# Patient Record
Sex: MALE | Race: WHITE | Employment: UNEMPLOYED | URBAN - METROPOLITAN AREA
[De-identification: names, ages, dates, MRNs, and addresses within clinical notes are randomized per-mention and may not be internally consistent; named-entity substitution may affect disease eponyms.]

---

## 2018-11-05 ENCOUNTER — HOSPITAL ENCOUNTER (EMERGENCY)
Facility: CLINIC | Age: 23
Discharge: HOME | End: 2018-11-05
Attending: EMERGENCY MEDICINE | Admitting: EMERGENCY MEDICINE
Payer: COMMERCIAL

## 2018-11-05 ENCOUNTER — APPOINTMENT (OUTPATIENT)
Dept: GENERAL RADIOLOGY | Facility: CLINIC | Age: 23
End: 2018-11-05
Attending: EMERGENCY MEDICINE
Payer: COMMERCIAL

## 2018-11-05 VITALS
DIASTOLIC BLOOD PRESSURE: 95 MMHG | HEIGHT: 74 IN | OXYGEN SATURATION: 96 % | WEIGHT: 246.25 LBS | BODY MASS INDEX: 31.6 KG/M2 | RESPIRATION RATE: 18 BRPM | TEMPERATURE: 100.4 F | SYSTOLIC BLOOD PRESSURE: 145 MMHG

## 2018-11-05 DIAGNOSIS — B34.9 VIRAL ILLNESS: ICD-10-CM

## 2018-11-05 DIAGNOSIS — R50.9 FEBRILE ILLNESS: ICD-10-CM

## 2018-11-05 LAB
DEPRECATED S PYO AG THROAT QL EIA: NORMAL
SPECIMEN SOURCE: NORMAL

## 2018-11-05 PROCEDURE — 71046 X-RAY EXAM CHEST 2 VIEWS: CPT

## 2018-11-05 PROCEDURE — 87081 CULTURE SCREEN ONLY: CPT | Performed by: EMERGENCY MEDICINE

## 2018-11-05 PROCEDURE — 87880 STREP A ASSAY W/OPTIC: CPT | Performed by: EMERGENCY MEDICINE

## 2018-11-05 PROCEDURE — 99284 EMERGENCY DEPT VISIT MOD MDM: CPT

## 2018-11-05 PROCEDURE — 25000132 ZZH RX MED GY IP 250 OP 250 PS 637

## 2018-11-05 RX ORDER — ACETAMINOPHEN 325 MG/1
TABLET ORAL
Status: COMPLETED
Start: 2018-11-05 | End: 2018-11-05

## 2018-11-05 RX ORDER — IBUPROFEN 600 MG/1
600 TABLET, FILM COATED ORAL ONCE
Status: DISCONTINUED | OUTPATIENT
Start: 2018-11-05 | End: 2018-11-05 | Stop reason: CLARIF

## 2018-11-05 RX ORDER — ACETAMINOPHEN 325 MG/1
975 TABLET ORAL ONCE
Status: COMPLETED | OUTPATIENT
Start: 2018-11-05 | End: 2018-11-05

## 2018-11-05 RX ADMIN — ACETAMINOPHEN 975 MG: 325 TABLET ORAL at 05:59

## 2018-11-05 RX ADMIN — ACETAMINOPHEN 975 MG: 325 TABLET, FILM COATED ORAL at 05:59

## 2018-11-05 ASSESSMENT — ENCOUNTER SYMPTOMS
SORE THROAT: 1
COUGH: 1
FEVER: 1
VOMITING: 1

## 2018-11-05 NOTE — DISCHARGE INSTRUCTIONS
Albuterol every 4 hours  Motrin 600mg every 6 hours  Tylenol 1000mg every 6 hours for fever  Push fluid and rest      Febrile Illness with Uncertain Cause (Adult)  You have a fever, but the cause is unknown. A fever is a natural reaction of the body to an illness such as infection due to a virus or bacteria. Sometimes other conditions such as cancer or immune diseases can cause fever, especially if the fever has lasted for more than a week or 2. In most cases, the temperature itself is not harmful. It actually helps the body fight infections. A fever does not need to be treated unless you feel very uncomfortable.  Sometimes a fever can be an early sign of a more serious infection, so make sure to follow up if your condition worsens.  Home care  Unless given other instructions by your healthcare provider, follow these guidelines when caring for yourself at home.  General care    If your symptoms are not severe, rest at home for the first 2 to 3 days. When you resume activity, don't let yourself get too tired.    For your overall health, don't smoke. Also avoid being exposed to secondhand smoke.    Your appetite may be poor, so a light diet is fine. Avoid dehydration by drinking 6 to 8 glasses of fluids per day (such as water, soft drinks, sports drinks, juices, tea, or soup). If you have congestion, extra fluids will help loosen secretions in the nose and lungs.  Medicines    You can take acetaminophen or ibuprofen for pain or to lower your temperature, unless you were given a different medicine to use. (Note: If you have chronic liver or kidney disease or have ever had a stomach ulcer or gastrointestinal bleeding, talk with your healthcare provider before using these medicines. Also talk to your provider if you are taking medicine to prevent blood clots.) Aspirin should never be given to anyone younger than 18 years of age who is ill with a viral infection or fever. It may cause severe liver or brain damage.    If  you were given antibiotics for an infection, take them until they are used up, or your healthcare provider tells you to stop. It is important to finish the antibiotics even though you feel better. This is to make sure the infection has cleared. Be aware that antibiotics are not usually given for a viral infection or a fever with an unknown cause.    Over-the-counter medicines will not shorten the duration of the illness. However, they may be helpful for the following symptoms: cough, sore throat, or nasal and sinus congestion. Ask your pharmacist for product suggestions. (Note: Don't use decongestants if you have high blood pressure.)  Follow-up care  Follow up with your healthcare provider, or as advised.    If a culture or other lab tests were done, you will be notified if your treatment needs to be changed. You can call as directed for the results.    If X-rays, a CT, or an ultrasound were done, a specialist will review them. You will be notified of any findings that may affect your care.  Call 911  Call 911 if any of these occur:    Trouble breathing or swallowing, or wheezing    Chest pain    Confusion    Extreme drowsiness or trouble awakening    Fainting or loss of consciousness    Rapid heart rate    Low blood pressure    Vomiting blood, or large amounts of blood in stool    Seizure  When to seek medical advice  Call your healthcare provider right away if any of these occur:    Cough with lots of colored sputum (mucus) or blood in your sputum    Severe headache    Face, neck, throat, or ear pain    Feeling drowsy    Abdominal pain    Repeated vomiting or diarrhea    Joint pain or a new rash    Burning when urinating    Fever of 100.4 F (38 C) or higher, or as directed by your healthcare provider    Feeling weak or dizzy  Date Last Reviewed: 11/1/2017 2000-2017 [employer]. [address] [place] [address], [place] [address]. All rights reserved. This information is not intended as a substitute for  professional medical care. Always follow your healthcare professional's instructions.

## 2018-11-05 NOTE — ED TRIAGE NOTES
Alert and oriented x 3 airway,breathing and circulation intact, sob and cough since 9 pm, sore throat and chest pain also

## 2018-11-05 NOTE — ED AVS SNAPSHOT
[facility] Emergency Department    [address]    [facility] [address]    Phone:  [phone]    Fax:  [phone]                                       Dipak Fuentes   MRN: [id number]    Department:  [facility] Emergency Department   Date of Visit:  11/5/2018           After Visit Summary Signature Page     I have received my discharge instructions, and my questions have been answered. I have discussed any challenges I see with this plan with the nurse or doctor.    ..........................................................................................................................................  Patient/Patient Representative Signature      ..........................................................................................................................................  Patient Representative Print Name and Relationship to Patient    ..................................................               ................................................  Date                                   Time    ..........................................................................................................................................  Reviewed by Signature/Title    ...................................................              ..............................................  Date                                               Time          22EPIC Rev 08/18

## 2018-11-05 NOTE — ED PROVIDER NOTES
"  History     Chief Complaint:    Shortness of Breath      HPI   Dipak Fuentes is a 23 year old male with a history of asthma who presents to the ED for evaluation of shortness of breath. The patient states that he has had a productive cough for the past 24 hours with clear sputum. Last night, he vomited secondary to severe coughing and now has pain from in his chest to her upper throat. He used an inhaler about an hour prior to presenting with mild relief of his shortness of breath. He denies any recent travel but notes that his daughter is sick with similar symptoms. He also states that he took an Adderall prior to presenting because he has ADHD. Patient has a low-grade fever here; he has not checked his temperature at home because he does not own a thermometer.     Allergies:  Ceclor     Medications:    Albuterol  Lamictal  Mirtazapine  Trazodoe  Robaxin     Past Medical History:    Bipolar 1 disorder  ADHD  Asthma  Chemical dependency    Past Surgical History:    The patient does not have any pertinent past surgical history.    Family History:    Depression  Substance abuse    Social History:  Former cigarette smoker, now E-cig smoker.  Positive for alcohol use, rarely.  Marital Status:  Single [1]     Review of Systems   Constitutional: Positive for fever.   HENT: Positive for sore throat.    Respiratory: Positive for cough.    Gastrointestinal: Positive for vomiting.   All other systems reviewed and are negative.      Physical Exam   First Vitals:  BP: 148/90  Heart Rate: 97  Temp: 100.4  F (38  C)  Resp: 18  Height: 188 cm (6' 2\")  Weight: 111.7 kg (246 lb 4.1 oz)  SpO2: 98 %      Physical Exam   Constitutional: He appears well-developed and well-nourished.   HENT:   Right Ear: External ear normal.   Left Ear: External ear normal.   Mouth/Throat: Oropharynx is clear and moist. No oropharyngeal exudate.   TM's clear bilaterally   Eyes: Conjunctivae are normal. Pupils are equal, round, and reactive to light. No " scleral icterus.   Neck: Normal range of motion. Neck supple.   Cardiovascular: Normal rate, regular rhythm, normal heart sounds and intact distal pulses.  Exam reveals no gallop and no friction rub.    No murmur heard.  Pulmonary/Chest: Effort normal and breath sounds normal. No respiratory distress. He has no wheezes. He has no rales.   Abdominal: Soft. Bowel sounds are normal. He exhibits no distension and no mass. There is no tenderness.   Musculoskeletal: Normal range of motion. He exhibits no edema.   Neurological: He is alert.   Skin: Skin is warm and dry. No rash noted.   Psychiatric: He has a normal mood and affect.       Emergency Department Course   Imaging:  Radiographic findings were communicated with the patient who voiced understanding of the findings.  XR Chest PA and LAT:   No evidence of active cardiopulmonary disease as per radiology.      Laboratory:  Rapid strep: negative  Beta strep culture: pending      Interventions:  0559 Tylenol 975 mg PO      Emergency Department Course:  Nursing notes and vitals reviewed. (9131) I performed an exam of the patient as documented above.     Medicine administered as documented above.     The patient was sent for a chest XR while in the emergency department, findings above.     0550 I rechecked the patient and discussed the results of his workup thus far.     Findings and plan explained to the Patient. Patient discharged home with instructions regarding supportive care, medications, and reasons to return. The importance of close follow-up was reviewed.     I personally reviewed the laboratory results with the Patient and answered all related questions prior to discharge.   Impression & Plan    Medical Decision Making:  Patient presents with one day of cough, sore throat, and a low grade temperature. Patient on exam does have a hoarse voice, his throat does look somewhat erythematous although there are no exudates. His lungs were clear but he did use an inhaler  an hour prior to arrival. So far his strep his negative and XR was negative. I did discuss with him that he needs to continue his inhaler every four hours and push fluids and rest. He did get Tylenol here for his temperature. He needs to continue with Motrin and Tylenol as well. He was agreeable with the plan. I did discuss with him that because this is the beginning of his illness, if it progresses or does not improve he will need repeat XR to rule out pneumonia. Currently there are non seen but we cannot rule out if he develops them. He agrees to close observation and follow up with his regular doctor in the next three days.     Diagnosis:    ICD-10-CM    1. Febrile illness R50.9    2. Viral illness B34.9        Disposition:  discharged to home    Scribe Disclosure:  I,  Jasson Summers, am serving as a scribe on 11/5/2018 at 4:56 AM to personally document services performed by Chandrakant Suazo MD based on my observations and the provider's statements to me.          Jasson Summers  11/5/2018   [facility] EMERGENCY DEPARTMENT       Chandrakant Suazo MD  11/14/18 0758

## 2018-11-07 LAB
BACTERIA SPEC CULT: NORMAL
Lab: NORMAL
SPECIMEN SOURCE: NORMAL

## 2020-02-24 ENCOUNTER — APPOINTMENT (OUTPATIENT)
Dept: CT IMAGING | Facility: CLINIC | Age: 25
End: 2020-02-24
Attending: EMERGENCY MEDICINE
Payer: COMMERCIAL

## 2020-02-24 ENCOUNTER — HOSPITAL ENCOUNTER (OUTPATIENT)
Facility: CLINIC | Age: 25
Setting detail: OBSERVATION
Discharge: HOME | End: 2020-02-25
Attending: EMERGENCY MEDICINE | Admitting: HOSPITALIST
Payer: COMMERCIAL

## 2020-02-24 DIAGNOSIS — M54.41 ACUTE RIGHT-SIDED LOW BACK PAIN WITH RIGHT-SIDED SCIATICA: Primary | ICD-10-CM

## 2020-02-24 DIAGNOSIS — M54.41 BILATERAL LOW BACK PAIN WITH RIGHT-SIDED SCIATICA, UNSPECIFIED CHRONICITY: ICD-10-CM

## 2020-02-24 LAB
ALBUMIN SERPL-MCNC: 4 G/DL (ref 3.4–5)
ALBUMIN UR-MCNC: NEGATIVE MG/DL
ALP SERPL-CCNC: 97 U/L (ref 40–150)
ALT SERPL W P-5'-P-CCNC: 89 U/L (ref 0–70)
ANION GAP SERPL CALCULATED.3IONS-SCNC: 6 MMOL/L (ref 3–14)
APPEARANCE UR: CLEAR
AST SERPL W P-5'-P-CCNC: 22 U/L (ref 0–45)
BASOPHILS # BLD AUTO: 0.1 10E9/L (ref 0–0.2)
BASOPHILS NFR BLD AUTO: 0.5 %
BILIRUB SERPL-MCNC: 0.5 MG/DL (ref 0.2–1.3)
BILIRUB UR QL STRIP: NEGATIVE
BUN SERPL-MCNC: 12 MG/DL (ref 7–30)
CALCIUM SERPL-MCNC: 9.4 MG/DL (ref 8.5–10.1)
CHLORIDE SERPL-SCNC: 104 MMOL/L (ref 94–109)
CO2 SERPL-SCNC: 25 MMOL/L (ref 20–32)
COLOR UR AUTO: ABNORMAL
CREAT SERPL-MCNC: 0.87 MG/DL (ref 0.66–1.25)
DIFFERENTIAL METHOD BLD: NORMAL
EOSINOPHIL # BLD AUTO: 0.2 10E9/L (ref 0–0.7)
EOSINOPHIL NFR BLD AUTO: 1.9 %
ERYTHROCYTE [DISTWIDTH] IN BLOOD BY AUTOMATED COUNT: 13.5 % (ref 10–15)
GFR SERPL CREATININE-BSD FRML MDRD: >90 ML/MIN/{1.73_M2}
GLUCOSE SERPL-MCNC: 92 MG/DL (ref 70–99)
GLUCOSE UR STRIP-MCNC: NEGATIVE MG/DL
HCT VFR BLD AUTO: 48.9 % (ref 40–53)
HGB BLD-MCNC: 15.8 G/DL (ref 13.3–17.7)
HGB UR QL STRIP: ABNORMAL
HYALINE CASTS #/AREA URNS LPF: 1 /LPF (ref 0–2)
IMM GRANULOCYTES # BLD: 0.1 10E9/L (ref 0–0.4)
IMM GRANULOCYTES NFR BLD: 0.6 %
KETONES UR STRIP-MCNC: NEGATIVE MG/DL
LEUKOCYTE ESTERASE UR QL STRIP: NEGATIVE
LIPASE SERPL-CCNC: 76 U/L (ref 73–393)
LYMPHOCYTES # BLD AUTO: 1.9 10E9/L (ref 0.8–5.3)
LYMPHOCYTES NFR BLD AUTO: 18.6 %
MCH RBC QN AUTO: 28.5 PG (ref 26.5–33)
MCHC RBC AUTO-ENTMCNC: 32.3 G/DL (ref 31.5–36.5)
MCV RBC AUTO: 88 FL (ref 78–100)
MONOCYTES # BLD AUTO: 1 10E9/L (ref 0–1.3)
MONOCYTES NFR BLD AUTO: 9.5 %
MUCOUS THREADS #/AREA URNS LPF: PRESENT /LPF
NEUTROPHILS # BLD AUTO: 7.1 10E9/L (ref 1.6–8.3)
NEUTROPHILS NFR BLD AUTO: 68.9 %
NITRATE UR QL: NEGATIVE
NRBC # BLD AUTO: 0 10*3/UL
NRBC BLD AUTO-RTO: 0 /100
PH UR STRIP: 5.5 PH (ref 5–7)
PLATELET # BLD AUTO: 332 10E9/L (ref 150–450)
POTASSIUM SERPL-SCNC: 3.4 MMOL/L (ref 3.4–5.3)
PROT SERPL-MCNC: 7.9 G/DL (ref 6.8–8.8)
RBC # BLD AUTO: 5.54 10E12/L (ref 4.4–5.9)
RBC #/AREA URNS AUTO: 1 /HPF (ref 0–2)
SODIUM SERPL-SCNC: 135 MMOL/L (ref 133–144)
SOURCE: ABNORMAL
SP GR UR STRIP: 1.02 (ref 1–1.03)
UROBILINOGEN UR STRIP-MCNC: NORMAL MG/DL (ref 0–2)
WBC # BLD AUTO: 10.3 10E9/L (ref 4–11)
WBC #/AREA URNS AUTO: 1 /HPF (ref 0–5)

## 2020-02-24 PROCEDURE — 81001 URINALYSIS AUTO W/SCOPE: CPT | Performed by: PHYSICIAN ASSISTANT

## 2020-02-24 PROCEDURE — 83690 ASSAY OF LIPASE: CPT | Performed by: EMERGENCY MEDICINE

## 2020-02-24 PROCEDURE — 25800030 ZZH RX IP 258 OP 636: Performed by: PHYSICIAN ASSISTANT

## 2020-02-24 PROCEDURE — 25000132 ZZH RX MED GY IP 250 OP 250 PS 637: Performed by: PHYSICIAN ASSISTANT

## 2020-02-24 PROCEDURE — 25000131 ZZH RX MED GY IP 250 OP 636 PS 637: Performed by: PHYSICIAN ASSISTANT

## 2020-02-24 PROCEDURE — 25000125 ZZHC RX 250: Performed by: EMERGENCY MEDICINE

## 2020-02-24 PROCEDURE — 25000128 H RX IP 250 OP 636: Performed by: EMERGENCY MEDICINE

## 2020-02-24 PROCEDURE — 99219 ZZC INITIAL OBSERVATION CARE,LEVL II: CPT | Performed by: PHYSICIAN ASSISTANT

## 2020-02-24 PROCEDURE — G0378 HOSPITAL OBSERVATION PER HR: HCPCS

## 2020-02-24 PROCEDURE — 96374 THER/PROPH/DIAG INJ IV PUSH: CPT | Mod: 59

## 2020-02-24 PROCEDURE — 25000128 H RX IP 250 OP 636: Performed by: PHYSICIAN ASSISTANT

## 2020-02-24 PROCEDURE — 74177 CT ABD & PELVIS W/CONTRAST: CPT

## 2020-02-24 PROCEDURE — 96376 TX/PRO/DX INJ SAME DRUG ADON: CPT

## 2020-02-24 PROCEDURE — 96361 HYDRATE IV INFUSION ADD-ON: CPT

## 2020-02-24 PROCEDURE — 85025 COMPLETE CBC W/AUTO DIFF WBC: CPT | Performed by: EMERGENCY MEDICINE

## 2020-02-24 PROCEDURE — 99285 EMERGENCY DEPT VISIT HI MDM: CPT | Mod: 25

## 2020-02-24 PROCEDURE — 80053 COMPREHEN METABOLIC PANEL: CPT | Performed by: EMERGENCY MEDICINE

## 2020-02-24 RX ORDER — ONDANSETRON 4 MG/1
4-8 TABLET, ORALLY DISINTEGRATING ORAL EVERY 8 HOURS PRN
Status: DISCONTINUED | OUTPATIENT
Start: 2020-02-24 | End: 2020-02-25 | Stop reason: HOSPADM

## 2020-02-24 RX ORDER — CYCLOBENZAPRINE HCL 10 MG
10 TABLET ORAL 3 TIMES DAILY
Status: DISCONTINUED | OUTPATIENT
Start: 2020-02-24 | End: 2020-02-25 | Stop reason: HOSPADM

## 2020-02-24 RX ORDER — METHYLPREDNISOLONE 4 MG/1
8 TABLET ORAL AT BEDTIME
Status: DISCONTINUED | OUTPATIENT
Start: 2020-02-24 | End: 2020-02-25 | Stop reason: HOSPADM

## 2020-02-24 RX ORDER — NALOXONE HYDROCHLORIDE 0.4 MG/ML
.1-.4 INJECTION, SOLUTION INTRAMUSCULAR; INTRAVENOUS; SUBCUTANEOUS
Status: DISCONTINUED | OUTPATIENT
Start: 2020-02-24 | End: 2020-02-25 | Stop reason: HOSPADM

## 2020-02-24 RX ORDER — BISACODYL 10 MG
10 SUPPOSITORY, RECTAL RECTAL DAILY PRN
Status: DISCONTINUED | OUTPATIENT
Start: 2020-02-24 | End: 2020-02-25 | Stop reason: HOSPADM

## 2020-02-24 RX ORDER — DIAZEPAM 5 MG
5-10 TABLET ORAL EVERY 6 HOURS PRN
Status: COMPLETED | OUTPATIENT
Start: 2020-02-24 | End: 2020-02-24

## 2020-02-24 RX ORDER — KETOROLAC TROMETHAMINE 15 MG/ML
15 INJECTION, SOLUTION INTRAMUSCULAR; INTRAVENOUS EVERY 6 HOURS PRN
Status: DISCONTINUED | OUTPATIENT
Start: 2020-02-24 | End: 2020-02-24

## 2020-02-24 RX ORDER — LIDOCAINE 4 G/G
1 PATCH TOPICAL ONCE
Status: COMPLETED | OUTPATIENT
Start: 2020-02-24 | End: 2020-02-24

## 2020-02-24 RX ORDER — PROCHLORPERAZINE 25 MG
25 SUPPOSITORY, RECTAL RECTAL EVERY 12 HOURS PRN
Status: DISCONTINUED | OUTPATIENT
Start: 2020-02-24 | End: 2020-02-25 | Stop reason: HOSPADM

## 2020-02-24 RX ORDER — METHYLPREDNISOLONE 4 MG/1
8 TABLET ORAL ONCE
Status: COMPLETED | OUTPATIENT
Start: 2020-02-24 | End: 2020-02-24

## 2020-02-24 RX ORDER — HYDROMORPHONE HYDROCHLORIDE 1 MG/ML
0.5 INJECTION, SOLUTION INTRAMUSCULAR; INTRAVENOUS; SUBCUTANEOUS
Status: DISCONTINUED | OUTPATIENT
Start: 2020-02-24 | End: 2020-02-24

## 2020-02-24 RX ORDER — CELECOXIB 200 MG/1
200 CAPSULE ORAL 2 TIMES DAILY
Status: DISCONTINUED | OUTPATIENT
Start: 2020-02-24 | End: 2020-02-25 | Stop reason: HOSPADM

## 2020-02-24 RX ORDER — METHYLPREDNISOLONE 4 MG/1
4 TABLET ORAL
Status: DISCONTINUED | OUTPATIENT
Start: 2020-02-25 | End: 2020-02-25 | Stop reason: HOSPADM

## 2020-02-24 RX ORDER — METHYLPREDNISOLONE 4 MG/1
4 TABLET ORAL ONCE
Status: COMPLETED | OUTPATIENT
Start: 2020-02-24 | End: 2020-02-24

## 2020-02-24 RX ORDER — METHYLPREDNISOLONE 4 MG/1
4 TABLET ORAL AT BEDTIME
Status: DISCONTINUED | OUTPATIENT
Start: 2020-02-26 | End: 2020-02-25 | Stop reason: HOSPADM

## 2020-02-24 RX ORDER — AMOXICILLIN 250 MG
2 CAPSULE ORAL 2 TIMES DAILY PRN
Status: DISCONTINUED | OUTPATIENT
Start: 2020-02-24 | End: 2020-02-25 | Stop reason: HOSPADM

## 2020-02-24 RX ORDER — ACETAMINOPHEN 325 MG/1
650 TABLET ORAL EVERY 4 HOURS PRN
Status: DISCONTINUED | OUTPATIENT
Start: 2020-02-24 | End: 2020-02-25 | Stop reason: HOSPADM

## 2020-02-24 RX ORDER — ACETAMINOPHEN 650 MG/1
650 SUPPOSITORY RECTAL EVERY 4 HOURS PRN
Status: DISCONTINUED | OUTPATIENT
Start: 2020-02-24 | End: 2020-02-25 | Stop reason: HOSPADM

## 2020-02-24 RX ORDER — LANOLIN ALCOHOL/MO/W.PET/CERES
3 CREAM (GRAM) TOPICAL
Status: DISCONTINUED | OUTPATIENT
Start: 2020-02-24 | End: 2020-02-25 | Stop reason: HOSPADM

## 2020-02-24 RX ORDER — AMOXICILLIN 250 MG
1 CAPSULE ORAL 2 TIMES DAILY PRN
Status: DISCONTINUED | OUTPATIENT
Start: 2020-02-24 | End: 2020-02-25 | Stop reason: HOSPADM

## 2020-02-24 RX ORDER — ONDANSETRON 2 MG/ML
4-8 INJECTION INTRAMUSCULAR; INTRAVENOUS EVERY 8 HOURS PRN
Status: DISCONTINUED | OUTPATIENT
Start: 2020-02-24 | End: 2020-02-25 | Stop reason: HOSPADM

## 2020-02-24 RX ORDER — POLYETHYLENE GLYCOL 3350 17 G
2-4 POWDER IN PACKET (EA) ORAL
Status: DISCONTINUED | OUTPATIENT
Start: 2020-02-24 | End: 2020-02-25 | Stop reason: HOSPADM

## 2020-02-24 RX ORDER — SODIUM CHLORIDE 9 MG/ML
INJECTION, SOLUTION INTRAVENOUS CONTINUOUS
Status: DISCONTINUED | OUTPATIENT
Start: 2020-02-24 | End: 2020-02-25

## 2020-02-24 RX ORDER — OXYCODONE HYDROCHLORIDE 5 MG/1
5-10 TABLET ORAL EVERY 4 HOURS PRN
Status: DISCONTINUED | OUTPATIENT
Start: 2020-02-24 | End: 2020-02-25 | Stop reason: HOSPADM

## 2020-02-24 RX ORDER — IOPAMIDOL 755 MG/ML
500 INJECTION, SOLUTION INTRAVASCULAR ONCE
Status: COMPLETED | OUTPATIENT
Start: 2020-02-24 | End: 2020-02-24

## 2020-02-24 RX ORDER — PROCHLORPERAZINE MALEATE 10 MG
10 TABLET ORAL EVERY 6 HOURS PRN
Status: DISCONTINUED | OUTPATIENT
Start: 2020-02-24 | End: 2020-02-25 | Stop reason: HOSPADM

## 2020-02-24 RX ORDER — HYDROCODONE BITARTRATE AND ACETAMINOPHEN 5; 325 MG/1; MG/1
1 TABLET ORAL ONCE
Status: COMPLETED | OUTPATIENT
Start: 2020-02-24 | End: 2020-02-24

## 2020-02-24 RX ORDER — GABAPENTIN 300 MG/1
300 CAPSULE ORAL AT BEDTIME
Status: DISCONTINUED | OUTPATIENT
Start: 2020-02-24 | End: 2020-02-25 | Stop reason: HOSPADM

## 2020-02-24 RX ORDER — HYDROMORPHONE HYDROCHLORIDE 1 MG/ML
0.5 INJECTION, SOLUTION INTRAMUSCULAR; INTRAVENOUS; SUBCUTANEOUS
Status: COMPLETED | OUTPATIENT
Start: 2020-02-24 | End: 2020-02-24

## 2020-02-24 RX ADMIN — LIDOCAINE 1 PATCH: 560 PATCH PERCUTANEOUS; TOPICAL; TRANSDERMAL at 09:58

## 2020-02-24 RX ADMIN — DIAZEPAM 5 MG: 5 TABLET ORAL at 15:18

## 2020-02-24 RX ADMIN — HYDROCODONE BITARTRATE AND ACETAMINOPHEN 1 TABLET: 5; 325 TABLET ORAL at 09:35

## 2020-02-24 RX ADMIN — SODIUM CHLORIDE: 9 INJECTION, SOLUTION INTRAVENOUS at 23:45

## 2020-02-24 RX ADMIN — HYDROMORPHONE HYDROCHLORIDE 0.5 MG: 1 INJECTION, SOLUTION INTRAMUSCULAR; INTRAVENOUS; SUBCUTANEOUS at 11:50

## 2020-02-24 RX ADMIN — METHYLPREDNISOLONE 8 MG: 4 TABLET ORAL at 15:19

## 2020-02-24 RX ADMIN — GABAPENTIN 300 MG: 300 CAPSULE ORAL at 21:45

## 2020-02-24 RX ADMIN — SODIUM CHLORIDE 65 ML: 9 INJECTION, SOLUTION INTRAVENOUS at 12:26

## 2020-02-24 RX ADMIN — METHYLPREDNISOLONE 4 MG: 4 TABLET ORAL at 18:25

## 2020-02-24 RX ADMIN — CYCLOBENZAPRINE HYDROCHLORIDE 10 MG: 10 TABLET, FILM COATED ORAL at 19:53

## 2020-02-24 RX ADMIN — DIAZEPAM 5 MG: 5 TABLET ORAL at 21:45

## 2020-02-24 RX ADMIN — SODIUM CHLORIDE: 9 INJECTION, SOLUTION INTRAVENOUS at 15:20

## 2020-02-24 RX ADMIN — IOPAMIDOL 100 ML: 755 INJECTION, SOLUTION INTRAVENOUS at 12:26

## 2020-02-24 RX ADMIN — METHYLPREDNISOLONE 4 MG: 4 TABLET ORAL at 15:20

## 2020-02-24 RX ADMIN — HYDROMORPHONE HYDROCHLORIDE 0.5 MG: 1 INJECTION, SOLUTION INTRAMUSCULAR; INTRAVENOUS; SUBCUTANEOUS at 11:03

## 2020-02-24 RX ADMIN — CELECOXIB 200 MG: 200 CAPSULE ORAL at 19:53

## 2020-02-24 RX ADMIN — NICOTINE 1 PATCH: 7 PATCH, EXTENDED RELEASE TRANSDERMAL at 15:17

## 2020-02-24 RX ADMIN — METHYLPREDNISOLONE 8 MG: 4 TABLET ORAL at 21:45

## 2020-02-24 ASSESSMENT — ENCOUNTER SYMPTOMS
DYSURIA: 0
FREQUENCY: 0
HEMATURIA: 0
BACK PAIN: 1
NUMBNESS: 0
SHORTNESS OF BREATH: 0
ROS GI COMMENTS: NEGATIVE FOR BOWEL INCONTINENCE.
FEVER: 0

## 2020-02-24 ASSESSMENT — MIFFLIN-ST. JEOR: SCORE: 2179.21

## 2020-02-24 NOTE — ED NOTES
Continues to rate pain 10/10 <15 minutes of iv dilaudid administration. Pt encouraged to take deep therapeutic breaths, lights dimmed. Writer informed pt he would round back frequently to assess discomfort.

## 2020-02-24 NOTE — PHARMACY-ADMISSION MEDICATION HISTORY
Admission medication history interview status for this patient is complete. See [facility] admission navigator for allergy information, prior to admission medications and immunization status.     Medication history interview source(s):Patient  Medication history resources (including written lists, pill bottles, clinic record):None    Changes made to PTA medication list:  Added: none  Deleted: lamictal, methocarbamol, mirtazapine, trazodone  Changed: none    Actions taken by pharmacist (provider contacted, etc):None     Additional medication history information:None    Medication reconciliation/reorder completed by provider prior to medication history?  No     Do you take OTC medications (eg tylenol, ibuprofen, fish oil, eye/ear drops, etc)? Yes     For patients on insulin therapy: No      Prior to Admission medications    Medication Sig Last Dose Taking? Auth Provider   IBUPROFEN PO  prn Yes Reported, Patient   albuterol (PROAIR HFA) 108 (90 BASE) MCG/ACT inhaler Inhale 2 puffs into the lungs every 6 hours as needed. More than a month at one year ago Last time used: one year ago Reported, Patient

## 2020-02-24 NOTE — ED NOTES
"[facility]  ED Nurse Handoff Report    Dipak Fuentes is a 24 year old male   ED Chief complaint: Back Pain and Leg Pain  . ED Diagnosis:   Final diagnoses:   Bilateral low back pain with right-sided sciatica, unspecified chronicity     Allergies:   Allergies   Allergen Reactions     Ceclor [Cefaclor Monohydrate]      Cefaclor Rash       Code Status: Full Code  Activity level - Baseline/Home:  Independent. Activity Level - Current:   Assist X 2. Lift room needed: No. Bariatric: No   Needed: No   Isolation: No. Infection: Not Applicable.     Vital Signs:   Vitals:    02/24/20 1030 02/24/20 1100 02/24/20 1115 02/24/20 1130   BP: 123/77 135/88  (!) 157/104   Pulse: 74  91 83   Resp:       Temp:       TempSrc:       SpO2: 98%          Cardiac Rhythm:  ,      Pain level: 0-10 Pain Scale: 10  Patient confused: No. Patient Falls Risk: Yes.   Elimination Status: Has voided   Patient Report - Initial Complaint: Lower back and RLE pain. Focused Assessment: A&O x 4 with VSS on RA. Extremely ridged with mobility, calling out and crying in pain when attempting to ambulate. Assist of 2 for transfers. 18g IV in Rt AC.     HPI per MD   Dipak Fuentes is a 24 year old male with a history of chemical dependency, bipolar 1 disorder, schizoaffective disorder, and tobacco use, who presents for evaluation of bilateral low back pain, right worse than left, for the past two weeks, worse over the past 7 hours. Patient also reports of radiation of his pain down his right leg and to his groin. Dipak describes that he woke to use the restroom at 0200 today when he \"tweaked\" his back while walking to the restroom, causing him to slowly lower himself on the floor. He did not hit his head or lose consciousness. Of note, he had back pain for 2 weeks prior to the episode tonight. He believes that he may have twisted his back incorrectly while walking to the restroom, causing his worsened discomfort today. He has had no recent " "falls or trauma. He was unable to get up from the floor due to his pain and ended up sleeping on the ground which worsened his pain. He woke from the floor with what he describes as a muscle spasm, so he called paramedics, who assisted him into his vehicle. Patient has not been able to ambulate over the last 7 hours, though has been able to urinate normally. Patient took Flexeril and ibuprofen one hour prior to arrival. His only other concern is \"feeling hot,\" and denies fever, chills, chest pain, shortness of breath, numbness to his legs or groin, urinary symptoms, urinary or bowel incontinence, or any other concerns. No history of kidney stones or disc related diseases. He has been seen by chiropractors in the past which have helped him. No history of back pain requiring an ED visit in the past.   Tests Performed: Urine/labs/CT. Abnormal Results:   Labs Ordered and Resulted from Time of ED Arrival Up to the Time of Departure from the ED   ROUTINE UA WITH MICROSCOPIC - Abnormal; Notable for the following components:       Result Value    Blood Urine Trace (*)     Mucous Urine Present (*)     All other components within normal limits   COMPREHENSIVE METABOLIC PANEL - Abnormal; Notable for the following components:    ALT 89 (*)     All other components within normal limits   CBC WITH PLATELETS DIFFERENTIAL   LIPASE   .   Treatments provided: analgesics.   Family Comments: Fiance and Mother at bedside.   OBS brochure/video discussed/provided to patient:  Yes  ED Medications:   Medications   Lidocaine (LIDOCARE) 4 % Patch 1 patch (1 patch Transdermal Patch/Med Applied 2/24/20 0917)   HYDROmorphone (PF) (DILAUDID) injection 0.5 mg (0.5 mg Intravenous Given 2/24/20 1150)   HYDROcodone-acetaminophen (NORCO) 5-325 MG per tablet 1 tablet (1 tablet Oral Given 2/24/20 0935)   HYDROmorphone (PF) (DILAUDID) injection 0.5 mg (0.5 mg Intravenous Given 2/24/20 1103)   CT Scan Flush (65 mLs Intravenous Given 2/24/20 1226) "   iopamidol (ISOVUE-370) solution 500 mL (100 mLs Intravenous Given 2/24/20 1226)     Drips infusing:  No  For the majority of the shift, the patient's behavior Yellow. Interventions performed were reassurance, explanation of cares/plan.    Sepsis treatment initiated: No       ED Nurse Name/Phone Number: Xavi Kelsey RN,   1:40 PM    RECEIVING UNIT ED HANDOFF REVIEW    Above ED Nurse Handoff Report was reviewed: Yes  Reviewed by: Holly Broderick RN on February 24, 2020 at 2:15 PM

## 2020-02-24 NOTE — PLAN OF CARE
PRIMARY DIAGNOSIS: ACUTE PAIN  OUTPATIENT/OBSERVATION GOALS TO BE MET BEFORE DISCHARGE:  1. Pain Status: No improvement noted. But receiving PO meds (see MAR)    2. Return to near baseline physical activity: No    3. Cleared for discharge by consultants (if involved): N/A    Discharge Planner Nurse   Safe discharge environment identified: Yes  Barriers to discharge: Yes       Entered by: Talib Carvalho 02/24/2020 5:45 PM    Pt stable. A/O x4. Pt vitals stable. Pt not ambulating d/t pain. Tolerating PO meds and food. Family at bedside. Will continue to monitor.      Please review provider order for any additional goals.   Nurse to notify provider when observation goals have been met and patient is ready for discharge.

## 2020-02-24 NOTE — ED PROVIDER NOTES
"  History     Chief Complaint:  Back Pain and Leg Pain      HPI   Dipak Fuentes is a 24 year old male with a history of chemical dependency, bipolar 1 disorder, schizoaffective disorder, and tobacco use, who presents for evaluation of bilateral low back pain, right worse than left, for the past two weeks, worse over the past 7 hours. Patient also reports of radiation of his pain down his right leg and to his groin. Dipak describes that he woke to use the restroom at 0200 today when he \"tweaked\" his back while walking to the restroom, causing him to slowly lower himself on the floor. He did not hit his head or lose consciousness. Of note, he had back pain for 2 weeks prior to the episode tonight. He believes that he may have twisted his back incorrectly while walking to the restroom, causing his worsened discomfort today. He has had no recent falls or trauma. He was unable to get up from the floor due to his pain and ended up sleeping on the ground which worsened his pain. He woke from the floor with what he describes as a muscle spasm, so he called paramedics, who assisted him into his vehicle. Patient has not been able to ambulate over the last 7 hours, though has been able to urinate normally. Patient took Flexeril and ibuprofen one hour prior to arrival. His only other concern is \"feeling hot,\" and denies fever, chills, chest pain, shortness of breath, numbness to his legs or groin, urinary symptoms, urinary or bowel incontinence, or any other concerns. No history of kidney stones or disc related diseases. He has been seen by chiropractors in the past which have helped him. No history of back pain requiring an ED visit in the past.     Allergies:  Ceclor      Medications:    Albuterol  Lamictal  Robaxin   Mirtazapine  Trazodone     Past Medical History:    Bipolar 1  Asthma   Chemical dependency   Schizoaffective disorder  Tobacco use   Decreased libido     Past Surgical History:    History reviewed. No pertinent " surgical history.     Family History:    Depression   Substance abuse  Anxiety  Parkinsonism    Social History:  Smoking status: former   Alcohol use: yes   Drug use: no   PCP: Physician No Ref-Primary  Marital Status:  Single      Review of Systems   Constitutional: Negative for fever.   Respiratory: Negative for shortness of breath.    Cardiovascular: Negative for chest pain.   Gastrointestinal:        Negative for bowel incontinence.    Genitourinary: Negative for dysuria, frequency, hematuria and urgency.        Positive for groin pain.  Negative for urinary incontinence.    Musculoskeletal: Positive for back pain.   Neurological: Negative for syncope and numbness.   All other systems reviewed and are negative.      Physical Exam     Patient Vitals for the past 24 hrs:   BP Temp Temp src Pulse Heart Rate Resp SpO2   02/24/20 1130 (!) 157/104 -- -- 83 -- -- --   02/24/20 1115 -- -- -- 91 -- -- --   02/24/20 1100 135/88 -- -- -- -- -- --   02/24/20 1030 123/77 -- -- 74 -- -- 98 %   02/24/20 1015 121/82 -- -- 72 -- -- 99 %   02/24/20 1000 (!) 126/109 -- -- 83 -- -- 98 %   02/24/20 0913 (!) 143/86 97.6  F (36.4  C) Oral 88 88 21 98 %        Physical Exam  Constitutional: Pleasant. Cooperative. Not moving on gurney.  Eyes: Pupils equally round and reactive  HENT: Head is normal in appearance. Oropharynx is normal with moist mucus membranes.  Cardiovascular: Regular rate and rhythm and without murmurs.  Respiratory: Normal respiratory effort, lungs are clear bilaterally.  GI: Abdomen is soft, non-tender, non-distended. No guarding, rebound, or rigidity.  Musculoskeletal: No midline spinal tenderness. R sided paraspinal tenderness to L spine. 5/5 strength with knee flexion, knee extension, dorsiflexion, and plantarflexion bilaterally.   Skin: Normal, without rash.  Neurologic: Cranial nerves grossly intact, normal cognition, no focal deficits. Alert and oriented x 3. Sensation to light touch intact in bilateral lower  extremities.   Psychiatric: Normal affect.  Nursing notes and vital signs reviewed.    Emergency Department Course     Imaging:  Radiographic findings were communicated with the patient who voiced understanding of the findings.    CT Abdomen Pelvis w Contrast  No acute changes in the abdomen or pelvis to account for  patient's symptoms. No bowel obstruction, diverticulitis or  appendicitis.  As read by Radiology.    Laboratory:  UA: blood trace, mucous present, o/w negative     Lipase: 76  CBC: WBC 10.3, HGB 15.8,    CMP: AST 89 (H), o/w WNL (Creatinine: 0.87)    Interventions:  0935: Norco 5-325 mg per tablet 1 tablet PO  0958: Lidocaine 4% patch 1 patch transdermal   1103: Dilaudid 0.5 mg IV  1150: Dilaudid 0.5 mg IV    Emergency Department Course:  0908: Nursing notes and vitals reviewed. I performed an exam of the patient as documented above.     Medicine administered as documented above. Blood drawn. The patient provided a urine sample here in the emergency department. This was sent for laboratory testing, findings above.      The patient was sent for ab abdomen pelvis CT while in the emergency department, findings above.     1100: I rechecked the patient and discussed the results of his workup thus far.     1300: I rechecked the patient and updated him on the plan.     1331: I consulted with Kathy Carvalho PA-C for Dr. Quintero of the hospitalist services. They are in agreement to accept the patient for admission.    Findings and plan explained to the Patient who consents to admission.      Impression & Plan      Medical Decision Making:  Dipak Fuentes is a 24 year old male who presents to the emergency department for evaluation of nontraumatic bilateral low back pain.  Patient is unable to ambulate at home and wound up sleeping on the floor, prompting him to call EMS.  No red flags otherwise.  See HPI as above for additional details.  Vitals and physical exam as above.  Differential is broad and included  cauda equina, compression fracture, epidural abscess, disc related pathology, kidney stone, PE, pyelonephritis, among others.  Initially, given the nontraumatic nature as well as history of back pain and no intra-abdominal symptoms, patient's symptoms were managed with p.o. medications.  Despite these medications, patient's pain was not alleviated, and patient had significant pain while attempting to ambulate to provide urine sample.  On reevaluation, patient did note some RLQ pain, possibly wrapping around from his back.  In this setting, stone work-up was then pursued.  Remainder of labs and imaging as above.  No evidence for other nefarious etiology.  Suspect MSK at this time.  Given patient's difficulty with ambulation and persistent significant pain, will admit the patient to observation for further pain management and consideration of PT.  Discussed this with patient, who is agreeable to admission.  Discussed case with hospitalist, who was agreeable to admission.  All questions answered.  Patient admitted in stable condition.    Diagnosis:    ICD-10-CM    1. Bilateral low back pain with right-sided sciatica, unspecified chronicity M54.41        Disposition:  Admitted to the hospital.     IMelina, am serving as a scribe at 9:08 AM on 2/24/2020 to document services personally performed by Silvestre Nur PA-C, based on my observations and the provider's statements to me.     Melina Cowan  2/24/2020   [facility] EMERGENCY DEPARTMENT       Silvestre Nur PA-C  02/24/20 1411       Silvestre Nur PA-C  02/24/20 141

## 2020-02-24 NOTE — ED TRIAGE NOTES
"Presents with lower back pain that radiates down the right leg. Reports pain has been going on for a while but was exacerbated last night when he \"tweaked it\" while going to the bathroom at 0200. Reports taking ibuprofen and flexrerall at 0800 this AM  "

## 2020-02-24 NOTE — PLAN OF CARE
ROOM # 233    Living Situation (if not independent, order SW consult): with family   Facility name:  : Rosalind Fuentes (mother) [phone]     Activity level at baseline: independent  Activity level on admit: assist of 2    Patient registered to observation; given Patient Bill of Rights; given the opportunity to ask questions about observation status and their plan of care.  Patient has been oriented to the observation room, bathroom and call light is in place.    Discussed discharge goals and expectations with patient/family.     Pt A/O x4. Pt stable. Pt tolerating PO meds, fluids and food. Family at bedside. Medications received. Will continue to monitor.
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
## 2020-02-24 NOTE — ED NOTES
Pt requested assistance to ambulate to restroom. Coming to a stand pt became very rigid and after a few steps reported 10/10 pain and began yelling/calling out. Pt returned to bed and reports discomfort with any elevation of head of bed.

## 2020-02-25 ENCOUNTER — THERAPY VISIT (OUTPATIENT)
Dept: PHYSICAL THERAPY | Facility: CLINIC | Age: 25
End: 2020-02-25
Payer: COMMERCIAL

## 2020-02-25 VITALS
TEMPERATURE: 97.8 F | HEIGHT: 74 IN | SYSTOLIC BLOOD PRESSURE: 132 MMHG | OXYGEN SATURATION: 97 % | BODY MASS INDEX: 31.95 KG/M2 | HEART RATE: 73 BPM | WEIGHT: 249 LBS | DIASTOLIC BLOOD PRESSURE: 63 MMHG | RESPIRATION RATE: 20 BRPM

## 2020-02-25 DIAGNOSIS — M54.50 LOW BACK PAIN: ICD-10-CM

## 2020-02-25 DIAGNOSIS — M54.41 ACUTE RIGHT-SIDED LOW BACK PAIN WITH RIGHT-SIDED SCIATICA: ICD-10-CM

## 2020-02-25 PROCEDURE — 97110 THERAPEUTIC EXERCISES: CPT | Mod: GP | Performed by: PHYSICAL THERAPIST

## 2020-02-25 PROCEDURE — 25000132 ZZH RX MED GY IP 250 OP 250 PS 637: Performed by: PHYSICIAN ASSISTANT

## 2020-02-25 PROCEDURE — 99217 ZZC OBSERVATION CARE DISCHARGE: CPT | Performed by: PHYSICIAN ASSISTANT

## 2020-02-25 PROCEDURE — 97161 PT EVAL LOW COMPLEX 20 MIN: CPT | Mod: GP | Performed by: PHYSICAL THERAPIST

## 2020-02-25 PROCEDURE — 96361 HYDRATE IV INFUSION ADD-ON: CPT

## 2020-02-25 PROCEDURE — 25000131 ZZH RX MED GY IP 250 OP 636 PS 637: Performed by: PHYSICIAN ASSISTANT

## 2020-02-25 PROCEDURE — G0378 HOSPITAL OBSERVATION PER HR: HCPCS

## 2020-02-25 RX ORDER — CELECOXIB 200 MG/1
200 CAPSULE ORAL 2 TIMES DAILY
Qty: 14 CAPSULE | Refills: 0 | Status: SHIPPED | OUTPATIENT
Start: 2020-02-25 | End: 2020-04-10

## 2020-02-25 RX ORDER — GABAPENTIN 300 MG/1
300 CAPSULE ORAL AT BEDTIME
Qty: 10 CAPSULE | Refills: 0 | Status: SHIPPED | OUTPATIENT
Start: 2020-02-25 | End: 2020-04-10

## 2020-02-25 RX ORDER — DIAZEPAM 5 MG
5 TABLET ORAL EVERY 8 HOURS PRN
Qty: 9 TABLET | Refills: 0 | Status: SHIPPED | OUTPATIENT
Start: 2020-02-25 | End: 2020-04-10

## 2020-02-25 RX ORDER — METHYLPREDNISOLONE 4 MG
TABLET, DOSE PACK ORAL
Qty: 21 TABLET | Refills: 0 | Status: SHIPPED | OUTPATIENT
Start: 2020-02-25 | End: 2020-04-10

## 2020-02-25 RX ORDER — ACETAMINOPHEN 325 MG/1
650 TABLET ORAL EVERY 4 HOURS PRN
Refills: 0 | COMMUNITY
Start: 2020-02-25 | End: 2022-08-17

## 2020-02-25 RX ADMIN — NICOTINE 1 PATCH: 7 PATCH, EXTENDED RELEASE TRANSDERMAL at 09:31

## 2020-02-25 RX ADMIN — METHYLPREDNISOLONE 4 MG: 4 TABLET ORAL at 09:20

## 2020-02-25 RX ADMIN — CYCLOBENZAPRINE HYDROCHLORIDE 10 MG: 10 TABLET, FILM COATED ORAL at 09:20

## 2020-02-25 RX ADMIN — CELECOXIB 200 MG: 200 CAPSULE ORAL at 09:20

## 2020-02-25 NOTE — PLAN OF CARE
"PRIMARY DIAGNOSIS: ACUTE LOW BACK PAIN  OUTPATIENT/OBSERVATION GOALS TO BE MET BEFORE DISCHARGE:  1. Pain Status: Improved-controlled with oral pain medications.    2. Return to near baseline physical activity: No    3. Cleared for discharge by consultants (if involved): N/A    Discharge Planner Nurse   Safe discharge environment identified: Yes  Barriers to discharge: Yes       Entered by: Junior Bailey 02/25/2020 5:35 AM     Please review provider order for any additional goals.   Nurse to notify provider when observation goals have been met and patient is ready for discharge.  Temp: 95.9  F (35.5  C) Temp src: Oral BP: (!) 142/61 Pulse: 73 Heart Rate: 81 Resp: 16 SpO2: 93 % O2 Device: None (Room air)    Pt A&Ox4. Pt reports back pain is \"doing much better,\" rating his pain at 5/10. Pt denies N/V/D, numbness, or tingling. Pt has not gotten up out of bed, assume assist of 2 w/ GB. PIV- NS infusing @ 100 mL/hr. Reg diet. Plan- discharge home w/ outpatient rehab unless symptoms do not improve, then consider Neurosurgical consult   Pt is sleeping  "

## 2020-02-25 NOTE — PLAN OF CARE
"PRIMARY DIAGNOSIS: ACUTE PAIN  OUTPATIENT/OBSERVATION GOALS TO BE MET BEFORE DISCHARGE:  1. Pain Status: Improved-controlled with oral pain medications.    2. Return to near baseline physical activity: No    3. Cleared for discharge by consultants (if involved): No    Discharge Planner Nurse   Safe discharge environment identified: Yes  Barriers to discharge: Yes       Entered by: Satya Grossman 02/24/2020     Pt alert and oriented x4. Having pain in his lower R back. Has not been out of bed due to pain. Given flexeril and valium for pain this evening - with improvement. NS @ 100 ml/hr. Regular diet. Will cont supportive cares.    /79 (BP Location: Left arm)   Pulse 73   Temp 96.8  F (36  C) (Oral)   Resp 16   Ht 1.88 m (6' 2\")   Wt 112.9 kg (249 lb)   SpO2 96%   BMI 31.97 kg/m         Please review provider order for any additional goals.   Nurse to notify provider when observation goals have been met and patient is ready for discharge.  "

## 2020-02-25 NOTE — PROGRESS NOTES
[facility] Initial Evaluation  Subjective:  The history is provided by the patient. No  was used.   Therapist Generated HPI Evaluation  Problem details: Patient reports a sudden onset of low back pain on 2/24/2020 at 2 AM while walking to the bathroom.   Patient was taken by ambulance to the hospital.  Back had been mildly more sore the two weeks prior to onset.  Patient c/o pain with walking, standing, sitting, and transitional movements.         Type of problem:  Lumbar.    This is a new condition.  Condition occurred with:  Insidious onset.  Where condition occurred: for unknown reasons.  Patient reports pain:  Lumbar spine right.  and is constant.  Pain radiates to:  Lower leg right. Pain is the same all the time.  Since onset symptoms are gradually improving.  Associated symptoms:  Loss of motion/stiffness. Symptoms are exacerbated by standing, sitting, walking and bending  and relieved by analgesics, NSAID's, muscle relaxants and rest (lying down).  Special tests included:  CT scan (of abdominal cavity ).  Past treatment: none.   Restrictions due to condition include:  Currently not working due to present treatment.  Barriers include:  None as reported by patient.    Patient Entered Health History - See Therapist Evaluation below        and reported as 6/10 on pain scale.  General health as reported by patient is good.  Pertinent medical history includes: asthma, smoking and mental illness.   Red flags:  None as reported by patient.  Medical allergies: adhesives and latex.   Surgeries include:  None.    Current medications:  Steroids, pain medication, anti-inflammatory and muscle relaxants.    Current occupation is [employer].                     Physical Exam                    Objective:  Standing Alignment:        Lumbar:  Lordosis decr and lateral shift R (no obvious shift in prone position)            Gait:    Gait Type:  Antalgic   Weight Bearing Status:  WBAT    Assistive Devices:  Crutches                 Lumbar/SI Evaluation  ROM:    AROM Lumbar:   Flexion:          None  Ext:                    Stuck in slight flexion.  Centralization of leg symptoms to proximal thgh with prone to prone on elbows progression   Side Bend:        Left:     Right:   Rotation:           Left:     Right:   Side Glide:        Left:  None    Right:  None          Lumbar Myotomes:        L4 (Ankle DF):  Left:  5    Right:  5  L5 (Great Toe Ext): Left: 5    Right: 5   S1 (Toe Raise):  Left: 5    Right: 5                                                               General     ROS    Assessment/Plan:    Patient is a 24 year old male with lumbar complaints.    Patient has the following significant findings with corresponding treatment plan.                Diagnosis 1:  Lumbar derangement - probable disc  Pain -  self management, education, directional preference exercise and home program  Decreased ROM/flexibility - manual therapy, therapeutic exercise, therapeutic activity and home program  Impaired gait - gait training and home program  Impaired muscle performance - neuro re-education and home program  Decreased function - therapeutic activities and home program  Impaired posture - neuro re-education, therapeutic activities and home program    Therapy Evaluation Codes:   1) History comprised of:   Personal factors that impact the plan of care:      None.    Comorbidity factors that impact the plan of care are:      Asthma, Mental illness and Smoking.     Medications impacting care: Anti-inflammatory, Muscle relaxant, Pain and Steroids.  2) Examination of Body Systems comprised of:   Body structures and functions that impact the plan of care:      Lumbar spine.   Activity limitations that impact the plan of care are:      Bathing, Bending, Cooking, Driving, Dressing, Lifting, Sitting, Squatting/kneeling, Stairs, Standing, Walking, Working, Sleeping and Transitional movements.  3) Clinical  presentation characteristics are:   Stable/Uncomplicated.  4) Decision-Making    Low complexity using standardized patient assessment instrument and/or measureable assessment of functional outcome.  Cumulative Therapy Evaluation is: Low complexity.    Previous and current functional limitations:  (See Goal Flow Sheet for this information)    Short term and Long term goals: (See Goal Flow Sheet for this information)     Communication ability:  Patient appears to be able to clearly communicate and understand verbal and written communication and follow directions correctly.  Treatment Explanation - The following has been discussed with the patient:   RX ordered/plan of care  Anticipated outcomes  Possible risks and side effects  This patient would benefit from PT intervention to resume normal activities.   Rehab potential is good.    Frequency:  2 X week, once daily  Duration:  for 2 weeks tapering to 1 X a week over 4 weeks  Discharge Plan:  Achieve all LTG.  Independent in home treatment program.  Reach maximal therapeutic benefit.    Please refer to the daily flowsheet for treatment today, total treatment time and time spent performing 1:1 timed codes.

## 2020-02-25 NOTE — LETTER
DEPARTMENT OF HEALTH AND HUMAN SERVICES  [facility]    PLAN/UPDATED PLAN OF PROGRESS FOR OUTPATIENT REHABILITATION    PATIENTS NAME:  Dipak Fuentes   : 1995  [facility] NUMBER:    [id number]  [id number]  PROVIDER NAME: EDE GIMENEZ PT  MEDICAL RECORD NUMBER: [id number]   START OF CARE DATE:  SOC Date: 20   TYPE:  PT  PRIMARY/TREATMENT DIAGNOSIS: (Pertinent Medical Diagnosis)  Acute right-sided low back pain with right-sided sciatica  Low back pain  VISITS FROM START OF CARE:  Rxs Used: 1     [facility] Initial Evaluation  Subjective:  The history is provided by the patient. No  was used.   Therapist Generated HPI Evaluation  Problem details: Patient reports a sudden onset of low back pain on 2020 at 2 AM while walking to the bathroom.   Patient was taken by ambulance to the hospital.  Back had been mildly more sore the two weeks prior to onset.  Patient c/o pain with walking, standing, sitting, and transitional movements.         Type of problem:  Lumbar.    This is a new condition.  Condition occurred with:  Insidious onset.  Where condition occurred: for unknown reasons.  Patient reports pain:  Lumbar spine right.  and is constant.  Pain radiates to:  Lower leg right. Pain is the same all the time.  Since onset symptoms are gradually improving.  Associated symptoms:  Loss of motion/stiffness. Symptoms are exacerbated by standing, sitting, walking and bending  and relieved by analgesics, NSAID's, muscle relaxants and rest (lying down).  Special tests included:  CT scan (of abdominal cavity ).  Past treatment: none.   Restrictions due to condition include:  Currently not working due to present treatment.  Barriers include:  None as reported by patient.    Patient Entered Health History - See Therapist Evaluation below        and reported as 6/10 on pain scale.  General health as reported by patient is good.  Pertinent  medical history includes: asthma, smoking and mental illness.   Red flags:  None as reported by patient.  Medical allergies: adhesives and latex.   Surgeries include:  None.    Current medications:  Steroids, pain medication, anti-inflammatory and muscle relaxants.    Current occupation is [employer].   PATIENTS NAME:  Dipak Fuentes   : 1995               Physical Exam                  Objective:  Standing Alignment:    Lumbar:  Lordosis decr and lateral shift R (no obvious shift in prone position)    Gait:    Gait Type:  Antalgic   Weight Bearing Status:  WBAT   Assistive Devices:  Crutches      Lumbar/SI Evaluation  ROM:    AROM Lumbar:   Flexion:          None  Ext:                    Stuck in slight flexion.  Centralization of leg symptoms to proximal thgh with prone to prone on elbows progression   Side Bend:        Left:     Right:   Rotation:           Left:     Right:   Side Glide:        Left:  None    Right:  None        Lumbar Myotomes:    L4 (Ankle DF):  Left:  5    Right:  5  L5 (Great Toe Ext): Left: 5    Right: 5   S1 (Toe Raise):  Left: 5    Right: 5    Assessment/Plan:    Patient is a 24 year old male with lumbar complaints.    Patient has the following significant findings with corresponding treatment plan.                Diagnosis 1:  Lumbar derangement - probable disc  Pain -  self management, education, directional preference exercise and home program  Decreased ROM/flexibility - manual therapy, therapeutic exercise, therapeutic activity and home program  Impaired gait - gait training and home program  Impaired muscle performance - neuro re-education and home program  Decreased function - therapeutic activities and home program  Impaired posture - neuro re-education, therapeutic activities and home program    Therapy Evaluation Codes:   1) History comprised of:   Personal factors that impact the plan of care:      None.    Comorbidity factors that impact the plan of care are:      Asthma, Mental  illness and Smoking.     Medications impacting care: Anti-inflammatory, Muscle relaxant, Pain and Steroids.  2) Examination of Body Systems comprised of:   Body structures and functions that impact the plan of care:      Lumbar spine.   Activity limitations that impact the plan of care are:      Bathing, Bending, Cooking, Driving, Dressing, Lifting, Sitting, Squatting/kneeling, Stairs, Standing, Walking, Working, Sleeping and Transitional movements.  PATIENTS NAME:  Dipak Fuentes   : 1995    3) Clinical presentation characteristics are:   Stable/Uncomplicated.  4) Decision-Making    Low complexity using standardized patient assessment instrument and/or measureable assessment of functional outcome.  Cumulative Therapy Evaluation is: Low complexity.    Previous and current functional limitations:  (See Goal Flow Sheet for this information)    Short term and Long term goals: (See Goal Flow Sheet for this information)     Communication ability:  Patient appears to be able to clearly communicate and understand verbal and written communication and follow directions correctly.  Treatment Explanation - The following has been discussed with the patient:   RX ordered/plan of care  Anticipated outcomes  Possible risks and side effects  This patient would benefit from PT intervention to resume normal activities.   Rehab potential is good.    Frequency:  2 X week, once daily  Duration:  for 2 weeks tapering to 1 X a week over 4 weeks  Discharge Plan:  Achieve all LTG.  Independent in home treatment program.  Reach maximal therapeutic benefit.        Caregiver Signature/Credentials _____________________________ Date ________       Treating Provider: Henry Ibarra PT   I have reviewed and certified the need for these services and plan of treatment while under my care.        PHYSICIAN'S SIGNATURE:   _____________________________________  Date___________    Charlotte Mata PA-C    Certification period:  Beginning  Cert  "date period: 02/25/20 to  End of Cert period date: 04/25/20     Functional Level Progress Report: Please see attached \"Goal Flow sheet for Functional level.\"    ____X____ Continue Services or       ________ DC Services                Service dates: From  SOC Date: 02/25/20 date to present                         "

## 2020-02-25 NOTE — PLAN OF CARE
"PRIMARY DIAGNOSIS: ACUTE LOW BACK PAIN  OUTPATIENT/OBSERVATION GOALS TO BE MET BEFORE DISCHARGE:  1. Pain Status: Improved-controlled with oral pain medications.    2. Return to near baseline physical activity: No    3. Cleared for discharge by consultants (if involved): N/A    Discharge Planner Nurse   Safe discharge environment identified: Yes  Barriers to discharge: Yes       Entered by: Junior Bailey 02/25/2020 1:39 AM     Please review provider order for any additional goals.   Nurse to notify provider when observation goals have been met and patient is ready for discharge.  Temp: 96.7  F (35.9  C) Temp src: Oral BP: 137/68 Pulse: 73 Heart Rate: 74 Resp: 16 SpO2: 94 % O2 Device: None (Room air)    Pt A&Ox4. Pt reports back pain is \"doing much better,\" rating his pain at 5/10. Pt denies N/V/D, numbness, or tingling. Pt has not gotten up out of bed, assume assist of 2 w/ GB. PIV- NS infusing @ 100 mL/hr. Reg diet. Plan- discharge home w/ outpatient rehab unless symptoms do not improve, then consider Neurosurgical consult   "

## 2020-02-25 NOTE — DISCHARGE SUMMARY
"[facility] Outpatient / Observation Unit  Discharge Summary        Dipak Fuentes MRN# [id number]   YOB: 1995 Age: 24 year old     Date of Admission:  2/24/2020  Date of Discharge:  2/25/2020  Admitting Physician:  Jaime Quintero MD  Discharge Physician: Charlotte Mata PA-C  Discharging Service: Hospitalist      Primary Provider: No Ref-Primary, Physician  Primary Care Physician Phone Number: None         Primary Discharge Diagnoses:    Dipak Fuentes is a 24 year old male with bipolar 1 disorder, schizoaffective disorder, mild intermittent asthma, and tobacco and marijuana use who presented to [facility] ED on 2/24/2020 with acute low back and right leg pain after \"tweaking\" his back overnight while using the bathroom.     #Acute back pain with right sided sciatica: suspect underlying disc herniation contributing to presenting and ongoing pain that has been improved with starting oral steroids and pain regimen. Improvement in mobility with patient able to ambulate around the unit with use of assistive device for stability prior to discharge. No acute neurologic deficits noted during stay and no need for MRI of lumbar spine at this time since patient is functionally improving as well unlikely to change medical management at this time. Patient will discharge with an appointment with PT at the [facility] this afternoon and would likely benefit from ongoing therapy as his pain continues to improve.         Secondary Discharge Diagnoses:     Past Medical History:   Diagnosis Date     Bipolar 1 disorder (H)      Uncomplicated asthma             Code Status:      Full Code        Brief Hospital Summary:        Reason for your hospital stay      You were admitted for concerns of back pain. Initially you had a CT scan   of your abdomen/pelvis which did not show any acute abnormalities (this   was done due to you originally complaining of right lower abdominal pain).   Your pain is likely related to a " disc herniation, no further imaging   needed at this time since this would not change your management course at   this time. We started a multimodal pain regimen including oral steroids,   Celecoxib, Gabapentin, and Valium. Your symptoms improved with these   measures. At home we recommend you continue this current regimen and taper   off use as your pain continues to improve. You were able to ambulate in   the loera before discharge (recommend using home crutches in the meantime   unless instructed otherwise by physical therapy when see this afternoon)   and [facility] after [facility].           Please refer to initial admission history and physical for further details.   Briefly, Dipak Fuentes was admitted on 2/24/2020 for acute back pain. Initial work up in the ED did not reveal evidence of significant neurologic deficits concerning for cauda equina syndrome. No infectious or malignant process was identified. Pt was registered to the Observation Unit for further evaluation and pain control.     Pt was placed on oral multi-modal pain regiment and was ambulated in the loera. On the day of discharge, pt's pain improved and is able to perform basic ADLs. Safe discharge plan was identified and pt was discharged with po pain meds, education regarding home therapies and set up with follow up care.         Significant Lab During Hospitalization:      Recent Labs   Lab 02/24/20  1151   WBC 10.3   HGB 15.8   HCT 48.9   MCV 88        Recent Labs   Lab 02/24/20  1151      POTASSIUM 3.4   CHLORIDE 104   CO2 25   ANIONGAP 6   GLC 92   BUN 12   CR 0.87   GFRESTIMATED >90   GFRESTBLACK >90   MARIA ESTHER 9.4              Significant Imaging During Hospitalization:      Results for orders placed or performed during the hospital encounter of 02/24/20   CT Abdomen Pelvis w Contrast    Narrative    CT ABDOMEN AND PELVIS WITH CONTRAST 2/24/2020 12:32 PM      HISTORY: Right lower quadrant, right flank pain.     COMPARISON: CT abdomen/pelvis 5/30/2007.    TECHNIQUE: Axial images from the lung bases to the symphysis are  performed with additional coronal reformatted images. 100 mL of Isovue  370 are given intravenously.  Radiation dose for this scan was reduced  using automated exposure control, adjustment of the mA and/or kV  according to patient size, or iterative reconstruction technique.    FINDINGS:  Lung bases are clear.    Abdomen: The upper abdominal organs are within normal limits,  including the liver, spleen, gallbladder, pancreas, adrenal glands and  kidneys. No hydronephrosis or renal calculi. The bowel is normal in  caliber without obstruction, diverticulitis or appendicitis.    Pelvis: The bladder, prostate and rectum are unremarkable. No enlarged  pelvic lymph nodes or free fluid. Bone window examination is  unremarkable.      Impression    IMPRESSION: No acute changes in the abdomen or pelvis to account for  patient's symptoms. No bowel obstruction, diverticulitis or  appendicitis.    ARABELLA BELTRAN MD            Pending Results:      None        Consultations This Hospital Stay:      No consultations were requested during this admission         Discharge Instructions and Follow-Up:      Follow-up Appointments     Follow-up and recommended labs and tests       Follow up with primary care provider within 7 days for hospital   follow-up.  No follow up labs or test are needed.               Discharge Disposition:      Discharged to home         Discharge Medications:        Current Discharge Medication List      START taking these medications    Details   acetaminophen (TYLENOL) 325 MG tablet Take 2 tablets (650 mg) by mouth every 4 hours as needed for mild pain  Refills: 0    Associated Diagnoses: Acute right-sided low back pain with right-sided sciatica      celecoxib (CELEBREX) 200 MG capsule Take 1 capsule (200 mg) by mouth 2 times daily for 7 days  Qty:  "14 capsule, Refills: 0    Associated Diagnoses: Acute right-sided low back pain with right-sided sciatica      diazepam (VALIUM) 5 MG tablet Take 1 tablet (5 mg) by mouth every 8 hours as needed for muscle spasms  Qty: 9 tablet, Refills: 0    Associated Diagnoses: Acute right-sided low back pain with right-sided sciatica      gabapentin (NEURONTIN) 300 MG capsule Take 1 capsule (300 mg) by mouth At Bedtime  Qty: 10 capsule, Refills: 0    Associated Diagnoses: Acute right-sided low back pain with right-sided sciatica      methylPREDNISolone (MEDROL DOSEPAK) 4 MG tablet therapy pack Follow Package Directions  Qty: 21 tablet, Refills: 0    Comments: Have patient start on evening day 2  Associated Diagnoses: Acute right-sided low back pain with right-sided sciatica         CONTINUE these medications which have NOT CHANGED    Details   albuterol (PROAIR HFA) 108 (90 BASE) MCG/ACT inhaler Inhale 2 puffs into the lungs every 6 hours as needed.         STOP taking these medications       IBUPROFEN PO Comments:   Reason for Stopping:                   Allergies:         Allergies   Allergen Reactions     Ceclor [Cefaclor Monohydrate]      Cefaclor Rash           Condition and Physical on Discharge:      Discharge condition: Stable   Vitals: Blood pressure 133/75, pulse 73, temperature 95.4  F (35.2  C), temperature source Oral, resp. rate 20, height 1.88 m (6' 2\"), weight 112.9 kg (249 lb), SpO2 98 %.  249 lbs 0 oz      GENERAL:  Comfortable.  PSYCH: pleasant, oriented, No acute distress.  HEENT:  PERRLA. Normal conjunctiva, normal hearing, nasal mucosa and oropharynx are normal.  NECK:  Supple, no neck vein distention, adenopathy or bruits, normal thyroid.  HEART:  Normal S1, S2 with no murmur, no pericardial rub, gallops or S3 or S4.  LUNGS:  Clear to auscultation, normal Respiratory effort. No wheezing, rales or ronchi.  ABDOMEN:  Soft, no hepatosplenomegaly, normal bowel sounds. Non-tender, non distended.   EXTREMITIES:  " No pedal edema, +2 pulses bilateral and equal. Back symmetric, no curvature. ROM normal. No CVA tenderness. Mild tenderness to palpation of right lumbar spine as well mildly + SLR. negative findings: no skin lesions, erythema, or scars,  no tenderness to palpation   SKIN:  Dry to touch, No rash, wound or ulcerations.  NEUROLOGIC:  CN 2-12 intact, BL 5/5 symmetric upper and lower extremity strength, sensation is intact with no focal deficits.     Charlotte Mata PA-C

## 2020-02-25 NOTE — PLAN OF CARE
Patient's After Visit Summary was reviewed with patient and/or family.   Patient verbalized understanding of After Visit Summary, recommended follow up and was given an opportunity to ask questions.   Discharge medications sent home with patient/family: YES, education given to patient and family  Discharged with significant other and mother.    OBSERVATION patient END time: 12:34 PM

## 2020-02-27 ENCOUNTER — THERAPY VISIT (OUTPATIENT)
Dept: PHYSICAL THERAPY | Facility: CLINIC | Age: 25
End: 2020-02-27
Payer: COMMERCIAL

## 2020-02-27 DIAGNOSIS — M54.50 LOW BACK PAIN: ICD-10-CM

## 2020-02-27 PROCEDURE — 97140 MANUAL THERAPY 1/> REGIONS: CPT | Mod: GP | Performed by: PHYSICAL THERAPIST

## 2020-02-27 PROCEDURE — 97110 THERAPEUTIC EXERCISES: CPT | Mod: GP | Performed by: PHYSICAL THERAPIST

## 2020-02-27 PROCEDURE — 97530 THERAPEUTIC ACTIVITIES: CPT | Mod: GP | Performed by: PHYSICAL THERAPIST

## 2020-03-02 ENCOUNTER — THERAPY VISIT (OUTPATIENT)
Dept: PHYSICAL THERAPY | Facility: CLINIC | Age: 25
End: 2020-03-02
Payer: COMMERCIAL

## 2020-03-02 DIAGNOSIS — M54.50 LOW BACK PAIN: ICD-10-CM

## 2020-03-02 PROCEDURE — 97530 THERAPEUTIC ACTIVITIES: CPT | Mod: GP | Performed by: PHYSICAL THERAPIST

## 2020-03-02 PROCEDURE — 97110 THERAPEUTIC EXERCISES: CPT | Mod: GP | Performed by: PHYSICAL THERAPIST

## 2020-03-03 ENCOUNTER — OFFICE VISIT (OUTPATIENT)
Dept: ORTHOPEDICS | Facility: CLINIC | Age: 25
End: 2020-03-03
Payer: COMMERCIAL

## 2020-03-03 ENCOUNTER — HOSPITAL ENCOUNTER (OUTPATIENT)
Dept: MRI IMAGING | Facility: CLINIC | Age: 25
Discharge: HOME | End: 2020-03-03
Attending: FAMILY MEDICINE | Admitting: FAMILY MEDICINE
Payer: COMMERCIAL

## 2020-03-03 ENCOUNTER — ANCILLARY PROCEDURE (OUTPATIENT)
Dept: GENERAL RADIOLOGY | Facility: CLINIC | Age: 25
End: 2020-03-03
Attending: FAMILY MEDICINE
Payer: COMMERCIAL

## 2020-03-03 VITALS
SYSTOLIC BLOOD PRESSURE: 130 MMHG | DIASTOLIC BLOOD PRESSURE: 64 MMHG | WEIGHT: 249 LBS | HEIGHT: 74 IN | BODY MASS INDEX: 31.95 KG/M2

## 2020-03-03 DIAGNOSIS — M54.40 ACUTE MIDLINE LOW BACK PAIN WITH SCIATICA, SCIATICA LATERALITY UNSPECIFIED: ICD-10-CM

## 2020-03-03 DIAGNOSIS — M54.40 ACUTE MIDLINE LOW BACK PAIN WITH SCIATICA, SCIATICA LATERALITY UNSPECIFIED: Primary | ICD-10-CM

## 2020-03-03 DIAGNOSIS — M54.9 BACK PAIN: ICD-10-CM

## 2020-03-03 PROCEDURE — 99204 OFFICE O/P NEW MOD 45 MIN: CPT | Performed by: FAMILY MEDICINE

## 2020-03-03 PROCEDURE — 72148 MRI LUMBAR SPINE W/O DYE: CPT

## 2020-03-03 PROCEDURE — 72100 X-RAY EXAM L-S SPINE 2/3 VWS: CPT

## 2020-03-03 RX ORDER — CYCLOBENZAPRINE HCL 10 MG
10 TABLET ORAL 2 TIMES DAILY PRN
Qty: 40 TABLET | Refills: 0 | Status: SHIPPED | OUTPATIENT
Start: 2020-03-03 | End: 2020-04-10

## 2020-03-03 ASSESSMENT — MIFFLIN-ST. JEOR: SCORE: 2179.21

## 2020-03-03 NOTE — LETTER
"    3/3/2020         RE: Dipak Fuentes  [address] [facility] [address]        Dear Colleague,    Thank you for referring your patient, Dipak Fuentes, to the [facility] SP[facility]TS MEDICINE. Please see a copy of my visit note below.    ASSESSMENT & PLAN  Patient Instructions     1. Acute midline low back pain with sciatica, sciatica laterality unspecified      -Patient has severe acute low back pain rating down both legs likely due to aggravation of herniated disc causing sciatica  -Patient attempted to begin physical therapy but was unable to perform any therapeutic exercises due to severity of pain.  -Patient will get an MRI of the lumbar spine for further evaluation.  Your MRI has been ordered. Will check for same day otherwise, schedule with [place] ([phone]). Once you know the date of your MRI, please call my office and schedule a follow-up visit for 2 days after that.  -Patient will start nabumetone 750 mg twice a day as needed and Flexeril 10 mg twice a day as needed.  -Call direct clinic number [[phone]] at any time with questions or concerns.    Albert Yeo MD [facility]  [facility] [place]          -----    SUBJECTIVE  Dipak Fuentes is a/an 24 year old male who is seen in consultation at the request of  Rosa Morrison PT for evaluation of right sided low back pain. The patient is seen with their girlfriend.    Onset: 1 week(s) ago. Reports insidious onset without acute precipitating event. States \"tweaked it while walking\"  Location of Pain: lower back pain with pain muscle tightness going down right leg, and pinching pain going down left leg and another pinching pain at the top front part the foot.   Rating of Pain at worst: 10/10  Rating of Pain Currently: 7/10  Worsened by: standing, moving, sitting  Better with: laying down.   Treatments tried: rest/activity avoidance, ice, heat, other medications: Prednisone (Medrol), home exercises, physical " therapy (3 visits) and previous imaging (CT Scan 2/24/2020)  Quality: sharp, pinching, muscle tightness  Red flags: Weakness: No, bowel/bladder loss: No, foot drop: Yes  Associated symptoms: feeling of instability  Orthopedic history: NO  Relevant surgical history: NO  Social history: social history: works as a cook.     Past Medical History:   Diagnosis Date     Bipolar 1 disorder (H)      Uncomplicated asthma      Social History     Socioeconomic History     Marital status: Single     Spouse name: Not on file     Number of children: Not on file     Years of education: Not on file     Highest education level: Not on file   Occupational History     Not on file   Social Needs     Financial resource strain: Not on file     Food insecurity:     Worry: Not on file     Inability: Not on file     Transportation needs:     Medical: Not on file     Non-medical: Not on file   Tobacco Use     Smoking status: Former Smoker     Packs/day: 0.50     Years: 5.00     Pack years: 2.50     Types: Cigarettes     Tobacco comment: at 19 switched to E cigs 30 mg, now down to 3 mg E-cigs   Substance and Sexual Activity     Alcohol use: Yes     Alcohol/week: 0.0 standard drinks     Comment: rare     Drug use: No     Sexual activity: Never   Lifestyle     Physical activity:     Days per week: Not on file     Minutes per session: Not on file     Stress: Not on file   Relationships     Social connections:     Talks on phone: Not on file     Gets together: Not on file     Attends Anabaptism service: Not on file     Active member of club or organization: Not on file     Attends meetings of clubs or organizations: Not on file     Relationship status: Not on file     Intimate partner violence:     Fear of current or ex partner: Not on file     Emotionally abused: Not on file     Physically abused: Not on file     Forced sexual activity: Not on file   Other Topics Concern     Parent/sibling w/ CABG, MI or angioplasty before 65F 55M? Not Asked  "  Social History Narrative     Not on file         Patient's past medical, surgical, social, and family histories were reviewed today and no changes are noted.    REVIEW OF SYSTEMS:  10 point ROS is negative other than symptoms noted above in HPI, Past Medical History or as stated below  Constitutional: NEGATIVE for fever, chills, change in weight  Skin: NEGATIVE for worrisome rashes, moles or lesions  GI/: NEGATIVE for bowel or bladder changes  Neuro: NEGATIVE for weakness, dizziness or paresthesias    OBJECTIVE:  /64 (BP Location: Right arm, Patient Position: Supine)   Ht 1.88 m (6' 2\")   Wt 112.9 kg (249 lb)   BMI 31.97 kg/m      General: healthy, alert and in no distress  HEENT: no scleral icterus or conjunctival erythema  Skin: no suspicious lesions or rash. No jaundice.  CV: no pedal edema  Resp: normal respiratory effort without conversational dyspnea   Psych: normal mood and affect  Gait: normal steady gait with appropriate coordination and balance  Neuro: normal light touch sensory exam of the bilateral lower extremities.  DTR's 2+ patella and achilles bilaterally.  MSK:  THORACIC/LUMBAR SPINE  Inspection:    No gross deformity/asymmetry  Palpation:    Tender about the left para lumbar muscles and right para lumbar muscles. Otherwise remainder of landmarks are nontender.  Range of Motion:     Lumbar flexion limited substantially by pain    Lumbar extension limited substantially by pain  Strength:    Full strength throughout hips/quads/hamstrings  Special Tests:    Positive: straight leg raise (bilateral), slump test (bilateral), femoral stretch test (bilateral)        Independent visualization of the below image:  No results found for this or any previous visit (from the past 24 hour(s)).  Review x-rays taken office today lumbar spine shows L5-S1 intervertebral joint space narrowing.  No acute fracture, listhesis, scoliosis.        Albert Yeo MD [facility] Sports and Orthopedic " Care      Again, thank you for allowing me to participate in the care of your patient.        Sincerely,        Albert Yeo, MD

## 2020-03-03 NOTE — PROGRESS NOTES
"ASSESSMENT & PLAN  Patient Instructions     1. Acute midline low back pain with sciatica, sciatica laterality unspecified      -Patient has severe acute low back pain rating down both legs likely due to aggravation of herniated disc causing sciatica  -Patient attempted to begin physical therapy but was unable to perform any therapeutic exercises due to severity of pain.  -Patient will get an MRI of the lumbar spine for further evaluation.  Your MRI has been ordered. Will check for same day otherwise, schedule with [place] ([phone]). Once you know the date of your MRI, please call my office and schedule a follow-up visit for 2 days after that.  -Patient will start nabumetone 750 mg twice a day as needed and Flexeril 10 mg twice a day as needed.  -Call direct clinic number [[phone]] at any time with questions or concerns.    Albert Yeo MD [facility]  [facility]          -----    SUBJECTIVE  Dipak Fuentes is a/an 24 year old male who is seen in consultation at the request of  Rosa Morrison PT for evaluation of right sided low back pain. The patient is seen with their girlfriend.    Onset: 1 week(s) ago. Reports insidious onset without acute precipitating event. States \"tweaked it while walking\"  Location of Pain: lower back pain with pain muscle tightness going down right leg, and pinching pain going down left leg and another pinching pain at the top front part the foot.   Rating of Pain at worst: 10/10  Rating of Pain Currently: 7/10  Worsened by: standing, moving, sitting  Better with: laying down.   Treatments tried: rest/activity avoidance, ice, heat, other medications: Prednisone (Medrol), home exercises, physical therapy (3 visits) and previous imaging (CT Scan 2/24/2020)  Quality: sharp, pinching, muscle tightness  Red flags: Weakness: No, bowel/bladder loss: No, foot drop: Yes  Associated symptoms: feeling of instability  Orthopedic history: " NO  Relevant surgical history: NO  Social history: social history: works as a cook.     Past Medical History:   Diagnosis Date     Bipolar 1 disorder (H)      Uncomplicated asthma      Social History     Socioeconomic History     Marital status: Single     Spouse name: Not on file     Number of children: Not on file     Years of education: Not on file     Highest education level: Not on file   Occupational History     Not on file   Social Needs     Financial resource strain: Not on file     Food insecurity:     Worry: Not on file     Inability: Not on file     Transportation needs:     Medical: Not on file     Non-medical: Not on file   Tobacco Use     Smoking status: Former Smoker     Packs/day: 0.50     Years: 5.00     Pack years: 2.50     Types: Cigarettes     Tobacco comment: at 19 switched to E cigs 30 mg, now down to 3 mg E-cigs   Substance and Sexual Activity     Alcohol use: Yes     Alcohol/week: 0.0 standard drinks     Comment: rare     Drug use: No     Sexual activity: Never   Lifestyle     Physical activity:     Days per week: Not on file     Minutes per session: Not on file     Stress: Not on file   Relationships     Social connections:     Talks on phone: Not on file     Gets together: Not on file     Attends Mormonism service: Not on file     Active member of club or organization: Not on file     Attends meetings of clubs or organizations: Not on file     Relationship status: Not on file     Intimate partner violence:     Fear of current or ex partner: Not on file     Emotionally abused: Not on file     Physically abused: Not on file     Forced sexual activity: Not on file   Other Topics Concern     Parent/sibling w/ CABG, MI or angioplasty before 65F 55M? Not Asked   Social History Narrative     Not on file         Patient's past medical, surgical, social, and family histories were reviewed today and no changes are noted.    REVIEW OF SYSTEMS:  10 point ROS is negative other than symptoms noted above  "in HPI, Past Medical History or as stated below  Constitutional: NEGATIVE for fever, chills, change in weight  Skin: NEGATIVE for worrisome rashes, moles or lesions  GI/: NEGATIVE for bowel or bladder changes  Neuro: NEGATIVE for weakness, dizziness or paresthesias    OBJECTIVE:  /64 (BP Location: Right arm, Patient Position: Supine)   Ht 1.88 m (6' 2\")   Wt 112.9 kg (249 lb)   BMI 31.97 kg/m     General: healthy, alert and in no distress  HEENT: no scleral icterus or conjunctival erythema  Skin: no suspicious lesions or rash. No jaundice.  CV: no pedal edema  Resp: normal respiratory effort without conversational dyspnea   Psych: normal mood and affect  Gait: normal steady gait with appropriate coordination and balance  Neuro: normal light touch sensory exam of the bilateral lower extremities.  DTR's 2+ patella and achilles bilaterally.  MSK:  THORACIC/LUMBAR SPINE  Inspection:    No gross deformity/asymmetry  Palpation:    Tender about the left para lumbar muscles and right para lumbar muscles. Otherwise remainder of landmarks are nontender.  Range of Motion:     Lumbar flexion limited substantially by pain    Lumbar extension limited substantially by pain  Strength:    Full strength throughout hips/quads/hamstrings  Special Tests:    Positive: straight leg raise (bilateral), slump test (bilateral), femoral stretch test (bilateral)        Independent visualization of the below image:  No results found for this or any previous visit (from the past 24 hour(s)).  Review x-rays taken office today lumbar spine shows L5-S1 intervertebral joint space narrowing.  No acute fracture, listhesis, scoliosis.        Albert Yeo MD [facility]    "

## 2020-03-03 NOTE — PATIENT INSTRUCTIONS
1. Acute midline low back pain with sciatica, sciatica laterality unspecified      -Patient has severe acute low back pain rating down both legs likely due to aggravation of herniated disc causing sciatica  -Patient attempted to begin physical therapy but was unable to perform any therapeutic exercises due to severity of pain.  -Patient will get an MRI of the lumbar spine for further evaluation.  Your MRI has been ordered. Will check for same day otherwise, schedule with [place] ([phone]). Once you know the date of your MRI, please call my office and schedule a follow-up visit for 2 days after that.  -Patient will start nabumetone 750 mg twice a day as needed and Flexeril 10 mg twice a day as needed.  -Call direct clinic number [[phone]] at any time with questions or concerns.    Albert Yeo MD [facility]  [facility] [place]

## 2020-03-05 ENCOUNTER — TELEPHONE (OUTPATIENT)
Dept: PALLIATIVE MEDICINE | Facility: CLINIC | Age: 25
End: 2020-03-05

## 2020-03-05 ENCOUNTER — OFFICE VISIT (OUTPATIENT)
Dept: ORTHOPEDICS | Facility: CLINIC | Age: 25
End: 2020-03-05
Payer: COMMERCIAL

## 2020-03-05 VITALS
BODY MASS INDEX: 31.95 KG/M2 | SYSTOLIC BLOOD PRESSURE: 120 MMHG | DIASTOLIC BLOOD PRESSURE: 62 MMHG | WEIGHT: 249 LBS | HEIGHT: 74 IN

## 2020-03-05 DIAGNOSIS — M51.26 HNP (HERNIATED NUCLEUS PULPOSUS), LUMBAR: Primary | ICD-10-CM

## 2020-03-05 PROCEDURE — 99214 OFFICE O/P EST MOD 30 MIN: CPT | Performed by: FAMILY MEDICINE

## 2020-03-05 RX ORDER — OXYCODONE AND ACETAMINOPHEN 5; 325 MG/1; MG/1
1 TABLET ORAL 2 TIMES DAILY PRN
Qty: 20 TABLET | Refills: 0 | Status: SHIPPED | OUTPATIENT
Start: 2020-03-05 | End: 2020-10-01

## 2020-03-05 ASSESSMENT — MIFFLIN-ST. JEOR: SCORE: 2179.21

## 2020-03-05 NOTE — TELEPHONE ENCOUNTER
"Pre-screening Questions for Radiology Injections:    Injection to be done at which interventional clinic site? [facility]    Instruct patient to arrive as directed prior to the scheduled appointment time:    [place] minutes before      [place]: 30 minutes before; if IV needed 1 hour before     Dr. Delgado-no IV needed for Cervical YU; please instruct to arrive 30\" early    Procedure ordered by Yeo    Procedure ordered? LESI      Transforaminal Cervical YU - no pain provider currently performing    What insurance would patient like us to bill for this procedure? [employer] (primary) Medicare (secondary)      Worker's comp or MVA (motor vehicle accident) -Any injection DO NOT SCHEDULE and route to Jessica Ramsay.      [employer] insurance - For SI joint injections, DO NOT SCHEDULE and route Jessica Ramsay.       [employer] - Any injection besides hip/shoulder/knee joint DO NOT SCHEDULE and route to Jessica Ramsay. She will obtain PA and call pt back to schedule procedure or notify pt of denial.       [employer]-Route to Jessica for review      **BCBS- ALL need to be routed to [place] for review if a PA is needed**      IF SCHEDULING IN [place] AND NEEDS A PA, IT IS OKAY TO SCHEDULE. [place] HANDLES THEIR OWN PA'S AFTER THE PATIENT IS SCHEDULED. PLEASE SCHEDULE AT LEAST 1 WEEK OUT SO A PA CAN BE OBTAINED.    Any chance of pregnancy? NO   If YES, do NOT schedule and route to RN pool    Is an  needed? No     Patient has a drive home? (mandatory) YES:     Is patient taking any blood thinners (i.e. plavix, coumadin, jantoven, warfarin, heparin, pradaxa or dabigatran, etc)? No   If hold needed, do NOT schedule, route to RN pool     Is patient taking any aspirin products (includes Excedrin and Fiorinal)? No     If more than 325mg/day, OK to schedule; Instruct pt to decrease to less than 325 mg for 7 days AND route to RN pool    For CERVICAL procedures, hold all aspirin products for 6 days.     Tell pt " that if aspirin product is not held for 6 days, the procedure WILL BE cancelled.      Does the patient have a bleeding or clotting disorder? No     If YES, okay to schedule AND route to RN nurse pool    For any patients with platelet count <100, must be forwarded to provider    Is patient diabetic?  No  If YES, instruct them to bring their glucometer.    Does patient have an active infection or treated for one within the past week? No     Is patient currently taking any antibiotics?  No     For patients on chronic, preventative, or prophylactic antibiotics, procedures may be scheduled.     For patients on antibiotics for active or recent infection:antibiotic course must have been completed for 4 days    Is patient currently taking any steroid medications? (i.e. Prednisone, Medrol)  No     For patients on steroid medications, course must have been completed for 4 days    Is patient actively being treated for cancer or immunocompromised? No  If YES, do NOT schedule and route to RN pool     Are you able to get on and off an exam table with minimal or no assistance? Yes  If NO, do NOT schedule and route to RN pool    Are you able to roll over and lay on your stomach with minimal or no assistance? Yes  If NO, do NOT schedule and route to RN pool     Any allergies to contrast dye, iodine, shellfish, or numbing and steroid medications? No  If YES, route to RN pool AND add allergy information to appointment notes    Allergies: Ceclor [cefaclor monohydrate] and Cefaclor      Has the patient had a flu shot or any other vaccinations within 7 days before or after the procedure.  No     Does patient have an MRI/CT?  YES: 3/3/20  Check Procedure Scheduling Grid to see if required.      Was the MRI done within the last 3 years?  Yes    If yes, where was the MRI done i.e.[facility], [facility], [facility], [facility] etc? [facility]      If no, do not schedule and route to RN pool    If MRI was not done at [facility], [facility] or  [facility] do NOT schedule and route to RN pool.      If pt has an imaging disc, the injection MAY be scheduled but pt has to bring disc to appt.     If they show up without the disc the injection cannot be done    Procedure Specific Instructions:      If celiac plexus block, informed patient NPO for 6 hours and that it is okay to take medications with sips of water, especially blood pressure medications  Not Applicable         If this is for a cervical procedure, informed patient that aspirin needs to be held for 6 days.   Not Applicable      If IV needed:    Do not schedule procedures requiring IV placement in the first appointment of the day or first appointment after lunch. Do NOT schedule at 0745, 0815 or 1245.     Instructed pt to arrive 30 minutes early for IV start if required. (Check Procedure Scheduling Grid)  Not Applicable    Reminders:      If you are started on any steroids or antibiotics between now and your appointment, you must contact us because the procedure may need to be cancelled.  No      For all procedures except radiofrequency ablations (RFAs) and spinal cord stimulator (SCS) trials, informed patient:    IV sedation is not provided for this procedure.  If you feel that an oral anti-anxiety medication is needed, you can discuss this further with your referring provider or primary care provider.  The Pain Clinic provider will discuss specifics of what the procedure includes at your appointment.  Most procedures last 10-20 minutes.  We use numbing medications to help with any discomfort during the procedure.  Not Applicable      For patients 85 or older we recommend having an adult stay w/ them for the remainder of the day.       Does the patient have any questions?  NO  Nicolette Jovel  [facility]

## 2020-03-05 NOTE — LETTER
"    3/5/2020         RE: Dipak Fuentes  [address] [facility] [address]        Dear Colleague,    Thank you for referring your patient, Dipak Fuentes, to the [facility] SPORTS MEDICINE. Please see a copy of my visit note below.    ASSESSMENT & PLAN  Patient Instructions     1. HNP (herniated nucleus pulposus), lumbar      -Patient is here for follow up of low back pain due to severe lumbar HNP  -MRI of the lumbar spine was reviewed today.  All answered questioned.    -Patient would like to try an epidural steroid injection.  Patient will follow up 2 weeks after his injection to assess response  -Patient will start oxycodone as needed for severe pain.  -Call direct clinic number [[phone]] at any time with questions or concerns.    Albert Yeo MD [facility] Orthopedics and Sports Medicine  [facility]          -----    SUBJECTIVE:  Dipak Fuentes is a 24 year old male who is seen in follow-up for severe acute low back pain rating down both legs likely due to aggravation of herniated disc causing sciatica.They were last seen 3/3/2020.     Since their last visit reports 0% - (About the same as last time). They indicate that their current pain level is 7/10. They have tried ice, heat, other medications: Cyclobenzaprine (Flexeril)  and nabumetone and previous imaging (MRI 3/3/2020).      The patient is seen girlfriend.    Patient's past medical, surgical, social, and family histories were reviewed today and no changes are noted.    REVIEW OF SYSTEMS:  Constitutional: NEGATIVE for fever, chills, change in weight  Skin: NEGATIVE for worrisome rashes, moles or lesions  GI/: NEGATIVE for bowel or bladder changes  Neuro: NEGATIVE for weakness, dizziness or paresthesias    OBJECTIVE:  /62   Ht 1.88 m (6' 2\")   Wt 112.9 kg (249 lb)   BMI 31.97 kg/m      General: healthy, alert and in no distress  HEENT: no scleral icterus or conjunctival erythema  Skin: no suspicious lesions or rash. " No jaundice.  CV: regular rhythm by palpation, no pedal edema  Resp: normal respiratory effort without conversational dyspnea   Psych: normal mood and affect  Gait: normal steady gait with appropriate coordination and balance  Neuro: normal light touch sensory exam of the extremities.    MSK:  THORACIC/LUMBAR SPINE  Inspection:    No gross deformity/asymmetry  Palpation:    Tender about the left para lumbar muscles and right para lumbar muscles. Otherwise remainder of landmarks are nontender.  Range of Motion:     Lumbar flexion limited substantially by pain    Lumbar extension limited substantially by pain  Strength:    Full strength throughout hips/quads/hamstrings  Special Tests:    Positive: straight leg raise (bilateral), slump test (bilateral), femoral stretch test (bilateral)    Independent visualization of the below image:  Recent Results (from the past 48 hour(s))   MR Lumbar Spine w/o Contrast    Narrative    MR LUMBAR SPINE WITHOUT CONTRAST March 3, 2020 1:07 PM     HISTORY: Radiculopathy less than six weeks, no red flags, no prior  management. Severe low back pain radiating down both legs. Rule out  lumbar herniated nucleus pulposus. Acute midline low back pain with  sciatica, sciatica laterality unspecified.    TECHNIQUE: Multiplanar multisequence images were obtained through the  lumbar spine without contrast.    COMPARISON: None.    FINDINGS: Sagittal images demonstrate normal posterior alignment. Five  lumbar type vertebral bodies are presumed. Disc spaces are relatively  preserved in height. Vertebral body heights are maintained. The conus  medullaris is normal in appearance with its tip at the L2 level. Cauda  equina nerve roots are normal.    T12-L1: Normal.    L1-L2: Normal.    L2-L3: Normal.    L3-L4: Normal.    L4-L5: Normal disc. Minimal facet hypertrophy. No stenosis.    L5-S1: There is a moderate size central disc protrusion measuring 0.7  cm in AP dimensions and 2.0 cm at its base. This  disc protrusion is in  continuity with the thecal sac and both S1 nerve roots but not causing  any definite displacement or any significant central canal or neural  foraminal stenosis.    Paraspinal soft tissues: Unremarkable as visualized.      Impression    IMPRESSION: Moderate size central disc protrusion at L5-S1 which is in  continuity with the thecal sac and S1 nerve roots but not causing any  definite stenosis or impingement.    NANETTE STRICKLAND MD         Patient's conditions were thoroughly discussed during today's visit with greater than 50% of the visit spent counseling the patient with total time spent face-to-face with the patient being 25 minutes.    Albert Yeo MD, [facility] Sports and Orthopedic Care          Again, thank you for allowing me to participate in the care of your patient.        Sincerely,        Albert Yeo, MD

## 2020-03-05 NOTE — PATIENT INSTRUCTIONS
1. HNP (herniated nucleus pulposus), lumbar      -Patient is here for follow up of low back pain due to severe lumbar HNP  -MRI of the lumbar spine was reviewed today.  All answered questioned.    -Patient would like to try an epidural steroid injection.  Patient will follow up 2 weeks after his injection to assess response  -Patient will start oxycodone as needed for severe pain.  -Call direct clinic number [[phone]] at any time with questions or concerns.    Albert Yeo MD CAM  [place] [facility]oped[facility] and Sports Medicine  [facility] [place]

## 2020-03-05 NOTE — PROGRESS NOTES
"ASSESSMENT & PLAN  Patient Instructions     1. HNP (herniated nucleus pulposus), lumbar      -Patient is here for follow up of low back pain due to severe lumbar HNP  -MRI of the lumbar spine was reviewed today.  All answered questioned.    -Patient would like to try an epidural steroid injection.  Patient will follow up 2 weeks after his injection to assess response  -Patient will start oxycodone as needed for severe pain.  -Call direct clinic number [[phone]] at any time with questions or concerns.    Albert Yeo MD [facility]  [facility]          -----    SUBJECTIVE:  Dipak Fuentes is a 24 year old male who is seen in follow-up for severe acute low back pain rating down both legs likely due to aggravation of herniated disc causing sciatica.They were last seen 3/3/2020.     Since their last visit reports 0% - (About the same as last time). They indicate that their current pain level is 7/10. They have tried ice, heat, other medications: Cyclobenzaprine (Flexeril)  and nabumetone and previous imaging (MRI 3/3/2020).      The patient is seen girlfriend.    Patient's past medical, surgical, social, and family histories were reviewed today and no changes are noted.    REVIEW OF SYSTEMS:  Constitutional: NEGATIVE for fever, chills, change in weight  Skin: NEGATIVE for worrisome rashes, moles or lesions  GI/: NEGATIVE for bowel or bladder changes  Neuro: NEGATIVE for weakness, dizziness or paresthesias    OBJECTIVE:  /62   Ht 1.88 m (6' 2\")   Wt 112.9 kg (249 lb)   BMI 31.97 kg/m     General: healthy, alert and in no distress  HEENT: no scleral icterus or conjunctival erythema  Skin: no suspicious lesions or rash. No jaundice.  CV: regular rhythm by palpation, no pedal edema  Resp: normal respiratory effort without conversational dyspnea   Psych: normal mood and affect  Gait: normal steady gait with appropriate coordination and balance  Neuro: normal " light touch sensory exam of the extremities.    MSK:  THORACIC/LUMBAR SPINE  Inspection:    No gross deformity/asymmetry  Palpation:    Tender about the left para lumbar muscles and right para lumbar muscles. Otherwise remainder of landmarks are nontender.  Range of Motion:     Lumbar flexion limited substantially by pain    Lumbar extension limited substantially by pain  Strength:    Full strength throughout hips/quads/hamstrings  Special Tests:    Positive: straight leg raise (bilateral), slump test (bilateral), femoral stretch test (bilateral)    Independent visualization of the below image:  Recent Results (from the past 48 hour(s))   MR Lumbar Spine w/o Contrast    Narrative    MR LUMBAR SPINE WITHOUT CONTRAST March 3, 2020 1:07 PM     HISTORY: Radiculopathy less than six weeks, no red flags, no prior  management. Severe low back pain radiating down both legs. Rule out  lumbar herniated nucleus pulposus. Acute midline low back pain with  sciatica, sciatica laterality unspecified.    TECHNIQUE: Multiplanar multisequence images were obtained through the  lumbar spine without contrast.    COMPARISON: None.    FINDINGS: Sagittal images demonstrate normal posterior alignment. Five  lumbar type vertebral bodies are presumed. Disc spaces are relatively  preserved in height. Vertebral body heights are maintained. The conus  medullaris is normal in appearance with its tip at the L2 level. Cauda  equina nerve roots are normal.    T12-L1: Normal.    L1-L2: Normal.    L2-L3: Normal.    L3-L4: Normal.    L4-L5: Normal disc. Minimal facet hypertrophy. No stenosis.    L5-S1: There is a moderate size central disc protrusion measuring 0.7  cm in AP dimensions and 2.0 cm at its base. This disc protrusion is in  continuity with the thecal sac and both S1 nerve roots but not causing  any definite displacement or any significant central canal or neural  foraminal stenosis.    Paraspinal soft tissues: Unremarkable as visualized.       Impression    IMPRESSION: Moderate size central disc protrusion at L5-S1 which is in  continuity with the thecal sac and S1 nerve roots but not causing any  definite stenosis or impingement.    NANETTE STRICKLAND MD         Patient's conditions were thoroughly discussed during today's visit with greater than 50% of the visit spent counseling the patient with total time spent face-to-face with the patient being 25 minutes.    Albert Yeo MD, [facility] and Orthopedic [facility]

## 2020-03-06 NOTE — TELEPHONE ENCOUNTER
[facility] does NOT cover any injections, but the patient has MA secondary and they will  what [facility] does not cover.    Okay to schedule      Jessica NIELSEN    [facility]

## 2020-03-09 NOTE — PROGRESS NOTES
[facility] - Procedure Note    Date of Visit: 3/10/2020    Procedure performed: Lumbar L5-S1 interlaminar epidural steroid injection   Diagnosis: Lumbar spondylosis; Lumbar radiculitis/radiculopathy  : Ivana Esteves MD  Anesthesia: None  Complications: None    Indications: Dipak Fuentes is a 24 year old male who is seen at the request of Albert Yeo, MD for a lumbar epidural steroid injection. The patient describes back pain radiating down bilateral lower extremities. The patient has been exhibiting symptoms consistent with lumbar intraspinal inflammation and radiculopathy. Symptoms have been persistent, disabling, and intermittently severe. The patient reports minimal improvement with conservative treatment, including medications and PT.    Lumbar MRI was done on 3/3/2020 which showed:   FINDINGS: Sagittal images demonstrate normal posterior alignment. Five  lumbar type vertebral bodies are presumed. Disc spaces are relatively  preserved in height. Vertebral body heights are maintained. The conus  medullaris is normal in appearance with its tip at the L2 level. Cauda  equina nerve roots are normal.     T12-L1: Normal.     L1-L2: Normal.     L2-L3: Normal.     L3-L4: Normal.     L4-L5: Normal disc. Minimal facet hypertrophy. No stenosis.     L5-S1: There is a moderate size central disc protrusion measuring 0.7  cm in AP dimensions and 2.0 cm at its base. This disc protrusion is in  continuity with the thecal sac and both S1 nerve roots but not causing  any definite displacement or any significant central canal or neural  foraminal stenosis.     Paraspinal soft tissues: Unremarkable as visualized.                                                                      IMPRESSION: Moderate size central disc protrusion at L5-S1 which is in  continuity with the thecal sac and S1 nerve roots but not causing any  definite stenosis or impingement.    Allergies:      Allergies   Allergen  Reactions     Ceclor [Cefaclor Monohydrate]      Cefaclor Rash        Vitals:  BP (!) 147/89   Pulse 97   SpO2 95%     Review of Systems: The patient denies recent fever, chills, illness, use of antibiotics or anticoagulants. All other 10-point review of systems negative.     Procedure: The procedure and risks were explained, and informed written consent was obtained from the patient. Risks include but are not limited to: infection, bleeding, increased pain, and damage to soft tissue, nerve, muscle, and vasculature structures. After getting informed consent, patient was brought into the procedure suite and was placed in a prone position on the procedure table. A Pause for the Cause was performed. Patient was prepped and draped in sterile fashion.     The L5-S1 interspace was identified with use of fluoroscopy in AP view. A 25-gauge, 1.5 inch needle was used to anesthetize the skin and subcutaneous tissue entry site with a total of 2 ml of 1% lidocaine. Under fluoroscopic visualization, a 22-gauge, 3.5 inch Tuohy epidural needle was slowly advanced towards the epidural space a few millimeters left of midline. The latter part of the needle advancement was guided with fluoroscopy in the lateral view. The epidural space was identified using loss of resistance technique. After negative aspiration for heme and cerebrospinal fluid, a total of 0.5 mL of non-ionic contrast was injected to confirm needle placement with 9.5 mL of contrast wasted. Epidurogram confirmed spread within the posterior epidural space. 1 ml of 40mg/ml of triamcinolone, 2 ml of 1% lidocaine, and 2 ml of preservative free saline was injected. The needle was removed.  Images were saved to PACS.    The patient tolerated the procedure well, and there was no evidence of procedural complications. No new sensory or motor deficits were noted following the procedure. The patient was stable and able to ambulate on discharge home. Post-procedure instructions  were provided.     Pre-procedure pain score: 5/10 in the back, 6/10 in the leg  Post-procedure pain score: 5/10 in the back, 6/10 in the leg    Assessment/Plan: Dipak Fuentes is a 24 year old male s/p lumbar interlaminar epidural steroid injection today for lumbar spondylosis and radiculitis/radiculopathy.     1. Following today's procedure, the patient was advised to contact the [facility] for any of the following:   Fever, chills, or night sweats   New onset of pain, numbness, or weakness   Any questions/concerns regarding the procedure  If unable to contact the Pain Center, the patient was instructed to go to a local Emergency Room for any complications.   2. The patient will receive a follow-up call in 1 week.   3. Follow-up with the referring provider in 2 weeks for post-procedure evaluation.    Ivana Estevse MD  [facility] [place]

## 2020-03-10 ENCOUNTER — ANCILLARY PROCEDURE (OUTPATIENT)
Dept: GENERAL RADIOLOGY | Facility: CLINIC | Age: 25
End: 2020-03-10
Attending: PHYSICAL MEDICINE & REHABILITATION
Payer: COMMERCIAL

## 2020-03-10 ENCOUNTER — RADIOLOGY INJECTION OFFICE VISIT (OUTPATIENT)
Dept: PALLIATIVE MEDICINE | Facility: CLINIC | Age: 25
End: 2020-03-10
Payer: COMMERCIAL

## 2020-03-10 VITALS — HEART RATE: 97 BPM | SYSTOLIC BLOOD PRESSURE: 147 MMHG | DIASTOLIC BLOOD PRESSURE: 89 MMHG | OXYGEN SATURATION: 95 %

## 2020-03-10 DIAGNOSIS — M54.16 LUMBAR RADICULOPATHY: ICD-10-CM

## 2020-03-10 DIAGNOSIS — M54.16 LUMBAR RADICULOPATHY: Primary | ICD-10-CM

## 2020-03-10 PROCEDURE — 62323 NJX INTERLAMINAR LMBR/SAC: CPT | Performed by: PHYSICAL MEDICINE & REHABILITATION

## 2020-03-10 NOTE — PATIENT INSTRUCTIONS
[facility] Procedure Discharge Instructions    Today you saw:    Dr. Ivana Esteves    Your procedure:  Epidural steroid injection      Medications used:  Lidocaine (anesthetic)  Kenalog (steroid) Normal Saline Omnipaque (contrast)            Be cautious when walking as numbness and/or weakness in the legs may occur up to 6-8 hours after the procedure due to effect of the local anesthetic    Do not drive for 6 hours. The effect of the local anesthetic could slow your reflexes.     Avoid strenuous activity for the first 24 hours. You may resume your regular activities after that.     You may shower, however avoid swimming, tub baths or hot tubs for 24 hours following your procedure    You may have a mild to moderate increase in pain for several days following the injection.      You may use ice packs for 10-15 minutes, 3 to 4 times a day at the injection site for comfort    Do not use heat to painful areas for 6 to 8 hours. This will give the local anesthetic time to wear off and prevent you from accidentally burning your skin.    Unless you have been directed to avoid the use of anti-inflammatory medications (NSAIDS-ibuprofen, Aleve, Motrin), you may use these medications or Tylenol for pain control if needed.     With diabetes, check your blood sugar more frequently than usual as your blood sugar may be higher than normal for 10-14 days following a steroid injection. Contact your doctor who manages your diabetes if your blood sugar is higher than usual    Possible side effects of steroids that you may experience include flushing, elevated blood pressure, increased appetite, mild headaches and restlessness.  All of these symptoms will get better with time.    It may take up to 14 days for the steroid medication to start working although you may feel the effect as early as a few days after the procedure.     Follow up with your referring provider in 2-3 weeks      If you experience any of the  following, call the pain center line during work hours at [phone] or on-call physician after hours at [phone]:  -Fever over 100 degree F  -Swelling, bleeding, redness, drainage, warmth at the injection site  -Progressive weakness or numbness in your legs   -Loss of bowel or bladder function  -Unusual headache that is not relieved by Tylenol or your regular headache medication  -Unusual new onset of pain that is not improving

## 2020-03-10 NOTE — NURSING NOTE
Discharge Information    IV Discontiued Time:  NA    Amount of Fluid Infused:  NA    Discharge Criteria = When patient returns to baseline or as per MD order    Consciousness:  Pt is fully awake    Circulation:  BP +/- 20% of pre-procedure level    Respiration:  Patient is able to breathe deeply    O2 Sat:  Patient is able to maintain O2 Sat >92% on room air    Activity:  Moves 4 extremities on command    Ambulation:  Patient is able to stand and walk or stand and pivot into wheelchair    Dressing:  Clean/dry or No Dressing    Notes:   Discharge instructions and AVS given to patient    Patient meets criteria for discharge?  YES    Admitted to PCU?  No    Responsible adult present to accompany patient home?  Yes    Signature/Title:    Gabriela May RN  RN Care Coordinator  [facility]

## 2020-03-10 NOTE — NURSING NOTE
Pre-procedure Intake    Have you been fasting? NA    If yes, for how long?     Are you taking a prescribed blood thinner such as coumadin, Plavix, Xarelto?    No    If yes, when did you take your last dose?     Do you take aspirin?  No    If cervical procedure, have you held aspirin for 6 days?   NA    Do you have any allergies to contrast dye, iodine, steroid and/or numbing medications?  NO    Are you currently taking antibiotics or have an active infection?  NO    Have you had a fever/elevated temperature within the past week? NO    Are you currently taking oral steroids? NO    Do you have a ? Yes       Are you pregnant or breastfeeding?  Not Applicable    Are the vital signs normal?  Yes    Debra ELIAS CMA

## 2020-03-16 ENCOUNTER — OFFICE VISIT (OUTPATIENT)
Dept: ORTHOPEDICS | Facility: CLINIC | Age: 25
End: 2020-03-16
Payer: COMMERCIAL

## 2020-03-16 VITALS
HEIGHT: 74 IN | SYSTOLIC BLOOD PRESSURE: 138 MMHG | DIASTOLIC BLOOD PRESSURE: 80 MMHG | WEIGHT: 249 LBS | BODY MASS INDEX: 31.95 KG/M2

## 2020-03-16 DIAGNOSIS — M54.40 ACUTE MIDLINE LOW BACK PAIN WITH SCIATICA, SCIATICA LATERALITY UNSPECIFIED: ICD-10-CM

## 2020-03-16 DIAGNOSIS — M51.26 HNP (HERNIATED NUCLEUS PULPOSUS), LUMBAR: Primary | ICD-10-CM

## 2020-03-16 PROCEDURE — 99214 OFFICE O/P EST MOD 30 MIN: CPT | Performed by: FAMILY MEDICINE

## 2020-03-16 RX ORDER — GABAPENTIN 300 MG/1
300 CAPSULE ORAL 3 TIMES DAILY
Qty: 90 CAPSULE | Refills: 0 | Status: SHIPPED | OUTPATIENT
Start: 2020-03-16 | End: 2020-04-10

## 2020-03-16 ASSESSMENT — MIFFLIN-ST. JEOR: SCORE: 2179.21

## 2020-03-16 NOTE — LETTER
3/16/2020         RE: Dipak Fuentes  [address] [id number]        Dear Colleague,    Thank you for referring your patient, Dipak Fuentes, to the [facility]. Please see a copy of my visit note below.    ASSESSMENT & PLAN  Patient Instructions     1. HNP (herniated nucleus pulposus), lumbar    2. Acute midline low back pain with sciatica, sciatica laterality unspecified      -Patient is here for follow-up of low back pain after an epidural steroid injection 1 week ago  -Patient states pain has not improved since the injection, it has changed in character and radiation  -Patient will start gabapentin 300 mg 3 times a day and tizanidine 4 mg twice a day as needed.  Patient will continue with nabumetone and oxycodone as needed  -Patient will call us in another week for an update on his condition  -Call direct clinic number [[phone]] at any time with questions or concerns.    Albert Yeo MD [facility] Orthopedics and Sports Medicine  [facility]          -----    SUBJECTIVE:  Dipak Fuentes is a 24 year old male who is seen in follow-up for low back pain due to severe lumbar HNP.They were last seen 3/5/2020. He had an YU done on 3/10/2020.     Since their last visit reports 0% - (About the same as last time). States now pain is shooting muscle spasms up his back and giving him headaches. Pain is also shooting down left leg, and pain in testicles. States has some tingling sensations in his feet.  They indicate that their current pain level is 6/10. They have tried other medications: Oxycodone/Acetaminophen (Percocet), Cyclobenzaprine (Flexeril)  and nabumetome and corticosteroid injection (most recent date: 3/10/2020) that provided 0 hour(s) of relief.      The patient is seen by themselves.    Patient's past medical, surgical, social, and family histories were reviewed today and no changes are noted.    REVIEW OF SYSTEMS:  Constitutional: NEGATIVE for  "fever, chills, change in weight  Skin: NEGATIVE for worrisome rashes, moles or lesions  GI/: NEGATIVE for bowel or bladder changes  Neuro: NEGATIVE for weakness, dizziness or paresthesias    OBJECTIVE:  /80   Ht 1.88 m (6' 2\")   Wt 112.9 kg (249 lb)   BMI 31.97 kg/m     General: healthy, alert and in no distress  HEENT: no scleral icterus or conjunctival erythema  Skin: no suspicious lesions or rash. No jaundice.  CV: regular rhythm by palpation, no pedal edema  Resp: normal respiratory effort without conversational dyspnea   Psych: normal mood and affect  Gait: normal steady gait with appropriate coordination and balance  Neuro: normal light touch sensory exam of the extremities.    MSK:  THORACIC/LUMBAR SPINE  Inspection:    No gross deformity/asymmetry  Palpation:    Tender about the left para lumbar muscles and right para lumbar muscles. Otherwise remainder of landmarks are nontender.  Range of Motion:     Lumbar flexion limited substantially by pain    Lumbar extension limited substantially by pain  Strength:    Full strength throughout hips/quads/hamstrings  Special Tests:    Positive: straight leg raise (bilateral), slump test (bilateral), femoral stretch test (bilateral)      Albert Yeo MD, [facility]          Again, thank you for allowing me to participate in the care of your patient.        Sincerely,        Albert Yeo, MD    "

## 2020-03-16 NOTE — PATIENT INSTRUCTIONS
1. HNP (herniated nucleus pulposus), lumbar    2. Acute midline low back pain with sciatica, sciatica laterality unspecified      -Patient is here for follow-up of low back pain after an epidural steroid injection 1 week ago  -Patient states pain has not improved since the injection, it has changed in character and radiation  -Patient will start gabapentin 300 mg 3 times a day and tizanidine 4 mg twice a day as needed.  Patient will continue with nabumetone and oxycodone as needed  -Patient will call us in another week for an update on his condition  -Call direct clinic number [[phone]] at any time with questions or concerns.    Albert Yeo MD [facility] Orthopedics and Sports Medicine  [facility]

## 2020-03-16 NOTE — PROGRESS NOTES
"ASSESSMENT & PLAN  Patient Instructions     1. HNP (herniated nucleus pulposus), lumbar    2. Acute midline low back pain with sciatica, sciatica laterality unspecified      -Patient is here for follow-up of low back pain after an epidural steroid injection 1 week ago  -Patient states pain has not improved since the injection, it has changed in character and radiation  -Patient will start gabapentin 300 mg 3 times a day and tizanidine 4 mg twice a day as needed.  Patient will continue with nabumetone and oxycodone as needed  -Patient will call us in another week for an update on his condition  -Call direct clinic number [[phone]] at any time with questions or concerns.    Albert Yeo MD [facility] Orthopedics and Sports Medicine  [facility]          -----    SUBJECTIVE:  Dipak Fuentes is a 24 year old male who is seen in follow-up for low back pain due to severe lumbar HNP.They were last seen 3/5/2020. He had an YU done on 3/10/2020.     Since their last visit reports 0% - (About the same as last time). States now pain is shooting muscle spasms up his back and giving him headaches. Pain is also shooting down left leg, and pain in testicles. States has some tingling sensations in his feet.  They indicate that their current pain level is 6/10. They have tried other medications: Oxycodone/Acetaminophen (Percocet), Cyclobenzaprine (Flexeril)  and nabumetome and corticosteroid injection (most recent date: 3/10/2020) that provided 0 hour(s) of relief.      The patient is seen by themselves.    Patient's past medical, surgical, social, and family histories were reviewed today and no changes are noted.    REVIEW OF SYSTEMS:  Constitutional: NEGATIVE for fever, chills, change in weight  Skin: NEGATIVE for worrisome rashes, moles or lesions  GI/: NEGATIVE for bowel or bladder changes  Neuro: NEGATIVE for weakness, dizziness or paresthesias    OBJECTIVE:  /80   Ht 1.88 m (6' 2\")   Wt 112.9 " kg (249 lb)   BMI 31.97 kg/m     General: healthy, alert and in no distress  HEENT: no scleral icterus or conjunctival erythema  Skin: no suspicious lesions or rash. No jaundice.  CV: regular rhythm by palpation, no pedal edema  Resp: normal respiratory effort without conversational dyspnea   Psych: normal mood and affect  Gait: normal steady gait with appropriate coordination and balance  Neuro: normal light touch sensory exam of the extremities.    MSK:  THORACIC/LUMBAR SPINE  Inspection:    No gross deformity/asymmetry  Palpation:    Tender about the left para lumbar muscles and right para lumbar muscles. Otherwise remainder of landmarks are nontender.  Range of Motion:     Lumbar flexion limited substantially by pain    Lumbar extension limited substantially by pain  Strength:    Full strength throughout hips/quads/hamstrings  Special Tests:    Positive: straight leg raise (bilateral), slump test (bilateral), femoral stretch test (bilateral)      Albert Yeo MD, [facility]

## 2020-03-23 ENCOUNTER — TELEPHONE (OUTPATIENT)
Dept: ORTHOPEDICS | Facility: CLINIC | Age: 25
End: 2020-03-23

## 2020-03-23 DIAGNOSIS — M51.26 HNP (HERNIATED NUCLEUS PULPOSUS), LUMBAR: Primary | ICD-10-CM

## 2020-03-23 RX ORDER — OXYCODONE AND ACETAMINOPHEN 5; 325 MG/1; MG/1
1 TABLET ORAL
Qty: 30 TABLET | Refills: 0 | Status: SHIPPED | OUTPATIENT
Start: 2020-03-23 | End: 2020-10-05

## 2020-03-23 RX ORDER — NORTRIPTYLINE HCL 25 MG
25 CAPSULE ORAL AT BEDTIME
Qty: 30 CAPSULE | Refills: 0 | Status: SHIPPED | OUTPATIENT
Start: 2020-03-23 | End: 2020-10-01

## 2020-03-23 NOTE — TELEPHONE ENCOUNTER
Dipak Fuentes is a 24 year old male who left voicemail stating he would like Dr. Yeo to return his call.     Patient expecting call back: YES      Preferred contact number:  [phone] (home) [phone] (work)   Can we leave a detailed message on this number: YES consent on file    3/16/20 office visit plan: Patient will start gabapentin 300 mg 3 times a day and tizanidine 4 mg twice a day as needed.  Patient will continue with nabumetone and oxycodone as needed  -Patient will call us in another week for an update on his condition    Phone call to patient. He states his pain as been worse in intensity since 3/21/20.   Muscles in right leg have started spasming again. Usually occur towards the end of the day.   Pain located whole lower back and radiates to both buttocks, testicles and down both legs. Is more  severe in right leg.   Pain radiates to right foot, but spasms stop at the knee.   Radiates to above knee on left leg.   Has tingling in both feet occasionally.  Denies loss of bowel/bladder control.  Reports no bm for 2 days, may be due to Percocet. Recommended he take a stool softener, increase fluids and fiber in diet.   He is taking 1 tab Percocet at Hs only. Cyclobenzaprine takes twice daily.  Tizanidine takes twice daily.  Will clarify with provider if he was to stop the Cyclobenzaprine and take Tizanidine only.   Gabapentin 300mg three times daily.  Nabumetone twice daily.  No tylenol.  All seem to be helping  No longer taking Celebrex or Valium  Denies foot drop.   Icing and heat irritate the pain.   Lying flat is the only position that helps  YU was 3/10/20.   Will discuss with provider and get back with him.     Please advise.     TARUN Thomason RN

## 2020-03-23 NOTE — TELEPHONE ENCOUNTER
I called patient back regarding his worsening pain after lumbar epidural steroid injection.  I discussed his case and we reviewed his MRI with Dr. Esteves who performed to the epidural steroid injection.  She is recommending that he be seen by neurosurgery since he has had no improvement with epidural steroid injection.  We will also start nortriptyline 25 mg at bedtime.  Patient will continue with gabapentin 300 mg 3 times a day, tizanidine 4 mg twice a day, nabumetone 750 mg twice a day and oxycodone at bedtime.  Patient reports starting MiraLAX to improve his constipation which he reports is helping.  Referral for neurosurgery was placed today.  Patient understands and agrees with the plan

## 2020-03-24 ENCOUNTER — VIRTUAL VISIT (OUTPATIENT)
Dept: NEUROSURGERY | Facility: CLINIC | Age: 25
End: 2020-03-24
Attending: FAMILY MEDICINE
Payer: COMMERCIAL

## 2020-03-24 DIAGNOSIS — M51.26 HNP (HERNIATED NUCLEUS PULPOSUS), LUMBAR: ICD-10-CM

## 2020-03-24 PROCEDURE — 99203 OFFICE O/P NEW LOW 30 MIN: CPT | Mod: TEL | Performed by: PHYSICIAN ASSISTANT

## 2020-03-24 NOTE — PROGRESS NOTES
NEUROSURGERY PHONE CONSULT NOTE     DATE OF VISIT: 3/24/2020     SUBJECTIVE:     Dipak Fuentes is a pleasant 24 year old male who I had a phone consult with today for consultation on lumbar spine pain with right worse than left leg radiculopathy. He is referred to the Neurosurgery Clinic by Dr. Yeo in .  Today, Mr. Fuentes reports a one-month history of symptoms. He describes constant, sharp, burning pain that initiates in the midline low lumbar region and radiates distally in the right S1 distribution more so on than on the left. This pain is accompanie by intermittent paresthesias and numbness in the same distribution. Prolonged walking, standing, and sitting aggravate the symptoms, while alleviation is obtained by lying supine. No recent mechanism of injury such as trauma or a fall is associated with the onset of the pain, although he did feel that he twisted his low back the wrong way one month ago while using the restroom. There are no bowel or bladder changes. He denies saddle anesthesia. The patient has participated in conservative therapies to include physical therapy and an intralaminar epidural steroid injection at the L5-S1 level on 03/19/2020. Neither of these modalities have provided any significant long term relief.       Current Outpatient Medications:      acetaminophen (TYLENOL) 325 MG tablet, Take 2 tablets (650 mg) by mouth every 4 hours as needed for mild pain, Disp: , Rfl: 0     albuterol (PROAIR HFA) 108 (90 BASE) MCG/ACT inhaler, Inhale 2 puffs into the lungs every 6 hours as needed., Disp: , Rfl:      celecoxib (CELEBREX) 200 MG capsule, Take 1 capsule (200 mg) by mouth 2 times daily for 7 days, Disp: 14 capsule, Rfl: 0     cyclobenzaprine (FLEXERIL) 10 MG tablet, Take 1 tablet (10 mg) by mouth 2 times daily as needed for muscle spasms, Disp: 40 tablet, Rfl: 0     diazepam (VALIUM) 5 MG tablet, Take 1 tablet (5 mg) by mouth every 8 hours as needed for muscle spasms, Disp: 9 tablet, Rfl: 0      gabapentin (NEURONTIN) 300 MG capsule, Take 1 capsule (300 mg) by mouth 3 times daily, Disp: 90 capsule, Rfl: 0     gabapentin (NEURONTIN) 300 MG capsule, Take 1 capsule (300 mg) by mouth At Bedtime, Disp: 10 capsule, Rfl: 0     methylPREDNISolone (MEDROL DOSEPAK) 4 MG tablet therapy pack, Follow Package Directions, Disp: 21 tablet, Rfl: 0     nabumetone (RELAFEN) 750 MG tablet, Take 1 tablet (750 mg) by mouth 2 times daily as needed for moderate pain, Disp: 60 tablet, Rfl: 1     nortriptyline (PAMELOR) 25 MG capsule, Take 1 capsule (25 mg) by mouth At Bedtime, Disp: 30 capsule, Rfl: 0     oxyCODONE-acetaminophen (PERCOCET) 5-325 MG tablet, Take 1 tablet by mouth nightly as needed for severe pain, Disp: 30 tablet, Rfl: 0     oxyCODONE-acetaminophen (PERCOCET) 5-325 MG tablet, Take 1 tablet by mouth 2 times daily as needed for severe pain, Disp: 20 tablet, Rfl: 0     tiZANidine (ZANAFLEX) 4 MG tablet, Take 1 tablet (4 mg) by mouth 2 times daily as needed for muscle spasms, Disp: 60 tablet, Rfl: 0  No current facility-administered medications for this visit.     Facility-Administered Medications Ordered in Other Visits:      Self Administer Medications: Behavioral Services, , Does not apply, See Admin Instructions, Gene Dougherty, [facility]     Allergies   Allergen Reactions     Ceclor [Cefaclor Monohydrate]      Cefaclor Rash        Past Medical History:   Diagnosis Date     Bipolar 1 disorder (H)      Uncomplicated asthma         ROS: 10 point review of symptoms are negative other than the symptoms noted above in the HPI.     Family History has been reviewed with the patient, there are no pertinent findings to presenting concern.     No past surgical history on file.     Social History     Tobacco Use     Smoking status: Former Smoker     Packs/day: 0.50     Years: 5.00     Pack years: 2.50     Types: Cigarettes     Tobacco comment: at 19 switched to E cigs 30 mg, now down to 3 mg E-cigs   Substance Use Topics     Alcohol  use: Yes     Alcohol/week: 0.0 standard drinks     Comment: rare     Drug use: No        OBJECTIVE:   There were no vitals taken for this visit.   There is no height or weight on file to calculate BMI.     Imaging:     MR LUMBAR SPINE WITHOUT CONTRAST March 3, 2020 1:07 PM     Findings, per radiologist read, notable for:      Moderate size central disc protrusion at L5-S1 which is in  continuity with the thecal sac and S1 nerve roots but not causing any  definite stenosis or impingement.    Full radiological report in chart. Imaging reviewed with with patient today.     ASSESSMENT/PLAN:     Dipak Fuentes is a 24 year old male who presents to the clinic for consultation on lumbar spine pain with right worse than left leg radiculopathy. The patient's most recent imaging was reviewed with him today. It was explained that images show a moderate size central disc protrusion at L5-S1 which is in  continuity with the thecal sac and S1 nerve roots, which correlates to today's clinical examination. The patient has attempted conservative management with physical therapy and an intralaminar epidural steroid injection at the L5-S1 level on 03/19/2020, without resolution of symptoms.     He has failed conservative management, and therefore, we briefly discussed surgical intervention that included a L5-S1 hemilaminectomy microdiscectomy. We also discussed continuing with non-surgical options, although the risk of permanent nerve damage is greater with continued delay of relieving the pressure from the nerve due to the stenosis.   We reviewed the surgical risks including nerve damage, infection, bleeding, residual or recurrent disk / symptoms and spinal fluid leak. This will also be performed utilizing general anesthesia which can pose both cardiovascular and respiratory risks as well.     We described the procedure as being a minimally invasive same day surgery. We also discussed the normal postoperative recovery that would  include not lifting anything greater than 5-10 pounds, avoid excessive bending, twisting, and turning and to make frequent position changes about every 30 minutes going from sitting, standing and walking for six weeks approximately. We also discussed the timing of post-operative follow-up appointments in the Neurosurgery Clinic.     We also discussed signs of a worsening problem that he should seek being evaluated.     Prior to further discussing surgical intervention. This case will be discussed with Dr. Oquendo for final treatment plans. Once a plan has been formulated, we will call Mr. Fuentes to discuss.    He appeared to have a good understanding of the situation, asked appropriate questions which we answered, and wished to proceed as we had previously recommended.     In the event that patient's symptoms worsen or change we would like to see him sooner. We also discussed signs of a worsening problem that he should seek being evaluated.     Over 21 minutes was taken for this virtual visit.        Respectfully,     JANET Peña, PAHiteshC

## 2020-03-27 ENCOUNTER — PREP FOR PROCEDURE (OUTPATIENT)
Dept: NEUROLOGY | Facility: CLINIC | Age: 25
End: 2020-03-27

## 2020-03-27 ENCOUNTER — VIRTUAL VISIT (OUTPATIENT)
Dept: NEUROSURGERY | Facility: CLINIC | Age: 25
End: 2020-03-27
Attending: NEUROLOGICAL SURGERY
Payer: COMMERCIAL

## 2020-03-27 DIAGNOSIS — M51.16 LUMBAR DISC HERNIATION WITH RADICULOPATHY: ICD-10-CM

## 2020-03-27 DIAGNOSIS — M51.16 LUMBAR DISC HERNIATION WITH RADICULOPATHY: Primary | ICD-10-CM

## 2020-03-27 PROCEDURE — 99202 OFFICE O/P NEW SF 15 MIN: CPT | Mod: TEL | Performed by: NEUROLOGICAL SURGERY

## 2020-03-27 NOTE — PROGRESS NOTES
Reason for Visit: Due to COVID-19 precautions, this visit was performed over the phone instead of face to face.    It was a pleasure to talk with Dipak Fuentes today by phone. He is a 24 year old male who recently had a virtual visit with Richy Schumacher PA-C in our clinic.  The patient has had severe radiculopathy, right greater than left with herniated disc at L5-S1.  He did not get much benefit from an epidural steroid injection and is only able to be mobile for short periods of time.    Given his imaging findings and symptoms with the L5-S1 herniated disc and concordant radiculopathy, we discussed a right L5-S1 minimally invasive microdiscectomy.  I think he would benefit from this and that that would help his symptoms significantly.  However we also discussed that currently we are unable to schedule and perform surgeries like this given the COVID health crisis.      We will work on having him ready to schedule surgery when the time comes that we can begin to move forward again.    This phone visit took 12 minutes.

## 2020-03-27 NOTE — LETTER
3/27/2020       RE: Dipak Fuentes  [address]  [place] [address]     Dear Colleague,    Thank you for referring your patient, Dipak Fuentes, to the [facility]OS[facility] at [facility]. Please see a copy of my visit note below.    Reason for Visit: Due to COVID-19 precautions, this visit was performed over the phone instead of face to face.    It was a pleasure to talk with Dipak Fuentes today by phone. He is a 24 year old male who recently had a virtual visit with Richy Schumacher PA-C in our clinic.  The patient has had severe radiculopathy, right greater than left with herniated disc at L5-S1.  He did not get much benefit from an epidural steroid injection and is only able to be mobile for short periods of time.    Given his imaging findings and symptoms with the L5-S1 herniated disc and concordant radiculopathy, we discussed a right L5-S1 minimally invasive microdiscectomy.  I think he would benefit from this and that that would help his symptoms significantly.  However we also discussed that currently we are unable to schedule and perform surgeries like this given the COVID health crisis.      We will work on having him ready to schedule surgery when the time comes that we can begin to move forward again.    This phone visit took 12 minutes.        Again, thank you for allowing me to participate in the care of your patient.      Sincerely,    Juancho Oquendo MD

## 2020-04-08 DIAGNOSIS — M54.41 ACUTE RIGHT-SIDED LOW BACK PAIN WITH RIGHT-SIDED SCIATICA: ICD-10-CM

## 2020-04-08 DIAGNOSIS — M51.26 HNP (HERNIATED NUCLEUS PULPOSUS), LUMBAR: ICD-10-CM

## 2020-04-08 DIAGNOSIS — M54.40 ACUTE MIDLINE LOW BACK PAIN WITH SCIATICA, SCIATICA LATERALITY UNSPECIFIED: ICD-10-CM

## 2020-04-08 NOTE — TELEPHONE ENCOUNTER
Reason for call:  Dipak would like to speak with Dr Yeo.    Phone number to reach patient:  Cell number on file:    Telephone Information:   Mobile [phone]       Best Time:  any    Can we leave a detailed message on this number?  NO

## 2020-04-10 ENCOUNTER — VIRTUAL VISIT (OUTPATIENT)
Dept: ORTHOPEDICS | Facility: CLINIC | Age: 25
End: 2020-04-10
Payer: COMMERCIAL

## 2020-04-10 VITALS — BODY MASS INDEX: 31.95 KG/M2 | WEIGHT: 249 LBS | HEIGHT: 74 IN

## 2020-04-10 DIAGNOSIS — M54.41 ACUTE RIGHT-SIDED LOW BACK PAIN WITH RIGHT-SIDED SCIATICA: ICD-10-CM

## 2020-04-10 DIAGNOSIS — M54.40 ACUTE MIDLINE LOW BACK PAIN WITH SCIATICA, SCIATICA LATERALITY UNSPECIFIED: ICD-10-CM

## 2020-04-10 DIAGNOSIS — M51.26 HNP (HERNIATED NUCLEUS PULPOSUS), LUMBAR: Primary | ICD-10-CM

## 2020-04-10 PROCEDURE — 99214 OFFICE O/P EST MOD 30 MIN: CPT | Mod: GT | Performed by: FAMILY MEDICINE

## 2020-04-10 RX ORDER — GABAPENTIN 300 MG/1
300 CAPSULE ORAL 3 TIMES DAILY
Qty: 90 CAPSULE | Refills: 0 | Status: CANCELLED | OUTPATIENT
Start: 2020-04-10

## 2020-04-10 RX ORDER — LAMOTRIGINE 100 MG/1
100 TABLET ORAL DAILY
COMMUNITY
End: 2020-10-01

## 2020-04-10 RX ORDER — GABAPENTIN 300 MG/1
300 CAPSULE ORAL 3 TIMES DAILY
Qty: 90 CAPSULE | Refills: 0 | Status: SHIPPED | OUTPATIENT
Start: 2020-04-10 | End: 2020-10-01

## 2020-04-10 RX ORDER — GABAPENTIN 300 MG/1
300 CAPSULE ORAL 3 TIMES DAILY
Qty: 90 CAPSULE | Refills: 1 | Status: CANCELLED | OUTPATIENT
Start: 2020-04-10

## 2020-04-10 ASSESSMENT — MIFFLIN-ST. JEOR: SCORE: 2179.21

## 2020-04-10 NOTE — PROGRESS NOTES
"Dipak Fuentes is a 24 year old male who is being evaluated via a billable video visit.      The patient has been notified of following:     \"This video visit will be conducted via a call between you and your physician/provider. We have found that certain health care needs can be provided without the need for an in-person physical exam.  This service lets us provide the care you need with a video conversation.  If a prescription is necessary we can send it directly to your pharmacy.  If lab work is needed we can place an order for that and you can then stop by our lab to have the test done at a later time.    If during the course of the call the physician/provider feels a video visit is not appropriate, you will not be charged for this service.\"     Physician has received verbal consent for a Video Visit from the patient? Yes    Patient would like the video invitation sent by: Text to cell phone: [phone]    Video Start Time: 2:35 PM    SUBJECTIVE:  Dipak Fuentes is a 24 year old male who is seen in follow-up for low back pain. They were last seen by Dr. Yeo on 3/16/20.     Since their last visit reports 30% improvement. He reports that over the last 5 days he has noticed significant improvement. He reports that he has been able to stand up straight. He notes that he has been able to stand for 5-10 minutes without crutches and about 1.5 hours with crutches. Pain improves with lying down. They indicate that their current pain level is 1/10. They have tried rest/activity avoidance, other medications: Oxycodone/Acetaminophen (Percocet) (stopped taking about 1 week ago), Gabapentin and nabumetone and tizanidine.      The patient is seen by themselves.    I have reviewed and updated the patient's Past Medical History, Social History, Family History and Medication List.    ALLERGIES  Ceclor [cefaclor monohydrate] and Cefaclor    REVIEW OF SYSTEMS:  10 point ROS is negative other than symptoms noted above in HPI, Past " Medical History or as stated below  Constitutional: NEGATIVE for fever, chills, change in weight  Skin: NEGATIVE for worrisome rashes, moles or lesions  GI/: NEGATIVE for bowel or bladder changes  Neuro: NEGATIVE for weakness, dizziness or paresthesias    THORACIC/LUMBAR SPINE  Range of Motion:     Lumbar flexion limited by pain - able to reach knees/top of calf    Lumbar extension limited by pain  Strength:    quadriceps able to lift up against gravity, Full strength throughout hips/quads/hamstrings  Special Tests:    Positive: slump test (right) - initiated by sitting at bedside and bending down to calf - reports increased back, buttock and posterior thigh pain    Assessment/Plan:    1. HNP (herniated nucleus pulposus), lumbar    2. Acute right-sided low back pain with right-sided sciatica    3. Acute midline low back pain with sciatica, sciatica laterality unspecified      Following up on acute right lumbar radic due to herniated disc  No improvement initially with therapy and pain appeared to worsen after YU  Was evaluated by Neurosurgery but unable to proceed with surgery at this time given COVID  Feels like he's making some improvement. Feels his range of motion and standing and sitting tolerance is improved.  Recommend restarting spine based therapy.  Will send a message to Rosa Morrison for her to reach out and set a virtual visit.  Refills provided for Gabapentin, Tazanidine and Nabumetone.  Will mail AVS    [facility] he's back in the office. Decision about subsequent visit can be made by his team.      Video-Visit Details    Type of service:  Video Visit    Video End Time (time video stopped): 2:52    Originating Location (pt. Location): Home    Distant Location (provider location):  [facility]     Mode of Communication:  Video Conference via Stephanie Carpenter DO, CAQSM  [facility]

## 2020-04-10 NOTE — PATIENT INSTRUCTIONS
1. HNP (herniated nucleus pulposus), lumbar    2. Acute right-sided low back pain with right-sided sciatica    3. Acute midline low back pain with sciatica, sciatica laterality unspecified      Recommend restarting spine based therapy.  Will send a message to Rosa Morrison for her to reach out and set a virtual visit.  Refills provided for Gabapentin, Tazanidine and Nabumetone.    Follow-up with Dr. Yeo in 2 weeks when he's back in the office.

## 2020-04-10 NOTE — TELEPHONE ENCOUNTER
Returned call to patient.   He states that over the last week he has noticed improvement of low back pain.   He has been able to tolerate standing with out crutches for longer periods of time, and able to stand 90 minutes with use of crutches yesterday, with mild increase in soreness.   He is called to inquire if there were maybe other recommendations that he can try at this time, to help postpone surgery.     Patient previously worked with Rosa Morrison PT prior to surgical consultation, but was not finding relief at that time.     Patient states that he also needs refills of the following medications:   Nabumetone 750 mg, Gabapentin 300 mg, and Tizanidine 4mg.   Per chart review patient should have 1 refill remaining of Nabumetone ordered by Dr. Yeo on 3/3/20.   Medication refills pending, and pharmacy selected.     Please advise on recommendations in Dr. Yeo's absence.     Clarissa Vargas, ATC

## 2020-04-10 NOTE — TELEPHONE ENCOUNTER
Recommend setting up a video visit and if unable then telephone visit.    Routing to BV Reception to schedule.    Miranda Carpenter DO, UTEM  [facility]

## 2020-04-13 ENCOUNTER — VIRTUAL VISIT (OUTPATIENT)
Dept: PHYSICAL THERAPY | Facility: CLINIC | Age: 25
End: 2020-04-13
Payer: COMMERCIAL

## 2020-04-13 DIAGNOSIS — M54.16 LUMBAR RADICULOPATHY: Primary | ICD-10-CM

## 2020-04-13 PROCEDURE — 97110 THERAPEUTIC EXERCISES: CPT | Mod: GT | Performed by: PHYSICAL THERAPIST

## 2020-04-13 PROCEDURE — 97530 THERAPEUTIC ACTIVITIES: CPT | Mod: GT | Performed by: PHYSICAL THERAPIST

## 2020-04-13 NOTE — PROGRESS NOTES
"Physical Therapy Virtual Follow Up Visit      The patient has been notified of following:     \"This virtual visit will be conducted between you and your provider. We have found that certain health care needs can be provided without the need for physical presence.  This service lets us provide the care you need with a virtual visit.\"    Due to external, as well as internal [facility] management of the COVID-19 Virus, Dipak Fuentes was not seen in our clinic.  As a substitution, we implemented a virtual visit to manage this patient's condition utilizing the PTRx virtual visit platform via the patient s existing code.  The provider, Rosa Morrison, reviewed the patient's chart, PTRx prescription, and spoke with the patient to determine the following telemedicine visit is appropriate and effective for the patient's care.    The following type of visit was completed:   Video Visit:  The PTRx platform uses a synchronous HIPAA compliant video stream for this patient encounter.        S: Dipak Fuentes is a 24 year old male. Connected virtually on the PTRx platform to discuss their condition/progress. They noted improvements in lumbar shift, standing tolerance, pain level, feels like the injection began to help (underwent YU x 4 weeks).  They noted ongoing pain or limitations with walking/standing for prolonged periods..     Current pain level: 3/10      O: Patient demonstrated mild R shift; Lx ROM: Flex=mod loss w/deviation Ext=min to mod loss R LBP, R SG=min to nil loss erp, L SG=min to mod loss pdm     PTRx Content from today's visit:  1. Rep R SGIS 3 x 10 (incr/NW/incr EXT, Flex); 2. Attempted to add EIS w/worsening of shift, decr Flex ; perform rep R SGIS only x 10/2 hrs, recheck baselines; educated in red flags including bowel/bladder changes, saddle parasthesia, sudden/worsening onset of weakness.  Educated in traffic light, therapeutic dose of exercise.     A:   Patient's symptoms are resolving.  Patient is ready " to progress to more complex exercises.  Response to therapy has shown an improvement in  pain level, ROM  and function      P: Patient will continue with the exercise program assigned on their PTRx code and will add the following measures to manage their pain/condition: encouraged patient to be up more vs lying down, increasing his walking distance as tolerated.      Current treatment program is being advanced to more complex exercises.      Virtual visit contact time    Time of service began: 12:30 PM  Time of service ended: 1:00 PM  Total Time for set up, visit, and documentation: 40 minutes    Payor: [facility] [email or web address] / Plan: [employer] TPA / Product Type: PPO /   .  Procedure Code/s   Therapeutic Exercise (57985): 10 minutes  Therapeutic Activities (89507): 10 minutes    I have reviewed the note as documented above.  This accurately captures the substance of my conversation with the patient.  Provider location: [place], [place] ([facility])  Patient location: [place], [place]

## 2020-04-13 NOTE — LETTER
"DEPARTMENT OF HEALTH AND HUMAN SERVICES  CENTERS FOR MEDICARE & MEDICAID SERVICES    PLAN/UPDATED PLAN OF PROGRESS FOR OUTPATIENT REHABILITATION    PATIENTS NAME:  Dipak Fuentes   : 1995  PROVIDER NUMBER:    [id number]  [id number]  PROVIDER NAME: EDE GIMENEZ PT  MEDICAL RECORD NUMBER: [id number]   START OF CARE DATE:  SOC Date: 20   TYPE:  PT  PRIMARY/TREATMENT DIAGNOSIS:Lumbar radiculopathy  VISITS FROM START OF CARE:  Rxs Used: 4     Physical Therapy Virtual Follow Up Visit    The patient has been notified of following:     \"This virtual visit will be conducted between you and your provider. We have found that certain health care needs can be provided without the need for physical presence.  This service lets us provide the care you need with a virtual visit.\"    Due to external, as well as internal [facility] management of the COVID-19 Virus, Dipak Fuentes was not seen in our clinic.  As a substitution, we implemented a virtual visit to manage this patient's condition utilizing the [employer] virtual visit platform via the patient s existing code.  The provider, Rosa Morrison, reviewed the patient's chart, PTRx prescription, and spoke with the patient to determine the following telemedicine visit is appropriate and effective for the patient's care.    The following type of visit was completed:   Video Visit:  The [employer] platform uses a synchronous HIPAA compliant video stream for this patient encounter.      S: Dipak Fuentes is a 24 year old male. Connected virtually on the [employer] platform to discuss their condition/progress. They noted improvements in lumbar shift, standing tolerance, pain level, feels like the injection began to help (underwent YU x 4 weeks).  They noted ongoing pain or limitations with walking/standing for prolonged periods..     Current pain level: 3/10    O: Patient demonstrated mild R shift; Lx ROM: Flex=mod loss w/deviation Ext=min to mod loss R LBP, R SG=min to nil loss " erp, L SG=min to mod loss pdm     PTRx Content from today's visit:  1. Rep R SGIS 3 x 10 (incr/NW/incr EXT, Flex); 2. Attempted to add EIS w/worsening of shift, decr Flex ; perform rep R SGIS only x 10/2 hrs, recheck baselines; educated in red flags including bowel/bladder changes, saddle parasthesia, sudden/worsening onset of weakness.  Educated in traffic light, therapeutic dose of exercise.     A:   Patient's symptoms are resolving.  Patient is ready to progress to more complex exercises.  Response to therapy has shown an improvement in  pain level, ROM  and function  PATIENTS NAME:  Dipak Fuentes   : 1995    P: Patient will continue with the exercise program assigned on their PTRx code and will add the following measures to manage their pain/condition: encouraged patient to be up more vs lying down, increasing his walking distance as tolerated.      Current treatment program is being advanced to more complex exercises.      Virtual visit contact time    Time of service began: 12:30 PM  Time of service ended: 1:00 PM  Total Time for set up, visit, and documentation: 40 minutes    Payor: [facility]ALTH / Plan: [employer] TPA / Product Type: PPO /   .  Procedure Code/s   Therapeutic Exercise (50078): 10 minutes  Therapeutic Activities (56101): 10 minutes    I have reviewed the note as documented above.  This accurately captures the substance of my conversation with the patient.  Provider location: [place], [place] ([facility])  Patient location: [place], [place]        Caregiver Signature/Credentials _____________________________ Date ________       Treating Provider: Rosa Morrison PT, Cert MDT     I have reviewed and certified the need for these services and plan of treatment while under my care.        PHYSICIAN'S SIGNATURE:   _________________________________________  Date___________   Charlotte Mata    Certification period:  Beginning of Cert date period: 20 to  End of Cert period date: 20     Functional  "Level Progress Report: Please see attached \"Goal Flow sheet for Functional level.\"    ____X____ Continue Services or       ________ DC Services                Service dates: From  SOC Date: 02/25/20 date to present                         "

## 2020-04-16 ENCOUNTER — TELEPHONE (OUTPATIENT)
Dept: ORTHOPEDICS | Facility: CLINIC | Age: 25
End: 2020-04-16

## 2020-04-16 NOTE — TELEPHONE ENCOUNTER
Reason for Call:  Form, our goal is to have forms completed with 7 days, however, some forms may require a visit or additional information.    Type of letter, form or note:  Member history Questionaire to [employer]      Who is the form from?: Insurance    Where did the form come from: form was faxed in    Phone number of person requesting form: [phone]  Can we leave a detailed message on this number:  NO    Desired completion date of form: 4/28/20      How will form be returned?:  fax to [phone]    Has the patient signed a consent form for release of information (may be included with form)? Not Applicable    Additional comments: Virtual visit performed on 4/10/20.     Form was started and place in Provider Basket for provider review/ completion at [facility].

## 2020-04-20 ENCOUNTER — VIRTUAL VISIT (OUTPATIENT)
Dept: PHYSICAL THERAPY | Facility: CLINIC | Age: 25
End: 2020-04-20
Payer: COMMERCIAL

## 2020-04-20 DIAGNOSIS — M54.59 MECHANICAL LOW BACK PAIN: Primary | ICD-10-CM

## 2020-04-20 PROCEDURE — 97110 THERAPEUTIC EXERCISES: CPT | Mod: 95 | Performed by: PHYSICAL THERAPIST

## 2020-04-20 PROCEDURE — 97112 NEUROMUSCULAR REEDUCATION: CPT | Mod: 95 | Performed by: PHYSICAL THERAPIST

## 2020-04-20 NOTE — PROGRESS NOTES
"Physical Therapy Virtual Follow Up Visit      The patient has been notified of following:     \"This virtual visit will be conducted between you and your provider. We have found that certain health care needs can be provided without the need for physical presence.  This service lets us provide the care you need with a virtual visit.\"    Due to external, as well as internal [facility] management of the COVID-19 Virus, Dipak Fuentes was not seen in our clinic.  As a substitution, we implemented a virtual visit to manage this patient's condition utilizing the PTRx virtual visit platform via the patient s existing code.  The provider, Rosa Morrison, reviewed the patient's chart, PTRx prescription, and spoke with the patient to determine the following telemedicine visit is appropriate and effective for the patient's care.    The following type of visit was completed:   Video Visit:  The [employer] platform uses a synchronous HIPAA compliant video stream for this patient encounter.        S: Dipak Fuentes is a 24 year old male. Connected virtually on the PTRx platform to discuss their condition/progress. They noted improvements in pain and medication requirements--no longer needing muscle relaxant.  They noted ongoing pain or limitations with prolonged activities, lifting--still has prolonged walking limitations, standing is unrestricted.     Current pain level: 2/10      O: Patient demonstrated very mild/nil R shift; Lx ROM Flex=min loss, decreased deviations noted, Ext=min to nil loss erp R LB, R SG=nil loss, L SG=min loss erp     PTRx Content from today's visit:  1. Rep L SGIS 2 x 10 (needed vc for form, end ranged, pace) P \"stretch\"/NW/incr EXT, NE Flex, incr L SG; 2. Rep EIS x 3 reps: incr/NW/NE ROM  TA Activation: patient unable to hold contraction long;     A:   Patient's symptoms are resolving.  Patient is ready to progress to more complex exercises.  Response to therapy has shown an improvement in  pain level, " radicular symptoms, ROM , posture and function      P: Patient will continue with the exercise program assigned on their PTRx code and will add the following measures to manage their pain/condition: adding rep EIS at end of set of L SGIS, monitoring sx response; added core strengthening with patient also instructed to begin short walks twice per day, extending one to a longer walk as able.     Current treatment program is being advanced to more complex exercises.  Continue with therapy 2 x month, once daily, for 6 weeks.     Virtual visit contact time    Time of service began: 12:30 PM  Time of service ended: 12:55 PM  Total Time for set up, visit, and documentation: 35 minutes    Payor: [facility] HEALTH / Plan: [employer] TPA / Product Type: PPO /   .  Procedure Code/s   Therapeutic Exercise (22314): 15 minutes  Neuromuscular Re-education (09785): 10 minutes    I have reviewed the note as documented above.  This accurately captures the substance of my conversation with the patient.  Provider location: [place], [place] ([facility])  Patient location: [place], [place]

## 2020-04-20 NOTE — LETTER
"DEPARTMENT OF HEALTH AND HUMAN SERVICES  CENTERS FOR MEDICARE & MEDICAID SERVICES    PLAN/UPDATED PLAN OF PROGRESS FOR OUTPATIENT REHABILITATION    PATIENTS NAME:  Dipak Fuentes   : 1995  PR[facility] NUMBER:    [id number]  [id number]  PROVIDER NAME: EDE GIMENEZ PT  MEDICAL RECORD NUMBER: [id number]   START OF CARE DATE:   20   TYPE:  PT  PRIMARY/TREATMENT DIAGNOSIS:  Mechanical low back pain  VISITS FROM START OF CARE:  Rxs Used: 5     Physical Therapy Virtual Follow Up Visit      The patient has been notified of following:     \"This virtual visit will be conducted between you and your provider. We have found that certain health care needs can be provided without the need for physical presence.  This service lets us provide the care you need with a virtual visit.\"    Due to external, as well as internal [facility] management of the COVID-19 Virus, Dipak Fuentes was not seen in our clinic.  As a substitution, we implemented a virtual visit to manage this patient's condition utilizing the [employer] virtual visit platform via the patient s existing code.  The provider, Rosa Morrison, reviewed the patient's chart, PTRx prescription, and spoke with the patient to determine the following telemedicine visit is appropriate and effective for the patient's care.    The following type of visit was completed:   Video Visit:  The [employer] platform uses a synchronous HIPAA compliant video stream for this patient encounter.      S: Dipak Fuentes is a 24 year old male. Connected virtually on the [employer] platform to discuss their condition/progress. They noted improvements in pain and medication requirements--no longer needing muscle relaxant.  They noted ongoing pain or limitations with prolonged activities, lifting--still has prolonged walking limitations, standing is unrestricted.     Current pain level: 2/10    O: Patient demonstrated very mild/nil R shift; Lx ROM Flex=min loss, decreased deviations noted, Ext=min to " "nil loss erp R LB, R SG=nil loss, L SG=min loss erp     PTRx Content from today's visit:  1. Rep L SGIS 2 x 10 (needed vc for form, end ranged, pace) P \"stretch\"/NW/incr EXT, NE Flex, incr L SG; 2. Rep EIS x 3 reps: incr/NW/NE ROM  TA Activation: patient unable to hold contraction long;     A:   Patient's symptoms are resolving.  Patient is ready to progress to more complex exercises.  PATIENTS NAME:  Dipak Fuentes   : 1995        Response to therapy has shown an improvement in  pain level, radicular symptoms, ROM , posture and function    P: Patient will continue with the exercise program assigned on their PTRx code and will add the following measures to manage their pain/condition: adding rep EIS at end of set of L SGIS, monitoring sx response; added core strengthening with patient also instructed to begin short walks twice per day, extending one to a longer walk as able.     Current treatment program is being advanced to more complex exercises.  Continue with therapy 2 x month, once daily, for 6 weeks.     Virtual visit contact time    Time of service began: 12:30 PM  Time of service ended: 12:55 PM  Total Time for set up, visit, and documentation: 35 minutes    Payor: [facility] HEALTH / Plan: [facility] / Product Type: PPO /   .  Procedure Code/s   Therapeutic Exercise (79731): 15 minutes  Neuromuscular Re-education (24519): 10 minutes    I have reviewed the note as documented above.  This accurately captures the substance of my conversation with the patient.  Provider location: [place], [place] ([facility])  Patient location: [place], [place]      Caregiver Signature/Credentials _____________________________ Date ________       Treating Provider: Rosa Morrison PT, Cert MDT   I have reviewed and certified the need for these services and plan of treatment while under my care.       PHYSICIAN'S SIGNATURE:   ___________________________________Date___________     ALBERTO Valderrama    Certification period:  Beginning  " "Cert date period: 04/26/20 to  End of Cert period date: 06/07/20     Functional Level Progress Report: Please see attached \"Goal Flow sheet for Functional level.\"    ____X____ Continue Services or       ________ DC Services                Service dates: From  SOC Date: 02/25/20 date to present                         "

## 2020-05-04 ENCOUNTER — VIRTUAL VISIT (OUTPATIENT)
Dept: PHYSICAL THERAPY | Facility: CLINIC | Age: 25
End: 2020-05-04
Payer: COMMERCIAL

## 2020-05-04 DIAGNOSIS — M54.16 LUMBAR RADICULOPATHY: Primary | ICD-10-CM

## 2020-05-04 PROCEDURE — 97112 NEUROMUSCULAR REEDUCATION: CPT | Mod: 95 | Performed by: PHYSICAL THERAPIST

## 2020-05-04 NOTE — PROGRESS NOTES
"Physical Therapy Virtual Follow Up Visit/Progress Note      The patient has been notified of following:     \"This virtual visit will be conducted between you and your provider. We have found that certain health care needs can be provided without the need for physical presence.  This service lets us provide the care you need with a virtual visit.\"    Due to external, as well as internal [facility] management of the COVID-19 Virus, Dipak Fuentes was not seen in our clinic.  As a substitution, we implemented a virtual visit to manage this patient's condition utilizing the [employer] virtual visit platform via the patient s existing code.  The provider, Rosa Morrison, reviewed the patient's chart, PTRx prescription, and spoke with the patient to determine the following telemedicine visit is appropriate and effective for the patient's care.    The following type of visit was completed:   Video Visit:  The [employer] platform uses a synchronous HIPAA compliant video stream for this patient encounter.        S: Dipak Fuentes is a 24 year old male. Connected virtually on the PTRx platform to discuss their condition/progress. They noted improvements in pain levels, function.  They noted ongoing pain or limitations with mild discomfort with lifting.     Current pain level: 1/10      O: Patient demonstrated Lx ROM Flex=min to mod loss, EXT=nil loss, B SG=min loss;      PTRx Content from today's visit:  Exercise Name: Understanding Mechanical Diagnosis and Therapy: Centralization  Exercise Name: Side Glide in Standing with Extension, Sets: 1 - Reps: 10 - Sessions: every few hours  Exercise Name: Abdominal Brace Transverse Abdominis, Sets: 1 - Reps: 10 hold 10 seconds - Sessions: 2  Exercise Name: Supine Abdominal Exercise #3B (Two Leg Marching), Sets: 3 - Reps: 30 seconds - Sessions: 1, Notes: make sure you are pulling in through your belly button and stay engaged the whole time your legs are marching--should not have any back pain. "   Exercise Name: neliag, Sets: 1 - Reps: 10 - Sessions: 1  Exercise Name: Wall Sit with Ball, Sets: 2 - Reps: 60 seconds - Sessions: 1  Exercise Name: Ball Stabilization Prone Walkout, Sets: 2 - Reps: 10, Notes: make sure you don't walk out too far unless you can keep your spine   Exercise Name: Bridging On Ball With Hamstring Curls (Large Ball)  Exercise Name: Ball Exercise Prone Push Up  Exercise Name: Ball Stabilization Prone Bilateral Lower Extremity Extension  Exercise Name: Ball Stabilization Prone Alternating Arm and Leg Extension    A:   Patient's symptoms are resolving.  Patient is progressing well and is ready to decrease frequency of treatment in the clinic.  Response to therapy has shown an improvement in  pain level and function  Now Dipak is able to walk 30 minutes twice per day which is a significant improvement.     P: Patient will continue with the exercise program assigned on their PTRx code and will add the following measures to manage their pain/condition: continue to walk 30 minutes twice daily.     Continue with physical therapy 2 x month, once daily for 6 weeks.    Current treatment program is being advanced to more complex exercises.      Virtual visit contact time    Time of service began: 1:20 PM  Time of service ended: 1:50 PM  Total Time for set up, visit, and documentation: 40 minutes    Payor: [facility] HEALTH / Plan: [employer] TPA / Product Type: PPO /   .  Procedure Code/s   Therapeutic Exercise (37034): 0  Neuromuscular Re-education (45763): 25 minutes  Therapeutic Activities (25611): 5 minutes    I have reviewed the note as documented above.  This accurately captures the substance of my conversation with the patient.  Provider location: [place], [place] ([facility])  Patient location: [place], [place]

## 2020-05-04 NOTE — LETTER
"DEPARTMENT OF HEALTH AND HUMAN SERVICES  CENTERS FOR MEDICARE & MEDICAID SERVICES    PLAN/UPDATED PLAN OF PROGRESS FOR OUTPATIENT REHABILITATION    PATIENTS NAME:  Dipak Fuentes   : 1995  PROVIDER NUMBER:    [id number]  [id number]  PROVIDER NAME: EDE GIMENEZ PT  MEDICAL RECORD NUMBER: [id number]   START OF CARE DATE: 20   TYPE:  PT  PRIMARY/TREATMENT DIAGNOSIS: (Pertinent Medical Diagnosis)  Lumbar radiculopathy  VISITS FROM START OF CARE:  Rxs Used: 6     Physical Therapy Virtual Follow Up Visit/Progress Note      The patient has been notified of following:     \"This virtual visit will be conducted between you and your provider. We have found that certain health care needs can be provided without the need for physical presence.  This service lets us provide the care you need with a virtual visit.\"    Due to external, as well as internal [facility] management of the COVID-19 Virus, Dipak Fuentes was not seen in our clinic.  As a substitution, we implemented a virtual visit to manage this patient's condition utilizing the [employer] virtual visit platform via the patient s existing code.  The provider, Rosa Morrison, reviewed the patient's chart, PTRx prescription, and spoke with the patient to determine the following telemedicine visit is appropriate and effective for the patient's care.    The following type of visit was completed:   Video Visit:  The [employer] platform uses a synchronous HIPAA compliant video stream for this patient encounter.        S: Dipak Fuentes is a 24 year old male. Connected virtually on the PTRx platform to discuss their condition/progress. They noted improvements in pain levels, function.  They noted ongoing pain or limitations with mild discomfort with lifting.     Current pain level: 1/10      O: Patient demonstrated Lx ROM Flex=min to mod loss, EXT=nil loss, B SG=min loss;      PTRx Content from today's visit:  Exercise Name: Understanding Mechanical Diagnosis and " Therapy: Centralization  Exercise Name: Side Glide in Standing with Extension, Sets: 1 - Reps: 10 - Sessions: every few hours  Exercise Name: Abdominal Brace Transverse Abdominis, Sets: 1 - Reps: 10 hold 10 seconds - Sessions: 2  Exercise Name: Supine Abdominal Exercise #3B (Two Leg Marching), Sets: 3 - Reps: 30 seconds - Sessions: 1, Notes: make sure you are pulling in through your belly button   PATIENTS NAME:  Dipak Fuentes   : 1995                and stay engaged the whole time your legs are marching--should not have any back pain.   Exercise Name: birddog, Sets: 1 - Reps: 10 - Sessions: 1  Exercise Name: Wall Sit with Ball, Sets: 2 - Reps: 60 seconds - Sessions: 1  Exercise Name: Ball Stabilization Prone Walkout, Sets: 2 - Reps: 10, Notes: make sure you don't walk out too far unless you can keep your spine   Exercise Name: Bridging On Ball With Hamstring Curls (Large Ball)  Exercise Name: Ball Exercise Prone Push Up  Exercise Name: Ball Stabilization Prone Bilateral Lower Extremity Extension  Exercise Name: Ball Stabilization Prone Alternating Arm and Leg Extension    A:   Patient's symptoms are resolving.  Patient is progressing well and is ready to decrease frequency of treatment in the clinic.  Response to therapy has shown an improvement in  pain level and function  Now Dipak is able to walk 30 minutes twice per day which is a significant improvement.     P: Patient will continue with the exercise program assigned on their PTRx code and will add the following measures to manage their pain/condition: continue to walk 30 minutes twice daily.     Continue with physical therapy 2 x month for 6 weeks.    Current treatment program is being advanced to more complex exercises.      Virtual visit contact time    Time of service began: 1:20 PM  Time of service ended: 1:50 PM  Total Time for set up, visit, and documentation: 40 minutes    Payor: [facility] HEALTH / Plan: [employer] TPA / Product Type: PPO /   .  Procedure  "Code/s   Therapeutic Exercise (80119): 0  Neuromuscular Re-education (32150): 25 minutes  Therapeutic Activities (59901): 5 minutes                PATIENTS NAME:  Dipak Fuentes   : 1995      I have reviewed the note as documented above.  This accurately captures the substance of my conversation with the patient.  Provider location: [place], [place] ([facility])  Patient location: [place], [place]        Caregiver Signature/Credentials _____________________________ Date ________       Treating Provider: Rosa Morrison PT, Cert MDT   I have reviewed and certified the need for these services and plan of treatment while under my care.        PHYSICIAN'S SIGNATURE:   _____________________________________ Date___________     ALBERTO Valderrama    Certification period:  Beginning of Cert date period: 20 to  End of Cert period date: 20     Functional Level Progress Report: Please see attached \"Goal Flow sheet for Functional level.\"    ____X____ Continue Services or       ________ DC Services                Service dates: From  SOC Date: 20 date to present                         "

## 2020-05-12 ENCOUNTER — DOCUMENTATION ONLY (OUTPATIENT)
Dept: NEUROSURGERY | Facility: CLINIC | Age: 25
End: 2020-05-12

## 2020-05-12 NOTE — PROGRESS NOTES
Received Member History Questionnaire     Completed: 5/12/20    Faxed: 5/14/20 to [phone] attn: Kamron Omer     Copy in bin and original sent to medical records

## 2020-05-14 ENCOUNTER — MEDICAL CORRESPONDENCE (OUTPATIENT)
Dept: HEALTH INFORMATION MANAGEMENT | Facility: CLINIC | Age: 25
End: 2020-05-14

## 2020-05-14 NOTE — TELEPHONE ENCOUNTER
"Form had post-it note on the form indicating \"Clal patient for further discussion\".     It appears upon chart review today that Neurosurgery has addressed the form. Which is more appropriate since they saw him most recently.       Closing encounter.     Jeni Rizvi M.Ed., LAT, ATC  Clinic Coordinator for Dr. Susana Irvin    "

## 2020-06-02 ENCOUNTER — VIRTUAL VISIT (OUTPATIENT)
Dept: PHYSICAL THERAPY | Facility: CLINIC | Age: 25
End: 2020-06-02
Payer: COMMERCIAL

## 2020-06-02 DIAGNOSIS — M51.16 LUMBAR DISC HERNIATION WITH RADICULOPATHY: Primary | ICD-10-CM

## 2020-06-02 PROCEDURE — 97112 NEUROMUSCULAR REEDUCATION: CPT | Mod: 95 | Performed by: PHYSICAL THERAPIST

## 2020-06-02 PROCEDURE — 97530 THERAPEUTIC ACTIVITIES: CPT | Mod: 95 | Performed by: PHYSICAL THERAPIST

## 2020-06-02 NOTE — PROGRESS NOTES
"Physical Therapy Virtual Follow Up Visit  Discharge Note    Progress reporting period is from initial evaluation date (please see noted date below) to Jun 2, 2020.  Linked Episodes   Type: Episode: Status: Noted: Resolved: Last update: Updated by:   PHYSICAL THERAPY LBP02/25/2020 Active 2/25/2020 9/11/2020 11:39 AM Rosa Morrison PT      Comments:       Patient seen for 7 visits.        The patient has been notified of following:     \"This virtual visit will be conducted between you and your provider. We have found that certain health care needs can be provided without the need for physical presence.  This service lets us provide the care you need with a virtual visit.\"    Due to external, as well as internal [facility] management of the COVID-19 Virus, Dipak Fuentes was not seen in our clinic.  As a substitution, we implemented a virtual visit to manage this patient's condition utilizing the [employer] virtual visit platform via the patient s existing code.  The provider, Rosa Morrison, reviewed the patient's chart, PTRx prescription, and spoke with the patient to determine the following telemedicine visit is appropriate and effective for the patient's care.    The following type of visit was completed:   Video Visit:  The PTRx platform uses a synchronous HIPAA compliant video stream for this patient encounter.        S: Dipak Fuentes is a 24 year old male. Connected virtually on the PTRx platform to discuss their condition/progress. They noted improvements in overall day to day activities; having less pain, more confidence in lifting light items.  They noted ongoing pain or limitations with am stiffness, sitting longer than an hour.     Current pain level: 0/10        O: Patient demonstrated able to perform walkout on stability ball with fair control of neutral spine position.  Able to correct with verbal cues for increased core activation and then able to demonstrate good control.  Also performed push up on " stability ball with again needing verbal cues to keep neutral spine, cued to not perform as deep push up. Lumbar ROM is wnl and currently painfree.    PTRx Content from today's visit:  Exercise Name: Understanding Mechanical Diagnosis and Therapy: Centralization  Exercise Name: Side Glide in Standing with Extension, Sets: 1 - Reps: 10 - Sessions: every few hours  Exercise Name: Abdominal Brace Transverse Abdominis, Sets: 1 - Reps: 10 hold 10 seconds - Sessions: 2  Exercise Name: Supine Abdominal Exercise #3B (Two Leg Marching), Sets: 3 - Reps: 30 seconds - Sessions: 1, Notes: make sure you are pulling in through your belly button and stay engaged the whole time your legs are marching--should not have any back pain.   Exercise Name: birddog, Sets: 1 - Reps: 10 - Sessions: 1  Exercise Name: Wall Sit with Ball, Sets: 2 - Reps: 60 seconds - Sessions: 1  Exercise Name: Ball Stabilization Prone Walkout, Sets: 2 - Reps: 10, Notes: make sure you don't walk out too far unless you can keep your spine   Exercise Name: Bridging On Ball With Hamstring Curls (Large Ball)  Exercise Name: Ball Exercise Prone Push Up  Exercise Name: Ball Stabilization Prone Bilateral Lower Extremity Extension  Exercise Name: Ball Stabilization Prone Alternating Arm and Leg Extension  Exercise Name: Single Leg Standing Deadlift, Notes: can perform with double leg, keeping back straight and hinging at hips.  Exercise Name: Prone Plank  Exercise Name: Prone Plank With Arm Extension  Exercise Name: Plank With Leg Extension  Exercise Name: Prone Plank With Arm/Leg Extension  Exercise Name: Lower Trunk Extension Off Plinth    A:   Patient's symptoms are resolving.  Response to therapy has shown an improvement in  pain level, ROM , strength and function      P: Patient will continue with the exercise program assigned on their PTRx code and will add the following measures to manage their pain/condition: added increased difficulty core exercises.     Patient  has met long term goals and is ready for discharge to a home program to be continued 2-3 x week for life.       Virtual visit contact time    Time of service began: 1:45 PM  Time of service ended: 2:20 PM  Total Time for set up, visit, and documentation: 40 minutes    Payor: [facility] HEALTH / Plan: [employer] TPA / Product Type: PPO /   .  Procedure Code/s   Neuromuscular Re-education (85839): 24 minutes  Therapeutic Activities (08566): 8 minutes    I have reviewed the note as documented above.  This accurately captures the substance of my conversation with the patient.  Provider location: [place], [place]([facility])  Patient location: [place]

## 2020-09-11 PROBLEM — M54.41 ACUTE LOW BACK PAIN WITH RIGHT-SIDED SCIATICA: Status: RESOLVED | Noted: 2020-02-24 | Resolved: 2020-09-11

## 2020-09-11 PROBLEM — M54.50 LOW BACK PAIN: Status: RESOLVED | Noted: 2020-02-25 | Resolved: 2020-09-11

## 2020-09-22 ENCOUNTER — OFFICE VISIT (OUTPATIENT)
Dept: ORTHOPEDICS | Facility: CLINIC | Age: 25
End: 2020-09-22
Payer: COMMERCIAL

## 2020-09-22 ENCOUNTER — TELEPHONE (OUTPATIENT)
Dept: NEUROSURGERY | Facility: CLINIC | Age: 25
End: 2020-09-22

## 2020-09-22 VITALS
SYSTOLIC BLOOD PRESSURE: 128 MMHG | HEIGHT: 74 IN | WEIGHT: 267 LBS | DIASTOLIC BLOOD PRESSURE: 80 MMHG | BODY MASS INDEX: 34.27 KG/M2

## 2020-09-22 DIAGNOSIS — M51.26 HNP (HERNIATED NUCLEUS PULPOSUS), LUMBAR: Primary | ICD-10-CM

## 2020-09-22 DIAGNOSIS — M54.41 ACUTE RIGHT-SIDED LOW BACK PAIN WITH RIGHT-SIDED SCIATICA: ICD-10-CM

## 2020-09-22 PROCEDURE — 99214 OFFICE O/P EST MOD 30 MIN: CPT | Performed by: FAMILY MEDICINE

## 2020-09-22 RX ORDER — GABAPENTIN 300 MG/1
600 CAPSULE ORAL 3 TIMES DAILY
Qty: 180 CAPSULE | Refills: 1 | Status: SHIPPED | OUTPATIENT
Start: 2020-09-22 | End: 2020-10-05

## 2020-09-22 ASSESSMENT — MIFFLIN-ST. JEOR: SCORE: 2255.85

## 2020-09-22 NOTE — PROGRESS NOTES
ASSESSMENT & PLAN  Patient Instructions     1. HNP (herniated nucleus pulposus), lumbar    2. Acute right-sided low back pain with right-sided sciatica      -Patient is following up for re-aggravation of low back pain with radicular symptoms on the right leg.  -Patient had improvement in pain for the past few months up until recently where he reaggravated his symptoms  -Patient is interested in surgery at this time.  Patient had seen Dr. Oquendo in November at the beginning the pandemic who indicated him for a minimally invasive microdiscectomy.  -Order was placed for surgical referral to follow through with surgery at this time  -She was given a refill of gabapentin and tizanidine.  Instructions to initiate gabapentin are included below.  -Call direct clinic number [[phone]] at any time with questions or concerns.    Albert Yeo MD [facility]  [facility]    Gabapentin 1 tab= 300mg     AM                                  PM                                  Bedtime  0                                     0                                     300mg (1 tab).  After 3-5 days, increase as tolerated   300mg (1 tab)                 0                                     300mg (1 tab).  After 3-5 days, increase as tolerated   300mg (1 tab)                 300mg (1 tab)                 300mg (1 tab).  After 3-5 days, increase as tolerated   300mg (1 tab)                 300mg (1 tab)                 600mg (2 tabs). After 3-5 days, increase as tolerated   600mg (2 tabs)               300mg (1 tab)                 600mg (2 tabs). After 3-5 days, increase as tolerated   600mg (2 tabs)               600mg (2 tabs)               600mg (2 tabs).      Call with any problems.  Caution for sedation.    Do not drive until you know how the medication affects you.   You can go slower if you need to or increasing only one dose at a time.  Do not stop abruptly once at higher doses.   "This medication must be tapered off.          -----    SUBJECTIVE:  Dipak Fuentes is a 25 year old male who is seen in follow-up for follow-up for low back pain.They were last seen 4/16/2020 in a virtual visit with Dr. Carpenter.     Since their last visit reports got better and then got worsening pain. States pain got worse a few weeks. Lower back and leg pain. States a lot of tightness, and muscle spasms in both legs, right worse than left.  They indicate that their current pain level is 6/10. They have tried other medications: Gabapentin (was helping while he was taking it, but then ran out) and tizanidine, home exercises and physical therapy (4 visits) Last visit was back in June, but has been doing the stretches.   Had been talking with Dr. Oquendo and neurosurgery, but that was delayed because of Covid and it was after his epidural that was working at the time.     The patient is seen by themselves.    Patient's past medical, surgical, social, and family histories were reviewed today and no changes are noted.    REVIEW OF SYSTEMS:  Constitutional: NEGATIVE for fever, chills, change in weight  Skin: NEGATIVE for worrisome rashes, moles or lesions  GI/: NEGATIVE for bowel or bladder changes  Neuro: NEGATIVE for weakness, dizziness or paresthesias    OBJECTIVE:  /80   Ht 1.88 m (6' 2\")   Wt 121.1 kg (267 lb)   BMI 34.28 kg/m     General: healthy, alert and in no distress  HEENT: no scleral icterus or conjunctival erythema  Skin: no suspicious lesions or rash. No jaundice.  CV: regular rhythm by palpation, no pedal edema  Resp: normal respiratory effort without conversational dyspnea   Psych: normal mood and affect  Gait: normal steady gait with appropriate coordination and balance  Neuro: normal light touch sensory exam of the extremities.  2+ reflexes bilaterally  MSK:THORACIC/LUMBAR SPINE  Inspection:    No gross deformity/asymmetry  Palpation:    Tender about the left para lumbar muscles and right para " lumbar muscles. Otherwise remainder of landmarks are nontender.  Range of Motion:     Lumbar flexion limited substantially by pain    Lumbar extension limited substantially by pain  Strength:    Full strength throughout hips/quads/hamstrings  Special Tests:    Positive: straight leg raise (bilateral), slump test (bilateral), femoral stretch test (bilateral)        Albert Yeo MD, [facility]

## 2020-09-22 NOTE — TELEPHONE ENCOUNTER
Patient had telephone visits with Richy Schumacher and Dr Oquendo 03/2020.  No surgeries were being scheduled at that time.  Pt would now like to schedule surgery with Dr Oquendo.  Please contact pt to schedule surgery

## 2020-09-22 NOTE — LETTER
9/22/2020         RE: Dipak Fuentes  [address] [facility] [address]        Dear Colleague,    Thank you for referring your patient, Dipak Fuentes, to the [facility]. Please see a copy of my visit note below.    ASSESSMENT & PLAN  Patient Instructions     1. HNP (herniated nucleus pulposus), lumbar    2. Acute right-sided low back pain with right-sided sciatica      -Patient is following up for re-aggravation of low back pain with radicular symptoms on the right leg.  -Patient had improvement in pain for the past few months up until recently where he reaggravated his symptoms  -Patient is interested in surgery at this time.  Patient had seen Dr. Oquendo in November at the beginning the pandemic who indicated him for a minimally invasive microdiscectomy.  -Order was placed for surgical referral to follow through with surgery at this time  -She was given a refill of gabapentin and tizanidine.  Instructions to initiate gabapentin are included below.  -Call direct clinic number [[phone]] at any time with questions or concerns.    Albert Yeo MD [facility]  [facility]    Gabapentin 1 tab= 300mg     AM                                  PM                                  Bedtime  0                                     0                                     300mg (1 tab).  After 3-5 days, increase as tolerated   300mg (1 tab)                 0                                     300mg (1 tab).  After 3-5 days, increase as tolerated   300mg (1 tab)                 300mg (1 tab)                 300mg (1 tab).  After 3-5 days, increase as tolerated   300mg (1 tab)                 300mg (1 tab)                 600mg (2 tabs). After 3-5 days, increase as tolerated   600mg (2 tabs)               300mg (1 tab)                 600mg (2 tabs). After 3-5 days, increase as tolerated   600mg (2 tabs)               600mg (2 tabs)               600mg (2  "tabs).      Call with any problems.  Caution for sedation.    Do not drive until you know how the medication affects you.   You can go slower if you need to or increasing only one dose at a time.  Do not stop abruptly once at higher doses.  This medication must be tapered off.          -----    SUBJECTIVE:  Dipak Fuentes is a 25 year old male who is seen in follow-up for follow-up for low back pain.They were last seen 4/16/2020 in a virtual visit with Dr. Carpenter.     Since their last visit reports got better and then got worsening pain. States pain got worse a few weeks. Lower back and leg pain. States a lot of tightness, and muscle spasms in both legs, right worse than left.  They indicate that their current pain level is 6/10. They have tried other medications: Gabapentin (was helping while he was taking it, but then ran out) and tizanidine, home exercises and physical therapy (4 visits) Last visit was back in June, but has been doing the stretches.   Had been talking with Dr. Oquendo and neurosurgery, but that was delayed because of Covid and it was after his epidural that was working at the time.     The patient is seen by themselves.    Patient's past medical, surgical, social, and family histories were reviewed today and no changes are noted.    REVIEW OF SYSTEMS:  Constitutional: NEGATIVE for fever, chills, change in weight  Skin: NEGATIVE for worrisome rashes, moles or lesions  GI/: NEGATIVE for bowel or bladder changes  Neuro: NEGATIVE for weakness, dizziness or paresthesias    OBJECTIVE:  /80   Ht 1.88 m (6' 2\")   Wt 121.1 kg (267 lb)   BMI 34.28 kg/m     General: healthy, alert and in no distress  HEENT: no scleral icterus or conjunctival erythema  Skin: no suspicious lesions or rash. No jaundice.  CV: regular rhythm by palpation, no pedal edema  Resp: normal respiratory effort without conversational dyspnea   Psych: normal mood and affect  Gait: normal steady gait with appropriate coordination " and balance  Neuro: normal light touch sensory exam of the extremities.  2+ reflexes bilaterally  MSK:THORACIC/LUMBAR SPINE  Inspection:    No gross deformity/asymmetry  Palpation:    Tender about the left para lumbar muscles and right para lumbar muscles. Otherwise remainder of landmarks are nontender.  Range of Motion:     Lumbar flexion limited substantially by pain    Lumbar extension limited substantially by pain  Strength:    Full strength throughout hips/quads/hamstrings  Special Tests:    Positive: straight leg raise (bilateral), slump test (bilateral), femoral stretch test (bilateral)        Albert Yeo MD, [facility]          Again, thank you for allowing me to participate in the care of your patient.        Sincerely,        Albert Yeo, MD

## 2020-09-23 ENCOUNTER — PREP FOR PROCEDURE (OUTPATIENT)
Dept: NEUROLOGY | Facility: CLINIC | Age: 25
End: 2020-09-23

## 2020-09-24 DIAGNOSIS — Z11.59 ENCOUNTER FOR SCREENING FOR OTHER VIRAL DISEASES: Primary | ICD-10-CM

## 2020-10-03 DIAGNOSIS — Z11.59 ENCOUNTER FOR SCREENING FOR OTHER VIRAL DISEASES: ICD-10-CM

## 2020-10-03 PROCEDURE — U0003 INFECTIOUS AGENT DETECTION BY NUCLEIC ACID (DNA OR RNA); SEVERE ACUTE RESPIRATORY SYNDROME CORONAVIRUS 2 (SARS-COV-2) (CORONAVIRUS DISEASE [COVID-19]), AMPLIFIED PROBE TECHNIQUE, MAKING USE OF HIGH THROUGHPUT TECHNOLOGIES AS DESCRIBED BY CMS-2020-01-R: HCPCS | Performed by: NEUROLOGICAL SURGERY

## 2020-10-05 ENCOUNTER — OFFICE VISIT (OUTPATIENT)
Dept: INTERNAL MEDICINE | Facility: CLINIC | Age: 25
End: 2020-10-05
Payer: COMMERCIAL

## 2020-10-05 VITALS
TEMPERATURE: 97.6 F | DIASTOLIC BLOOD PRESSURE: 80 MMHG | OXYGEN SATURATION: 100 % | WEIGHT: 268.5 LBS | HEART RATE: 96 BPM | SYSTOLIC BLOOD PRESSURE: 126 MMHG | BODY MASS INDEX: 34.47 KG/M2

## 2020-10-05 DIAGNOSIS — M51.16 LUMBAR DISC HERNIATION WITH RADICULOPATHY: ICD-10-CM

## 2020-10-05 DIAGNOSIS — Z01.818 PREOP GENERAL PHYSICAL EXAM: Primary | ICD-10-CM

## 2020-10-05 LAB
ANION GAP SERPL CALCULATED.3IONS-SCNC: 4 MMOL/L (ref 3–14)
BASOPHILS # BLD AUTO: 0.1 10E9/L (ref 0–0.2)
BASOPHILS NFR BLD AUTO: 0.6 %
BUN SERPL-MCNC: 11 MG/DL (ref 7–30)
CALCIUM SERPL-MCNC: 8.9 MG/DL (ref 8.5–10.1)
CHLORIDE SERPL-SCNC: 104 MMOL/L (ref 94–109)
CO2 SERPL-SCNC: 29 MMOL/L (ref 20–32)
CREAT SERPL-MCNC: 0.97 MG/DL (ref 0.66–1.25)
DIFFERENTIAL METHOD BLD: NORMAL
EOSINOPHIL # BLD AUTO: 0.2 10E9/L (ref 0–0.7)
EOSINOPHIL NFR BLD AUTO: 2.8 %
ERYTHROCYTE [DISTWIDTH] IN BLOOD BY AUTOMATED COUNT: 12.9 % (ref 10–15)
GFR SERPL CREATININE-BSD FRML MDRD: >90 ML/MIN/{1.73_M2}
GLUCOSE SERPL-MCNC: 103 MG/DL (ref 70–99)
HCT VFR BLD AUTO: 44.3 % (ref 40–53)
HGB BLD-MCNC: 15.2 G/DL (ref 13.3–17.7)
LYMPHOCYTES # BLD AUTO: 2.8 10E9/L (ref 0.8–5.3)
LYMPHOCYTES NFR BLD AUTO: 33.7 %
MCH RBC QN AUTO: 30.2 PG (ref 26.5–33)
MCHC RBC AUTO-ENTMCNC: 34.3 G/DL (ref 31.5–36.5)
MCV RBC AUTO: 88 FL (ref 78–100)
MONOCYTES # BLD AUTO: 1 10E9/L (ref 0–1.3)
MONOCYTES NFR BLD AUTO: 12.5 %
NEUTROPHILS # BLD AUTO: 4.1 10E9/L (ref 1.6–8.3)
NEUTROPHILS NFR BLD AUTO: 50.4 %
PLATELET # BLD AUTO: 342 10E9/L (ref 150–450)
POTASSIUM SERPL-SCNC: 4.1 MMOL/L (ref 3.4–5.3)
RBC # BLD AUTO: 5.04 10E12/L (ref 4.4–5.9)
SARS-COV-2 RNA SPEC QL NAA+PROBE: NOT DETECTED
SODIUM SERPL-SCNC: 137 MMOL/L (ref 133–144)
SPECIMEN SOURCE: NORMAL
WBC # BLD AUTO: 8.2 10E9/L (ref 4–11)

## 2020-10-05 PROCEDURE — 99214 OFFICE O/P EST MOD 30 MIN: CPT | Performed by: NURSE PRACTITIONER

## 2020-10-05 PROCEDURE — 80048 BASIC METABOLIC PNL TOTAL CA: CPT | Performed by: NURSE PRACTITIONER

## 2020-10-05 PROCEDURE — 85025 COMPLETE CBC W/AUTO DIFF WBC: CPT | Performed by: NURSE PRACTITIONER

## 2020-10-05 PROCEDURE — 36415 COLL VENOUS BLD VENIPUNCTURE: CPT | Performed by: NURSE PRACTITIONER

## 2020-10-05 SDOH — HEALTH STABILITY: MENTAL HEALTH: HOW MANY STANDARD DRINKS CONTAINING ALCOHOL DO YOU HAVE ON A TYPICAL DAY?: NOT ASKED

## 2020-10-05 SDOH — HEALTH STABILITY: MENTAL HEALTH: HOW OFTEN DO YOU HAVE A DRINK CONTAINING ALCOHOL?: NOT ASKED

## 2020-10-05 SDOH — HEALTH STABILITY: MENTAL HEALTH: HOW OFTEN DO YOU HAVE 6 OR MORE DRINKS ON ONE OCCASION?: NOT ASKED

## 2020-10-05 NOTE — PROGRESS NOTES
[facility]  [address]  [facility] [address]  [phone]  Dept: [phone]    PRE-OP EVALUATION:  Today's date: 10/5/2020    Dipak Fuentes (: 1995) presents for pre-operative evaluation assessment as requested by Dr. Juancho Harris.  He requires evaluation and anesthesia risk assessment prior to undergoing surgery/procedure for treatment of right lumbar 5-sacral 1 minimally invasive microdisectomy .    Fax number for surgical facility: [facility]  Primary Physician: No Ref-Primary, Physician  Type of Anesthesia Anticipated: General    Preop Questionnnaire:  Pre-op Questionnaire 10/5/2020   Surgery Location: [facility]   Surgeon: Dr harris   Surgery/Procedure: Lumbar microdisectomy   Surgery Date: 10/07/20   Time of Surgery: 11:30   Where patient plans to recover: At home with family   1. Have you ever had a heart attack or stroke? No   2. Have you ever had surgery on your heart or blood vessels, such as a stent placement, a coronary artery bypass, or surgery on an artery in your head, neck, heart, or legs? No   3. Do you have chest pain with activity? No   4. Do you have a history of  heart failure? No   5. Do you currently have a cold, bronchitis or symptoms of other infection? No   6. Do you have a cough, shortness of breath, or wheezing? No   7. Do you or anyone in your family have previous history of blood clots? No   8. Do you or does anyone in your family have a serious bleeding problem such as prolonged bleeding following surgeries or cuts? No   9. Have you ever had problems with anemia or been told to take iron pills? No   10. Have you had any abnormal blood loss such as black, tarry or bloody stools? No   11. Have you ever had a blood transfusion? No   Are you willing to have a blood transfusion if it is medically needed before, during, or after your surgery? Yes   13. Have you or any of your relatives ever had problems with anesthesia? No    14. Do you have sleep apnea, excessive snoring or daytime drowsiness? No   15. Do you have any artifical heart valves or other implanted medical devices like a pacemaker, defibrillator, or continuous glucose monitor? No   16. Do you have artificial joints? No   17. Are you allergic to latex? YES: when pt uses for prolong use like a Band-Aid/adhesive         HPI:     HPI related to upcoming procedure: Low back pain since March, 2020 getting worse.  Started when I fell on it about 1 year ago.  Now I have numbness in right. MRI showed showed disc hernation      ASTHMA - Patient has a longstanding history of moderate-severe Asthma . Patient has been doing well overall noting NO SYMPTOMS and continues on medication regimen consisting of Albuterol Inhaler without adverse reactions or side effects.     DEPRESSION - Patient has a long history of Depression of moderate severity requiring medication for control with recent symptoms being stable..Current symptoms of depression include MJ, none.     See other PMH for details.    New patient to the clinic and provider.    MEDICAL HISTORY:     Patient Active Problem List    Diagnosis Date Noted     Mild intermittent asthma without complication      Priority: Medium     Lumbar disc herniation with radiculopathy 03/27/2020     Priority: Medium     Chemical dependency (H) 01/07/2016     Priority: Medium     Nondependent amphetamine or related acting sympathomimetic abuse, in remission (H) 03/25/2013     Priority: Medium     Bipolar I disorder, single manic episode (H) 03/25/2013     Priority: Medium     Problem list name updated by automated process. Provider to review       Asthma 03/25/2013     Priority: Medium     Problem list name updated by automated process. Provider to review       Benign neoplasm of skin 03/25/2013     Priority: Medium     Problem list name updated by automated process. Provider to review       Schizoaffective disorder (H) 03/25/2013     Priority: Medium  "    Problem list name updated by automated process. Provider to review  Diagnosis updated by automated process. Provider to review and confirm.       Personal history of tobacco use, presenting hazards to health 03/25/2013     Priority: Medium     Decreased libido 08/29/2012     Priority: Medium      Past Medical History:   Diagnosis Date     Bipolar 1 disorder (H)      Complication of anesthesia     panic after having anesthesia     Lumbar disc herniation with radiculopathy      Mild intermittent asthma without complication      Other chronic pain     low back and buttocks leg pain     Uncomplicated asthma     mild     Past Surgical History:   Procedure Laterality Date     [facility]       Current Outpatient Medications   Medication Sig Dispense Refill     acetaminophen (TYLENOL) 325 MG tablet Take 2 tablets (650 mg) by mouth every 4 hours as needed for mild pain  0     albuterol (PROAIR HFA) 108 (90 BASE) MCG/ACT inhaler Inhale 2 puffs into the lungs every 6 hours as needed.       tiZANidine (ZANAFLEX) 4 MG tablet Take 1 tablet (4 mg) by mouth 2 times daily as needed for muscle spasms 60 tablet 0     OTC products: no recent use of OTC ASA, NSAIDS or Steroids    Allergies   Allergen Reactions     Ceclor [Cefaclor Monohydrate]      Lamictal [Lamotrigine]      Had known side effect of\"skin eating rash\"  Jaycob lauren syndrome     Cefaclor Rash      Latex Allergy: YES: Precautions to take: don't apply to patient for long term use    Social History     Tobacco Use     Smoking status: Former Smoker     Packs/day: 0.50     Years: 5.00     Pack years: 2.50     Types: Cigarettes     Smokeless tobacco: Never Used     Tobacco comment: uses nicoting gum now9 did cigs and e cigs   Substance Use Topics     Alcohol use: Not Currently     Comment: rare     History   Drug Use     Types: Marijuana     Comment: bipolar-concentrates 1-2 x day       REVIEW OF SYSTEMS:   CONSTITUTIONAL: NEGATIVE for fever, chills, change in " weight  INTEGUMENTARY/SKIN: NEGATIVE for worrisome rashes, moles or lesions  EYES: NEGATIVE for vision changes or irritation  ENT/MOUTH: NEGATIVE for ear, mouth and throat problems  RESP: NEGATIVE for significant cough or SOB  BREAST: NEGATIVE for masses, tenderness or discharge  CV: NEGATIVE for chest pain, palpitations or peripheral edema  GI: NEGATIVE for nausea, abdominal pain, heartburn, or change in bowel habits  : NEGATIVE for frequency, dysuria, or hematuria  MUSCULOSKELETAL: NEGATIVE for significant arthralgias or myalgia  NEURO: NEGATIVE for weakness, dizziness or paresthesias  ENDOCRINE: NEGATIVE for temperature intolerance, skin/hair changes  HEME: NEGATIVE for bleeding problems  PSYCHIATRIC: NEGATIVE for changes in mood or affect    EXAM:   /80 (BP Location: Left arm, Patient Position: Sitting, Cuff Size: Adult Large)   Pulse 96   Temp 97.6  F (36.4  C) (Oral)   Wt 121.8 kg (268 lb 8 oz)   SpO2 100%   BMI 34.47 kg/m      GENERAL APPEARANCE: healthy, alert and no distress     EYES: EOMI,  PERRL     HENT: ear canals and TM's normal and nose and mouth without ulcers or lesions     NECK: no adenopathy, no asymmetry, masses, or scars and thyroid normal to palpation     RESP: lungs clear to auscultation - no rales, rhonchi or wheezes     CV: regular rates and rhythm, normal S1 S2, no S3 or S4 and no murmur, click or rub     ABDOMEN:  soft, nontender, no HSM or masses and bowel sounds normal     MS: extremities normal- no gross deformities noted, no evidence of inflammation in joints, FROM in all extremities. Noted difficulty with getting up and down from table due to guarding.  SLR positive right side.     SKIN: no suspicious lesions or rashes     NEURO: Normal strength and tone, sensory exam grossly normal, mentation intact and speech normal     PSYCH: mentation appears normal. and affect normal/bright     LYMPHATICS: No cervical adenopathy    DIAGNOSTICS:   Labs Resulted Today: No results found  for any visits on 10/05/20. CBC and BMP ordered to be drawn today.  EKG: not necessary due to age and PMH    Recent Labs   Lab Test 02/24/20  1151   HGB 15.8         POTASSIUM 3.4   CR 0.87        IMPRESSION:   Reason for surgery: chronic low back pain with right radiculapathy    Diagnosis/reason for consult: Pre-Op exam    The proposed surgical procedure is considered INTERMEDIATE risk.    REVISED CARDIAC RISK INDEX  The patient has the following serious cardiovascular risks for perioperative complications such as (MI, PE, VFib and 3  AV Block):  No serious cardiac risks  INTERPRETATION: 0 risks: Class I (very low risk - 0.4% complication rate)    The patient has the following additional risks for perioperative complications:  No identified additional risks      ICD-10-CM    1. Preop general physical exam  Z01.818 Basic metabolic panel     CBC with platelets differential   2. Lumbar disc herniation with radiculopathy  M51.16        RECOMMENDATIONS:         --Patient is on no chronic medications    APPROVAL GIVEN to proceed with proposed procedure, without further diagnostic evaluation       Signed Electronically by: Abiola Leyva CNP    Copy of this evaluation report is provided to requesting physician.    [facility] Preop Guidelines    Revised Cardiac Risk Index

## 2020-10-05 NOTE — PATIENT INSTRUCTIONS
"Go to [address] for labs.    Testing for Covid pending    Follow- up in 3 months through video    Approved for surgery.  No NASIDS like Ibuprofen x 7 days out; ok with Tylenol.    FiPreparing for Your Surgery  Getting started  A surgery nurse will call you to review your health history and instructions. They will give you an arrival time based on your scheduled surgery time.  Please be ready to share the following:    Your doctor's clinic name and phone number    Your medical, surgical and anesthesia history    A list of allergies and sensitivities    A list of medicines, including herbal treatments and over-the-counter drugs    Whether the patient has a legal guardian (ask how to send us the papers in advance)  If your child is having surgery, please ask for a copy of Preparing for Your Child's Surgery.    Preparing for surgery    Within 30 days of surgery: Have an exam at your family clinic (primary care clinic), or go to a pre-operative clinic. This exam is called a \"History and Physical,\" or H&P.    At your H&P exam, talk to your care team about all medicines you take. If you need to stop any medicines before surgery, ask when to start taking them again.  ? We do this for your safety. Many medicines can make you bleed too much during surgery. Some change how well surgery (anesthesia) drugs work.    Call your insurance company to see what it will and won't pay for. Ask if they need to pre-approve the surgery. (If no insurance, call [phone].)    Call your surgeon's clinic if there's any change in your health. This includes signs of a cold or flu (sore throat, runny nose, cough, rash, fever). It also includes a scrape or scratch near the surgery site.    If you have questions on the day of surgery, call your surgery center.  Eating and drinking guidelines  For your safety: Unless your surgeon tells you otherwise, follow the guidelines below.    Eat and drink as usual until 8 hours before surgery. After that, " no food or milk.    Drink clear liquids until 2 hours before surgery. These are liquids you can see through, like water, Gatorade and Propel Water. You may also have black coffee and tea (no cream or milk).    Nothing by mouth within 2 hours of surgery. This includes gum, candy and breath mints.    Stop alcohol the midnight before surgery.    If your family clinic tells you to take medicine on the morning of surgery, it's okay to take it with a sip of water.  Preventing infection    Shower or bathe the night before and morning of your surgery. Follow the instructions your clinic gave you. (If no instructions, use regular soap.)    Don't shave or clip hair near your surgery site. This can lead to skin infection.    Don't smoke the morning of surgery. Smoking increases the risk of infection. You may chew nicotine gum up to 2 hours before surgery. A nicotine patch is okay.  ? Note: Some surgeries require you to completely quit smoking and nicotine. Check with your surgeon.    Your care team will make every effort to keep you safe from infection. We will:  ? Clean our hands often with soap and water (or an alcohol-based hand rub).  ? Clean the skin at your surgery site with a special soap that kills germs. We'll also remove hair from the site as needed.  ? Wear special hair covers, masks, gowns and gloves during surgery.  ? Give antibiotic medicine, if prescribed. Not all surgeries need antibiotics.  What to bring on the day of surgery    Photo ID and insurance card    Copy of your health care directive, if you have one    Glasses and hearing aides (bring cases)  ? You can't wear contacts during surgery    Inhaler and eye drops, if you use them (tell us about these when you arrive)    CPAP machine or breathing device, if you use them    A few personal items, if spending the night    If you have . . .  ? A pacemaker or ICD (cardiac defibrillator): Bring the ID card.  ? An implanted stimulator: Bring the remote  control.  ? A legal guardian: Bring a copy of the certified (court-stamped) guardianship papers.  Please remove any jewelry, including body piercings. Leave jewelry and other valuables at home.  If you're going home the day of surgery  Important: If you don't follow the rules below, we must cancel your surgery.     Arrange for someone to drive you home after surgery. You may not drive, take a taxi or take public transportation by yourself (unless you'll have local anesthesia only).    Arrange for a responsible adult to stay with you overnight. If you don't, we may keep you in the hospital overnight, and you may need to pay the costs yourself.  Questions?   If you have any questions for your care team, list them here: _________________________________________________________________________________________________________________________________________________________________________________________________________________________________________________________________________________________________________________________  For informational purposes only. Not to replace the advice of your health care provider. Copyright   2120-5638 [facility]. All rights reserved. Clinically reviewed by Gracie Alberto MD. [employer] [id number] - REV 07/19.

## 2020-10-07 ENCOUNTER — APPOINTMENT (OUTPATIENT)
Dept: GENERAL RADIOLOGY | Facility: CLINIC | Age: 25
End: 2020-10-07
Attending: NEUROLOGICAL SURGERY
Payer: COMMERCIAL

## 2020-10-07 ENCOUNTER — HOSPITAL ENCOUNTER (OUTPATIENT)
Facility: CLINIC | Age: 25
Discharge: HOME | End: 2020-10-07
Attending: NEUROLOGICAL SURGERY | Admitting: NEUROLOGICAL SURGERY
Payer: COMMERCIAL

## 2020-10-07 ENCOUNTER — ANESTHESIA EVENT (OUTPATIENT)
Dept: SURGERY | Facility: CLINIC | Age: 25
End: 2020-10-07
Payer: COMMERCIAL

## 2020-10-07 ENCOUNTER — ANESTHESIA (OUTPATIENT)
Dept: SURGERY | Facility: CLINIC | Age: 25
End: 2020-10-07
Payer: COMMERCIAL

## 2020-10-07 VITALS
RESPIRATION RATE: 16 BRPM | TEMPERATURE: 98 F | DIASTOLIC BLOOD PRESSURE: 87 MMHG | OXYGEN SATURATION: 97 % | SYSTOLIC BLOOD PRESSURE: 143 MMHG | WEIGHT: 267 LBS | BODY MASS INDEX: 34.27 KG/M2 | HEART RATE: 83 BPM | HEIGHT: 74 IN

## 2020-10-07 DIAGNOSIS — M51.16 LUMBAR DISC HERNIATION WITH RADICULOPATHY: ICD-10-CM

## 2020-10-07 PROCEDURE — 761N000002 HC RECOVERY PHASE 1 LEVEL 1 EA ADDTL HR: Performed by: NEUROLOGICAL SURGERY

## 2020-10-07 PROCEDURE — 272N000004 HC RX 272: Performed by: NEUROLOGICAL SURGERY

## 2020-10-07 PROCEDURE — 999N000136 HC STATISTIC PRE PROC ASSESS II: Performed by: NEUROLOGICAL SURGERY

## 2020-10-07 PROCEDURE — 250N000013 HC RX MED GY IP 250 OP 250 PS 637: Performed by: NEUROLOGICAL SURGERY

## 2020-10-07 PROCEDURE — 258N000003 HC RX IP 258 OP 636: Performed by: ANESTHESIOLOGY

## 2020-10-07 PROCEDURE — 370N000001 HC ANESTHESIA TECHNICAL FEE, 1ST 30 MIN: Performed by: NEUROLOGICAL SURGERY

## 2020-10-07 PROCEDURE — 63030 LAMOT DCMPRN NRV RT 1 LMBR: CPT | Mod: AS | Performed by: PHYSICIAN ASSISTANT

## 2020-10-07 PROCEDURE — 761N000007 HC RECOVERY PHASE 2 EACH 15 MINS: Performed by: NEUROLOGICAL SURGERY

## 2020-10-07 PROCEDURE — 360N000027 HC SURGERY LEVEL 4 EA 15 ADDTL MIN: Performed by: NEUROLOGICAL SURGERY

## 2020-10-07 PROCEDURE — 250N000009 HC RX 250: Performed by: NEUROLOGICAL SURGERY

## 2020-10-07 PROCEDURE — 250N000011 HC RX IP 250 OP 636: Performed by: NURSE ANESTHETIST, CERTIFIED REGISTERED

## 2020-10-07 PROCEDURE — 272N000001 HC OR GENERAL SUPPLY STERILE: Performed by: NEUROLOGICAL SURGERY

## 2020-10-07 PROCEDURE — 250N000011 HC RX IP 250 OP 636: Performed by: ANESTHESIOLOGY

## 2020-10-07 PROCEDURE — 63030 LAMOT DCMPRN NRV RT 1 LMBR: CPT | Mod: RT | Performed by: NEUROLOGICAL SURGERY

## 2020-10-07 PROCEDURE — 370N000002 HC ANESTHESIA TECHNICAL FEE, EACH ADDTL 15 MIN: Performed by: NEUROLOGICAL SURGERY

## 2020-10-07 PROCEDURE — 360N000030 HC SURGERY LEVEL 4 W FLUORO 1ST 30 MIN: Performed by: NEUROLOGICAL SURGERY

## 2020-10-07 PROCEDURE — 761N000001 HC RECOVERY PHASE 1 LEVEL 1 FIRST HR: Performed by: NEUROLOGICAL SURGERY

## 2020-10-07 PROCEDURE — 250N000009 HC RX 250: Performed by: NURSE ANESTHETIST, CERTIFIED REGISTERED

## 2020-10-07 PROCEDURE — 999N000179 XR SURGERY CARM FLUORO LESS THAN 5 MIN W STILLS: Mod: TC

## 2020-10-07 PROCEDURE — 250N000013 HC RX MED GY IP 250 OP 250 PS 637: Performed by: PHYSICIAN ASSISTANT

## 2020-10-07 PROCEDURE — 258N000003 HC RX IP 258 OP 636: Performed by: NURSE ANESTHETIST, CERTIFIED REGISTERED

## 2020-10-07 RX ORDER — OXYCODONE HYDROCHLORIDE 5 MG/1
5 TABLET ORAL
Status: COMPLETED | OUTPATIENT
Start: 2020-10-07 | End: 2020-10-07

## 2020-10-07 RX ORDER — OXYCODONE HYDROCHLORIDE 5 MG/1
5 TABLET ORAL EVERY 6 HOURS PRN
Qty: 12 TABLET | Refills: 0 | Status: SHIPPED | OUTPATIENT
Start: 2020-10-07 | End: 2020-10-10

## 2020-10-07 RX ORDER — HYDROXYZINE HYDROCHLORIDE 25 MG/1
25 TABLET, FILM COATED ORAL
Status: DISCONTINUED | OUTPATIENT
Start: 2020-10-07 | End: 2020-10-07 | Stop reason: HOSPADM

## 2020-10-07 RX ORDER — NALOXONE HYDROCHLORIDE 0.4 MG/ML
.1-.4 INJECTION, SOLUTION INTRAMUSCULAR; INTRAVENOUS; SUBCUTANEOUS
Status: DISCONTINUED | OUTPATIENT
Start: 2020-10-07 | End: 2020-10-07 | Stop reason: HOSPADM

## 2020-10-07 RX ORDER — KETOROLAC TROMETHAMINE 30 MG/ML
30 INJECTION, SOLUTION INTRAMUSCULAR; INTRAVENOUS EVERY 6 HOURS PRN
Status: DISCONTINUED | OUTPATIENT
Start: 2020-10-07 | End: 2020-10-07 | Stop reason: HOSPADM

## 2020-10-07 RX ORDER — BUPIVACAINE HYDROCHLORIDE AND EPINEPHRINE 2.5; 5 MG/ML; UG/ML
INJECTION, SOLUTION INFILTRATION; PERINEURAL PRN
Status: DISCONTINUED | OUTPATIENT
Start: 2020-10-07 | End: 2020-10-07 | Stop reason: HOSPADM

## 2020-10-07 RX ORDER — FENTANYL CITRATE 50 UG/ML
25-50 INJECTION, SOLUTION INTRAMUSCULAR; INTRAVENOUS
Status: DISCONTINUED | OUTPATIENT
Start: 2020-10-07 | End: 2020-10-07 | Stop reason: HOSPADM

## 2020-10-07 RX ORDER — CLINDAMYCIN PHOSPHATE 900 MG/50ML
900 INJECTION, SOLUTION INTRAVENOUS SEE ADMIN INSTRUCTIONS
Status: DISCONTINUED | OUTPATIENT
Start: 2020-10-07 | End: 2020-10-07 | Stop reason: HOSPADM

## 2020-10-07 RX ORDER — METHOCARBAMOL 750 MG/1
750 TABLET, FILM COATED ORAL
Status: DISCONTINUED | OUTPATIENT
Start: 2020-10-07 | End: 2020-10-07 | Stop reason: HOSPADM

## 2020-10-07 RX ORDER — ACETAMINOPHEN 325 MG/1
975 TABLET ORAL ONCE
Status: COMPLETED | OUTPATIENT
Start: 2020-10-07 | End: 2020-10-07

## 2020-10-07 RX ORDER — METOPROLOL TARTRATE 1 MG/ML
1-2 INJECTION, SOLUTION INTRAVENOUS EVERY 5 MIN PRN
Status: DISCONTINUED | OUTPATIENT
Start: 2020-10-07 | End: 2020-10-07 | Stop reason: HOSPADM

## 2020-10-07 RX ORDER — LIDOCAINE HYDROCHLORIDE 10 MG/ML
INJECTION, SOLUTION INFILTRATION; PERINEURAL PRN
Status: DISCONTINUED | OUTPATIENT
Start: 2020-10-07 | End: 2020-10-07

## 2020-10-07 RX ORDER — IBUPROFEN 600 MG/1
600 TABLET, FILM COATED ORAL EVERY 6 HOURS PRN
Qty: 42 TABLET | Refills: 0 | Status: SHIPPED | OUTPATIENT
Start: 2020-10-07 | End: 2020-10-22

## 2020-10-07 RX ORDER — SODIUM CHLORIDE, SODIUM LACTATE, POTASSIUM CHLORIDE, CALCIUM CHLORIDE 600; 310; 30; 20 MG/100ML; MG/100ML; MG/100ML; MG/100ML
INJECTION, SOLUTION INTRAVENOUS CONTINUOUS
Status: DISCONTINUED | OUTPATIENT
Start: 2020-10-07 | End: 2020-10-07 | Stop reason: HOSPADM

## 2020-10-07 RX ORDER — CLINDAMYCIN PHOSPHATE 900 MG/50ML
900 INJECTION, SOLUTION INTRAVENOUS
Status: DISCONTINUED | OUTPATIENT
Start: 2020-10-07 | End: 2020-10-07 | Stop reason: HOSPADM

## 2020-10-07 RX ORDER — OXYCODONE HYDROCHLORIDE 5 MG/1
5 TABLET ORAL EVERY 4 HOURS PRN
Status: DISCONTINUED | OUTPATIENT
Start: 2020-10-07 | End: 2020-10-07 | Stop reason: HOSPADM

## 2020-10-07 RX ORDER — METHOCARBAMOL 500 MG/1
500 TABLET, FILM COATED ORAL 4 TIMES DAILY PRN
Qty: 30 TABLET | Refills: 0 | Status: SHIPPED | OUTPATIENT
Start: 2020-10-07 | End: 2020-11-25

## 2020-10-07 RX ORDER — DOCUSATE SODIUM 100 MG/1
100 CAPSULE, LIQUID FILLED ORAL 2 TIMES DAILY PRN
Qty: 20 CAPSULE | Refills: 0 | Status: SHIPPED | OUTPATIENT
Start: 2020-10-07 | End: 2020-10-22

## 2020-10-07 RX ORDER — GLYCOPYRROLATE 0.2 MG/ML
INJECTION, SOLUTION INTRAMUSCULAR; INTRAVENOUS PRN
Status: DISCONTINUED | OUTPATIENT
Start: 2020-10-07 | End: 2020-10-07

## 2020-10-07 RX ORDER — MEPERIDINE HYDROCHLORIDE 25 MG/ML
12.5 INJECTION INTRAMUSCULAR; INTRAVENOUS; SUBCUTANEOUS
Status: DISCONTINUED | OUTPATIENT
Start: 2020-10-07 | End: 2020-10-07 | Stop reason: HOSPADM

## 2020-10-07 RX ORDER — ONDANSETRON 4 MG/1
4 TABLET, ORALLY DISINTEGRATING ORAL EVERY 30 MIN PRN
Status: DISCONTINUED | OUTPATIENT
Start: 2020-10-07 | End: 2020-10-07 | Stop reason: HOSPADM

## 2020-10-07 RX ORDER — ONDANSETRON 2 MG/ML
INJECTION INTRAMUSCULAR; INTRAVENOUS PRN
Status: DISCONTINUED | OUTPATIENT
Start: 2020-10-07 | End: 2020-10-07

## 2020-10-07 RX ORDER — PROPOFOL 10 MG/ML
INJECTION, EMULSION INTRAVENOUS CONTINUOUS PRN
Status: DISCONTINUED | OUTPATIENT
Start: 2020-10-07 | End: 2020-10-07

## 2020-10-07 RX ORDER — FENTANYL CITRATE 50 UG/ML
INJECTION, SOLUTION INTRAMUSCULAR; INTRAVENOUS PRN
Status: DISCONTINUED | OUTPATIENT
Start: 2020-10-07 | End: 2020-10-07

## 2020-10-07 RX ORDER — ACETAMINOPHEN 325 MG/1
650 TABLET ORAL
Status: DISCONTINUED | OUTPATIENT
Start: 2020-10-07 | End: 2020-10-07 | Stop reason: HOSPADM

## 2020-10-07 RX ORDER — LIDOCAINE 40 MG/G
CREAM TOPICAL
Status: DISCONTINUED | OUTPATIENT
Start: 2020-10-07 | End: 2020-10-07 | Stop reason: HOSPADM

## 2020-10-07 RX ORDER — ONDANSETRON 2 MG/ML
4 INJECTION INTRAMUSCULAR; INTRAVENOUS EVERY 30 MIN PRN
Status: DISCONTINUED | OUTPATIENT
Start: 2020-10-07 | End: 2020-10-07 | Stop reason: HOSPADM

## 2020-10-07 RX ORDER — PROPOFOL 10 MG/ML
INJECTION, EMULSION INTRAVENOUS PRN
Status: DISCONTINUED | OUTPATIENT
Start: 2020-10-07 | End: 2020-10-07

## 2020-10-07 RX ORDER — ALBUTEROL SULFATE 0.83 MG/ML
2.5 SOLUTION RESPIRATORY (INHALATION) EVERY 4 HOURS PRN
Status: DISCONTINUED | OUTPATIENT
Start: 2020-10-07 | End: 2020-10-07 | Stop reason: HOSPADM

## 2020-10-07 RX ORDER — HYDROMORPHONE HYDROCHLORIDE 1 MG/ML
.3-.5 INJECTION, SOLUTION INTRAMUSCULAR; INTRAVENOUS; SUBCUTANEOUS EVERY 10 MIN PRN
Status: DISCONTINUED | OUTPATIENT
Start: 2020-10-07 | End: 2020-10-07 | Stop reason: HOSPADM

## 2020-10-07 RX ORDER — HYDRALAZINE HYDROCHLORIDE 20 MG/ML
2.5-5 INJECTION INTRAMUSCULAR; INTRAVENOUS EVERY 10 MIN PRN
Status: DISCONTINUED | OUTPATIENT
Start: 2020-10-07 | End: 2020-10-07 | Stop reason: HOSPADM

## 2020-10-07 RX ORDER — DEXAMETHASONE SODIUM PHOSPHATE 4 MG/ML
INJECTION, SOLUTION INTRA-ARTICULAR; INTRALESIONAL; INTRAMUSCULAR; INTRAVENOUS; SOFT TISSUE PRN
Status: DISCONTINUED | OUTPATIENT
Start: 2020-10-07 | End: 2020-10-07

## 2020-10-07 RX ORDER — FENTANYL CITRATE 50 UG/ML
25-50 INJECTION, SOLUTION INTRAMUSCULAR; INTRAVENOUS EVERY 5 MIN PRN
Status: DISCONTINUED | OUTPATIENT
Start: 2020-10-07 | End: 2020-10-07 | Stop reason: HOSPADM

## 2020-10-07 RX ORDER — NEOSTIGMINE METHYLSULFATE 1 MG/ML
VIAL (ML) INJECTION PRN
Status: DISCONTINUED | OUTPATIENT
Start: 2020-10-07 | End: 2020-10-07

## 2020-10-07 RX ADMIN — PROPOFOL 200 MG: 10 INJECTION, EMULSION INTRAVENOUS at 13:13

## 2020-10-07 RX ADMIN — MIDAZOLAM 2 MG: 1 INJECTION INTRAMUSCULAR; INTRAVENOUS at 13:11

## 2020-10-07 RX ADMIN — DEXMEDETOMIDINE HYDROCHLORIDE 20 MCG: 100 INJECTION, SOLUTION INTRAVENOUS at 13:24

## 2020-10-07 RX ADMIN — PROPOFOL 100 MCG/KG/MIN: 10 INJECTION, EMULSION INTRAVENOUS at 13:24

## 2020-10-07 RX ADMIN — FENTANYL CITRATE 50 MCG: 50 INJECTION, SOLUTION INTRAMUSCULAR; INTRAVENOUS at 15:29

## 2020-10-07 RX ADMIN — SODIUM CHLORIDE, POTASSIUM CHLORIDE, SODIUM LACTATE AND CALCIUM CHLORIDE: 600; 310; 30; 20 INJECTION, SOLUTION INTRAVENOUS at 13:09

## 2020-10-07 RX ADMIN — CLINDAMYCIN PHOSPHATE 900 MG: 900 INJECTION, SOLUTION INTRAVENOUS at 13:14

## 2020-10-07 RX ADMIN — GLYCOPYRROLATE 0.2 MG: 0.2 INJECTION, SOLUTION INTRAMUSCULAR; INTRAVENOUS at 13:13

## 2020-10-07 RX ADMIN — Medication 5 MG: at 14:17

## 2020-10-07 RX ADMIN — ROCURONIUM BROMIDE 50 MG: 10 INJECTION INTRAVENOUS at 13:13

## 2020-10-07 RX ADMIN — ACETAMINOPHEN 975 MG: 325 TABLET, FILM COATED ORAL at 13:07

## 2020-10-07 RX ADMIN — HYDROMORPHONE HYDROCHLORIDE 0.5 MG: 1 INJECTION, SOLUTION INTRAMUSCULAR; INTRAVENOUS; SUBCUTANEOUS at 15:24

## 2020-10-07 RX ADMIN — ONDANSETRON HYDROCHLORIDE 4 MG: 2 INJECTION, SOLUTION INTRAVENOUS at 13:24

## 2020-10-07 RX ADMIN — GLYCOPYRROLATE 0.8 MG: 0.2 INJECTION, SOLUTION INTRAMUSCULAR; INTRAVENOUS at 14:17

## 2020-10-07 RX ADMIN — KETOROLAC TROMETHAMINE 30 MG: 30 INJECTION, SOLUTION INTRAMUSCULAR at 16:02

## 2020-10-07 RX ADMIN — FENTANYL CITRATE 100 MCG: 50 INJECTION, SOLUTION INTRAMUSCULAR; INTRAVENOUS at 13:12

## 2020-10-07 RX ADMIN — DEXAMETHASONE SODIUM PHOSPHATE 4 MG: 4 INJECTION, SOLUTION INTRA-ARTICULAR; INTRALESIONAL; INTRAMUSCULAR; INTRAVENOUS; SOFT TISSUE at 13:13

## 2020-10-07 RX ADMIN — LIDOCAINE HYDROCHLORIDE 50 MG: 10 INJECTION, SOLUTION INFILTRATION; PERINEURAL at 13:13

## 2020-10-07 RX ADMIN — FENTANYL CITRATE 50 MCG: 50 INJECTION, SOLUTION INTRAMUSCULAR; INTRAVENOUS at 15:21

## 2020-10-07 RX ADMIN — FENTANYL CITRATE 50 MCG: 50 INJECTION, SOLUTION INTRAMUSCULAR; INTRAVENOUS at 15:14

## 2020-10-07 RX ADMIN — OXYCODONE HYDROCHLORIDE 5 MG: 5 TABLET ORAL at 15:44

## 2020-10-07 RX ADMIN — FENTANYL CITRATE 50 MCG: 50 INJECTION, SOLUTION INTRAMUSCULAR; INTRAVENOUS at 15:41

## 2020-10-07 RX ADMIN — HYDROMORPHONE HYDROCHLORIDE 0.5 MG: 1 INJECTION, SOLUTION INTRAMUSCULAR; INTRAVENOUS; SUBCUTANEOUS at 15:32

## 2020-10-07 ASSESSMENT — LIFESTYLE VARIABLES: TOBACCO_USE: 1

## 2020-10-07 ASSESSMENT — MIFFLIN-ST. JEOR: SCORE: 2255.85

## 2020-10-07 NOTE — BRIEF OP NOTE
[facility]    Brief Operative Note    Pre-operative diagnosis: Lumbar disc herniation with radiculopathy [M51.16]  Post-operative diagnosis Same as pre-operative diagnosis    Procedure: Procedure(s):  Right Lumbar 5-Sacral 1 minimally invasive microdiscectomy  Surgeon: Surgeon(s) and Role:     * Juancho Oquendo MD - Primary     * Richy Schumacher PA-C  Anesthesia: General   Estimated blood loss: 25ml  Drains: None  Specimens: * No specimens in log *  Findings:   None.  Complications: None.  Implants: * No implants in log *    [id number]

## 2020-10-07 NOTE — ANESTHESIA PREPROCEDURE EVALUATION
Anesthesia Pre-Procedure Evaluation    Patient: Dipak Fuentes   MRN: [id number] : 1995          Preoperative Diagnosis: Lumbar disc herniation with radiculopathy [M51.16]    Procedure(s):  Right Lumbar 5-Sacral 1 minimally invasive microdiscectomy    Past Medical History:   Diagnosis Date     Bipolar 1 disorder (H)      Complication of anesthesia     panic after having anesthesia     Lumbar disc herniation with radiculopathy      Mild intermittent asthma without complication      Other chronic pain     low back and buttocks leg pain     Uncomplicated asthma     mild     Past Surgical History:   Procedure Laterality Date     WISDOM ST GUIDEWIRE       Anesthesia Evaluation     . Pt has had prior anesthetic.            ROS/MED HX    ENT/Pulmonary:     (+)tobacco use, Past use asthma , . .   (-) sleep apnea   Neurologic:     (+)other neuro (lumbar radiculopathy)     Cardiovascular:  - neg cardiovascular ROS       METS/Exercise Tolerance:     Hematologic:         Musculoskeletal:         GI/Hepatic:        (-) GERD   Renal/Genitourinary:         Endo:     (+) Obesity, .      Psychiatric:     (+) psychiatric history depression and bipolar      Infectious Disease:         Malignancy:         Other:                          Physical Exam      Airway   Mallampati: II  TM distance: >3 FB  Neck ROM: full    Dental     Cardiovascular   Rhythm and rate: regular and normal      Pulmonary    breath sounds clear to auscultation            Lab Results   Component Value Date    WBC 8.2 10/05/2020    HGB 15.2 10/05/2020    HCT 44.3 10/05/2020     10/05/2020     10/05/2020    POTASSIUM 4.1 10/05/2020    CHLORIDE 104 10/05/2020    CO2 29 10/05/2020    BUN 11 10/05/2020    CR 0.97 10/05/2020     (H) 10/05/2020    MARIA ESTHER 8.9 10/05/2020    ALBUMIN 4.0 2020    PROTTOTAL 7.9 2020    ALT 89 (H) 2020    AST 22 2020    ALKPHOS 97 2020    BILITOTAL 0.5 2020    LIPASE 76 2020  "      Preop Vitals  BP Readings from Last 3 Encounters:   10/05/20 126/80   09/22/20 128/80   03/16/20 138/80    Pulse Readings from Last 3 Encounters:   10/05/20 96   03/10/20 97   02/24/20 73      Resp Readings from Last 3 Encounters:   02/25/20 20   11/05/18 18   08/22/16 18    SpO2 Readings from Last 3 Encounters:   10/05/20 100%   03/10/20 95%   02/25/20 97%      Temp Readings from Last 1 Encounters:   10/05/20 97.6  F (36.4  C) (Oral)    Ht Readings from Last 1 Encounters:   09/22/20 1.88 m (6' 2\")      Wt Readings from Last 1 Encounters:   10/05/20 121.8 kg (268 lb 8 oz)    Estimated body mass index is 34.47 kg/m  as calculated from the following:    Height as of 9/22/20: 1.88 m (6' 2\").    Weight as of 10/5/20: 121.8 kg (268 lb 8 oz).       Anesthesia Plan      History & Physical Review  History and physical reviewed and following examination; no interval change.    ASA Status:  2 .    NPO Status:  > 8 hours    Plan for General with Intravenous and Propofol induction. Maintenance will be Balanced.    PONV prophylaxis:  Ondansetron (or other 5HT-3) and Dexamethasone or Solumedrol         Postoperative Care  Postoperative pain management:  IV analgesics, Oral pain medications and Multi-modal analgesia.      Consents  Anesthetic plan, risks, benefits and alternatives discussed with:  Patient..                 Jin Karimi MD                    .  "

## 2020-10-07 NOTE — DISCHARGE INSTRUCTIONS
GENERAL ANESTHESIA OR SEDATION ADULT DISCHARGE INSTRUCTIONS   SPECIAL PRECAUTIONS FOR 24 HOURS AFTER SURGERY    IT IS NOT UNUSUAL TO FEEL LIGHT-HEADED OR FAINT, UP TO 24 HOURS AFTER SURGERY OR WHILE TAKING PAIN MEDICATION.  IF YOU HAVE THESE SYMPTOMS; SIT FOR A FEW MINUTES BEFORE STANDING AND HAVE SOMEONE ASSIST YOU WHEN YOU GET UP TO WALK OR USE THE BATHROOM.    YOU SHOULD REST AND RELAX FOR THE NEXT 24 HOURS AND YOU MUST MAKE ARRANGEMENTS TO HAVE SOMEONE STAY WITH YOU FOR AT LEAST 24 HOURS AFTER YOUR DISCHARGE.  AVOID HAZARDOUS AND STRENUOUS ACTIVITIES.  DO NOT MAKE IMPORTANT DECISIONS FOR 24 HOURS.    DO NOT DRIVE ANY VEHICLE OR OPERATE MECHANICAL EQUIPMENT FOR 24 HOURS FOLLOWING THE END OF YOUR SURGERY.  EVEN THOUGH YOU MAY FEEL NORMAL, YOUR REACTIONS MAY BE AFFECTED BY THE MEDICATION YOU HAVE RECEIVED.    DO NOT DRINK ALCOHOLIC BEVERAGES FOR 24 HOURS FOLLOWING YOUR SURGERY.    DRINK CLEAR LIQUIDS (APPLE JUICE, GINGER ALE, 7-UP, BROTH, ETC.).  PROGRESS TO YOUR REGULAR DIET AS YOU FEEL ABLE.    YOU MAY HAVE A DRY MOUTH, A SORE THROAT, MUSCLES ACHES OR TROUBLE SLEEPING.  THESE SHOULD GO AWAY AFTER 24 HOURS.    CALL YOUR DOCTOR FOR ANY OF THE FOLLOWING:  SIGNS OF INFECTION (FEVER, GROWING TENDERNESS AT THE SURGERY SITE, A LARGE AMOUNT OF DRAINAGE OR BLEEDING, SEVERE PAIN, FOUL-SMELLING DRAINAGE, REDNESS OR SWELLING.    IT HAS BEEN OVER 8 TO 10 HOURS SINCE SURGERY AND YOU ARE STILL NOT ABLE TO URINATE (PASS WATER).   Maximum acetaminophen (Tylenol) dose from all sources should not exceed 4 grams (4000 mg) per day  975 mg was given    You received Toradol, an IV form of ibuprofen (Motrin) at 4pm*.  Do not take any ibuprofen products until 10pm*.

## 2020-10-07 NOTE — ANESTHESIA PROCEDURE NOTES
Airway   Date/Time: 10/7/2020 1:16 PM   Patient location during procedure: OR    Staff -   CRNA: Sylvester Banks APRN CRNA  Performed By: CRNA    Consent for Airway   Urgency: elective    Indications and Patient Condition  Indications for airway management: justin-procedural  Induction type:intravenousMask difficulty assessment: 1 - vent by mask    Final Airway Details  Final airway type: endotracheal airway  Successful airway:ETT - single and Oral  Endotracheal Airway Details   ETT size (mm): 8.0  Cuffed: yes  Successful intubation technique: direct laryngoscopy  Grade View of Cords: 1  Adjucts: stylet  Measured from: lips  Secured at (cm): 23  Secured with: plastic tape  Bite block used: None    Post intubation assessment   Placement verified by: capnometry and equal breath sounds   Secured with:plastic tape  Ease of procedure: easy  Dentition: Intact and Unchanged

## 2020-10-07 NOTE — ANESTHESIA CARE TRANSFER NOTE
Patient: Dipak Fuentes    Procedure(s):  Right Lumbar 5-Sacral 1 minimally invasive microdiscectomy    Diagnosis: Lumbar disc herniation with radiculopathy [M51.16]  Diagnosis Additional Information: No value filed.    Anesthesia Type:   General     Note:  Airway :Face Mask          Vitals: (Last set prior to Anesthesia Care Transfer)    CRNA VITALS  10/7/2020 1400 - 10/7/2020 1435      10/7/2020             Pulse:  111    SpO2:  95 %    Resp Rate (observed):  24                Electronically Signed By: ZENA Williamson CRNA  October 7, 2020  2:35 PM

## 2020-10-07 NOTE — ANESTHESIA POSTPROCEDURE EVALUATION
Patient: Dipak Fuentes    Procedure(s):  Right Lumbar 5-Sacral 1 minimally invasive microdiscectomy    Diagnosis:Lumbar disc herniation with radiculopathy [M51.16]  Diagnosis Additional Information: No value filed.    Anesthesia Type:  General    Note:  Anesthesia Post Evaluation    Patient location during evaluation: PACU  Patient participation: Able to fully participate in evaluation  Level of consciousness: awake  Pain management: adequate  Airway patency: patent  Cardiovascular status: acceptable  Respiratory status: acceptable  Hydration status: acceptable  PONV: controlled     Anesthetic complications: None          Last vitals:  Vitals:    10/07/20 1229 10/07/20 1524   BP: 137/72    Pulse: 84    Resp: 20    Temp: 99.4  F (37.4  C)    SpO2: 98% 98%         Electronically Signed By: Jin Karimi MD  October 7, 2020  3:31 PM

## 2020-10-08 ENCOUNTER — NURSE TRIAGE (OUTPATIENT)
Dept: NURSING | Facility: CLINIC | Age: 25
End: 2020-10-08

## 2020-10-08 ENCOUNTER — TELEPHONE (OUTPATIENT)
Dept: NEUROSURGERY | Facility: CLINIC | Age: 25
End: 2020-10-08

## 2020-10-08 DIAGNOSIS — M51.16 LUMBAR DISC HERNIATION WITH RADICULOPATHY: Primary | ICD-10-CM

## 2020-10-08 RX ORDER — METHYLPREDNISOLONE 4 MG
TABLET, DOSE PACK ORAL
Qty: 21 TABLET | Refills: 0 | Status: SHIPPED | OUTPATIENT
Start: 2020-10-08 | End: 2020-11-25

## 2020-10-08 NOTE — TELEPHONE ENCOUNTER
"DOS: 10/8/2020  Procedure: Right Lumbar 5-Sacral 1 minimally invasive microdiscectomy  Surgeon: Juancho Oquendo MD    Patient complains of severe low back pain along with right leg cramping. He states the back pain is similar to before surgery but has worsened. He is taking Oxycodone and Ibuprofen. He was instructed to stop Robaxin last night due to a side effect. He has also been icing his back.     Patient is walking with difficulty. He states his hips is leaning to the left side again like his spine is not straight. This makes it difficult for him to get up. He is concerned about this. Writer informed patient message will be sent to the care team for advice.     Throughout this conversation patient was \"spacing out\" and states Oxycodone is \"kicking in.\"     "

## 2020-10-08 NOTE — OP NOTE
Procedure Date: 10/07/2020      PREOPERATIVE DIAGNOSIS:  L5-S1 herniated disk with radiculopathy.      POSTOPERATIVE DIAGNOSES:  L5-S1 herniated disk with radiculopathy.      PROCEDURES:  Right L5-S1 minimally invasive microdiskectomy.      SURGEON:  Juancho Oquendo MD      ASSISTANT:  Richy Schumacher PA-C      ANESTHESIA:  General endotracheal anesthesia.      ESTIMATED BLOOD LOSS:  25 mL.      INDICATIONS:  The patient is a 25-year-old male with mainly right lower extremity pain and an L5-S1 herniated disk.  He was brought to the operating room for microdiskectomy after failing conservative measures. Please note that Richy Schumacher PA-C's assistance was needed for positioning, retraction, suctioning, and closure.      DESCRIPTION OF PROCEDURE:  The patient was brought to the operating room, and general endotracheal anesthesia was induced.  The patient was rolled in the prone position on the Terell frame.  The back was prepped and draped in sterile fashion.  C-arm fluoroscopy was used to localize the L5-S1 level, and a right-sided paramedian approach was performed using the [email or web address] tubular dilating system.  The level was again confirmed with fluoroscopy.  The microscope was sterilely draped and brought into the field, and a laminotomy and medial facetectomy were performed on the right at L5-S1.  The ligamentum flavum was identified and elevated.  The nerve root was identified underneath after debulking some of the epidural fat.  Once this was done, it was retracted medially.  The disk space was opened, and using a combination of curettes, rongeurs and Kerrison punches, the disk material underneath the nerve root and under the thecal sac was debrided.  This was continued until the nerve root felt to be well decompressed.  Once this was done, hemostasis was achieved, and the fascia was closed with 0 interrupted Vicryl, 2-0 inverted interrupted Vicryl was used for subcutaneous layer and a subcuticular 4-0  Monocryl used for skin and Exofin placed as a dressing.  The patient was then awakened, extubated and taken to the recovery room in good condition.         ANGELITO CAMPOS MD             D: 10/07/2020   T: 10/07/2020   MT: TRUDY      Name:     AMBER CASTILLO   MRN:      [id number]        Account:        [id number]   :      1995           Procedure Date: 10/07/2020      Document: [id number]

## 2020-10-08 NOTE — TELEPHONE ENCOUNTER
Reason For Call: Patient had surgery yesterday 10/7/20, and is experiencing a lot of pain, he would like a call back as soon as possible to discuss, he can be reached at [phone].

## 2020-10-08 NOTE — TELEPHONE ENCOUNTER
Triage Call     Pt had low back surgery today.  Took his first dose of Methocarbamol at 9 pm and is having mild facial numbness (decreased sensation) now on both sides of face, mostly cheeks.  Denies facial swelling; Denies tongue swelling.  No change to use of face movement or swallowing.  No such adverse reaction information given for medication in micromedex.    Pt admits to very tight muscles in neck and shoulders due to intense post op pain.  Advise given re: pain control using ibuprofen, tylenol and his oxycodone.    Triaged to disposition of home care, and care advice given. Encouraged to call back if facial sensation does not return tonight.    COVID 19 Nurse Triage Plan/Patient Instructions    Please be aware that novel coronavirus (COVID-19) may be circulating in the community. If you develop symptoms such as fever, cough, or SOB or if you have concerns about the presence of another infection including coronavirus (COVID-19), please contact your health care provider or visit [email or web address].     Disposition/Instructions    Home care recommended. Follow home care protocol based instructions.    Thank you for taking steps to prevent the spread of this virus.  o Limit your contact with others.  o Wear a simple mask to cover your cough.  o Wash your hands well and often.    Resources    [facility]: About COVID-19: [email or web address]    CDC: What to Do If You're Sick: www.cdc.gov/coronavirus/2019-ncov/about/steps-when-sick.html    CDC: Ending Home Isolation: www.cdc.gov/coronavirus/2019-ncov/hcp/disposition-in-home-patients.html     CDC: Caring for Someone: www.cdc.gov/coronavirus/2019-ncov/if-you-are-sick/care-for-someone.html     [facility]: Interim Guidance for Hospital Discharge to Home: www.health.[facility].[place].us/diseases/coronavirus/hcp/hospdischarge.pdf    [facility] clinical trials (COVID-19 research studies): clinicalaffairs.[facility]/umn-clinical-trials     Below are the COVID-19  hotlines at the [place] [facility] ([facility]). Interpreters are available.   o For health questions: Call 297-881-6511 or 1-518.552.7208 (7 a.m. to 7 p.m.)  o For questions about schools and childcare: Call 960-214-7430 or 1-737.234.8436 (7 a.m. to 7 p.m.)     Vickie Hodge RN      Additional Information    Negative: [1] SEVERE weakness (i.e., unable to walk or barely able to walk, requires support) AND [2] new onset or worsening    Negative: [1] Weakness (i.e., paralysis, loss of muscle strength) of the face, arm / hand, or leg / foot on one side of the body AND [2] sudden onset AND [3] present now    Negative: [1] Numbness (i.e., loss of sensation) of the face, arm / hand, or leg / foot on one side of the body AND [2] sudden onset AND [3] present now    Negative: [1] Loss of speech or garbled speech AND [2] sudden onset AND [3] present now    Negative: Difficult to awaken or acting confused (e.g., disoriented, slurred speech)    Negative: Sounds like a life-threatening emergency to the triager    Negative: Confusion, disorientation, or hallucinations is main symptom    Negative: Neck pain is main symptom (and having weakness, numbness, or tingling in arm / hand because of neck pain)    Negative: Back pain is main symptom (and having weakness, numbness, or tingling in leg because of back pain)    Negative: Hand pain is main symptom (and having mild weakness, numbness, or tingling in hand related to hand pain)    Negative: Dizziness is main symptom    Negative: Vision loss or change is main symptom    Negative: Followed a head injury within last 3 days    Negative: Followed a neck injury within last 3 days    Negative: [1] Tingling in both hands and/or feet AND [2] breathing faster than normal AND [3] feels similar to prior panic attack or hyperventilation episode    Negative: Weakness in both sides of the body or weakness all over    Negative: Headache  (and neurologic deficit)    Negative: [1] Back  pain AND [2] numbness (loss of sensation) in groin or rectal area    Negative: [1] Unable to urinate (or only a few drops) > 4 hours AND [2] bladder feels very full (e.g., palpable bladder or strong urge to urinate)    Negative: [1] Loss of bladder or bowel control (urine or bowel incontinence; wetting self, leaking stool) AND [2] new onset    Negative: [1] Weakness (i.e., paralysis, loss of muscle strength) of the face, arm / hand, or leg / foot on one side of the body AND [2] sudden onset AND [3] brief (now gone)    Negative: [1] Numbness (i.e., loss of sensation) of the face, arm / hand, or leg / foot on one side of the body AND [2] sudden onset AND [3] brief (now gone)    Negative: [1] Loss of speech or garbled speech AND [2] sudden onset AND [3] brief (now gone)    Negative: Bell's palsy suspected (i.e., weakness on only one side of the face, developing over hours to days, no other symptoms)    Negative: Patient sounds very sick or weak to the triager    Negative: Neck pain (and neurologic deficit)    Negative: Back pain (and neurologic deficit)    Negative: [1] Weakness of the face, arm / hand, or leg / foot on one side of the body AND [2] gradual onset (e.g., days to weeks) AND [3] present now    Negative: [1] Numbness (i.e., loss of sensation) of the face, arm / hand, or leg / foot on one side of the body AND [2] gradual onset (e.g., days to weeks) AND [3] present now    Negative: [1] Loss of speech or garbled speech AND [2] gradual onset (e.g., days to weeks) AND [3] present now    Negative: [1] Tingling (e.g., pins and needles) of the face, arm / hand, or leg / foot on one side of the body AND [2] present now    Negative: [1] Loss of speech or garbled speech AND [2] is a chronic symptom (recurrent or ongoing AND present > 4 weeks)    Negative: [1] Weakness of arm / hand, or leg / foot AND [2] is a chronic symptom (recurrent or ongoing AND present > 4 weeks)    Negative: [1] Numbness or tingling in one or  both hands AND [2] is a chronic symptom (recurrent or ongoing AND present > 4 weeks)    Negative: [1] Numbness or tingling in one or both feet AND [2] is a chronic symptom (recurrent or ongoing AND present > 4 weeks)    Negative: [1] Numbness or tingling on both sides of body AND [2] is a new symptom present > 24 hours    Negative: Drug overdose and nurse unable to answer question    Negative: Caller requesting information not related to medicine    Negative: Caller requesting a prescription for Strep throat and has a positive culture result    Negative: Rash while taking a medication or within 3 days of stopping it    Negative: Immunization reaction suspected    Negative: [1] Asthma AND [2] having symptoms of asthma (cough, wheezing, etc)    Negative: MORE THAN A DOUBLE DOSE of a prescription or over-the-counter (OTC) drug    Negative: [1] DOUBLE DOSE (an extra dose or lesser amount) of over-the-counter (OTC) drug AND [2] any symptoms (e.g., dizziness, nausea, pain, sleepiness)    Negative: [1] DOUBLE DOSE (an extra dose or lesser amount) of prescription drug AND [2] any symptoms (e.g., dizziness, nausea, pain, sleepiness)    Negative: Took another person's prescription drug    [1] Numbness or tingling on both sides of body AND [2] is a new symptom present < 24 hours    Negative: [1] Tingling in hand (e.g., pins and needles) AND [2] after prolonged laying on arm AND [3]  brief (now gone)    Negative: [1] Tingling in foot (e.g., pins and needles) AND [2] after prolonged sitting AND [3] brief (now gone)    Negative: [1] Bumped elbow AND [2] brief (now gone) numbness (or tingling or burning) in hand and fingers    Protocols used: NEUROLOGIC DEFICIT-A-AH, MEDICATION QUESTION CALL-A-AH

## 2020-10-10 ENCOUNTER — TELEPHONE (OUTPATIENT)
Dept: NEUROSURGERY | Facility: CLINIC | Age: 25
End: 2020-10-10

## 2020-10-10 DIAGNOSIS — Z98.890 H/O LUMBAR DISCECTOMY: Primary | ICD-10-CM

## 2020-10-10 RX ORDER — METHYLPREDNISOLONE 4 MG
TABLET, DOSE PACK ORAL
Qty: 21 TABLET | Refills: 0 | Status: SHIPPED | OUTPATIENT
Start: 2020-10-10 | End: 2020-11-25

## 2020-10-10 RX ORDER — OXYCODONE HYDROCHLORIDE 5 MG/1
5-10 TABLET ORAL EVERY 4 HOURS PRN
Qty: 50 TABLET | Refills: 0 | Status: SHIPPED | OUTPATIENT
Start: 2020-10-10 | End: 2020-11-10

## 2020-10-10 NOTE — TELEPHONE ENCOUNTER
Patient called c/o severe back and right leg pain, describes the pain as the same pain he had pre-op but worse.  Also c/o muscle tightness, had reaction to Robaxin so using Tizanidine.  Denies weakness and urinating okay.    Recommend medrol dose pack and will refill Oxycodone.  If pain not improved next week recommend evaluation in clinic.    Discussed with Dr. Oquendo.    Lucia Carlos MPAS  [facility]surgery  [address] [address]    Tel [phone]  Pager [phone]

## 2020-10-22 ENCOUNTER — VIRTUAL VISIT (OUTPATIENT)
Dept: NEUROSURGERY | Facility: CLINIC | Age: 25
End: 2020-10-22
Payer: COMMERCIAL

## 2020-10-22 VITALS — WEIGHT: 260 LBS | HEIGHT: 74 IN | BODY MASS INDEX: 33.37 KG/M2

## 2020-10-22 DIAGNOSIS — M51.16 LUMBAR DISC HERNIATION WITH RADICULOPATHY: ICD-10-CM

## 2020-10-22 DIAGNOSIS — Z98.890 S/P LUMBAR MICRODISCECTOMY: Primary | ICD-10-CM

## 2020-10-22 PROCEDURE — 99207 PR NO CHARGE NURSE ONLY: CPT

## 2020-10-22 PROCEDURE — 999N000103 HC STATISTIC NO CHARGE FACILITY FEE: Mod: TEL

## 2020-10-22 RX ORDER — DOCUSATE SODIUM 100 MG/1
100 CAPSULE, LIQUID FILLED ORAL 2 TIMES DAILY PRN
Qty: 20 CAPSULE | Refills: 0 | Status: SHIPPED | OUTPATIENT
Start: 2020-10-22 | End: 2020-11-25

## 2020-10-22 ASSESSMENT — PAIN SCALES - GENERAL: PAINLEVEL: SEVERE PAIN (6)

## 2020-10-22 ASSESSMENT — MIFFLIN-ST. JEOR: SCORE: 2224.1

## 2020-10-22 NOTE — PROGRESS NOTES
"Telephone Encounter: Nurse Visit for Incision Check      S:  Patient is s/p Right L5-S1 minimally invasive microdiskectomy on 10/7/20  with Dr. Oquendo. Patient states pain is 6-7/10. He reports some pain in low back , right buttock, denies any leg pain, muscle soreness/stiffness.  He reports  Tingling/numbness in bilateral feet occasionally.  He reports he increased his activity yesterday and is a little more sore today. Advised he alternate ice/heat. He denies weakness to lower extremities. For pain management, patient is taking ibuprofen, oxycodone, tizanidine . Patient stopped the robaxin due to side effects, it caused tingling in his face and he called nurse advisors triage line and recommned he discontinue it. Patient had left over tizanidine that he had been taking which is helping the spasms and soreness. Patient also denies any fever, cough, or SOB today.   O:  Patient assessed incision while discussing over the phone. He reports incision \"looks good\".  Denies any redness, swelling, drainage, dehiscence, warmth or fevers.   A: S/p Right L5-S1 minimally invasive microdiskectomy on 10/7/20  with Dr. Oquendo. Recovering well overall.  P:    - Keep your incision clean and dry at all times. No bathing, swimming, or submerging in water until incision is well healed.  Call clinic or go to Emergency Room if you develop any new pain, drainage, swelling, or fever.  - No lifting greater than 10-15 pounds. No bending or twisting, or over head reaching.   - Return as scheduled for post op follow-up   -refilled tizanidine and stool softener      -Please call clinic with any further questions or concerns: [phone]     Emily Morales RN  [facility]   [address]  [address] [place] [id number]     Tel [phone]       "

## 2020-10-22 NOTE — PATIENT INSTRUCTIONS
Instructions for Patient    Keep your incision clean and dry at all times.     No bathing, swimming, or submerging in water until incision is well healed.      No lifting greater than 10-15 pounds. No bending, twisting, or overhead reaching.    Return as scheduled for follow up appointment     Weaning from narcotic pain medications    When it is time, start weaning by extending the time between doses.     For example, if you're taking 2 tabs every 4 hours, spread it out to 2 tabs over 4.5, 5, 6 hours.     At that point you can certainly cut down to 1 tab, then wean to an as needed basis until completely done with them.    For refills, please call our clinic. A nurse will call you back to obtain a pain assessment. Please call 3-4 business days before you run out so we can ensure there is a provider available at the location you prefer for .    Call the clinic or go to Emergency Room if you develop any new pain, drainage, swelling, or fever. Go to the Emergency Room if sudden onset of severe headache, weakness, confusion, change in level of consciousness, pain, or loss of movement.    Post-operative appointments     6 week post op, 3 months post op, 6 months post op, 1 year post-op appointments     Please call clinic with any further questions or concerns      [facility]  [address] [address]  T:  [phone]  F:  [phone]

## 2020-10-22 NOTE — NURSING NOTE
"Dipak Fuentes is a 25 year old male who presents for:  Chief Complaint   Patient presents with     Neurologic Problem     nurse appointment Left -L5 microdiscectomy        Initial Vitals:  Ht 6' 2\" (1.88 m)   Wt 260 lb (117.9 kg)   BMI 33.38 kg/m   Estimated body mass index is 33.38 kg/m  as calculated from the following:    Height as of this encounter: 6' 2\" (1.88 m).    Weight as of this encounter: 260 lb (117.9 kg).. Body surface area is 2.48 meters squared. BP completed using cuff size: NA (Not Taken)  Severe Pain (6)    Nursing Comments: see chief complaint    Corey Khan, APOLINAR    "

## 2020-11-09 ENCOUNTER — TELEPHONE (OUTPATIENT)
Dept: NEUROSURGERY | Facility: CLINIC | Age: 25
End: 2020-11-09

## 2020-11-09 DIAGNOSIS — Z98.890 H/O LUMBAR DISCECTOMY: ICD-10-CM

## 2020-11-09 RX ORDER — OXYCODONE HYDROCHLORIDE 5 MG/1
5-10 TABLET ORAL EVERY 6 HOURS PRN
Qty: 30 TABLET | Refills: 0 | Status: CANCELLED | OUTPATIENT
Start: 2020-11-09

## 2020-11-09 NOTE — TELEPHONE ENCOUNTER
Patient is s/p right L5-S1 minimally invasive microdiscectomy with Dr. Oquendo on 10/7. He states that a couple days ago he stopped taking the Oxycodone to see if he could go without but he is still having a good amount of pain. He says the pain starts in his mid back and goes up to his bilateral shoulders, and then down to both of his legs. He describes the pain as a constant dull muscle pain, however, ocassionally he has intermittent sharp pain down both of his legs. He is out of Oxycodone so informed him that we can refill this. Encouraged him to utilize Tylenol and ice and heat in addition to the pain medication. Patient is going to call if the pain continues to persist despite these interventions.

## 2020-11-09 NOTE — TELEPHONE ENCOUNTER
Reason For Call: Patient is still in a lot of pain after surgery, he would like a call back at [phone].

## 2020-11-10 DIAGNOSIS — Z98.890 S/P LUMBAR MICRODISCECTOMY: ICD-10-CM

## 2020-11-10 DIAGNOSIS — Z98.890 H/O LUMBAR DISCECTOMY: ICD-10-CM

## 2020-11-10 RX ORDER — OXYCODONE HYDROCHLORIDE 5 MG/1
5 TABLET ORAL EVERY 6 HOURS PRN
Qty: 30 TABLET | Refills: 0 | Status: SHIPPED | OUTPATIENT
Start: 2020-11-10 | End: 2020-11-25

## 2020-11-10 NOTE — TELEPHONE ENCOUNTER
Patient calling for a refill of zanaflex.     DOS: 10/07/2020  Procedure: Right L5-S1 minimally invasive microdiskectomy  Surgeon:Jere     Current symptom(s): mid back and goes up to his bilateral shoulders, and then down to both of his legs. He describes the pain as a constant dull muscle pain  Current pain management: zanaflex, oxycodone, tylenol, ice/heat    Last fill: 10/22 (oxy and zanaflex)    Next visit: 11/20/2020    Medication pended for your approval, if appropriate. Pharmacy verified.     Any patient questions or concerns:     Informed patient request will be forwarded to care team.       Kathy Puente RN

## 2020-11-20 ENCOUNTER — OFFICE VISIT (OUTPATIENT)
Dept: NEUROSURGERY | Facility: CLINIC | Age: 25
End: 2020-11-20
Attending: PHYSICIAN ASSISTANT
Payer: COMMERCIAL

## 2020-11-20 VITALS
TEMPERATURE: 98.6 F | HEART RATE: 79 BPM | DIASTOLIC BLOOD PRESSURE: 79 MMHG | WEIGHT: 267.2 LBS | OXYGEN SATURATION: 97 % | SYSTOLIC BLOOD PRESSURE: 129 MMHG | BODY MASS INDEX: 34.29 KG/M2 | HEIGHT: 74 IN

## 2020-11-20 DIAGNOSIS — Z98.890 H/O LUMBAR DISCECTOMY: Primary | ICD-10-CM

## 2020-11-20 PROCEDURE — 99024 POSTOP FOLLOW-UP VISIT: CPT | Performed by: PHYSICIAN ASSISTANT

## 2020-11-20 PROCEDURE — G0463 HOSPITAL OUTPT CLINIC VISIT: HCPCS

## 2020-11-20 ASSESSMENT — MIFFLIN-ST. JEOR: SCORE: 2256.76

## 2020-11-20 ASSESSMENT — PAIN SCALES - GENERAL: PAINLEVEL: MODERATE PAIN (4)

## 2020-11-20 NOTE — LETTER
"    11/20/2020         RE: Dipak Fuentes  [address] [id number]        Dear Colleague,    Thank you for referring your patient, Dipak Fuentes, to the [facility] [place]. Please see a copy of my visit note below.    NEUROSURGERY CLINIC PROGRESS NOTE    DATE OF VISIT: 11/20/2020    HPI:     Dipak Fuentes is a pleasant 25 year old male who presents to the clinic today for a 6-week post-operative follow-up visit. On 10/07/2020 the patient underwent a right lumbar 5-sacral 1 minimally invasive microdiscectomy with Dr. Oquendo.  Today, the patient reports that overall he is doing well. His symptoms have improved, but not resolved. The numbness has resolved, the pain persist.     Current Outpatient Medications   Medication     acetaminophen (TYLENOL) 325 MG tablet     albuterol (PROAIR HFA) 108 (90 BASE) MCG/ACT inhaler     docusate sodium (COLACE) 100 MG capsule     methocarbamol (ROBAXIN) 500 MG tablet     methylPREDNISolone (MEDROL DOSEPAK) 4 MG tablet therapy pack     methylPREDNISolone (MEDROL DOSEPAK) 4 MG tablet therapy pack     oxyCODONE (ROXICODONE) 5 MG tablet     tiZANidine (ZANAFLEX) 4 MG tablet     No current facility-administered medications for this visit.      Facility-Administered Medications Ordered in Other Visits   Medication     Self Administer Medications: Behavioral Services       Allergies   Allergen Reactions     Ceclor [Cefaclor Monohydrate]      Lamictal [Lamotrigine]      Had known side effect of\"skin eating rash\"  Jaycob lauren syndrome     Cefaclor Rash       Past Medical History:   Diagnosis Date     Bipolar 1 disorder (H)      Complication of anesthesia     panic after having anesthesia     Lumbar disc herniation with radiculopathy      Mild intermittent asthma without complication      Other chronic pain     low back and buttocks leg pain     Uncomplicated asthma     mild       Review Of Systems    Skin: negative  Eyes: " "negative  Ears/Nose/Throat: negative  Respiratory: No shortness of breath, dyspnea on exertion, cough, or hemoptysis  Cardiovascular: negative  Gastrointestinal: negative  Musculoskeletal: back pain  Neurologic: negative  Psychiatric: negative  Hematologic/Lymphatic/Immunologic: negative  Endocrine: negative    OBJECTIVE:    /79 (BP Location: Right arm, Patient Position: Sitting, Cuff Size: Adult Large)   Pulse 79   Temp 98.6  F (37  C) (Oral)   Ht 6' 2\" (1.88 m)   Wt 267 lb 3.2 oz (121.2 kg)   SpO2 97%   BMI 34.31 kg/m      Exam:    Patient appears comfortable and in no apparent distress. Moving all extremities.  Gait is non-antalgic.  CN II-XII grossly intact, alert and appropriate with conversation and following  commands  Bilateral upper extremities with full strength including hand intrinsics and grasp.  Sensation intact throughout.  Bilateral lower extremities 5/5 strength including plantar and dorsiflexion.  Normal sensation throughout bilaterally.  Lumbar incision edges well approximated. Absent for edema, erythema, or drainage.    ASSESSMENT:    1. H/O lumbar discectomy        PLAN:    Dipak Fuentes is 6 weeks out from a right lumbar 5-sacral 1 minimally invasive microdiscectomy performed by Dr. Oquendo on 10/07/2020. Today the patient reports that overall he is doing well. His symptoms have improved, but not resolved. The numbness has resolved, the pain persist.     The patient has remained compliant with the post-operative restrictions which included not lifting anything greater than 5-10 lbs. Today we discussed increasing activity from 10 lbs by 2-5 lbs per week, but encouraged continuing to avoid excessive bending,  twisting, and turning at the waist and to avoid jostling and jarring activities.     A referral was placed to physical therapy for additional strengthening and core muscle exercises.    Mr. Fuentes will return to the clinic in 6 weeks.    The patient gave verbal understanding and is in " agreement with the above plan. He will call or return to the clinic for any worsening or changes in symptoms.    Respectfully,     JANET Peña PA-C          Again, thank you for allowing me to participate in the care of your patient.        Sincerely,        Richy Schumacher PA-C

## 2020-11-20 NOTE — NURSING NOTE
"Dipak Fuentes is a 25 year old male who presents for:  Chief Complaint   Patient presents with     Neurologic Problem     6 week post op. Right L5-S1 min invasive microdiscectomy.         Initial Vitals:  /79 (BP Location: Right arm, Patient Position: Sitting, Cuff Size: Adult Large)   Pulse 79   Temp 98.6  F (37  C) (Oral)   Ht 6' 2\" (1.88 m)   Wt 267 lb 3.2 oz (121.2 kg)   SpO2 97%   BMI 34.31 kg/m   Estimated body mass index is 34.31 kg/m  as calculated from the following:    Height as of this encounter: 6' 2\" (1.88 m).    Weight as of this encounter: 267 lb 3.2 oz (121.2 kg).. Body surface area is 2.52 meters squared. BP completed using cuff size: large  Moderate Pain (4)    Nursing Comments: see chief complaint    Corey Khan, CMA    "

## 2020-11-24 ENCOUNTER — THERAPY VISIT (OUTPATIENT)
Dept: PHYSICAL THERAPY | Facility: CLINIC | Age: 25
End: 2020-11-24
Attending: PHYSICIAN ASSISTANT
Payer: COMMERCIAL

## 2020-11-24 DIAGNOSIS — Z98.890 STATUS POST LUMBAR DISCECTOMY: ICD-10-CM

## 2020-11-24 DIAGNOSIS — Z98.890 H/O LUMBAR DISCECTOMY: ICD-10-CM

## 2020-11-24 PROCEDURE — 97162 PT EVAL MOD COMPLEX 30 MIN: CPT | Mod: GP | Performed by: PHYSICAL THERAPIST

## 2020-11-24 PROCEDURE — 97110 THERAPEUTIC EXERCISES: CPT | Mod: GP | Performed by: PHYSICAL THERAPIST

## 2020-11-24 PROCEDURE — 97112 NEUROMUSCULAR REEDUCATION: CPT | Mod: GP | Performed by: PHYSICAL THERAPIST

## 2020-11-24 NOTE — PROGRESS NOTES
[facility] Initial Evaluation -- Lumbar    Date: November 24, 2020  Dipak Fuentes is a 25 year old male with a lumbar condition.   Referral: Dr. Oquendo  Work mechanical stresses:  Not working  Employment status:  unemployed  Leisure mechanical stresses: not currently  Functional disability score (SILVIANO/STarT Back):  See flow sheet  VAS score (0-10): 2/10  Patient goals/expectations:  To get stronger    HISTORY:    Present symptoms: central low back  Pain quality (sharp/shooting/stabbing/aching/burning/cramping):  aching   Paresthesia (yes/no):  no    Present since (onset date): October 6, 2020.     Symptoms (improving/unchanging/worsening):  improving.     Symptoms commenced as a result of: surgery   Condition occurred in the following environment:        Symptoms at onset (back/thigh/leg): back  Constant symptoms (back/thigh/leg):   Intermittent symptoms (back/thigh/leg): back    Symptoms are made worse with the following: Always Bending, Sometimes Lying, Time of day - Always AM and Other - Lifting and house work/chores   Symptoms are made better with the following: Always Walking, Time of day - Always as the day progresses and Always On the move    Disturbed sleep (yes/no):  no Sleeping postures (prone/sup/side R/L): back, stomach    Previous episodes (0/1-5/6-10/11+): 2 Year of first episode:     Previous history: previous HNP, surgery  Previous treatments: PT, somewhat helpful but needed surgery      Specific Questions:  Cough/Sneeze/Strain (pos/neg): positive with cough/sneeze  Bowel/Bladder (normal/abnormal): normal  Gait (normal/abnormal): normal  Medications (nil/NSAIDS/analg/steroids/anticoag/other):  Other - Ibuprofen  Medical allergies:  none  General health (excellent/good/fair/poor):  good  Pertinent medical history:  Asthma and Overweight  Imaging (None/Xray/MRI/Other):  MRI  Recent or major surgery (yes/no):  Yes October 6, 2020  Night pain (yes/no): yes  Accidents (yes/no):  no  Unexplained weight loss (yes/no): none  Barriers at home: none  Other red flags: none    EXAMINATION    Posture:   Sitting (good/fair/poor): fair  Standing (good/fair/poor):fair  Lordosis (red/acc/normal): red  Correction of posture (better/worse/no effect): better    Lateral Shift (right/left/nil): nil  Relevant (yes/no):  no  Other Observations: none    Neurological:    Motor deficit:  intact  Reflexes:  intact  Sensory deficit:  none  Dural signs:  intact    Movement Loss:   Willi Mod Min Nil Pain   Flexion  x      Extension   x     Side Gliding R   x     Side Gliding L   x       Test Movements:   During: produces, abolishes, increases, decreases, no effect, centralizing, peripheralizing   After: better, worse, no better, no worse, no effect, centralized, peripheralized    Pretest symptoms standing:    Symptoms During Symptoms After ROM increased ROM decreased No Effect   FIS        Rep FIS        EIS        Rep EIS        Pretest symptoms lying:     Symptoms During Symptoms After ROM increased ROM decreased No Effect   SHIREEN        Rep SHIREEN        EIL        Rep EIL        If required, pretest symptoms:    Symptoms During Symptoms After ROM increased ROM decreased No Effect   SGIS - R        Rep SGIS - R        SGIS - L        Rep SGIS - L          Static Tests:  Sitting slouched:    Sitting erect:    Standing slouched   Standing erect:    Lying prone in extension:   Long sitting:      Other Tests: poor TA activation with muscle cramping; only tolerated standing core activation    Provisional Classification:  Inconclusive/Other - Post-Surgery    Principle of Management:  Education:  Therapeutic dose of exercise,   Equipment provided:    Mechanical therapy (Y/N):     Extension principle:    Lateral Principle:    Flexion principle:    Other:  Core activation/strengthening    ASSESSMENT/PLAN:    Patient is a 25 year old male with lumbar complaints.    Patient has the following significant findings with corresponding  treatment plan.                Diagnosis 1:  S/p microdiscectomy lumbar  Pain -  self management, education, directional preference exercise and home program  Decreased ROM/flexibility - manual therapy and therapeutic exercise  Decreased strength - therapeutic exercise and therapeutic activities  Decreased function - therapeutic activities    Therapy Evaluation Codes:   1) History comprised of:   Personal factors that impact the plan of care:      None.    Comorbidity factors that impact the plan of care are:      Asthma and Dizziness.     Medications impacting care: Anti-inflammatory.  2) Examination of Body Systems comprised of:   Body structures and functions that impact the plan of care:      Lumbar spine.   Activity limitations that impact the plan of care are:      Bending, Lifting, Sitting and Standing.  3) Clinical presentation characteristics are:   Evolving/Changing.  4) Decision-Making    Moderate complexity using standardized patient assessment instrument and/or measureable assessment of functional outcome.  Cumulative Therapy Evaluation is: Moderate complexity.    Previous and current functional limitations:  (See Goal Flow Sheet for this information)    Short term and Long term goals: (See Goal Flow Sheet for this information)     Communication ability:  Patient appears to be able to clearly communicate and understand verbal and written communication and follow directions correctly.  Treatment Explanation - The following has been discussed with the patient:   RX ordered/plan of care  Anticipated outcomes  Possible risks and side effects  This patient would benefit from PT intervention to resume normal activities.   Rehab potential is good.    Frequency:  1 X week, once daily  Duration:  for 6 weeks  Discharge Plan:  Achieve all LTG.  Independent in home treatment program.  Reach maximal therapeutic benefit.    Please refer to the daily flowsheet for treatment today, total treatment time and time spent  performing 1:1 timed codes.

## 2020-11-25 ENCOUNTER — NURSE TRIAGE (OUTPATIENT)
Dept: NURSING | Facility: CLINIC | Age: 25
End: 2020-11-25

## 2020-11-25 ENCOUNTER — OFFICE VISIT (OUTPATIENT)
Dept: URGENT CARE | Facility: URGENT CARE | Age: 25
End: 2020-11-25
Payer: COMMERCIAL

## 2020-11-25 VITALS
HEART RATE: 108 BPM | TEMPERATURE: 99.1 F | WEIGHT: 279.6 LBS | OXYGEN SATURATION: 98 % | SYSTOLIC BLOOD PRESSURE: 146 MMHG | BODY MASS INDEX: 35.9 KG/M2 | DIASTOLIC BLOOD PRESSURE: 107 MMHG

## 2020-11-25 DIAGNOSIS — T14.8XXA WOUND INFECTION: Primary | ICD-10-CM

## 2020-11-25 DIAGNOSIS — L08.9 WOUND INFECTION: Primary | ICD-10-CM

## 2020-11-25 PROCEDURE — 99203 OFFICE O/P NEW LOW 30 MIN: CPT | Performed by: FAMILY MEDICINE

## 2020-11-26 NOTE — TELEPHONE ENCOUNTER
"\"I had back surgery on 10/7 see epic and tonight I have a low grade fever 99.1 and toward the top of my incision it looks like it's coming open. There's a small hole and it's really red and painful. \"  Patient wants to go to  now(lives close), he does not want to wait until tomorrow due to it being Thanksgiving and denies 2nd level triage. Is on his way now.  Blank Best RN [facility]    COVID 19 Nurse Triage Plan/Patient Instructions    Please be aware that novel coronavirus (COVID-19) may be circulating in the community. If you develop symptoms such as fever, cough, or SOB or if you have concerns about the presence of another infection including coronavirus (COVID-19), please contact your health care provider or visit [email or web address].     Disposition/Instructions    Additional COVID19 information to add for patients.   How can I protect others?  If you have symptoms (fever, cough, body aches or trouble breathing): Stay home and away from others (self-isolate) until:    At least 10 days have passed since your symptoms started, And     You ve had no fever--and no medicine that reduces fever--for 1 full day (24 hours), And      Your other symptoms have resolved (gotten better).     If you don t have symptoms, but a test showed that you have COVID-19 (you tested positive):    Stay home and away from others (self-isolate). Follow the tips under \"How do I self-isolate?\" below for 10 days (20 days if you have a weak immune system).    You don't need to be retested for COVID-19 before going back to school or work. As long as you're fever-free and feeling better, you can go back to school, work and other activities after waiting the 10 or 20 days.     How do I self-isolate?    Stay in your own room, even for meals. Use your own bathroom if you can.     Stay away from others in your home. No hugging, kissing or shaking hands. No visitors.    Don t go to work, school or anywhere else.     Clean  high " touch  surfaces often (doorknobs, counters, handles, etc.). Use a household cleaning spray or wipes. You ll find a full list on the EPA website:  www.epa.gov/pesticide-registration/list-n-disinfectants-use-against-sars-cov-2.    Cover your mouth and nose with a mask, tissue or washcloth to avoid spreading germs.    Wash your hands and face often. Use soap and water.    Caregivers in these groups are at risk for severe illness due to COVID-19:  o People 65 years and older  o People who live in a nursing home or long-term care facility  o People with chronic disease (lung, heart, cancer, diabetes, kidney, liver, immunologic)  o People who have a weakened immune system, including those who:  - Are in cancer treatment  - Take medicine that weakens the immune system, such as corticosteroids  - Had a bone marrow or organ transplant  - Have an immune deficiency  - Have poorly controlled HIV or AIDS  - Are obese (body mass index of 40 or higher)  - Smoke regularly    Caregivers should wear gloves while washing dishes, handling laundry and cleaning bedrooms and bathrooms.    Use caution when washing and drying laundry: Don t shake dirty laundry, and use the warmest water setting that you can.    For more tips, go to www.cdc.gov/coronavirus/2019-ncov/downloads/10Things.pdf.    How can I take care of myself?  1. Get lots of rest. Drink extra fluids (unless a doctor has told you not to).     2. Take Tylenol (acetaminophen) for fever or pain. If you have liver or kidney problems, ask your family doctor if it s okay to take Tylenol.     Adults can take either:     650 mg (two 325 mg pills) every 4 to 6 hours, or     1,000 mg (two 500 mg pills) every 8 hours as needed.     Note: Don t take more than 3,000 mg in one day.   Acetaminophen is found in many medicines (both prescribed and over-the-counter medicines). Read all labels to be sure you don t take too much.     For children, check the Tylenol bottle for the right dose. The  dose is based on the child s age or weight.    3. If you have other health problems (like cancer, heart failure, an organ transplant or severe kidney disease): Call your specialty clinic if you don t feel better in the next 2 days.    4. Know when to call 911: Emergency warning signs include:    Trouble breathing or shortness of breath    Pain or pressure in the chest that doesn t go away    Feeling confused like you haven t felt before, or not being able to wake up    Bluish-colored lips or face    What are the symptoms of COVID-19?     The most common symptoms are cough, fever and trouble breathing.     Less common symptoms include body aches, chills, diarrhea (loose, watery poops), fatigue (feeling very tired), headache, runny nose, sore throat and loss of smell.    COVID-19 can cause severe coughing (bronchitis) and lung infection (pneumonia).    How does it spread?     The virus may spread when a person coughs or sneezes into the air. The virus can travel about 6 feet this way, and it can live on surfaces.      Common  (household disinfectants) will kill the virus.    Who is at risk?  Anyone can catch COVID-19 if they re around someone who has the virus.    How can others protect themselves?     Stay away from people who have COVID-19 (or symptoms of COVID-19).    Wash hands often with soap and water. Or, use hand  with at least 60% alcohol.    Avoid touching the eyes, nose or mouth.     Wear a face mask when you go out in public, when sick or when caring for a sick person.    Where can I get more information?     [facility] [place]: About COVID-19: [email or web address]    CDC: What to Do If You re Sick: www.cdc.gov/coronavirus/2019-ncov/about/steps-when-sick.html    CDC: Ending Home Isolation: www.cdc.gov/coronavirus/2019-ncov/hcp/disposition-in-home-patients.html     CDC: Caring for Someone: www.cdc.gov/coronavirus/2019-ncov/if-you-are-sick/care-for-someone.html     [facility]: Interim  Guidance for Hospital Discharge to Home: www.[facility]./diseases/coronavirus/hcp/hospdischarge.pdf    [facility] clinical trials (COVID-19 research studies): [email or web address].[facility]-clinical-trials     Below are the COVID-19 hotlines at the [place] Department of Health ([facility]). Interpreters are available.   o For health questions: Call 353-080-1930 or 1-197.830.9850 (7 a.m. to 7 p.m.)  o For questions about schools and childcare: Call 750-210-2045 or 1-758.168.7469 (7 a.m. to 7 p.m.)          Thank you for taking steps to prevent the spread of this virus.  o Limit your contact with others.  o Wear a simple mask to cover your cough.  o Wash your hands well and often.    Resources     [email or web address] [place]: About COVID-19: [email or web address]    CDC: What to Do If You're Sick: www.cdc.gov/coronavirus/2019-ncov/about/steps-when-sick.html    CDC: Ending Home Isolation: www.cdc.gov/coronavirus/2019-ncov/hcp/disposition-in-home-patients.html     CDC: Caring for Someone: www.cdc.gov/coronavirus/2019-ncov/if-you-are-sick/care-for-someone.html     [facility]: Interim Guidance for Hospital Discharge to Home: www.[facility]./diseases/coronavirus/hcp/hospdischarge.pdf    [facility] clinical trials (COVID-19 research studies): [email or web address].[facility]/n-clinical-trials     Below are the COVID-19 hotlines at the [place] Department [facility] ([facility]). Interpreters are available.   o For health questions: Call 603-639-4849 or 1-291.832.5748 (7 a.m. to 7 p.m.)  o For questions about schools and childcare: Call 578-066-6990 or 1-742.207.7140 (7 a.m. to 7 p.m.)                     Additional Information    [1] Looks infected (spreading redness, red streak, pus) AND [2] fever    Negative: Black (necrotic) or blisters develop in wound    Negative: Patient sounds very sick or weak to the triager    Protocols used: WOUND INFECTION-A-AH

## 2020-11-26 NOTE — PROGRESS NOTES
SUBJECTIVE:  Chief Complaint   Patient presents with     INCISION POSS INFECT     6 WKS AGO NOTICED TODAY     Dipak Fuentes is a 25 year old male who presents with a chief complaint of wound infection.    S/p lumbar microdiscectomy 10/7, doing well post operative and had follow up with surgeon on 11/20 and wound healing well.  Notice drainage and more pain earlier today, wife got home and checked wound and told patient that is redder than before with small wound opening.    Past Medical History:   Diagnosis Date     Bipolar 1 disorder (H)      Complication of anesthesia     panic after having anesthesia     Lumbar disc herniation with radiculopathy      Mild intermittent asthma without complication      Other chronic pain     low back and buttocks leg pain     Uncomplicated asthma     mild     Current Outpatient Medications   Medication Sig Dispense Refill     acetaminophen (TYLENOL) 325 MG tablet Take 2 tablets (650 mg) by mouth every 4 hours as needed for mild pain  0     Social History     Tobacco Use     Smoking status: Former Smoker     Packs/day: 0.50     Years: 5.00     Pack years: 2.50     Types: Cigarettes     Smokeless tobacco: Never Used     Tobacco comment: uses nicoting gum now9 did cigs and e cigs   Substance Use Topics     Alcohol use: Not Currently     Comment: rare       ROS:  Review of systems negative except as stated above.    EXAM:   BP (!) 146/107   Pulse 108   Temp 99.1  F (37.3  C)   Wt 126.8 kg (279 lb 9.6 oz)   SpO2 98%   BMI 35.90 kg/m    M/S Exam:back - wound incision with mildly redness with small area of opening/deheiscene at superior aspect of wound.  GENERAL APPEARANCE: healthy, alert and no distress  PSYCH: alert, affect bright      ASSESSMENT/PLAN:  (T14.8XXA,  L08.9) Wound infection  (primary encounter diagnosis)  Comment: lumbar  Plan: amoxicillin-clavulanate (AUGMENTIN) 875-125 MG         tablet            Reassurance given, reviewed that open wounds will need to re-heal on  its own but due to increase in pain and changes in wound, empiric coverage for infection - RX Augmentin given.  Encourage tylenol and ibuprofen for discomfort    Follow up with Spine Specialist if no improvement of symptoms     Jose Reyna MD  November 25, 2020 9:10 PM

## 2020-11-30 ENCOUNTER — THERAPY VISIT (OUTPATIENT)
Dept: PHYSICAL THERAPY | Facility: CLINIC | Age: 25
End: 2020-11-30
Payer: COMMERCIAL

## 2020-11-30 DIAGNOSIS — M51.16 LUMBAR DISC HERNIATION WITH RADICULOPATHY: ICD-10-CM

## 2020-11-30 DIAGNOSIS — Z98.890 STATUS POST LUMBAR DISCECTOMY: ICD-10-CM

## 2020-11-30 PROCEDURE — 97110 THERAPEUTIC EXERCISES: CPT | Mod: GP | Performed by: PHYSICAL THERAPY ASSISTANT

## 2020-11-30 PROCEDURE — 97112 NEUROMUSCULAR REEDUCATION: CPT | Mod: GP | Performed by: PHYSICAL THERAPY ASSISTANT

## 2020-11-30 PROCEDURE — 97530 THERAPEUTIC ACTIVITIES: CPT | Mod: GP | Performed by: PHYSICAL THERAPY ASSISTANT

## 2020-12-07 ENCOUNTER — THERAPY VISIT (OUTPATIENT)
Dept: PHYSICAL THERAPY | Facility: CLINIC | Age: 25
End: 2020-12-07
Payer: COMMERCIAL

## 2020-12-07 DIAGNOSIS — M51.16 LUMBAR DISC HERNIATION WITH RADICULOPATHY: ICD-10-CM

## 2020-12-07 DIAGNOSIS — Z98.890 STATUS POST LUMBAR DISCECTOMY: ICD-10-CM

## 2020-12-07 PROCEDURE — 97110 THERAPEUTIC EXERCISES: CPT | Mod: GP | Performed by: PHYSICAL THERAPY ASSISTANT

## 2020-12-07 PROCEDURE — 97530 THERAPEUTIC ACTIVITIES: CPT | Mod: GP | Performed by: PHYSICAL THERAPY ASSISTANT

## 2020-12-07 PROCEDURE — 97112 NEUROMUSCULAR REEDUCATION: CPT | Mod: GP | Performed by: PHYSICAL THERAPY ASSISTANT

## 2020-12-08 NOTE — PROGRESS NOTES
"NEUROSURGERY CLINIC PROGRESS NOTE    DATE OF VISIT: 11/20/2020    HPI:     Dipak Fuentes is a pleasant 25 year old male who presents to the clinic today for a 6-week post-operative follow-up visit. On 10/07/2020 the patient underwent a right lumbar 5-sacral 1 minimally invasive microdiscectomy with Dr. Oquendo.  Today, the patient reports that overall he is doing well. His symptoms have improved, but not resolved. The numbness has resolved, the pain persist.     Current Outpatient Medications   Medication     acetaminophen (TYLENOL) 325 MG tablet     albuterol (PROAIR HFA) 108 (90 BASE) MCG/ACT inhaler     docusate sodium (COLACE) 100 MG capsule     methocarbamol (ROBAXIN) 500 MG tablet     methylPREDNISolone (MEDROL DOSEPAK) 4 MG tablet therapy pack     methylPREDNISolone (MEDROL DOSEPAK) 4 MG tablet therapy pack     oxyCODONE (ROXICODONE) 5 MG tablet     tiZANidine (ZANAFLEX) 4 MG tablet     No current facility-administered medications for this visit.      Facility-Administered Medications Ordered in Other Visits   Medication     Self Administer Medications: Behavioral Services       Allergies   Allergen Reactions     Ceclor [Cefaclor Monohydrate]      Lamictal [Lamotrigine]      Had known side effect of\"skin eating rash\"  Jaycob lauren syndrome     Cefaclor Rash       Past Medical History:   Diagnosis Date     Bipolar 1 disorder (H)      Complication of anesthesia     panic after having anesthesia     Lumbar disc herniation with radiculopathy      Mild intermittent asthma without complication      Other chronic pain     low back and buttocks leg pain     Uncomplicated asthma     mild       Review Of Systems    Skin: negative  Eyes: negative  Ears/Nose/Throat: negative  Respiratory: No shortness of breath, dyspnea on exertion, cough, or hemoptysis  Cardiovascular: negative  Gastrointestinal: negative  Musculoskeletal: back pain  Neurologic: negative  Psychiatric: negative  Hematologic/Lymphatic/Immunologic: " Neck , no lymphadenopathy "negative  Endocrine: negative    OBJECTIVE:    /79 (BP Location: Right arm, Patient Position: Sitting, Cuff Size: Adult Large)   Pulse 79   Temp 98.6  F (37  C) (Oral)   Ht 6' 2\" (1.88 m)   Wt 267 lb 3.2 oz (121.2 kg)   SpO2 97%   BMI 34.31 kg/m      Exam:    Patient appears comfortable and in no apparent distress. Moving all extremities.  Gait is non-antalgic.  CN II-XII grossly intact, alert and appropriate with conversation and following  commands  Bilateral upper extremities with full strength including hand intrinsics and grasp.  Sensation intact throughout.  Bilateral lower extremities 5/5 strength including plantar and dorsiflexion.  Normal sensation throughout bilaterally.  Lumbar incision edges well approximated. Absent for edema, erythema, or drainage.    ASSESSMENT:    1. H/O lumbar discectomy        PLAN:    Dipak Fuentes is 6 weeks out from a right lumbar 5-sacral 1 minimally invasive microdiscectomy performed by Dr. Oquendo on 10/07/2020. Today the patient reports that overall he is doing well. His symptoms have improved, but not resolved. The numbness has resolved, the pain persist.     The patient has remained compliant with the post-operative restrictions which included not lifting anything greater than 5-10 lbs. Today we discussed increasing activity from 10 lbs by 2-5 lbs per week, but encouraged continuing to avoid excessive bending,  twisting, and turning at the waist and to avoid jostling and jarring activities.     A referral was placed to physical therapy for additional strengthening and core muscle exercises.    Mr. Fuentes will return to the clinic in 6 weeks.    The patient gave verbal understanding and is in agreement with the above plan. He will call or return to the clinic for any worsening or changes in symptoms.    Respectfully,     JANET Peña, PAHiteshC      "

## 2020-12-10 ENCOUNTER — NURSE TRIAGE (OUTPATIENT)
Dept: NURSING | Facility: CLINIC | Age: 25
End: 2020-12-10

## 2020-12-10 ENCOUNTER — HOSPITAL ENCOUNTER (EMERGENCY)
Facility: CLINIC | Age: 25
Discharge: HOME | End: 2020-12-11
Attending: EMERGENCY MEDICINE | Admitting: EMERGENCY MEDICINE
Payer: COMMERCIAL

## 2020-12-10 DIAGNOSIS — M62.830 BACK MUSCLE SPASM: ICD-10-CM

## 2020-12-10 PROCEDURE — 99283 EMERGENCY DEPT VISIT LOW MDM: CPT

## 2020-12-10 PROCEDURE — 250N000013 HC RX MED GY IP 250 OP 250 PS 637: Performed by: EMERGENCY MEDICINE

## 2020-12-10 RX ORDER — OXYCODONE HYDROCHLORIDE 5 MG/1
10 TABLET ORAL ONCE
Status: COMPLETED | OUTPATIENT
Start: 2020-12-10 | End: 2020-12-10

## 2020-12-10 RX ORDER — METHOCARBAMOL 500 MG/1
1000 TABLET, FILM COATED ORAL ONCE
Status: COMPLETED | OUTPATIENT
Start: 2020-12-10 | End: 2020-12-10

## 2020-12-10 RX ORDER — METHOCARBAMOL 750 MG/1
750-1500 TABLET, FILM COATED ORAL 3 TIMES DAILY PRN
Qty: 20 TABLET | Refills: 0 | Status: SHIPPED | OUTPATIENT
Start: 2020-12-10 | End: 2020-12-15

## 2020-12-10 RX ADMIN — METHOCARBAMOL 1000 MG: 500 TABLET ORAL at 22:15

## 2020-12-10 RX ADMIN — OXYCODONE HYDROCHLORIDE 10 MG: 5 TABLET ORAL at 22:14

## 2020-12-10 ASSESSMENT — ENCOUNTER SYMPTOMS
NUMBNESS: 0
BACK PAIN: 1
FEVER: 0
NECK PAIN: 1

## 2020-12-10 NOTE — ED AVS SNAPSHOT
[facility]  [address]  [facility] [address]  Phone: [phone]  Fax: [phone]                                    Dipak Fuentes   MRN: [id number]    Department: [facility] Emergency [facility]   Date of Visit: 12/10/2020           After Visit Summary Signature Page    I have received my discharge instructions, and my questions have been answered. I have discussed any challenges I see with this plan with the nurse or doctor.    ..........................................................................................................................................  Patient/Patient Representative Signature      ..........................................................................................................................................  Patient Representative Print Name and Relationship to Patient    ..................................................               ................................................  Date                                   Time    ..........................................................................................................................................  Reviewed by Signature/Title    ...................................................              ..............................................  Date                                               Time          22EPIC Rev 08/18

## 2020-12-11 ENCOUNTER — TELEPHONE (OUTPATIENT)
Dept: NEUROSURGERY | Facility: CLINIC | Age: 25
End: 2020-12-11

## 2020-12-11 VITALS
RESPIRATION RATE: 18 BRPM | OXYGEN SATURATION: 99 % | SYSTOLIC BLOOD PRESSURE: 139 MMHG | TEMPERATURE: 98.4 F | HEART RATE: 93 BPM | DIASTOLIC BLOOD PRESSURE: 99 MMHG

## 2020-12-11 NOTE — ED PROVIDER NOTES
History     Chief Complaint:  Back Pain    HPI   Dipak Fuentes is a 25 year old male with a history of chronic pain and lumbar disc herniation with radicuopathy who presents with back pain. The patient complains that yesterday he lifted a computer in his garage and has since felt pain in his neck, shoulders, and back which radiates down both legs with spasms. He states that he is two months post lumbar discectomy (10/07), but that the computer was within the lifting limits provided by his neurosurgeon. The patient has taken Tizanidine and Oxycodone at 1720 tonight for the pain, which has reduced the pain from an 8 to a 6. He denies any incontinence, fever, or paresthesias. Denies any IV drug use.     Allergies:  Ceclor   Lamictal   Cefaclor     Medications:    Albuterol     Past Medical History:    Bipolar 1   Lumbar disc herniation with radiculopathy   Asthma   Chronic pain   Schizoaffective disorder   Chemical dependency     Past Surgical History:    Discectomy lumbar minimally invasive one level     Family History:    Mother: depression   Father: depression, substance abuse   Brother: depression     Social History:  The patient presents to the emergency department by [facility]   Marital Status:   [2]    Review of Systems   Constitutional: Negative for fever.   Genitourinary: Negative.    Musculoskeletal: Positive for back pain and neck pain.   Neurological: Negative for numbness.   All other systems reviewed and are negative.    Physical Exam     Patient Vitals for the past 24 hrs:   BP Temp Temp src Pulse Resp SpO2   12/10/20 1939 (!) 156/102 98.2  F (36.8  C) Oral 95 20 99 %       Physical Exam  Nursing note and vitals reviewed.  Constitutional: Alert, attentive  HENT:    Nose: Nose normal.    Mouth/Throat: Moist mucous membranes.   CV: brisk capillary refill to the distal extremities, 2+ DP pulses bilaterally.  Chest: Effort normal and breath sounds normal.   GI:  No distension. There is no tenderness to the RUQ, RLQ or LLQ. No Aguilera's sign or McBurney's point tenderness. No palpable, pulsatile mass.  MSK: Normal range of motion. No midline T/L/S spine tenderness. Bilateral paraspinal tenderness.  Neurological: Alert, attentive.  GCS 15; 5/5 strength throughout the bilateral lower extremities (hip flexion, knee flexion/extension, DF/PF, EHL/FHL); sensation intact to light touch throughout L2-S1 distributions to the lower extremities; 2+ patellar reflexes bilaterally, normal gait  Skin: Skin is warm and dry.       Emergency Department Course   Interventions:  2214 Oxycodone 10 mg PO   2215 Robaxin 1,000 mg PO     Emergency Department Course:  Past medical records, nursing notes, and vitals reviewed.  2148: I performed an exam of the patient and obtained history, as documented above.     2339: I rechecked the patient. I reviewed the results with the Patient and answered all related questions prior to discharge.     Findings and plan explained to the Patient. Patient discharged home with instructions regarding supportive care, medications, and reasons to return. The importance of close follow-up was reviewed. The patient was prescribed Robaxin.   Impression & Plan   Medical Decision Making:  Patient presents with chief complaint back pain/muscle spasms.  This occurred after lifting a computer.  Patient has history of back problems with back surgery in the recent past.  He has no red flags on exam to suggest any neuroimaging is indicated.  His pain is improved after intervention.  He has oxycodone at home, but requested a different muscle relaxer for pain relief.  Robaxin prescribed.  I discussed with him that he should contact his neurosurgery team for follow-up and medication refill as that should not come from the emergency department.  No signs of cauda equina or any other spinal cord or multiple nerve root compression.  This appears to be due to muscle spasms at this time.   He is discharged in stable condition.  All questions answered and he is in agreement with the plan.    Diagnosis:    ICD-10-CM   1. Back muscle spasm  M62.830     Disposition:  Discharged to home.    Discharge Medications:  New Prescriptions    METHOCARBAMOL (ROBAXIN) 750 MG TABLET    Take 1-2 tablets (750-1,500 mg) by mouth 3 times daily as needed for muscle spasms     Africa Noel  12/10/2020   [facility] EMERGENCY DEPT  Scribe Disclosure:  I, Africa Noel, am serving as a scribe at 9:48 PM on 12/10/2020 to document services personally performed by Nazario Osorio MD based on my observations and the provider's statements to me.       Nazario Osorio MD  12/11/20 06

## 2020-12-11 NOTE — TELEPHONE ENCOUNTER
Attempted to reach out to patient, no answer. Unable top leave message due to no mail box available.

## 2020-12-11 NOTE — TELEPHONE ENCOUNTER
Pt was in ED last night due to pain across lower back and up the spine to the chest.  Would like a c/b asap today.

## 2020-12-11 NOTE — TELEPHONE ENCOUNTER
Spoke with pt regarding his pain. Pt reported that he picked up and moved his computer in garage. Computer is about 20 lbs. Afterward, he felt pain on his lower back, bilateral buttocks ( right is worse than left), down to right leg, up to spine and Left shoulder. Pain is muscle contraction. Went to ER yesterday. Today pain is at 5. Denied numbness or tingling. Had not take pain killer about a week. Starts taking pain killer since the night before last night. Also uses muscle relaxant, as well as ice / heat.

## 2020-12-11 NOTE — TELEPHONE ENCOUNTER
Patient calling reporting he is having severe spasms on his shoulder, leg, and back. States his shoulder and back are the worst. States his shoulder pain radiate around his should to his chest. Denies difficulty of breathing. Per guideline, advised patient to be seen at the emergency department. Caller verbalized understanding. Denies further questions.      Sean Canela RN  [facility]     COVID 19 Nurse Triage Plan/Patient Instructions    Please be aware that novel coronavirus (COVID-19) may be circulating in the community. If you develop symptoms such as fever, cough, or SOB or if you have concerns about the presence of another infection including coronavirus (COVID-19), please contact your health care provider or visit [email or web address].     Disposition/Instructions    ED Visit recommended. Follow protocol based instructions.     Bring Your Own Device:  Please also bring your smart device(s) (smart phones, tablets, laptops) and their charging cables for your personal use and to communicate with your care team during your visit.    Thank you for taking steps to prevent the spread of this virus.  o Limit your contact with others.  o Wear a simple mask to cover your cough.  o Wash your hands well and often.    Resources    [facility]: About COVID-19: [email or web address]    CDC: What to Do If You're Sick: www.cdc.gov/coronavirus/2019-ncov/about/steps-when-sick.html    CDC: Ending Home Isolation: www.cdc.gov/coronavirus/2019-ncov/hcp/disposition-in-home-patients.html     CDC: Caring for Someone: www.cdc.gov/coronavirus/2019-ncov/if-you-are-sick/care-for-someone.html     [facility]: Interim Guidance for Hospital Discharge to Home: www.health.[facility].[place].us/diseases/coronavirus/hcp/hospdischarge.pdf    [facility] clinical trials (COVID-19 research studies): clinicalaffairs.[facility]/umn-clinical-trials     Below are the COVID-19 hotlines at the [place] Department of Health  "([facility]). Interpreters are available.   o For health questions: Call 291-972-1490 or 1-531.467.6294 (7 a.m. to 7 p.m.)  o For questions about schools and childcare: Call 313-982-7692 or 1-785.576.3497 (7 a.m. to 7 p.m.)                       Reason for Disposition    [1] Pain lasting > 5 minutes AND [2] pain also present in chest  (Exception: pain is clearly made worse by movement)    Additional Information    Negative: Passed out (i.e., lost consciousness, collapsed and was not responding)    Negative: Shock suspected (e.g., cold/pale/clammy skin, too weak to stand, low BP, rapid pulse)    Negative: [1] Similar pain previously AND [2] it was from \"heart attack\"    Negative: [1] Similar pain previously AND [2] it was from \"angina\" AND [3] not relieved by nitroglycerin    Negative: Sounds like a life-threatening emergency to the triager    Negative: Difficulty breathing or unusual sweating (e.g., sweating without exertion)    Protocols used: SHOULDER PAIN-A-AH      "

## 2020-12-11 NOTE — ED TRIAGE NOTES
Pt presents to ED with c/o muscles spasms throughout entire body. Had surgery for microdisectomy 2 months ago for the same symptoms. Spasms had slowly gone away, pt had weaned off pain meds. Spasms returned last night.

## 2020-12-14 ENCOUNTER — NURSE TRIAGE (OUTPATIENT)
Dept: NURSING | Facility: CLINIC | Age: 25
End: 2020-12-14

## 2020-12-14 ENCOUNTER — TELEPHONE (OUTPATIENT)
Facility: CLINIC | Age: 25
End: 2020-12-14

## 2020-12-14 DIAGNOSIS — Z98.890 H/O LUMBAR DISCECTOMY: Primary | ICD-10-CM

## 2020-12-14 RX ORDER — METHYLPREDNISOLONE 4 MG
TABLET, DOSE PACK ORAL
Qty: 21 TABLET | Refills: 0 | Status: SHIPPED | OUTPATIENT
Start: 2020-12-14 | End: 2022-08-17

## 2020-12-14 NOTE — TELEPHONE ENCOUNTER
"Per Mango Schumacher PA-C: \"Called and spoke to him. Explained that his shoulder pain and muscle spasms are not related to his lumbar spine surgery. His leg pain and spasm after lifting his computer could be. I did provide him with a MDP, but he refused any muscle relaxants. Narcotics would not be beneficial at this time. \"      "

## 2020-12-14 NOTE — TELEPHONE ENCOUNTER
"Dipak calling as they gave him a steriod dose pac and he just got it.  He has questions on how he \"catches up\" for the number of doses for the day.  Instructed patient to follow directions on the package and take the after dinner and before bed doses and then start tomorrow's dosing with the beginning of the day.      He does report that pain is a little worse than when he talked with PA this morning.  Encoruaged him to call back if it gets severe or other symptoms develop, as could page the on-call provider to discuss if should be seen tonight or not.      Pain right now is 6/10.  It is all radiating from where he had surgery 2 months ago.  Goes from fingers to feet and tight muscles in whole body.  Can feel it go up spine and down leg.    Madeleine Leslie RN on 12/14/2020 at 6:04 PM        Additional Information    Negative: MORE THAN A DOUBLE DOSE of a prescription or over-the-counter (OTC) drug    Negative: [1] DOUBLE DOSE (an extra dose or lesser amount) of over-the-counter (OTC) drug AND [2] any symptoms (e.g., dizziness, nausea, pain, sleepiness)    Negative: [1] DOUBLE DOSE (an extra dose or lesser amount) of prescription drug AND [2] any symptoms (e.g., dizziness, nausea, pain, sleepiness)    Negative: Took another person's prescription drug    Negative: Drug overdose and nurse unable to answer question    Negative: Caller requesting information not related to medicine    Negative: Caller requesting a prescription for Strep throat and has a positive culture result    Negative: Rash while taking a medication or within 3 days of stopping it    Negative: Immunization reaction suspected    Negative: [1] Asthma AND [2] having symptoms of asthma (cough, wheezing, etc)    Negative: [1] DOUBLE DOSE (an extra dose or lesser amount) of prescription drug AND [2] NO symptoms (Exception: a double dose of antibiotics)    Negative: Diabetes drug error or overdose (e.g., insulin or extra dose)    Negative: [1] Request for " "URGENT new prescription or refill of \"essential\" medication (i.e., likelihood of harm to patient if not taken) AND [2] triager unable to fill per unit policy    Negative: [1] Prescription not at pharmacy AND [2] was prescribed today by PCP    Negative: Pharmacy calling with prescription questions and triager unable to answer question    Negative: Caller has urgent medication question about med that PCP prescribed and triager unable to answer question    Negative: Caller has NON-URGENT medication question about med that PCP prescribed and triager unable to answer question    Negative: Caller requesting a NON-URGENT new prescription or refill and triager unable to refill per unit policy    Negative: Caller has medication question about med not prescribed by PCP and triager unable to answer question (e.g., compatibility with other med, storage)    Negative: [1] DOUBLE DOSE (an extra dose or lesser amount) of over-the-counter (OTC) drug AND [2] NO symptoms    Negative: [1] DOUBLE DOSE (an extra dose or lesser amount) of antibiotic drug AND [2] NO symptoms    Caller has medication question, adult has minor symptoms, caller declines triage, and triager answers question    Negative: Caller has medication question only, adult not sick, and triager answers question    Protocols used: MEDICATION QUESTION CALL-A-AH      "

## 2020-12-15 ENCOUNTER — THERAPY VISIT (OUTPATIENT)
Dept: PHYSICAL THERAPY | Facility: CLINIC | Age: 25
End: 2020-12-15
Payer: COMMERCIAL

## 2020-12-15 DIAGNOSIS — Z98.890 STATUS POST LUMBAR DISCECTOMY: ICD-10-CM

## 2020-12-15 DIAGNOSIS — M51.16 LUMBAR DISC HERNIATION WITH RADICULOPATHY: ICD-10-CM

## 2020-12-15 PROCEDURE — 97110 THERAPEUTIC EXERCISES: CPT | Mod: GP | Performed by: PHYSICAL THERAPY ASSISTANT

## 2020-12-15 PROCEDURE — 97112 NEUROMUSCULAR REEDUCATION: CPT | Mod: GP | Performed by: PHYSICAL THERAPY ASSISTANT

## 2020-12-19 ENCOUNTER — OFFICE VISIT (OUTPATIENT)
Dept: URGENT CARE | Facility: URGENT CARE | Age: 25
End: 2020-12-19
Payer: COMMERCIAL

## 2020-12-19 ENCOUNTER — NURSE TRIAGE (OUTPATIENT)
Dept: NURSING | Facility: CLINIC | Age: 25
End: 2020-12-19

## 2020-12-19 VITALS
HEART RATE: 98 BPM | TEMPERATURE: 98.8 F | SYSTOLIC BLOOD PRESSURE: 136 MMHG | DIASTOLIC BLOOD PRESSURE: 89 MMHG | OXYGEN SATURATION: 97 %

## 2020-12-19 DIAGNOSIS — M54.50 RIGHT-SIDED LOW BACK PAIN WITHOUT SCIATICA, UNSPECIFIED CHRONICITY: Primary | ICD-10-CM

## 2020-12-19 PROCEDURE — 99213 OFFICE O/P EST LOW 20 MIN: CPT | Performed by: FAMILY MEDICINE

## 2020-12-19 RX ORDER — OXYCODONE AND ACETAMINOPHEN 5; 325 MG/1; MG/1
1 TABLET ORAL EVERY 8 HOURS PRN
Qty: 9 TABLET | Refills: 0 | Status: SHIPPED | OUTPATIENT
Start: 2020-12-19 | End: 2020-12-22

## 2020-12-19 RX ORDER — OMEGA-3 FATTY ACIDS/FISH OIL 300-1000MG
200 CAPSULE ORAL EVERY 4 HOURS PRN
Status: ON HOLD | COMMUNITY
End: 2022-09-21

## 2020-12-19 NOTE — PATIENT INSTRUCTIONS
Place ice over the painful areas of the right lower back.      Call the neurosurgery clinic on Monday, December 21, 2020, for further recommendations regarding the back and right leg pain.      Take Ibuprofen for additional pain relief.  Take with foot.

## 2020-12-19 NOTE — PROGRESS NOTES
SUBJECTIVE:   Dipak Fuentes is a 25 year old male a history of chronic pain, lumbar disc herniation with radiculopathy, s/p minimally invasive lumbar microdiskectomy in October 2020 for right-sided L5-@1 herniated disk with radiculopathy.  He is presenting with a chief complaint of extreme right lower back pain, right calf lspasm since last night.  .No weakness in the right foot/toes.  No numbness in the right foot/toes.    Pain rating is 10 out of 10 at the worst of the pain.    Onset of symptoms was one day ago.  Course of illness is worsening..    Severity severe.   Current and Associated symptoms:   Treatment measures tried include Oxycodone (He ran out of this medication about one week ago, Medrol dose pack (started on December 14 and finished this morning), Robaxan without much pain relief.  ..    Patient was evaluated at the [facility] emergency room on December 10, 2020,  for a two-day history of pain at the neck, shoulders, back with radiation to both legs after lifting a computer in a garage.  Patient was diagnosed with back muscle spasm and was prescribed Robaxin.      Patient started a Medrol dose pack on December 14, 2020, which provided about two days of relief but the pain worsened afterwards.      Patient has an appointment with a neurosurgeon in 1.5 weeks.              Past Medical History:   Diagnosis Date     Bipolar 1 disorder (H)      Complication of anesthesia     panic after having anesthesia     Lumbar disc herniation with radiculopathy      Mild intermittent asthma without complication      Other chronic pain     low back and buttocks leg pain     Uncomplicated asthma     mild     Current Outpatient Medications   Medication Sig Dispense Refill     ibuprofen (ADVIL/MOTRIN) 200 MG capsule Take 200 mg by mouth every 4 hours as needed for fever       acetaminophen (TYLENOL) 325 MG tablet Take 2 tablets (650 mg) by mouth every 4 hours as needed for mild pain (Patient not taking:  Reported on 12/19/2020)  0     methylPREDNISolone (MEDROL DOSEPAK) 4 MG tablet therapy pack Follow Package Directions (Patient not taking: Reported on 12/19/2020) 21 tablet 0     Social History     Tobacco Use     Smoking status: Former Smoker     Packs/day: 0.50     Years: 5.00     Pack years: 2.50     Types: Cigarettes     Smokeless tobacco: Never Used     Tobacco comment: uses nicoting gum now9 did cigs and e cigs   Substance Use Topics     Alcohol use: Not Currently     Comment: rare       ROS:  CONSTITUTIONAL:NEGATIVE  for fevers.   MUSCULOSKELETAL: positive for right lower back pain.    NEURO: negative for numbness/weakness.      OBJECTIVE:  /89   Pulse 98   Temp 98.8  F (37.1  C) (Tympanic)   SpO2 97%   GENERAL APPEARANCE: patient is in severe pain.  He is alert, however.    NEURO: Normal strength and tone, sensory exam grossly normal, mentation intact and DTRs 2/4 at the patella and Achilles of the left leg.  Straight leg raises were normal except for pain originating at the right calf when the right leg was raised.    SKIN: no suspicious lesions or rashes  BACK:  There is severe pain with palpation over most of the spinous processes of the lumbar spine.  There is also pain without spasm over the right lumbar muscles.    RIGHT LEG:  There is pain with palpation over the left calf without severe spasm.  ROM is limited with flexion, extension, rotation to the right.  Lateral bending and rotation of the torso to the left are intact.       ASSESSMENT:  Severe Right low back pain.    PLAN:  Rx:  Percocet:  (dispense:  9 tablets)  Do not take Robaxan nor alcohol while on the Percocet  Ibuprofen for additional pain relief.   Call the neurosurgery clinic on Monday (December 21, 2020) for further evaluation and recommendations.      Ezequiel Armstrong MD

## 2020-12-19 NOTE — TELEPHONE ENCOUNTER
"Patient states he completed prescribed Medrol Dosepak this morning and was told to call to update provider.  States he is worse; \"extremely painful leg spasms\".  FNA spoke with page , [employer], at 11:35AM who will page on call provider, Dr. Toya Galo, to contact patient at [phone].  "

## 2020-12-21 ENCOUNTER — THERAPY VISIT (OUTPATIENT)
Dept: PHYSICAL THERAPY | Facility: CLINIC | Age: 25
End: 2020-12-21
Payer: COMMERCIAL

## 2020-12-21 DIAGNOSIS — M51.16 LUMBAR DISC HERNIATION WITH RADICULOPATHY: ICD-10-CM

## 2020-12-21 DIAGNOSIS — Z98.890 STATUS POST LUMBAR DISCECTOMY: ICD-10-CM

## 2020-12-21 PROCEDURE — 97112 NEUROMUSCULAR REEDUCATION: CPT | Mod: GP | Performed by: PHYSICAL THERAPY ASSISTANT

## 2020-12-21 PROCEDURE — 97110 THERAPEUTIC EXERCISES: CPT | Mod: GP | Performed by: PHYSICAL THERAPY ASSISTANT

## 2020-12-28 ENCOUNTER — THERAPY VISIT (OUTPATIENT)
Dept: PHYSICAL THERAPY | Facility: CLINIC | Age: 25
End: 2020-12-28
Payer: COMMERCIAL

## 2020-12-28 DIAGNOSIS — M51.16 LUMBAR DISC HERNIATION WITH RADICULOPATHY: ICD-10-CM

## 2020-12-28 DIAGNOSIS — Z98.890 STATUS POST LUMBAR DISCECTOMY: ICD-10-CM

## 2020-12-28 PROCEDURE — 97110 THERAPEUTIC EXERCISES: CPT | Mod: GP | Performed by: PHYSICAL THERAPY ASSISTANT

## 2020-12-28 PROCEDURE — 97112 NEUROMUSCULAR REEDUCATION: CPT | Mod: GP | Performed by: PHYSICAL THERAPY ASSISTANT

## 2020-12-28 NOTE — PROGRESS NOTES
Subjective:  HPI  Physical Exam                    Objective:  System    Physical Exam    General     ROS    Assessment/Plan:    ASSESSMENT/PLAN  Updated problem list and treatment plan: Diagnosis 1:  S/p lumbar fusion  Pain -  self management, education and home program  Decreased ROM/flexibility - manual therapy and therapeutic exercise  Decreased strength - therapeutic exercise and therapeutic activities  Decreased function - therapeutic activities  STG/LTGs have been met:  Yes (See Goal flow sheet completed today.)  Progress toward STG/LTGs have been made:  Yes (See Goal flow sheet completed today.)  Assessment of Progress: The patient has met all of their long term goals.  Self Management Plans:  Patient is independent in a home treatment program.  Patient is independent in self management of symptoms.  I have re-evaluated this patient and find that the nature, scope, duration and intensity of the therapy is appropriate for the medical condition of the patient.  Dipak continues to require the following intervention to meet STG and LT's:  PT intervention is no longer required to meet STG/LTG.    Recommendations:  This patient is ready to be discharged from therapy and continue their home treatment program.    Please refer to the daily flowsheet for treatment today, total treatment time and time spent performing 1:1 timed codes.

## 2020-12-28 NOTE — PROGRESS NOTES
Subjective:  HPI  Physical Exam                    Objective:  System         Lumbar/SI Evaluation  ROM:    AROM Lumbar:   Flexion:          100%  Ext:                    80%   Side Bend:        Left:     Right:   Rotation:           Left:  100%    Right:  100%  Side Glide:        Left:     Right:                                                                          General     ROS    Assessment/Plan:    DISCHARGE REPORT    Progress reporting period is from 11/24/20 to 12/28/20.      SUBJECTIVE  Subjective changes noted by patient:  Patient feels that he is about 60% back to normal.  Lifting is more of the difficulty but is feeling like he is able to do more.   Current pain level is 3/10    Previous pain level was:   Initial Pain level: 4/10   Changes in function:  Yes (See Goal flowsheet attached for changes in current functional level)     Adverse reaction to treatment or activity: None     OBJECTIVE  Changes noted in objective findings:  Yes, patient has made progress with his strengthening exercises.  Patient does have a good home exercise program and seems to know the exercises well when he does come to therapy.    SILVIANO last visit was a 12

## 2021-04-22 ENCOUNTER — TELEPHONE (OUTPATIENT)
Dept: INTERNAL MEDICINE | Facility: CLINIC | Age: 26
End: 2021-04-22
Payer: COMMERCIAL

## 2021-04-22 NOTE — TELEPHONE ENCOUNTER
Patient Quality Outreach      Summary:    Patient has the following on his problem list/HM:   Asthma review     No flowsheet data found.       Patient is due/failing the following:   ACT needed and Asthma follow-up visit    Type of outreach:    Phone, left message for patient/parent to call back.    Questions for provider review:    None                                                                                                                                     Autumn Rahman MA     Chart routed to none.

## 2022-02-06 ENCOUNTER — APPOINTMENT (OUTPATIENT)
Dept: GENERAL RADIOLOGY | Facility: CLINIC | Age: 27
End: 2022-02-06
Attending: PHYSICIAN ASSISTANT
Payer: COMMERCIAL

## 2022-02-06 ENCOUNTER — NURSE TRIAGE (OUTPATIENT)
Dept: NURSING | Facility: CLINIC | Age: 27
End: 2022-02-06
Payer: COMMERCIAL

## 2022-02-06 ENCOUNTER — HOSPITAL ENCOUNTER (EMERGENCY)
Facility: CLINIC | Age: 27
Discharge: HOME | End: 2022-02-06
Attending: PHYSICIAN ASSISTANT | Admitting: PHYSICIAN ASSISTANT
Payer: COMMERCIAL

## 2022-02-06 VITALS
OXYGEN SATURATION: 100 % | RESPIRATION RATE: 18 BRPM | DIASTOLIC BLOOD PRESSURE: 90 MMHG | TEMPERATURE: 97.4 F | SYSTOLIC BLOOD PRESSURE: 154 MMHG | HEART RATE: 93 BPM

## 2022-02-06 DIAGNOSIS — S61.442A PUNCTURE WOUND OF LEFT HAND WITH FOREIGN BODY, INITIAL ENCOUNTER: ICD-10-CM

## 2022-02-06 PROCEDURE — 90715 TDAP VACCINE 7 YRS/> IM: CPT | Performed by: PHYSICIAN ASSISTANT

## 2022-02-06 PROCEDURE — 250N000013 HC RX MED GY IP 250 OP 250 PS 637: Performed by: PHYSICIAN ASSISTANT

## 2022-02-06 PROCEDURE — 250N000011 HC RX IP 250 OP 636: Performed by: PHYSICIAN ASSISTANT

## 2022-02-06 PROCEDURE — 90471 IMMUNIZATION ADMIN: CPT | Performed by: PHYSICIAN ASSISTANT

## 2022-02-06 PROCEDURE — 73130 X-RAY EXAM OF HAND: CPT | Mod: LT

## 2022-02-06 PROCEDURE — 99283 EMERGENCY DEPT VISIT LOW MDM: CPT | Mod: 25

## 2022-02-06 RX ADMIN — AMOXICILLIN AND CLAVULANATE POTASSIUM 1 TABLET: 875; 125 TABLET, FILM COATED ORAL at 17:01

## 2022-02-06 RX ADMIN — CLOSTRIDIUM TETANI TOXOID ANTIGEN (FORMALDEHYDE INACTIVATED), CORYNEBACTERIUM DIPHTHERIAE TOXOID ANTIGEN (FORMALDEHYDE INACTIVATED), BORDETELLA PERTUSSIS TOXOID ANTIGEN (GLUTARALDEHYDE INACTIVATED), BORDETELLA PERTUSSIS FILAMENTOUS HEMAGGLUTININ ANTIGEN (FORMALDEHYDE INACTIVATED), BORDETELLA PERTUSSIS PERTACTIN ANTIGEN, AND BORDETELLA PERTUSSIS FIMBRIAE 2/3 ANTIGEN 0.5 ML: 5; 2; 2.5; 5; 3; 5 INJECTION, SUSPENSION INTRAMUSCULAR at 17:01

## 2022-02-06 NOTE — TELEPHONE ENCOUNTER
"Patient is calling and says that a power drill went through the left hand between the thumb and first digit \"web area\". Caller says he rinsed the area with water, and cut away the hanging skin.  Caller rates pain in left hand minimum, caller says it is too early to tell.  Triage guidelines recommend to go to ED NOW.  Caller verbalized and understands directives.  COVID 19 Nurse Triage Plan/Patient Instructions    Please be aware that novel coronavirus (COVID-19) may be circulating in the community. If you develop symptoms such as fever, cough, or SOB or if you have concerns about the presence of another infection including coronavirus (COVID-19), please contact your health care provider or visit [email or web address].     Disposition/Instructions    ED Visit recommended. Follow protocol based instructions.     Bring Your Own Device:  Please also bring your smart device(s) (smart phones, tablets, laptops) and their charging cables for your personal use and to communicate with your care team during your visit.    Thank you for taking steps to prevent the spread of this virus.  o Limit your contact with others.  o Wear a simple mask to cover your cough.  o Wash your hands well and often.    Resources    [facility]: About COVID-19: [email or web address]    CDC: What to Do If You're Sick: www.cdc.gov/coronavirus/2019-ncov/about/steps-when-sick.html    CDC: Ending Home Isolation: www.cdc.gov/coronavirus/2019-ncov/hcp/disposition-in-home-patients.html     CDC: Caring for Someone: www.cdc.gov/coronavirus/2019-ncov/if-you-are-sick/care-for-someone.html     [facility]: Interim Guidance for Hospital Discharge to Home: www.health.[facility].[place].us/diseases/coronavirus/hcp/hospdischarge.pdf    [facility] clinical trials (COVID-19 research studies): [email or web address].[email or web address]/umn-clinical-trials     Below are the COVID-19 hotlines at the [place] Department of Health ([facility]). Interpreters are available. "   o For health questions: Call 724-066-5981 or 1-472.683.4812 (7 a.m. to 7 p.m.)  o For questions about schools and childcare: Call 589-724-8552 or 1-922.977.3361 (7 a.m. to 7 p.m.)                     Reason for Disposition    High pressure injection injury (e.g., from grease gun or paint gun, usually work-related)    Additional Information    Negative: Serious injury with multiple fractures (broken bones)    Negative: [1] Major bleeding (e.g., actively dripping or spurting) AND [2] can't be stopped    Negative: Amputation    Negative: Sounds like a life-threatening emergency to the triager    Negative: Finger injury is main concern    Negative: Caused by an animal bite    Negative: Caused by a human bite    Negative: Wound looks infected    Negative: Cast problems or questions    Negative: [1] Numbness (loss of sensation) of finger(s) AND [2] present now    Negative: [1] Dirt in the wound AND [2] not removed with 15 minutes of scrubbing    Negative: [1] Bleeding AND [2] won't stop after 10 minutes of direct pressure (using correct technique)    Negative: Skin is split open or gaping  (or length > 1/2 inch or 12 mm)    Negative: Cut over knuckle (MCP joint)    Negative: Looks like a dislocated joint (e.g., crooked or deformed)    Negative: Looks like a broken bone (e.g., knuckle is gone or depressed)    Negative: Bullet wound, stabbed by knife, or other serious penetrating wound    Protocols used: HAND AND WRIST INJURY-A-AH

## 2022-02-06 NOTE — ED NOTES
Emergency Department Technician Wound Irrigation Note:    2/6/2022    5:00 PM      Wound location: left hand puncture wound    Irrigation Fluid: tap water    Estimated Irrigation Volume (60 mL fluid per cm): 300 mL    Gila Skanirudh

## 2022-02-06 NOTE — ED PROVIDER NOTES
History   Chief Complaint:  Hand Injury       HPI   Dipak Fuentes is a 26 year old male who presents with hand injury.  The patient was working with a drill to remove screws into the bit of the drill punctured skin on his left hand between his thumb and pointer finger.  He notes that it was partially stripped and there was some metal shavings associated with this.  He reports he had some tingling throughout his entire hand but this is now gone and he can feel all of the fingers.  No difficulty moving fingers or hand.    ROS:  Review of Systems   All other systems reviewed and are negative.    Allergies:  Ceclor [Cefaclor Monohydrate]  Lamictal [Lamotrigine]  Cefaclor     Medications:    amoxicillin-clavulanate (AUGMENTIN) 875-125 MG tablet  acetaminophen (TYLENOL) 325 MG tablet  ibuprofen (ADVIL/MOTRIN) 200 MG capsule  methylPREDNISolone (MEDROL DOSEPAK) 4 MG tablet therapy pack        Past Medical History:    Past Medical History:   Diagnosis Date     Bipolar 1 disorder (H)      Complication of anesthesia      Lumbar disc herniation with radiculopathy      Mild intermittent asthma without complication      Other chronic pain      Uncomplicated asthma      Patient Active Problem List   Diagnosis     Decreased libido     Nondependent amphetamine or related acting sympathomimetic abuse, in remission (H)     Bipolar I disorder, single manic episode (H)     Asthma     Benign neoplasm of skin     Schizoaffective disorder (H)     Personal history of tobacco use, presenting hazards to health     Chemical dependency (H)     Mild intermittent asthma without complication        Past Surgical History:    Past Surgical History:   Procedure Laterality Date     DISCECTOMY LUMBAR MINIMALLY INVASIVE ONE LEVEL Right 10/7/2020    Procedure: Right L5-S1 minimally invasive microdiskectomy;  Surgeon: Juancho Oquendo MD;  [facility]          Family History:    family history includes Anxiety Disorder in  his paternal grandmother; Depression in his brother, father, maternal grandmother, mother, and paternal grandmother; Heart Disease in his maternal grandmother; Parkinsonism in his paternal grandmother; Substance Abuse in his father.    Social History:   reports that he has quit smoking. His smoking use included cigarettes. He has a 2.50 pack-year smoking history. He has never used smokeless tobacco. He reports previous alcohol use. He reports current drug use. Drug: Marijuana.  [facility]     Physical Exam     Patient Vitals for the past 24 hrs:   BP Temp Temp src Pulse Resp SpO2   02/06/22 1644 -- 97.4  F (36.3  C) Temporal 93 18 100 %   02/06/22 1643 (!) 154/90 -- -- -- -- --        Physical Exam  General: Alert and oriented.    Head: Normocephalic.  External ears and nose normal.    Eyes: Pupils equal and round.  Normal tracking.    Pulmonary/Chest: Effort and rate normal    MSK: Normal ROM in MCP, PIP, DIP joints.    SKIN:  Small 1/2 cm puncture wound not actively bleeding to web space between thumb and index finger.  Otherwise Warm and dry with strong radial pulse and normal capillary refill.    NEURO:  Normal strength of flexion and extension at MCP, PIP, and DIP joints.  Normal sensation to two point touch discrimination.  PSYCH:  Normal affect    Emergency Department Course     Imaging:  XR Hand Left G/E 3 Views   Final Result   IMPRESSION: No evidence of fracture. There is evidence of a wound in the web space between the thumb and index finger. There are a few tiny radiopaque densities in this region that could be tiny radiopaque foreign bodies.         Report per radiology    Laboratory:  Labs Ordered and Resulted from Time of ED Arrival to Time of ED Departure - No data to display     Procedures     Emergency Department Course:    Reviewed:  I reviewed nursing notes, vitals, past medical history, Care Everywhere and [facility]    Assessments:  1700 I obtained history and examined the  patient as noted above.   1735 I rechecked the patient and explained findings. Wound was thoroughly irrigated and 3 small radioopaque metal shards were removed by me    Interventions:  Medications   Tdap (tetanus-diphtheria-acell pertussis) (ADACEL) injection 0.5 mL (0.5 mLs Intramuscular Given 22)   amoxicillin-clavulanate (AUGMENTIN) 875-125 MG per tablet 1 tablet (1 tablet Oral Given 22)        Disposition:  The patient was discharged to home.     Impression & Plan        Medical Decision Makin-year-old male presents with puncture wound to hand.  There is no evidence of fracture, tendon injury, neurovascular compromise, or infection.  There were a few very small metallic shard foreign body seen on x-ray which were meticulously removed.  Tetanus updated.  Will cover with Augmentin for infection prophylaxis.  Discussed watch for signs of infection and these were provided in writing.  No occasion for closure.  Return precautions given.    Diagnosis:    ICD-10-CM    1. Puncture wound of left hand with foreign body, initial encounter  S61.442A         Discharge Medications:  New Prescriptions    AMOXICILLIN-CLAVULANATE (AUGMENTIN) 875-125 MG TABLET    Take 1 tablet by mouth 2 times daily for 3 days        2022   Sergio Colon PA-C E[place], Clark Ellsworth, PA-C  22 [phone]

## 2022-02-06 NOTE — ED TRIAGE NOTES
Pt reports that he was using a drill to remove screws and the bit went Into left hand in between thumb and pointer finger. PT reports that is was full of shavings when it went into hand. Pt reports some numbness in fingers. PT VSS and ABC's intact. [facility] reports last tetanus in 2016

## 2022-08-10 ENCOUNTER — TELEPHONE (OUTPATIENT)
Dept: NEUROSURGERY | Facility: CLINIC | Age: 27
End: 2022-08-10

## 2022-08-10 DIAGNOSIS — Z98.890 H/O LUMBAR DISCECTOMY: ICD-10-CM

## 2022-08-10 DIAGNOSIS — M54.16 LUMBAR RADICULOPATHY: ICD-10-CM

## 2022-08-10 NOTE — TELEPHONE ENCOUNTER
Patient called stating he is having pan from lower back in to RT leg and foot, with numbness. Please contact Dipak [phone]

## 2022-08-10 NOTE — TELEPHONE ENCOUNTER
Last Visit: 11/20/2020    Next Visit: none    Name of Provider:  Richy Schumacher PA-C    Assessment: On 10/07/2020 the patient underwent a right lumbar 5-sacral 1 minimally invasive microdiscectomy with Dr. Oquendo.    Patient reports pain was initially better after his surgery but still had some residual pain. Pre op pain was located in low back and RLE same pain continues post op but much less but is still present . Pain increased over the last few days. Used a weed kathi in the past few days and noticed increased pain.   Currently has low back and RLE pain, T/N in right foot and ankle, mild weakness in RLE, no falls recently within the last month but has had a few falls post op. Reports spasms in RLE. Denies foot drop/drag, no bowel or bladder incontinence.  Denies any saddle anesthesia but describes intermittent shooting pain to right groin when back is flaring up. Sometimes reports spasms in LLE during flare.     Taking muscle relaxer, taking OTC pain relievers, alternating ice/heat, sees chiropractor , weekly massage therapy appts for the last 8 months (pain relief lasts a few days). Doing at home exercises, completed PT after his surgery. Patient reports he lost 80 lbs.      Recommendation given: Advised patient to make an appt with ALFREDITO for evaluation. Reviewed red flag symptoms and to seek emergency care should these symptoms arise. Patient verbalized understanding and in agreement with plan. He was transferred to our scheduling team.

## 2022-08-10 NOTE — TELEPHONE ENCOUNTER
Patient was able to get in to be seen this afternoon with Richy Schumacher PA-C. Mango reviewed and said he is happy to still see patient this afternoon but could offer him a MDP and order updated lumbar MRI w/ & w/o contrast then follow-up after to review imaging.     Called patient and reviewed above. Patient declined MDP d/t adverse side effects. He inquired about a refill for tizanidine that we previously prescribed him in the past post op and was very helpful.     Reviewed tizanidine request with Mango. He provided verbal ok for tizanidine 4 mg TID prn #30. Patient said he is ok to cancel today's appt, get his MRI and reschedule after to review imaging and plan. Provided central scheduling number, Rx sent to patient's pharmacy.

## 2022-08-11 ENCOUNTER — HOSPITAL ENCOUNTER (OUTPATIENT)
Dept: MRI IMAGING | Facility: HOSPITAL | Age: 27
Discharge: HOME | End: 2022-08-11
Attending: PHYSICIAN ASSISTANT | Admitting: PHYSICIAN ASSISTANT
Payer: COMMERCIAL

## 2022-08-11 DIAGNOSIS — M54.16 LUMBAR RADICULOPATHY: ICD-10-CM

## 2022-08-11 DIAGNOSIS — Z98.890 H/O LUMBAR DISCECTOMY: ICD-10-CM

## 2022-08-11 PROCEDURE — 72158 MRI LUMBAR SPINE W/O & W/DYE: CPT

## 2022-08-11 PROCEDURE — A9585 GADOBUTROL INJECTION: HCPCS | Performed by: PHYSICIAN ASSISTANT

## 2022-08-11 PROCEDURE — 255N000002 HC RX 255 OP 636: Performed by: PHYSICIAN ASSISTANT

## 2022-08-11 RX ORDER — GADOBUTROL 604.72 MG/ML
0.1 INJECTION INTRAVENOUS ONCE
Status: COMPLETED | OUTPATIENT
Start: 2022-08-11 | End: 2022-08-11

## 2022-08-11 RX ADMIN — GADOBUTROL 13 ML: 604.72 INJECTION INTRAVENOUS at 10:28

## 2022-08-17 ENCOUNTER — OFFICE VISIT (OUTPATIENT)
Dept: NEUROSURGERY | Facility: CLINIC | Age: 27
End: 2022-08-17
Attending: PHYSICIAN ASSISTANT
Payer: COMMERCIAL

## 2022-08-17 VITALS
BODY MASS INDEX: 29.34 KG/M2 | HEART RATE: 96 BPM | HEIGHT: 74 IN | WEIGHT: 228.6 LBS | OXYGEN SATURATION: 98 % | SYSTOLIC BLOOD PRESSURE: 140 MMHG | DIASTOLIC BLOOD PRESSURE: 89 MMHG

## 2022-08-17 DIAGNOSIS — Z98.890 S/P LUMBAR MICRODISCECTOMY: Primary | ICD-10-CM

## 2022-08-17 PROCEDURE — G0463 HOSPITAL OUTPT CLINIC VISIT: HCPCS

## 2022-08-17 PROCEDURE — 99213 OFFICE O/P EST LOW 20 MIN: CPT | Performed by: PHYSICIAN ASSISTANT

## 2022-08-17 ASSESSMENT — PAIN SCALES - GENERAL: PAINLEVEL: MILD PAIN (3)

## 2022-08-17 NOTE — PROGRESS NOTES
"Dipak Fuentes is a 27 year old male who presents for:  Chief Complaint   Patient presents with     Results     MRI F/U        Initial Vitals:  BP (!) 140/89   Pulse 96   Ht 6' 2\" (1.88 m)   Wt 228 lb 9.6 oz (103.7 kg)   SpO2 98%   BMI 29.35 kg/m   Estimated body mass index is 29.35 kg/m  as calculated from the following:    Height as of this encounter: 6' 2\" (1.88 m).    Weight as of this encounter: 228 lb 9.6 oz (103.7 kg).. Body surface area is 2.33 meters squared. BP completed using cuff size: regular  Mild Pain (3)    Nursing Comments:     Sandra Chamorro MA    "

## 2022-08-17 NOTE — PROGRESS NOTES
"NEUROSURGERY CLINIC PROGRESS NOTE     DATE OF VISIT: 8/17/2022     SUBJECTIVE:     Dipak Fuentes is a pleasant 27 year old male who is well known to our practice as Dr. Oquendo performed a right lumbar 5-sacral 1 minimally invasive microdiscectomy on 10/07/2022 who presents to the clinic today for symptoms very similar to his pre-operative symptoms which he describes a daily with fluctuating intensity, sharp, aching, burning pain that initiates in the midline low lumbar region and radiates distally in what sounds like the right S1 distribution. This pain is  accompanied by paresthesia, numbness and perceived weakness in the same distribution. Prolonged sitting and standing aggravate the symptoms, while alleviation is obtained by walking. No mechanism of injury such as trauma or a fall is associated with the onset of the pain. There are no bowel or bladder changes. He denies saddle anesthesia. He denies changes in gait, instability, or falling episodes.      Current Outpatient Medications:      ibuprofen (ADVIL/MOTRIN) 200 MG capsule, Take 200 mg by mouth every 4 hours as needed for fever, Disp: , Rfl:      tiZANidine (ZANAFLEX) 4 MG tablet, Take 1 tablet (4 mg) by mouth 3 times daily as needed for muscle spasms, Disp: 30 tablet, Rfl: 0  No current facility-administered medications for this visit.    Facility-Administered Medications Ordered in Other Visits:      Self Administer Medications: Behavioral Services, , Does not apply, See Admin Instructions, Gene Dougherty, [facility]     Allergies   Allergen Reactions     Ceclor [Cefaclor Monohydrate]      Lamictal [Lamotrigine]      Had known side effect of\"skin eating rash\"  Jaycob lauren syndrome     Cefaclor Rash        Past Medical History:   Diagnosis Date     Bipolar 1 disorder (H)      Complication of anesthesia     panic after having anesthesia     Lumbar disc herniation with radiculopathy      Mild intermittent asthma without complication      Other chronic pain     low " "back and buttocks leg pain     Uncomplicated asthma     mild        ROS: 10 point review of symptoms are negative other than the symptoms noted above in the HPI.     Family History has been reviewed with the patient, there are no pertinent findings to presenting concern.     Past Surgical History:   Procedure Laterality Date     DISCECTOMY LUMBAR MINIMALLY INVASIVE ONE LEVEL Right 10/7/2020    Procedure: Right L5-S1 minimally invasive microdiskectomy;  Surgeon: Juancho Oquendo MD;  [facility]          Social History     Tobacco Use     Smoking status: Former Smoker     Packs/day: 0.50     Years: 5.00     Pack years: 2.50     Types: Cigarettes     Smokeless tobacco: Never Used     Tobacco comment: uses nicoting gum now9 did cigs and e cigs   Substance Use Topics     Alcohol use: Not Currently     Comment: rare     Drug use: Yes     Types: Marijuana     Comment: bipolar-concentrates 1-2 x day        OBJECTIVE:   BP (!) 140/89   Pulse 96   Ht 6' 2\" (1.88 m)   Wt 228 lb 9.6 oz (103.7 kg)   SpO2 98%   BMI 29.35 kg/m     Body mass index is 29.35 kg/m .     Imaging:     MR LUMBAR SPINE W/O and W CONTRAST - 8/11/2022 9:46 AM    Findings, per radiologist read, notable for:      1.  Right L5-S1 hemilaminectomy. Right L5-S1 subarticular zone demonstrates a prominent perineural sleeve. Right L5-S1 subarticular zone demonstrates a round area of nonenhancement measuring 8 mm with surrounding enhancement. This likely reflects   enhancement around a right perineural sleeve as well as recurrent disc herniation with surrounding epidural fibrosis. This may be affecting the descending right S1 nerve root.     2.  L5-S1 degenerative disc disease described above.    Full radiological report in chart. Imaging was reviewed with with patient today.     Exam:     Patient appears comfortable, conversational, and in no apparent distress.   Head: Normocephalic, without obvious abnormality, atraumatic, no " facial asymmetry.   Eyes: conjunctivae/corneas clear. PERRL, EOM's intact.   Throat: lips, mucosa, and tongue normal; teeth and gums normal.   Neck: supple, symmetrical, trachea midline, no adenopathy and thyroid: not enlarged, symmetric, no tenderness/mass/nodules.   Lungs: clear to auscultation bilaterally.   Heart: regular rate and rhythm.   Abdomen: soft, non-tender; bowel sounds normal; no masses, no organomegaly.   Pulses: 2+ and symmetric.   Skin: Skin color, texture, turgor normal. No rashes or lesions.     CN II-XII grossly intact, alert and appropriate with conversation and following commands.   Gait is non-antalgic. Able to tandem walk. Able to walk on toes and heels without difficulty - but pain.   Cervical spine is non tender to palpation. Appropriate range of motion of neck, not concerning for lhermitte's phenomenon.   Bilateral bicep 2/4 and tricep reflexes 1/4. Sensation intact throughout upper extremities.     UE muscle strength  Right  Left    Deltoid  5/5  5/5    Biceps  5/5  5/5    Triceps  5/5  5/5    Hand intrinsics  5/5  5/5    Hand grasp  5/5  5/5    Hensley signs  neg  neg      Lumbar spine is non tender to palpation.  Intact sensation throughout lower extremities.   Bilateral patellar 2/4 and achilles reflex 1/4. Negative for pain with straight leg raise.     LE muscle strength  Right  Left    Iliopsoas (hip flexion)  5/5  5/5    Quad (knee extension)  5/5  5/5    Hamstring (knee flexion)  5/5  5/5    Gastrocnemius (PF)  5/5  5/5    Tibialis Ant. (DF)  5/5  5/5    EHL  5/5  5/5      Negative Babinski bilaterally. Negative for clonus.   Calves are soft and non-tender bilaterally.       ASSESSMENT/PLAN:     Dipak Fuentes is a 27 year old male who presents to the clinic for consultation on a daily with fluctuating intensity, sharp, aching, burning pain that initiates in the midline low lumbar region and radiates distally in what sounds like the right S1 distribution. This pain is  accompanied  by paresthesia, numbness and perceived weakness in the same distribution.     Dr. Oquendo performed a right lumbar 5-sacral 1 minimally invasive microdiscectomy on 10/07/2022.     The patient's most recent imaging was reviewed with him today. It was explained that images show the right L5-S1 subarticular zone demonstrating a prominent perineural sleeve. Right L5-S1 subarticular zone demonstrates a round area of nonenhancement measuring 8 mm with surrounding enhancement. This likely reflects  enhancement around a right perineural sleeve as well as recurrent disc herniation with surrounding epidural fibrosis. This may be affecting the descending right S1 nerve root. On exam, he is noted to have appropriate strength, sensation and range of motion.     He is not interested in surgical intervention or a MDP at this time.     Based on his physical exam, imaging review and past treatments, we feel that it would be in Mr. Fuentes's best interest to try a conservative approach by participating in a program initiated by our colleagues at [facility]. He did inquire about possible treatment options that they may consider so we briefly discussed core stretching and strengthening exercises in conjunction with lumbar traction as possibilities.      We also discussed the possibility of obtaining a right L5-S1 epidural steroid injection, which he would to proceed with. This has been ordered for him with Dr. Shay.     We would like to see him back as needed to further discuss other conservative options or possible surgical interventions. We did review the images together and we explained his condition and the recommended treatment. We also discussed signs of a worsening problem that he should seek being evaluated.        Respectfully,     JANET De León, PA-C  [facility]  [facility]  Tel: [phone]      Exam, imaging, and plan  reviewed by Dr. Oquendo.

## 2022-08-17 NOTE — LETTER
"    8/17/2022         RE: Dipak Fuentes  [address]  [facility] [id number]        Dear Colleague,    Thank you for referring your patient, Dipak Fuentes, to the [facility] [place]. Please see a copy of my visit note below.    NEUROSURGERY CLINIC PROGRESS NOTE     DATE OF VISIT: 8/17/2022     SUBJECTIVE:     Dipak Fuentes is a pleasant 27 year old male who is well known to our practice as Dr. Oquendo performed a right lumbar 5-sacral 1 minimally invasive microdiscectomy on 10/07/2022 who presents to the clinic today for symptoms very similar to his pre-operative symptoms which he describes a daily with fluctuating intensity, sharp, aching, burning pain that initiates in the midline low lumbar region and radiates distally in what sounds like the right S1 distribution. This pain is  accompanied by paresthesia, numbness and perceived weakness in the same distribution. Prolonged sitting and standing aggravate the symptoms, while alleviation is obtained by walking. No mechanism of injury such as trauma or a fall is associated with the onset of the pain. There are no bowel or bladder changes. He denies saddle anesthesia. He denies changes in gait, instability, or falling episodes.      Current Outpatient Medications:      ibuprofen (ADVIL/MOTRIN) 200 MG capsule, Take 200 mg by mouth every 4 hours as needed for fever, Disp: , Rfl:      tiZANidine (ZANAFLEX) 4 MG tablet, Take 1 tablet (4 mg) by mouth 3 times daily as needed for muscle spasms, Disp: 30 tablet, Rfl: 0  No current facility-administered medications for this visit.    Facility-Administered Medications Ordered in Other Visits:      Self Administer Medications: Behavioral Services, , Does not apply, See Admin Instructions, Gene Dougherty, [facility]     Allergies   Allergen Reactions     Ceclor [Cefaclor Monohydrate]      Lamictal [Lamotrigine]      Had known side effect of\"skin eating rash\"  Jaycob lauren syndrome     Cefaclor Rash " "       Past Medical History:   Diagnosis Date     Bipolar 1 disorder (H)      Complication of anesthesia     panic after having anesthesia     Lumbar disc herniation with radiculopathy      Mild intermittent asthma without complication      Other chronic pain     low back and buttocks leg pain     Uncomplicated asthma     mild        ROS: 10 point review of symptoms are negative other than the symptoms noted above in the HPI.     Family History has been reviewed with the patient, there are no pertinent findings to presenting concern.     Past Surgical History:   Procedure Laterality Date     DISCECTOMY LUMBAR MINIMALLY INVASIVE ONE LEVEL Right 10/7/2020    Procedure: Right L5-S1 minimally invasive microdiskectomy;  Surgeon: Juancho Oquendo MD;  [facility]: [facility]          Social History     Tobacco Use     Smoking status: Former Smoker     Packs/day: 0.50     Years: 5.00     Pack years: 2.50     Types: Cigarettes     Smokeless tobacco: Never Used     Tobacco comment: uses nicoting gum now9 did cigs and e cigs   Substance Use Topics     Alcohol use: Not Currently     Comment: rare     Drug use: Yes     Types: Marijuana     Comment: bipolar-concentrates 1-2 x day        OBJECTIVE:   BP (!) 140/89   Pulse 96   Ht 6' 2\" (1.88 m)   Wt 228 lb 9.6 oz (103.7 kg)   SpO2 98%   BMI 29.35 kg/m     Body mass index is 29.35 kg/m .     Imaging:     MR LUMBAR SPINE W/O and W CONTRAST - 8/11/2022 9:46 AM    Findings, per radiologist read, notable for:      1.  Right L5-S1 hemilaminectomy. Right L5-S1 subarticular zone demonstrates a prominent perineural sleeve. Right L5-S1 subarticular zone demonstrates a round area of nonenhancement measuring 8 mm with surrounding enhancement. This likely reflects   enhancement around a right perineural sleeve as well as recurrent disc herniation with surrounding epidural fibrosis. This may be affecting the descending right S1 nerve root.     2.  L5-S1 degenerative " disc disease described above.    Full radiological report in chart. Imaging was reviewed with with patient today.     Exam:     Patient appears comfortable, conversational, and in no apparent distress.   Head: Normocephalic, without obvious abnormality, atraumatic, no facial asymmetry.   Eyes: conjunctivae/corneas clear. PERRL, EOM's intact.   Throat: lips, mucosa, and tongue normal; teeth and gums normal.   Neck: supple, symmetrical, trachea midline, no adenopathy and thyroid: not enlarged, symmetric, no tenderness/mass/nodules.   Lungs: clear to auscultation bilaterally.   Heart: regular rate and rhythm.   Abdomen: soft, non-tender; bowel sounds normal; no masses, no organomegaly.   Pulses: 2+ and symmetric.   Skin: Skin color, texture, turgor normal. No rashes or lesions.     CN II-XII grossly intact, alert and appropriate with conversation and following commands.   Gait is non-antalgic. Able to tandem walk. Able to walk on toes and heels without difficulty - but pain.   Cervical spine is non tender to palpation. Appropriate range of motion of neck, not concerning for lhermitte's phenomenon.   Bilateral bicep 2/4 and tricep reflexes 1/4. Sensation intact throughout upper extremities.     UE muscle strength  Right  Left    Deltoid  5/5  5/5    Biceps  5/5  5/5    Triceps  5/5  5/5    Hand intrinsics  5/5  5/5    Hand grasp  5/5  5/5    Hensley signs  neg  neg      Lumbar spine is non tender to palpation.  Intact sensation throughout lower extremities.   Bilateral patellar 2/4 and achilles reflex 1/4. Negative for pain with straight leg raise.     LE muscle strength  Right  Left    Iliopsoas (hip flexion)  5/5  5/5    Quad (knee extension)  5/5  5/5    Hamstring (knee flexion)  5/5  5/5    Gastrocnemius (PF)  5/5  5/5    Tibialis Ant. (DF)  5/5  5/5    EHL  5/5  5/5      Negative Babinski bilaterally. Negative for clonus.   Calves are soft and non-tender bilaterally.       ASSESSMENT/PLAN:     Dipak Fuentes is a 27  year old male who presents to the clinic for consultation on a daily with fluctuating intensity, sharp, aching, burning pain that initiates in the midline low lumbar region and radiates distally in what sounds like the right S1 distribution. This pain is  accompanied by paresthesia, numbness and perceived weakness in the same distribution.     Dr. Oquendo performed a right lumbar 5-sacral 1 minimally invasive microdiscectomy on 10/07/2022.     The patient's most recent imaging was reviewed with him today. It was explained that images show the right L5-S1 subarticular zone demonstrating a prominent perineural sleeve. Right L5-S1 subarticular zone demonstrates a round area of nonenhancement measuring 8 mm with surrounding enhancement. This likely reflects  enhancement around a right perineural sleeve as well as recurrent disc herniation with surrounding epidural fibrosis. This may be affecting the descending right S1 nerve root. On exam, he is noted to have appropriate strength, sensation and range of motion.     He is not interested in surgical intervention or a MDP at this time.     Based on his physical exam, imaging review and past treatments, we feel that it would be in Mr. Fuentes's best interest to try a conservative approach by participating in a program initiated by our colleagues at the [facility]. He did inquire about possible treatment options that they may consider so we briefly discussed core stretching and strengthening exercises in conjunction with lumbar traction as possibilities.      We also discussed the possibility of obtaining a right L5-S1 epidural steroid injection, which he would to proceed with. This has been ordered for him with Dr. Shay.     We would like to see him back as needed to further discuss other conservative options or possible surgical interventions. We did review the images together and we explained his condition and the recommended treatment. We also  discussed signs of a worsening problem that he should seek being evaluated.        Respectfully,     JANET De León PA-C  [facility] Neurosurgery  [facility]  Tel: [phone]      Exam, imaging, and plan reviewed by Dr. Oquendo.       Again, thank you for allowing me to participate in the care of your patient.        Sincerely,        Richy Schumacher PA-C

## 2022-08-22 DIAGNOSIS — M54.17 LUMBOSACRAL RADICULOPATHY: Primary | ICD-10-CM

## 2022-08-22 NOTE — PROGRESS NOTES
Per ALBERTO Bob (neurosurgery) ordered a right S1-S2 transforaminal epidural corticosteroid injection for . Dipak Fuentes for right lumbosacral radiculopathy.    Giovanni Shay MD

## 2022-08-23 ENCOUNTER — TELEPHONE (OUTPATIENT)
Dept: INTERVENTIONAL RADIOLOGY/VASCULAR | Facility: CLINIC | Age: 27
End: 2022-08-23

## 2022-08-23 NOTE — TELEPHONE ENCOUNTER
Called and spoke with patient about upcoming steriod injections with Dr. Shay. Given standard instructions and all questions answered.

## 2022-08-25 ENCOUNTER — HOSPITAL ENCOUNTER (OUTPATIENT)
Dept: GENERAL RADIOLOGY | Facility: CLINIC | Age: 27
Discharge: HOME | End: 2022-08-25
Attending: PHYSICAL MEDICINE & REHABILITATION | Admitting: PHYSICAL MEDICINE & REHABILITATION
Payer: COMMERCIAL

## 2022-08-25 VITALS
RESPIRATION RATE: 18 BRPM | HEART RATE: 71 BPM | DIASTOLIC BLOOD PRESSURE: 73 MMHG | SYSTOLIC BLOOD PRESSURE: 136 MMHG | OXYGEN SATURATION: 98 %

## 2022-08-25 DIAGNOSIS — M54.17 LUMBOSACRAL RADICULOPATHY: Primary | ICD-10-CM

## 2022-08-25 PROCEDURE — 64483 NJX AA&/STRD TFRM EPI L/S 1: CPT | Mod: RT | Performed by: PHYSICAL MEDICINE & REHABILITATION

## 2022-08-25 PROCEDURE — 250N000009 HC RX 250

## 2022-08-25 PROCEDURE — 64483 NJX AA&/STRD TFRM EPI L/S 1: CPT

## 2022-08-25 PROCEDURE — 250N000011 HC RX IP 250 OP 636

## 2022-08-25 PROCEDURE — 255N000002 HC RX 255 OP 636: Performed by: PHYSICAL MEDICINE & REHABILITATION

## 2022-08-25 RX ORDER — LIDOCAINE HYDROCHLORIDE 10 MG/ML
INJECTION, SOLUTION EPIDURAL; INFILTRATION; INTRACAUDAL; PERINEURAL
Status: DISPENSED
Start: 2022-08-25 | End: 2022-08-25

## 2022-08-25 RX ORDER — LIDOCAINE HYDROCHLORIDE 10 MG/ML
INJECTION, SOLUTION EPIDURAL; INFILTRATION; INTRACAUDAL; PERINEURAL
Status: COMPLETED
Start: 2022-08-25 | End: 2022-08-25

## 2022-08-25 RX ORDER — LIDOCAINE HYDROCHLORIDE 10 MG/ML
5 INJECTION, SOLUTION EPIDURAL; INFILTRATION; INTRACAUDAL; PERINEURAL ONCE
Status: COMPLETED | OUTPATIENT
Start: 2022-08-25 | End: 2022-08-25

## 2022-08-25 RX ORDER — IOPAMIDOL 408 MG/ML
10 INJECTION, SOLUTION INTRATHECAL ONCE
Status: COMPLETED | OUTPATIENT
Start: 2022-08-25 | End: 2022-08-25

## 2022-08-25 RX ORDER — DEXAMETHASONE SODIUM PHOSPHATE 10 MG/ML
10 INJECTION, SOLUTION INTRAMUSCULAR; INTRAVENOUS ONCE
Status: COMPLETED | OUTPATIENT
Start: 2022-08-25 | End: 2022-08-25

## 2022-08-25 RX ORDER — DEXAMETHASONE SODIUM PHOSPHATE 10 MG/ML
INJECTION, SOLUTION INTRAMUSCULAR; INTRAVENOUS
Status: COMPLETED
Start: 2022-08-25 | End: 2022-08-25

## 2022-08-25 RX ADMIN — LIDOCAINE HYDROCHLORIDE 7.5 ML: 10 INJECTION, SOLUTION EPIDURAL; INFILTRATION; INTRACAUDAL; PERINEURAL at 10:52

## 2022-08-25 RX ADMIN — DEXAMETHASONE SODIUM PHOSPHATE 10 MG: 10 INJECTION, SOLUTION INTRAMUSCULAR; INTRAVENOUS at 10:52

## 2022-08-25 RX ADMIN — LIDOCAINE HYDROCHLORIDE 7.5 ML: 10 INJECTION, SOLUTION EPIDURAL; INFILTRATION; INTRACAUDAL at 10:52

## 2022-08-25 RX ADMIN — IOPAMIDOL 2.1 ML: 408 INJECTION, SOLUTION INTRATHECAL at 10:51

## 2022-08-25 NOTE — PROGRESS NOTES
Assisted MD Shay in Right S1-S2 transforaminal epidural steroid injection . Medications given per MAR. Pain prior to procedure 5/10 right low back. Pain post procedure 7/10 right low back. Pt tolerated procedure well. Injection site covered with dressing. Dressing clean, dry and intact at time of discharge. Discharge instructions given and all questions answered. Pt left ambulatory in stable condition.

## 2022-08-29 ENCOUNTER — THERAPY VISIT (OUTPATIENT)
Dept: PHYSICAL THERAPY | Facility: CLINIC | Age: 27
End: 2022-08-29
Attending: PHYSICIAN ASSISTANT
Payer: COMMERCIAL

## 2022-08-29 DIAGNOSIS — Z98.890 S/P LUMBAR MICRODISCECTOMY: ICD-10-CM

## 2022-08-29 DIAGNOSIS — M54.41 ACUTE BILATERAL LOW BACK PAIN WITH RIGHT-SIDED SCIATICA: ICD-10-CM

## 2022-08-29 PROCEDURE — 97161 PT EVAL LOW COMPLEX 20 MIN: CPT | Mod: GP | Performed by: PHYSICAL THERAPIST

## 2022-08-29 PROCEDURE — 97110 THERAPEUTIC EXERCISES: CPT | Mod: GP | Performed by: PHYSICAL THERAPIST

## 2022-08-29 NOTE — PROGRESS NOTES
Physical Therapy Initial Evaluation  Subjective:  The history is provided by the patient. No  was used.   Therapist Generated HPI Evaluation  Problem details: The patient reports to therapy with a chief complaint of low back pain with radiculopathy. Pt has a hx of similar symptoms that resulted in microdiscectomy in October of 2020. The patient reports doing well after surgery, but over the past 2 months without precipitating injury, that patient began experiencing similar symptoms again. He is now experiencing N/T in the bilateral LE (R>L) and weakness in the R LE. Sitting is the worst for him and he is required to sit for the majority of his work day. Also required to do some lifting at work. Is interested in obtaining work restrictions, and was told he could get that from PT. Heaitng and icing minimally helpful, medication minimally helpful. MRI revealed another disc herniation per patient. Pt underwent injection on 8/25/22 but reports no change with his symptoms. Goal to put off surgery as able, but knows that this may be in his future. Pt does report that he has been having a difficult time going to the bathroom because his muscles feel so tight. He denies saddle paresthesia. Difficulty sleeping due to muscle spasms.     MRI results:  IMPRESSION:  1.  Right L5-S1 hemilaminectomy. Right L5-S1 subarticular zone demonstrates a prominent perineural sleeve. Right L5-S1 subarticular zone demonstrates a round area of nonenhancement measuring 8 mm with surrounding enhancement. This likely reflects   enhancement around a right perineural sleeve as well as recurrent disc herniation with surrounding epidural fibrosis. This may be affecting the descending right S1 nerve root.     2.  L5-S1 degenerative disc disease described above.     3.  Additional mild degenerative changes described above.    Also bending xray completed; IMPRESSION: Five lumbar type vertebrae. Alignment is normal in both  the flexed  "and extended positions. Vertebral body heights normal. No  evidence for fracture on these two lateral views.    .         Type of problem:  Lumbar.    This is a recurrent condition.  Condition occurred with:  Insidious onset.    Patient reports pain:  Lumbar spine right and lower lumbar spine.  Pain is described as aching, burning, sharp, shooting and cramping and is constant.  Pain radiates to:  Gluteals right, thigh right, knee right, lower leg right and foot right. Pain is the same all the time.  Since onset symptoms are gradually worsening.  Associated symptoms:  Loss of motion/stiffness, loss of strength, numbness and tingling. Symptoms are exacerbated by bending, sitting, standing, stress, certain positions, lifting, twisting, lying down and walking    Special tests included:  MRI.  Previous treatment includes surgery and physical therapy. There was moderate improvement following previous treatment.  Restrictions due to condition include:  Working in normal job without restrictions.  Barriers include:  None as reported by patient.    Patient Health History  Dipak Fuentes being seen for \"disc hemorage\".     Problem began: 6/29/2022.   Problem occurred: unsure    Pain is reported as 6/10 on pain scale.  General health as reported by patient is good.  Pertinent medical history includes: asthma, numbness/tingling and overweight.   Red flags:  Calf pain-swelling-warmth and changes in bowel and bladder habits.  Medical allergies: adhesives.   Surgeries include:  Orthopedic surgery. Other surgery history details: discectomy 2020 .    Current medications:  Anti-inflammatory and muscle relaxants.    Current occupation is IT .   Primary job tasks include:  Computer work, prolonged sitting and lifting/carrying.                                    Objective:    Gait:    Gait Type:  Antalgic                    Lumbar/SI Evaluation  ROM:    AROM Lumbar:   Flexion:            Max limited P+ in LB and peripheralizes symptoms to " R LE past the knee   Ext:                    Mod limited P+ in LB but does not radiate symptoms    Side Bend:        Left:  Min limited stiffness    Right:  Min limited stiffness   Rotation:           Left:  Mod limited P+    Right:  Mod limited P+   Side Glide:        Left:     Right:           Lumbar Myotomes:    T12-L3 (Hip Flex):  Left: 4+    Right: 4  L2-4 (Quads):  Left:  4+    Right:  4-  L4 (Ankle DF):  Left:  4+    Right:  4-  L5 (Great Toe Ext): Left: 4+    Right: 4   S1 (Toe Raise):  Left: 4+    Right: 4-        Neural Tension/Mobility:      Left side:Slump  negative.   Right side:   Slump positive.  Lumbar Palpation:    Tenderness present at Left:    Quadratus Lumborum; Erector Spinae and Piriformis  Tenderness present at Right: Quadratus Lumborum; Erector Spinae and Piriformis                                                     General     ROS    Assessment/Plan:    Patient is a 27 year old male with lumbar complaints.    Patient has the following significant findings with corresponding treatment plan.                Diagnosis 1:  Low Back Pain with Right Radicular Symptoms   Pain -  hot/cold therapy, US, electric stimulation, mechanical traction, manual therapy, directional preference exercise and home program  Decreased ROM/flexibility - manual therapy and therapeutic exercise  Decreased strength - therapeutic exercise and therapeutic activities  Impaired gait - gait training  Impaired muscle performance - neuro re-education  Impaired posture - neuro re-education    Cumulative Therapy Evaluation is: Low complexity.    Previous and current functional limitations:  (See Goal Flow Sheet for this information)    Short term and Long term goals: (See Goal Flow Sheet for this information)     Communication ability:  Patient appears to be able to clearly communicate and understand verbal and written communication and follow directions correctly.  Treatment Explanation - The following has been discussed with  the patient:   RX ordered/plan of care  Anticipated outcomes  Possible risks and side effects  This patient would benefit from PT intervention to resume normal activities.   Rehab potential is fair.    Frequency:  1 X week, once daily  Duration:  for 6 weeks  Discharge Plan:  Achieve all LTG.  Independent in home treatment program.  Reach maximal therapeutic benefit.    Please refer to the daily flowsheet for treatment today, total treatment time and time spent performing 1:1 timed codes.

## 2022-08-30 PROBLEM — M54.41 ACUTE BILATERAL LOW BACK PAIN WITH RIGHT-SIDED SCIATICA: Status: ACTIVE | Noted: 2022-08-30

## 2022-09-06 NOTE — PROGRESS NOTES
[facility]    OUTPATIENT Physical Therapy ORTHOPEDIC EVALUATION  PLAN OF TREATMENT FOR OUTPATIENT REHABILITATION  (COMPLETE FOR INITIAL CLAIMS ONLY)  Patient's Last Name, First Name, M.I.  YOB: 1995  Dipak Fuentes    Provider s Name:  [facility]   Medical Record No.  [id number]   Start of Care Date:      Onset Date:       Type:     _X__PT   ___OT Medical Diagnosis:    Encounter Diagnoses   Name Primary?    S/P lumbar microdiscectomy     Acute bilateral low back pain with right-sided sciatica         Treatment Diagnosis:  R lumbar radiculopathy        Goals:     08/30/22 0500   Body Part   Goals listed below are for Low Back   Goal #1   Goal #1 sleeping   Previous Functional Level No restrictions   Current Functional Level 3-5 hours without sleep per night   STG Target Performance 2-3 hours without sleep per night   Rationale to establish restorative sleep pattern   Due Date 09/20/22   LTG Target Performance 1-2 hours without sleep per night   Rationale to establish restorative sleep pattern   Due Date 10/11/22       Therapy Frequency:     Predicted Duration of Therapy Intervention:       CAROLINA DAVIS, PT                 I CERTIFY THE NEED FOR THESE SERVICES FURNISHED UNDER        THIS PLAN OF TREATMENT AND WHILE UNDER MY CARE     (Physician attestation of this document indicates review and certification of the therapy plan).                     Certification Date From:      Certification Date To:       Referring Provider:  Richy Schumacher    Initial Assessment        See Epic Evaluation

## 2022-09-08 ENCOUNTER — OFFICE VISIT (OUTPATIENT)
Dept: NEUROSURGERY | Facility: CLINIC | Age: 27
End: 2022-09-08
Attending: PHYSICIAN ASSISTANT
Payer: COMMERCIAL

## 2022-09-08 VITALS — HEART RATE: 85 BPM | SYSTOLIC BLOOD PRESSURE: 138 MMHG | OXYGEN SATURATION: 96 % | DIASTOLIC BLOOD PRESSURE: 82 MMHG

## 2022-09-08 DIAGNOSIS — Z98.890 S/P LUMBAR MICRODISCECTOMY: Primary | ICD-10-CM

## 2022-09-08 PROCEDURE — 99213 OFFICE O/P EST LOW 20 MIN: CPT | Performed by: PHYSICIAN ASSISTANT

## 2022-09-08 PROCEDURE — G0463 HOSPITAL OUTPT CLINIC VISIT: HCPCS

## 2022-09-08 ASSESSMENT — PAIN SCALES - GENERAL: PAINLEVEL: MILD PAIN (2)

## 2022-09-08 NOTE — PROGRESS NOTES
"NEUROSURGERY CLINIC PROGRESS NOTE    DATE OF VISIT: 9/8/2022    HPI:     Dipak Fuentes is a pleasant 27 year old male who is well known to our practice as Dr. Oquendo performed a right lumbar 5-sacral 1 minimally invasive microdiscectomy on 10/07/2020. We last evaluated him on 08/17/2022 at which time his symptoms were very similar to his pre-operative symptoms which he describes as a daily with fluctuating intensity, sharp, aching, burning pain that initiates in the midline low lumbar region and radiates distally in what sounds like the right S1 distribution. This pain is  accompanied by paresthesia, numbness and perceived weakness in the same distribution.     We have had him participate in physical therapy and obtained an YU. Neither have provided adequate alleviation.        He would like to discuss surgical options.    Current Outpatient Medications   Medication     tiZANidine (ZANAFLEX) 4 MG tablet     ibuprofen (ADVIL/MOTRIN) 200 MG capsule     tiZANidine (ZANAFLEX) 4 MG tablet     No current facility-administered medications for this visit.     Facility-Administered Medications Ordered in Other Visits   Medication     Self Administer Medications: Behavioral Services       Allergies   Allergen Reactions     Ceclor [Cefaclor Monohydrate]      Lamictal [Lamotrigine]      Had known side effect of\"skin eating rash\"  Jaycob lauren syndrome     Cefaclor Rash       Past Medical History:   Diagnosis Date     Bipolar 1 disorder (H)      Complication of anesthesia     panic after having anesthesia     Lumbar disc herniation with radiculopathy      Mild intermittent asthma without complication      Other chronic pain     low back and buttocks leg pain     Uncomplicated asthma     mild       Review Of Systems    Skin: negative  Eyes: negative  Ears/Nose/Throat: negative  Respiratory: No shortness of breath, dyspnea on exertion, cough, or hemoptysis  Cardiovascular: negative  Gastrointestinal: negative  Musculoskeletal: " back pain and muscular weakness  Neurologic: numbness or tingling of feet  Psychiatric: negative  Hematologic/Lymphatic/Immunologic: negative  Endocrine: negative    OBJECTIVE:    /82   Pulse 85   SpO2 96%     Imaging:    MR LUMBAR SPINE W/O and W CONTRAST - 8/11/2022 9:46 AM    1.  Right L5-S1 hemilaminectomy. Right L5-S1 subarticular zone demonstrates a prominent perineural sleeve. Right L5-S1 subarticular zone demonstrates a round area of nonenhancement measuring 8 mm with surrounding enhancement. This likely reflects   enhancement around a right perineural sleeve as well as recurrent disc herniation with surrounding epidural fibrosis. This may be affecting the descending right S1 nerve root.     2.  L5-S1 degenerative disc disease described above.    Radiographic Findings: Full radiological report in chart. I personally reviewed the images with the patient today.    Exam:    Patient appears comfortable and in no apparent distress. Moving all extremities.  Gait is antalgic.  CN II-XII grossly intact, alert and appropriate with conversation and following  commands  Bilateral upper extremities with full strength including hand intrinsics and grasp.  Sensation intact throughout.  Bilateral lower extremities 5/5 . 3+/5 with right plantarflexion.  Diminished sensation throughout RLE.    ASSESSMENT:    1. S/P lumbar microdiscectomy        PLAN:    We will have Dr. Oquendo review this case to see if there are any further surgical options available. Once Dr. Oquendo has had an opportunity to look at his films we will contact Mr. Fuentes.      We did review the images together and we explained with an anatomical model his condition and the recommended treatment. We also discussed signs of a worsening problem that he should seek being evaluated.     The patient gave verbal understanding and is in agreement with the above plan. He will call or return to the clinic for any worsening or changes in symptoms.      Respectfully,      Mango Schumacher, JANET, PA-C

## 2022-09-08 NOTE — LETTER
"    9/8/2022         RE: Dipak Fuentes  [address] [facility] [address]        Dear Colleague,    Thank you for referring your patient, Dipak Fuentes, to the [facility] NEUROSURGERY [facility]INIC [place]. Please see a copy of my visit note below.    NEUROSURGERY CLINIC PROGRESS NOTE    DATE OF VISIT: 9/8/2022    HPI:     Dipak Fuentes is a pleasant 27 year old male who is well known to our practice as Dr. Oquendo performed a right lumbar 5-sacral 1 minimally invasive microdiscectomy on 10/07/2020. We last evaluated him on 08/17/2022 at which time his symptoms were very similar to his pre-operative symptoms which he describes as a daily with fluctuating intensity, sharp, aching, burning pain that initiates in the midline low lumbar region and radiates distally in what sounds like the right S1 distribution. This pain is  accompanied by paresthesia, numbness and perceived weakness in the same distribution.     We have had him participate in physical therapy and obtained an YU. Neither have provided adequate alleviation.        He would like to discuss surgical options.    Current Outpatient Medications   Medication     tiZANidine (ZANAFLEX) 4 MG tablet     ibuprofen (ADVIL/MOTRIN) 200 MG capsule     tiZANidine (ZANAFLEX) 4 MG tablet     No current facility-administered medications for this visit.     Facility-Administered Medications Ordered in Other Visits   Medication     Self Administer Medications: Behavioral [facility]       Allergies   Allergen Reactions     Ceclor [Cefaclor Monohydrate]      Lamictal [Lamotrigine]      Had known side effect of\"skin eating rash\"  Jaycob lauren syndrome     Cefaclor Rash       Past Medical History:   Diagnosis Date     Bipolar 1 disorder (H)      Complication of anesthesia     panic after having anesthesia     Lumbar disc herniation with radiculopathy      Mild intermittent asthma without complication      Other chronic pain     low back and buttocks leg pain     " Uncomplicated asthma     mild       Review Of Systems    Skin: negative  Eyes: negative  Ears/Nose/Throat: negative  Respiratory: No shortness of breath, dyspnea on exertion, cough, or hemoptysis  Cardiovascular: negative  Gastrointestinal: negative  Musculoskeletal: back pain and muscular weakness  Neurologic: numbness or tingling of feet  Psychiatric: negative  Hematologic/Lymphatic/Immunologic: negative  Endocrine: negative    OBJECTIVE:    /82   Pulse 85   SpO2 96%     Imaging:    MR LUMBAR SPINE W/O and W CONTRAST - 8/11/2022 9:46 AM    1.  Right L5-S1 hemilaminectomy. Right L5-S1 subarticular zone demonstrates a prominent perineural sleeve. Right L5-S1 subarticular zone demonstrates a round area of nonenhancement measuring 8 mm with surrounding enhancement. This likely reflects   enhancement around a right perineural sleeve as well as recurrent disc herniation with surrounding epidural fibrosis. This may be affecting the descending right S1 nerve root.     2.  L5-S1 degenerative disc disease described above.    Radiographic Findings: Full radiological report in chart. I personally reviewed the images with the patient today.    Exam:    Patient appears comfortable and in no apparent distress. Moving all extremities.  Gait is antalgic.  CN II-XII grossly intact, alert and appropriate with conversation and following  commands  Bilateral upper extremities with full strength including hand intrinsics and grasp.  Sensation intact throughout.  Bilateral lower extremities 5/5 . 3+/5 with right plantarflexion.  Diminished sensation throughout RLE.    ASSESSMENT:    1. S/P lumbar microdiscectomy        PLAN:    We will have Dr. Oquendo review this case to see if there are any further surgical options available. Once Dr. Oquendo has had an opportunity to look at his films we will contact Mr. Fuentes.      We did review the images together and we explained with an anatomical model his condition and the recommended  treatment. We also discussed signs of a worsening problem that he should seek being evaluated.     The patient gave verbal understanding and is in agreement with the above plan. He will call or return to the clinic for any worsening or changes in symptoms.      Respectfully,     JANET Peña PA-C      Again, thank you for allowing me to participate in the care of your patient.        Sincerely,        Richy Schumacher PA-C

## 2022-09-12 ENCOUNTER — OFFICE VISIT (OUTPATIENT)
Dept: NEUROSURGERY | Facility: CLINIC | Age: 27
End: 2022-09-12
Attending: NEUROLOGICAL SURGERY
Payer: COMMERCIAL

## 2022-09-12 VITALS — OXYGEN SATURATION: 100 % | SYSTOLIC BLOOD PRESSURE: 168 MMHG | DIASTOLIC BLOOD PRESSURE: 93 MMHG | HEART RATE: 85 BPM

## 2022-09-12 DIAGNOSIS — M51.16 LUMBAR DISC HERNIATION WITH RADICULOPATHY: Primary | ICD-10-CM

## 2022-09-12 PROCEDURE — G0463 HOSPITAL OUTPT CLINIC VISIT: HCPCS

## 2022-09-12 PROCEDURE — 99214 OFFICE O/P EST MOD 30 MIN: CPT | Performed by: NEUROLOGICAL SURGERY

## 2022-09-12 ASSESSMENT — PAIN SCALES - GENERAL: PAINLEVEL: SEVERE PAIN (6)

## 2022-09-12 NOTE — PATIENT INSTRUCTIONS
"Patient Instructions    Surgery scheduled at [facility] Right Lumbar 5 to Sacral 1 minimally invasive redo microdiscectomy with Dr. Oquendo     Pre-Operative  Surgical risks: blood clots in the leg or lung, problems urinating, nerve damage, drainage from the incision, infection,stiffness  Pre-operative physical with primary care physician within 30 days of surgical date.   Likely same day procedure with discharge home day of surgery, may stay for 23 hour observation hospitalization for monitoring.     Shower procedure  Please shower with antimicrobial soap the night before surgery and morning of surgery. Please refer to showering instruction sheet in folder.  Eating/Drinking  Stop all solid foods 8 hours before surgery.  Keep drinking clear liquids until 4 hours before surgery  Clear liquids include water, clear juice, black coffee, or clear tea without milk, Gatorade, clear soda.   Medications  Hold Aspirin, NSAIDs (Advil/Ibuprofen, Indocin, Naproxen,Nuprin,Relafen/Nabumetone, Diclofenac,Meloxicam, Aleve, Celebrex) x 7 days prior to surgical date  You can take Tylenol (Acetaminophen) for pain, 1000 mg  Do not exceed 3,000 mg per day   Any other medications prescribed, please discuss with your primary care provider at your pre-operative physical   As part of preparation for your upcoming procedure you are required to have a test for the novel Coronavirus, COVID-19  SDS Covid Testing: You will need to have a test for the novel Coronavirus, COVID-19.The rapid antigen test needs to be completed within 1-2 days prior to surgery. Please take a photo of the negative test and bring to surgery to show the nurse. If positive, please refer to the \"Before Your Procedure or Hospital Admission\" printout.  You may NOT receive the COVID-19 vaccine 72 hours before or after surgery.    Pain Management  You will have post-operative incisional pain which may require pain medications and muscle relaxants. You will receive medication " upon discharge.  You may resume taking NSAIDs (ex. Ibuprofen, aleve, naproxen) immediately post-op   Do NOT drive while taking narcotic pain medication  Do NOT drink alcohol while using any pain medication  You can utilize ice as needed (no longer than 20 minutes at one time)    Incision Care  No submerging incision in water such as pools, hot tubs, baths for at least 8 weeks or until incision is healed  It is okay to shower, just pat the incision dry   Remove dressing as instructed upon discharge  Watch for signs of infection  Redness, swelling, warmth, drainage, and fever of 101 degrees or higher  Notify clinic [phone].    Activity Restrictions  For the first 6-8 weeks, no lifting > 10 pounds, limited bending, twisting, or overhead reaching.  Take stairs in moderation   Ok to walk as tolerated, take short frequent walks. You may gradually increase the distance as tolerated.   Avoid bed rest and prolonged sitting for longer than 30 minutes (change positions frequently while awake)  No contact sports until after follow up visit  No high impact activities such as; running/jogging, snowmobile or 4 hogan riding or any other recreational vehicles  Please call the clinic if you develop any of the following symptoms:  Swelling and/or warmth in one or both legs  Pain or tenderness in your leg, ankle, foot, or arm   Red or discolored skin     Post-Op Follow Up Appointments  2 week incision check with RN  6 week post op follow up visit with Physician Assistant   3 months post op with Dr. Oquendo   Please call to schedule follow up appointment at [phone]    Resources  If you are currently employed, you will need to be off work for 2-4 weeks for post op recovery and healing.  Please fax any FMLA/short term disability paperwork to [phone]  You may call our clinic when you'd like to return to work and we can provide a work letter  To allow staff adequate time to complete paperwork, please send as soon as possible        [facility]  Phone: [phone]  Fax: [phone]

## 2022-09-12 NOTE — PROGRESS NOTES
"It was a pleasure to see Dipak Fuentes today in Neurosurgery Clinic. He is a 27 year old male who has previously undergone:    Procedure Date: 10/07/2020      PREOPERATIVE DIAGNOSIS:  L5-S1 herniated disk with radiculopathy.      POSTOPERATIVE DIAGNOSES:  L5-S1 herniated disk with radiculopathy.      PROCEDURES:  Right L5-S1 minimally invasive microdiskectomy.      SURGEON:  Juancho Oquendo MD      ASSISTANT:  Richy Schumacher PA-C      ANESTHESIA:  General endotracheal anesthesia.      ESTIMATED BLOOD LOSS:  25 mL.     He did well after his last surgery.  However for the last month or so he has had worsening symptoms particularly in the back and right lower extremity.  These have been severe enough that they make it difficult for him to drive into work.  He has tried multiple interventions without relief.  Please see Mio Schumacher PA-C's note for full details.    Vitals:    09/12/22 1008   BP: (!) 168/93   Pulse: 85   SpO2: 100%     Estimated body mass index is 29.35 kg/m  as calculated from the following:    Height as of 8/17/22: 1.88 m (6' 2\").    Weight as of 8/17/22: 103.7 kg (228 lb 9.6 oz).  Severe Pain (6)    Awake alert and oriented.  Bilateral lower extremity strength 5 out of 5 in all muscle groups.    Well-healed lumbar incision  Sensation intact to light touch.    Positive straight leg raise on right.    Imaging: MRI of the lumbar spine shows recurrent disc herniation on the right at L5-S1 with significant compression of the S1 nerve root.  Imaging was reviewed with patient shown to the patient clinic today.    Assessment: Recurrent L5-S1 herniated disc with radiculopathy.    Plan: Given his failure of conservative measures I have recommended a right L5-S1 minimally invasive redo microdiscectomy. The risks benefits and alternatives to the procedure were discussed. Risks include, but are not limited to, bleeding, infection, neurologic injury such as  nerve root injury, CSF leak, need for further " surgery or revision of any implanted hardware, failure for symptoms to improve. Questions were solicited and answered, and the patient wishes to proceed with surgery.

## 2022-09-12 NOTE — LETTER
"    9/12/2022         RE: Dipak Fuentes  [address] [facility] [address]        Dear Colleague,    Thank you for referring your patient, Dipak Fuentes, to the [facility]UROSURGERY CLINIC [place]. Please see a copy of my visit note below.    It was a pleasure to see Dipak Fuentes today in Neurosurgery Clinic. He is a 27 year old male who has previously undergone:    Procedure Date: 10/07/2020      PREOPERATIVE DIAGNOSIS:  L5-S1 herniated disk with radiculopathy.      POSTOPERATIVE DIAGNOSES:  L5-S1 herniated disk with radiculopathy.      PROCEDURES:  Right L5-S1 minimally invasive microdiskectomy.      SURGEON:  Juancho Oquendo MD      ASSISTANT:  Richy Schumacher PA-C      ANESTHESIA:  General endotracheal anesthesia.      ESTIMATED BLOOD LOSS:  25 mL.     He did well after his last surgery.  However for the last month or so he has had worsening symptoms particularly in the back and right lower extremity.  These have been severe enough that they make it difficult for him to drive into work.  He has tried multiple interventions without relief.  Please see Mio Schumacher PA-C's note for full details.    Vitals:    09/12/22 1008   BP: (!) 168/93   Pulse: 85   SpO2: 100%     Estimated body mass index is 29.35 kg/m  as calculated from the following:    Height as of 8/17/22: 1.88 m (6' 2\").    Weight as of 8/17/22: 103.7 kg (228 lb 9.6 oz).  Severe Pain (6)    Awake alert and oriented.  Bilateral lower extremity strength 5 out of 5 in all muscle groups.    Well-healed lumbar incision  Sensation intact to light touch.    Positive straight leg raise on right.    Imaging: MRI of the lumbar spine shows recurrent disc herniation on the right at L5-S1 with significant compression of the S1 nerve root.  Imaging was reviewed with patient shown to the patient clinic today.    Assessment: Recurrent L5-S1 herniated disc with radiculopathy.    Plan: Given his failure of conservative measures I have " recommended a right L5-S1 minimally invasive redo microdiscectomy. The risks benefits and alternatives to the procedure were discussed. Risks include, but are not limited to, bleeding, infection, neurologic injury such as  nerve root injury, CSF leak, need for further surgery or revision of any implanted hardware, failure for symptoms to improve. Questions were solicited and answered, and the patient wishes to proceed with surgery.             Again, thank you for allowing me to participate in the care of your patient.        Sincerely,        Juancho Oquendo MD

## 2022-09-12 NOTE — NURSING NOTE
"Reviewed pre- and post-operative instructions as outlined in the After Visit Summary/Patient Instructions with patient.   Surgery folder was given to patient    Patient Education Topic: Procedure with Dr. Oquendo     Learner(s): Patient    Knowledge Level: Basic    Readiness to Learn: Ready    Method:  Verbal explanation    Outcome: Able to verbalize instructions    Barriers to Learning: No barrier    Covid Testing: patient educated they will need to have a test for the novel Coronavirus, COVID-19.The rapid antigen test needs to be completed within 1-2 days prior to surgery. Patient instructed to take a photo of the negative test and bring to surgery to show to the nurse. If positive, patient instructed to refer to the \"Before Your Procedure or Hospital Admission\" printout.     NDI/SILVIANO: Confirmation of completion within last 6 months    Patient had the opportunity for questions to be answered. Advised Patient to call clinic with any questions/concerns. Gave patient antibacterial soap for pre-surgery skin preparation.       "

## 2022-09-12 NOTE — LETTER
Dipak Fuentes  [address]  [facility] [address]    Todays Date:  2022    Patient was seen in clinic today  yes    Patient:  Dipak Fuentes  : 1995    Physician/Nurse Practitioner: Juancho Oquendo MD    The patient will need to work from home for the foreseeable future, at least the next 4-6 weeks, as his symptoms prevent him from driving in to work.    Sincerely,        Juancho Oquendo MD

## 2022-09-13 ENCOUNTER — OFFICE VISIT (OUTPATIENT)
Dept: FAMILY MEDICINE | Facility: CLINIC | Age: 27
End: 2022-09-13
Payer: COMMERCIAL

## 2022-09-13 VITALS
OXYGEN SATURATION: 98 % | WEIGHT: 224 LBS | HEART RATE: 88 BPM | DIASTOLIC BLOOD PRESSURE: 78 MMHG | RESPIRATION RATE: 20 BRPM | TEMPERATURE: 97.6 F | SYSTOLIC BLOOD PRESSURE: 114 MMHG | BODY MASS INDEX: 28.76 KG/M2

## 2022-09-13 DIAGNOSIS — M51.16 LUMBAR DISC HERNIATION WITH RADICULOPATHY: ICD-10-CM

## 2022-09-13 DIAGNOSIS — Z01.818 PREOP GENERAL PHYSICAL EXAM: Primary | ICD-10-CM

## 2022-09-13 LAB
ANION GAP SERPL CALCULATED.3IONS-SCNC: 4 MMOL/L (ref 3–14)
BASOPHILS # BLD AUTO: 0.1 10E3/UL (ref 0–0.2)
BASOPHILS NFR BLD AUTO: 1 %
BUN SERPL-MCNC: 19 MG/DL (ref 7–30)
CALCIUM SERPL-MCNC: 9.7 MG/DL (ref 8.5–10.1)
CHLORIDE BLD-SCNC: 106 MMOL/L (ref 94–109)
CO2 SERPL-SCNC: 28 MMOL/L (ref 20–32)
CREAT SERPL-MCNC: 0.92 MG/DL (ref 0.66–1.25)
EOSINOPHIL # BLD AUTO: 0.2 10E3/UL (ref 0–0.7)
EOSINOPHIL NFR BLD AUTO: 3 %
ERYTHROCYTE [DISTWIDTH] IN BLOOD BY AUTOMATED COUNT: 12.3 % (ref 10–15)
GFR SERPL CREATININE-BSD FRML MDRD: >90 ML/MIN/1.73M2
GLUCOSE BLD-MCNC: 104 MG/DL (ref 70–99)
HCT VFR BLD AUTO: 46.5 % (ref 40–53)
HGB BLD-MCNC: 15.7 G/DL (ref 13.3–17.7)
IMM GRANULOCYTES # BLD: 0 10E3/UL
IMM GRANULOCYTES NFR BLD: 0 %
LYMPHOCYTES # BLD AUTO: 2 10E3/UL (ref 0.8–5.3)
LYMPHOCYTES NFR BLD AUTO: 29 %
MCH RBC QN AUTO: 29.6 PG (ref 26.5–33)
MCHC RBC AUTO-ENTMCNC: 33.8 G/DL (ref 31.5–36.5)
MCV RBC AUTO: 88 FL (ref 78–100)
MONOCYTES # BLD AUTO: 0.8 10E3/UL (ref 0–1.3)
MONOCYTES NFR BLD AUTO: 12 %
NEUTROPHILS # BLD AUTO: 3.9 10E3/UL (ref 1.6–8.3)
NEUTROPHILS NFR BLD AUTO: 56 %
PLATELET # BLD AUTO: 290 10E3/UL (ref 150–450)
POTASSIUM BLD-SCNC: 4 MMOL/L (ref 3.4–5.3)
RBC # BLD AUTO: 5.31 10E6/UL (ref 4.4–5.9)
SODIUM SERPL-SCNC: 138 MMOL/L (ref 133–144)
WBC # BLD AUTO: 7 10E3/UL (ref 4–11)

## 2022-09-13 PROCEDURE — 36415 COLL VENOUS BLD VENIPUNCTURE: CPT | Performed by: PHYSICIAN ASSISTANT

## 2022-09-13 PROCEDURE — 80048 BASIC METABOLIC PNL TOTAL CA: CPT | Performed by: PHYSICIAN ASSISTANT

## 2022-09-13 PROCEDURE — 99214 OFFICE O/P EST MOD 30 MIN: CPT | Performed by: PHYSICIAN ASSISTANT

## 2022-09-13 PROCEDURE — 85025 COMPLETE CBC W/AUTO DIFF WBC: CPT | Performed by: PHYSICIAN ASSISTANT

## 2022-09-13 ASSESSMENT — ASTHMA QUESTIONNAIRES
QUESTION_2 LAST FOUR WEEKS HOW OFTEN HAVE YOU HAD SHORTNESS OF BREATH: ONCE OR TWICE A WEEK
ACT_TOTALSCORE: 22
ACT_TOTALSCORE: 22
QUESTION_3 LAST FOUR WEEKS HOW OFTEN DID YOUR ASTHMA SYMPTOMS (WHEEZING, COUGHING, SHORTNESS OF BREATH, CHEST TIGHTNESS OR PAIN) WAKE YOU UP AT NIGHT OR EARLIER THAN USUAL IN THE MORNING: NOT AT ALL
QUESTION_5 LAST FOUR WEEKS HOW WOULD YOU RATE YOUR ASTHMA CONTROL: WELL CONTROLLED
QUESTION_1 LAST FOUR WEEKS HOW MUCH OF THE TIME DID YOUR ASTHMA KEEP YOU FROM GETTING AS MUCH DONE AT WORK, SCHOOL OR AT HOME: NONE OF THE TIME
QUESTION_4 LAST FOUR WEEKS HOW OFTEN HAVE YOU USED YOUR RESCUE INHALER OR NEBULIZER MEDICATION (SUCH AS ALBUTEROL): ONCE A WEEK OR LESS

## 2022-09-13 ASSESSMENT — PAIN SCALES - GENERAL: PAINLEVEL: MODERATE PAIN (4)

## 2022-09-13 NOTE — PROGRESS NOTES
[address] [address]  Phone: [phone]  Primary Provider: No Ref-Primary, Physician  Pre-op Performing Provider: JRERY TAN      PREOPERATIVE EVALUATION:  Today's date: 9/13/2022    Dipak Fuentes is a 27 year old male who presents for a preoperative evaluation.    Surgical Information:  Surgery/Procedure: Right Lumbar 5 to Sacral 1 minimally invasive redo microdiscectomy  Surgery Location: [facility]  Surgeon: Dr. Juancho Oquendo  Surgery Date: 09/21/2022  Time of Surgery: 1:50 PM  Where patient plans to recover: At home with family  Fax number for surgical facility: Note does not need to be faxed, will be available electronically in Epic.    Type of Anesthesia Anticipated: General    Assessment & Plan     The proposed surgical procedure is considered INTERMEDIATE risk.    Preop general physical exam  Normal hemoglobin, electrolytes and renal function.  Medically optimized for surgery  - CBC with platelets and differential  - Basic metabolic panel  (Ca, Cl, CO2, Creat, Gluc, K, Na, BUN)  - CBC with platelets and differential  - Basic metabolic panel  (Ca, Cl, CO2, Creat, Gluc, K, Na, BUN)    Lumbar disc herniation with radiculopathy  Reason for surgery            Risks and Recommendations:  The patient has the following additional risks and recommendations for perioperative complications:   - No identified additional risk factors other than previously addressed    Medication Instructions:  Patient is on no chronic medications    RECOMMENDATION:  APPROVAL GIVEN to proceed with proposed procedure, without further diagnostic evaluation.              17 minutes spent on the date of the encounter doing chart review, history and exam, documentation and further activities per the note        Subjective     HPI related to upcoming procedure: patient unknown to me presents for preoperative exam.  Back pain for couple months  with radiation into right lower extremity.  History of previous back surgery- reports same level approximately 2 yrs ago    Takes THC for bipolar do  History of asthma- have inhaler used once in last year - asthma only bad when transitioning between seasons   Preop Questions 9/13/2022   1. Have you ever had a heart attack or stroke? No   2. Have you ever had surgery on your heart or blood vessels, such as a stent placement, a coronary artery bypass, or surgery on an artery in your head, neck, heart, or legs? No   3. Do you have chest pain with activity? No   4. Do you have a history of  heart failure? No   5. Do you currently have a cold, bronchitis or symptoms of other infection? No   6. Do you have a cough, shortness of breath, or wheezing? No   7. Do you or anyone in your family have previous history of blood clots? UNKNOWN    8. Do you or does anyone in your family have a serious bleeding problem such as prolonged bleeding following surgeries or cuts? No   9. Have you ever had problems with anemia or been told to take iron pills? No   10. Have you had any abnormal blood loss such as black, tarry or bloody stools? No   11. Have you ever had a blood transfusion? No   12. Are you willing to have a blood transfusion if it is medically needed before, during, or after your surgery? Yes   13. Have you or any of your relatives ever had problems with anesthesia? No   14. Do you have sleep apnea, excessive snoring or daytime drowsiness? No   15. Do you have any artifical heart valves or other implanted medical devices like a pacemaker, defibrillator, or continuous glucose monitor? No   16. Do you have artificial joints? No   17. Are you allergic to latex? YES: rash     Work in IT - stydying computer science  luis eduardo is 5 year old   Health Care Directive:  Patient does not have a Health Care Directive or Living Will: Discussed advance care planning with patient; however, patient declined at this time.    Preoperative  Review of :   reviewed - 4/23/22 given 7 vicodin      Status of Chronic Conditions:  See problem list for active medical problems.  Problems all longstanding and stable, except as noted/documented.  See ROS for pertinent symptoms related to these conditions.    ASTHMA - Patient has a longstanding history of intermittent Asthma . Patient has been doing well overall noting NO SYMPTOMS and not currently taking asthma medication regimen consisting of  without adverse reactions or side effects.       Review of Systems  CONSTITUTIONAL: NEGATIVE for fever, chills, change in weight  INTEGUMENTARY/SKIN: NEGATIVE for worrisome rashes, moles or lesions  EYES: NEGATIVE for vision changes or irritation  ENT/MOUTH: NEGATIVE for ear, mouth and throat problems  RESP: NEGATIVE for significant cough or SOB  CV: NEGATIVE for chest pain, palpitations or peripheral edema  GI: NEGATIVE for nausea, abdominal pain, heartburn, or change in bowel habits  : NEGATIVE for frequency, dysuria, or hematuria  MUSCULOSKELETAL:see hpi   NEURO: NEGATIVE for weakness, dizziness or paresthesias  ENDOCRINE: NEGATIVE for temperature intolerance, skin/hair changes  HEME: NEGATIVE for bleeding problems  PSYCHIATRIC: NEGATIVE for changes in mood or affect    Patient Active Problem List    Diagnosis Date Noted     Acute bilateral low back pain with right-sided sciatica 08/30/2022     Priority: Medium     Mild intermittent asthma without complication      Priority: Medium     Lumbar disc herniation with radiculopathy 03/27/2020     Priority: Medium     Chemical dependency (H) 01/07/2016     Priority: Medium     Nondependent amphetamine or related acting sympathomimetic abuse, in remission (H) 03/25/2013     Priority: Medium     Bipolar I disorder, single manic episode (H) 03/25/2013     Priority: Medium     Problem list name updated by automated process. Provider to review       Asthma 03/25/2013     Priority: Medium     Problem list name updated by  "automated process. Provider to review       Benign neoplasm of skin 03/25/2013     Priority: Medium     Problem list name updated by automated process. Provider to review       Schizoaffective disorder (H) 03/25/2013     Priority: Medium     Problem list name updated by automated process. Provider to review  Diagnosis updated by automated process. Provider to review and confirm.       Personal history of tobacco use, presenting hazards to health 03/25/2013     Priority: Medium     Decreased libido 08/29/2012     Priority: Medium      Past Medical History:   Diagnosis Date     Bipolar 1 disorder (H)      Complication of anesthesia     panic after having anesthesia     Lumbar disc herniation with radiculopathy      Mild intermittent asthma without complication      Other chronic pain     low back and buttocks leg pain     Uncomplicated asthma     mild     Past Surgical History:   Procedure Laterality Date     DISCECTOMY LUMBAR MINIMALLY INVASIVE ONE LEVEL Right 10/7/2020    Procedure: Right L5-S1 minimally invasive microdiskectomy;  Surgeon: Juancho Oquendo MD;  [facility]       Current Outpatient Medications   Medication Sig Dispense Refill     ibuprofen (ADVIL/MOTRIN) 200 MG capsule Take 200 mg by mouth every 4 hours as needed for fever       tiZANidine (ZANAFLEX) 4 MG tablet Take 1 tablet (4 mg) by mouth 3 times daily as needed for muscle spasms 30 tablet 0     tiZANidine (ZANAFLEX) 4 MG tablet Take 1 tablet (4 mg) by mouth 3 times daily (Patient not taking: Reported on 9/13/2022) 30 tablet 0       Allergies   Allergen Reactions     Ceclor [Cefaclor Monohydrate]      Lamictal [Lamotrigine]      Had known side effect of\"skin eating rash\"  Jaycob lauren syndrome     Latex      PN: Converted from LW Latex Sensitivity Flag     Cefaclor Rash        Social History     Tobacco Use     Smoking status: Former Smoker     Packs/day: 0.50     Years: 5.00     Pack years: 2.50     Types: " Cigarettes     Smokeless tobacco: Never Used     Tobacco comment: uses nicoting gum now9 did cigs and e cigs   Substance Use Topics     Alcohol use: Not Currently     Comment: rare     Family History   Problem Relation Age of Onset     Depression Mother      Depression Father      Substance Abuse Father      Depression Maternal Grandmother      Heart Disease Maternal Grandmother      Depression Paternal Grandmother      Anxiety Disorder Paternal Grandmother      Parkinsonism Paternal Grandmother      Depression Brother      Unknown/Adopted No family hx of      Schizophrenia No family hx of      Bipolar Disorder No family hx of      Suicide No family hx of      Dementia No family hx of      Gonzalo Disease No family hx of      Autism Spectrum Disorder No family hx of      Intellectual Disability (Mental Retardation) No family hx of      Mental Illness No family hx of      History   Drug Use     Types: Marijuana     Comment: bipolar-concentrates 1-2 x day         Objective     /78   Pulse 88   Temp 97.6  F (36.4  C) (Tympanic)   Resp 20   Wt 101.6 kg (224 lb)   SpO2 98%   BMI 28.76 kg/m      Physical Exam    GENERAL APPEARANCE: healthy, alert and no distress     EYES: EOMI,  PERRL     HENT: ear canals and TM's normal and nose and mouth without ulcers or lesions     NECK: no adenopathy, no asymmetry, masses, or scars and thyroid normal to palpation     RESP: lungs clear to auscultation - no rales, rhonchi or wheezes     CV: regular rates and rhythm, normal S1 S2, no S3 or S4 and no murmur, click or rub     ABDOMEN:  soft, nontender, no HSM or masses and bowel sounds normal     MS: deferred to neurosurgery     SKIN: no suspicious lesions or rashes     NEURO: Normal strength and tone, sensory exam grossly normal, mentation intact and speech normal     PSYCH: mentation appears normal. and affect normal/bright     LYMPHATICS: No cervical adenopathy    Recent Labs   Lab Test 10/05/20  0815   HGB 15.2   PLT  342      POTASSIUM 4.1   CR 0.97        Diagnostics:  Recent Results (from the past 24 hour(s))   Basic metabolic panel  (Ca, Cl, CO2, Creat, Gluc, K, Na, BUN)    Collection Time: 09/13/22  9:12 AM   Result Value Ref Range    Sodium 138 133 - 144 mmol/L    Potassium 4.0 3.4 - 5.3 mmol/L    Chloride 106 94 - 109 mmol/L    Carbon Dioxide (CO2) 28 20 - 32 mmol/L    Anion Gap 4 3 - 14 mmol/L    Urea Nitrogen 19 7 - 30 mg/dL    Creatinine 0.92 0.66 - 1.25 mg/dL    Calcium 9.7 8.5 - 10.1 mg/dL    Glucose 104 (H) 70 - 99 mg/dL    GFR Estimate >90 >60 mL/min/1.73m2   CBC with platelets and differential    Collection Time: 09/13/22  9:12 AM   Result Value Ref Range    WBC Count 7.0 4.0 - 11.0 10e3/uL    RBC Count 5.31 4.40 - 5.90 10e6/uL    Hemoglobin 15.7 13.3 - 17.7 g/dL    Hematocrit 46.5 40.0 - 53.0 %    MCV 88 78 - 100 fL    MCH 29.6 26.5 - 33.0 pg    MCHC 33.8 31.5 - 36.5 g/dL    RDW 12.3 10.0 - 15.0 %    Platelet Count 290 150 - 450 10e3/uL    % Neutrophils 56 %    % Lymphocytes 29 %    % Monocytes 12 %    % Eosinophils 3 %    % Basophils 1 %    % Immature Granulocytes 0 %    Absolute Neutrophils 3.9 1.6 - 8.3 10e3/uL    Absolute Lymphocytes 2.0 0.8 - 5.3 10e3/uL    Absolute Monocytes 0.8 0.0 - 1.3 10e3/uL    Absolute Eosinophils 0.2 0.0 - 0.7 10e3/uL    Absolute Basophils 0.1 0.0 - 0.2 10e3/uL    Absolute Immature Granulocytes 0.0 <=0.4 10e3/uL      No EKG required, no history of coronary heart disease, significant arrhythmia, peripheral arterial disease or other structural heart disease.    Revised Cardiac Risk Index (RCRI):  The patient has the following serious cardiovascular risks for perioperative complications:   - No serious cardiac risks = 0 points     RCRI Interpretation: 0 points: Class I (very low risk - 0.4% complication rate)           Signed Electronically by: Zaynab Stover PA-C  Copy of this evaluation report is provided to requesting physician.

## 2022-09-13 NOTE — PATIENT INSTRUCTIONS
Preparing for Your Surgery  Getting started  A nurse will call you to review your health history and instructions. They will give you an arrival time based on your scheduled surgery time. Please be ready to share:    Your doctor's clinic name and phone number    Your medical, surgical and anesthesia history    A list of allergies and sensitivities    A list of medicines, including herbal treatments and over-the-counter drugs    Whether the patient has a legal guardian (ask how to send us the papers in advance)  Please tell us if you're pregnant--or if there's any chance you might be pregnant. Some surgeries may injure a fetus (unborn baby), so they require a pregnancy test. Surgeries that are safe for a fetus don't always need a test, and you can choose whether to have one.   If you have a child who's having surgery, please ask for a copy of Preparing for Your Child's Surgery.    Preparing for surgery    Within 30 days of surgery: Have a pre-op exam (sometimes called an H&P, or History and Physical). This can be done at a clinic or pre-operative center.  ? If you're having a , you may not need this exam. Talk to your care team.    At your pre-op exam, talk to your care team about all medicines you take. If you need to stop any medicines before surgery, ask when to start taking them again.  ? We do this for your safety. Many medicines can make you bleed too much during surgery. Some change how well surgery (anesthesia) drugs work.    Call your insurance company to let them know you're having surgery. (If you don't have insurance, call [phone].)    Call your clinic if there's any change in your health. This includes signs of a cold or flu (sore throat, runny nose, cough, rash, fever). It also includes a scrape or scratch near the surgery site.    If you have questions on the day of surgery, call your hospital or surgery center.  COVID testing  You may need to be tested for COVID-19 before having  surgery. If so, we will give you instructions.  Eating and drinking guidelines  For your safety: Unless your surgeon tells you otherwise, follow the guidelines below.    Eat and drink as usual until 8 hours before surgery. After that, no food or milk.    Drink clear liquids until 2 hours before surgery. These are liquids you can see through, like water, Gatorade and Propel Water. You may also have black coffee and tea (no cream or milk).    Nothing by mouth within 2 hours of surgery. This includes gum, candy and breath mints.    If you drink alcohol: Stop drinking it the night before surgery.    If your care team tells you to take medicine on the morning of surgery, it's okay to take it with a sip of water.  Preventing infection    Shower or bathe the night before and morning of your surgery. Follow the instructions your clinic gave you. (If no instructions, use regular soap.)    Don't shave or clip hair near your surgery site. We'll remove the hair if needed.    Don't smoke or vape the morning of surgery. You may chew nicotine gum up to 2 hours before surgery. A nicotine patch is okay.  ? Note: Some surgeries require you to completely quit smoking and nicotine. Check with your surgeon.    Your care team will make every effort to keep you safe from infection. We will:  ? Clean our hands often with soap and water (or an alcohol-based hand rub).  ? Clean the skin at your surgery site with a special soap that kills germs.  ? Give you a special gown to keep you warm. (Cold raises the risk of infection.)  ? Wear special hair covers, masks, gowns and gloves during surgery.  ? Give antibiotic medicine, if prescribed. Not all surgeries need antibiotics.  What to bring on the day of surgery    Photo ID and insurance card    Copy of your health care directive, if you have one    Glasses and hearing aides (bring cases)  ? You can't wear contacts during surgery    Inhaler and eye drops, if you use them (tell us about these when  you arrive)    CPAP machine or breathing device, if you use them    A few personal items, if spending the night    If you have . . .  ? A pacemaker, ICD (cardiac defibrillator) or other implant: Bring the ID card.  ? An implanted stimulator: Bring the remote control.  ? A legal guardian: Bring a copy of the certified (court-stamped) guardianship papers.  Please remove any jewelry, including body piercings. Leave jewelry and other valuables at home.  If you're going home the day of surgery    You must have a responsible adult drive you home. They should stay with you overnight as well.    If you don't have someone to stay with you, and you aren't safe to go home alone, we may keep you overnight. Insurance often won't pay for this.  After surgery  If it's hard to control your pain or you need more pain medicine, please call your surgeon's office.  Questions?   If you have any questions for your care team, list them here: _________________________________________________________________________________________________________________________________________________________________________ ____________________________________ ____________________________________ ____________________________________  For informational purposes only. Not to replace the advice of your health care provider. Copyright   2003, 2019 [facility]. All rights reserved. Clinically reviewed by Gracie Alberto MD. [employer] [id number] - REV 07/21.

## 2022-09-13 NOTE — LETTER
September 13, 2022      Dipak Fuentes  [address] [address]              Dear Dipak   Your electrolytes, blood sugar and kidney function were normal.   Your blood counts and hemoglobin were normal.   Good luck with surgery!   Please call or MyChart my office with any questions or concerns.   Zaynab Stover, PAC       Resulted Orders   Basic metabolic panel  (Ca, Cl, CO2, Creat, Gluc, K, Na, BUN)   Result Value Ref Range    Sodium 138 133 - 144 mmol/L    Potassium 4.0 3.4 - 5.3 mmol/L    Chloride 106 94 - 109 mmol/L    Carbon Dioxide (CO2) 28 20 - 32 mmol/L    Anion Gap 4 3 - 14 mmol/L    Urea Nitrogen 19 7 - 30 mg/dL    Creatinine 0.92 0.66 - 1.25 mg/dL    Calcium 9.7 8.5 - 10.1 mg/dL    Glucose 104 (H) 70 - 99 mg/dL    GFR Estimate >90 >60 mL/min/1.73m2      Comment:      Effective December 21, 2021 eGFRcr in adults is calculated using the 2021 CKD-EPI creatinine equation which includes age and gender (Lara rivas al., [facility], DOI: 10.1056/CWNKcp3512040)   CBC with platelets and differential   Result Value Ref Range    WBC Count 7.0 4.0 - 11.0 10e3/uL    RBC Count 5.31 4.40 - 5.90 10e6/uL    Hemoglobin 15.7 13.3 - 17.7 g/dL    Hematocrit 46.5 40.0 - 53.0 %    MCV 88 78 - 100 fL    MCH 29.6 26.5 - 33.0 pg    MCHC 33.8 31.5 - 36.5 g/dL    RDW 12.3 10.0 - 15.0 %    Platelet Count 290 150 - 450 10e3/uL    % Neutrophils 56 %    % Lymphocytes 29 %    % Monocytes 12 %    % Eosinophils 3 %    % Basophils 1 %    % Immature Granulocytes 0 %    Absolute Neutrophils 3.9 1.6 - 8.3 10e3/uL    Absolute Lymphocytes 2.0 0.8 - 5.3 10e3/uL    Absolute Monocytes 0.8 0.0 - 1.3 10e3/uL    Absolute Eosinophils 0.2 0.0 - 0.7 10e3/uL    Absolute Basophils 0.1 0.0 - 0.2 10e3/uL    Absolute Immature Granulocytes 0.0 <=0.4 10e3/uL

## 2022-09-15 ENCOUNTER — DOCUMENTATION ONLY (OUTPATIENT)
Dept: NEUROSURGERY | Facility: CLINIC | Age: 27
End: 2022-09-15

## 2022-09-21 ENCOUNTER — ANESTHESIA EVENT (OUTPATIENT)
Dept: SURGERY | Facility: CLINIC | Age: 27
End: 2022-09-21
Payer: COMMERCIAL

## 2022-09-21 ENCOUNTER — ANESTHESIA (OUTPATIENT)
Dept: SURGERY | Facility: CLINIC | Age: 27
End: 2022-09-21
Payer: COMMERCIAL

## 2022-09-21 ENCOUNTER — APPOINTMENT (OUTPATIENT)
Dept: GENERAL RADIOLOGY | Facility: CLINIC | Age: 27
End: 2022-09-21
Attending: NEUROLOGICAL SURGERY
Payer: COMMERCIAL

## 2022-09-21 ENCOUNTER — HOSPITAL ENCOUNTER (OUTPATIENT)
Facility: CLINIC | Age: 27
Discharge: HOME | End: 2022-09-21
Attending: NEUROLOGICAL SURGERY | Admitting: NEUROLOGICAL SURGERY
Payer: COMMERCIAL

## 2022-09-21 VITALS
HEIGHT: 74 IN | WEIGHT: 227.96 LBS | DIASTOLIC BLOOD PRESSURE: 79 MMHG | OXYGEN SATURATION: 95 % | SYSTOLIC BLOOD PRESSURE: 120 MMHG | HEART RATE: 77 BPM | BODY MASS INDEX: 29.26 KG/M2 | RESPIRATION RATE: 14 BRPM | TEMPERATURE: 98.6 F

## 2022-09-21 DIAGNOSIS — M54.41 ACUTE BILATERAL LOW BACK PAIN WITH RIGHT-SIDED SCIATICA: Primary | ICD-10-CM

## 2022-09-21 DIAGNOSIS — Z98.890 H/O LUMBAR DISCECTOMY: ICD-10-CM

## 2022-09-21 DIAGNOSIS — M54.16 LUMBAR RADICULOPATHY: ICD-10-CM

## 2022-09-21 PROCEDURE — 250N000009 HC RX 250: Performed by: NEUROLOGICAL SURGERY

## 2022-09-21 PROCEDURE — 63042 LAMINOTOMY SINGLE LUMBAR: CPT | Mod: RT | Performed by: NEUROLOGICAL SURGERY

## 2022-09-21 PROCEDURE — 258N000003 HC RX IP 258 OP 636: Performed by: ANESTHESIOLOGY

## 2022-09-21 PROCEDURE — 250N000011 HC RX IP 250 OP 636: Performed by: NURSE ANESTHETIST, CERTIFIED REGISTERED

## 2022-09-21 PROCEDURE — 360N000084 HC SURGERY LEVEL 4 W/ FLUORO, PER MIN: Performed by: NEUROLOGICAL SURGERY

## 2022-09-21 PROCEDURE — 710N000012 HC RECOVERY PHASE 2, PER MINUTE: Performed by: NEUROLOGICAL SURGERY

## 2022-09-21 PROCEDURE — 272N000001 HC OR GENERAL SUPPLY STERILE: Performed by: NEUROLOGICAL SURGERY

## 2022-09-21 PROCEDURE — 710N000009 HC RECOVERY PHASE 1, LEVEL 1, PER MIN: Performed by: NEUROLOGICAL SURGERY

## 2022-09-21 PROCEDURE — 250N000005 HC OR RX SURGIFLO HEMOSTATIC MATRIX 10ML 199102S OPNP: Performed by: NEUROLOGICAL SURGERY

## 2022-09-21 PROCEDURE — 370N000017 HC ANESTHESIA TECHNICAL FEE, PER MIN: Performed by: NEUROLOGICAL SURGERY

## 2022-09-21 PROCEDURE — 999N000141 HC STATISTIC PRE-PROCEDURE NURSING ASSESSMENT: Performed by: NEUROLOGICAL SURGERY

## 2022-09-21 PROCEDURE — 250N000011 HC RX IP 250 OP 636: Performed by: ANESTHESIOLOGY

## 2022-09-21 PROCEDURE — 69990 MICROSURGERY ADD-ON: CPT | Mod: 59 | Performed by: NEUROLOGICAL SURGERY

## 2022-09-21 PROCEDURE — 250N000011 HC RX IP 250 OP 636: Performed by: PHYSICIAN ASSISTANT

## 2022-09-21 PROCEDURE — 250N000009 HC RX 250: Performed by: NURSE ANESTHETIST, CERTIFIED REGISTERED

## 2022-09-21 PROCEDURE — 250N000013 HC RX MED GY IP 250 OP 250 PS 637: Performed by: ANESTHESIOLOGY

## 2022-09-21 PROCEDURE — 999N000179 XR SURGERY CARM FLUORO LESS THAN 5 MIN W STILLS: Mod: TC

## 2022-09-21 PROCEDURE — 63042 LAMINOTOMY SINGLE LUMBAR: CPT | Mod: AS | Performed by: PHYSICIAN ASSISTANT

## 2022-09-21 RX ORDER — CLINDAMYCIN PHOSPHATE 900 MG/50ML
900 INJECTION, SOLUTION INTRAVENOUS SEE ADMIN INSTRUCTIONS
Status: DISCONTINUED | OUTPATIENT
Start: 2022-09-21 | End: 2022-09-21 | Stop reason: HOSPADM

## 2022-09-21 RX ORDER — ONDANSETRON 2 MG/ML
4 INJECTION INTRAMUSCULAR; INTRAVENOUS EVERY 30 MIN PRN
Status: DISCONTINUED | OUTPATIENT
Start: 2022-09-21 | End: 2022-09-21 | Stop reason: HOSPADM

## 2022-09-21 RX ORDER — ONDANSETRON 2 MG/ML
INJECTION INTRAMUSCULAR; INTRAVENOUS PRN
Status: DISCONTINUED | OUTPATIENT
Start: 2022-09-21 | End: 2022-09-21

## 2022-09-21 RX ORDER — KETOROLAC TROMETHAMINE 30 MG/ML
INJECTION, SOLUTION INTRAMUSCULAR; INTRAVENOUS PRN
Status: DISCONTINUED | OUTPATIENT
Start: 2022-09-21 | End: 2022-09-21

## 2022-09-21 RX ORDER — HALOPERIDOL 5 MG/ML
1 INJECTION INTRAMUSCULAR
Status: DISCONTINUED | OUTPATIENT
Start: 2022-09-21 | End: 2022-09-21 | Stop reason: HOSPADM

## 2022-09-21 RX ORDER — SODIUM CHLORIDE, SODIUM LACTATE, POTASSIUM CHLORIDE, CALCIUM CHLORIDE 600; 310; 30; 20 MG/100ML; MG/100ML; MG/100ML; MG/100ML
INJECTION, SOLUTION INTRAVENOUS CONTINUOUS
Status: DISCONTINUED | OUTPATIENT
Start: 2022-09-21 | End: 2022-09-21 | Stop reason: HOSPADM

## 2022-09-21 RX ORDER — NEOSTIGMINE METHYLSULFATE 1 MG/ML
VIAL (ML) INJECTION PRN
Status: DISCONTINUED | OUTPATIENT
Start: 2022-09-21 | End: 2022-09-21

## 2022-09-21 RX ORDER — IBUPROFEN 800 MG/1
800 TABLET, FILM COATED ORAL EVERY 8 HOURS PRN
Qty: 40 TABLET | Refills: 0 | Status: SHIPPED | OUTPATIENT
Start: 2022-09-21 | End: 2023-02-02 | Stop reason: ALTCHOICE

## 2022-09-21 RX ORDER — HYDRALAZINE HYDROCHLORIDE 20 MG/ML
2.5-5 INJECTION INTRAMUSCULAR; INTRAVENOUS EVERY 10 MIN PRN
Status: DISCONTINUED | OUTPATIENT
Start: 2022-09-21 | End: 2022-09-21 | Stop reason: HOSPADM

## 2022-09-21 RX ORDER — DEXAMETHASONE SODIUM PHOSPHATE 4 MG/ML
INJECTION, SOLUTION INTRA-ARTICULAR; INTRALESIONAL; INTRAMUSCULAR; INTRAVENOUS; SOFT TISSUE PRN
Status: DISCONTINUED | OUTPATIENT
Start: 2022-09-21 | End: 2022-09-21

## 2022-09-21 RX ORDER — LIDOCAINE 40 MG/G
CREAM TOPICAL
Status: DISCONTINUED | OUTPATIENT
Start: 2022-09-21 | End: 2022-09-21 | Stop reason: HOSPADM

## 2022-09-21 RX ORDER — LABETALOL HYDROCHLORIDE 5 MG/ML
10 INJECTION, SOLUTION INTRAVENOUS
Status: DISCONTINUED | OUTPATIENT
Start: 2022-09-21 | End: 2022-09-21 | Stop reason: HOSPADM

## 2022-09-21 RX ORDER — ALBUTEROL SULFATE 0.83 MG/ML
2.5 SOLUTION RESPIRATORY (INHALATION) EVERY 4 HOURS PRN
Status: DISCONTINUED | OUTPATIENT
Start: 2022-09-21 | End: 2022-09-21 | Stop reason: HOSPADM

## 2022-09-21 RX ORDER — SENNA AND DOCUSATE SODIUM 50; 8.6 MG/1; MG/1
1 TABLET, FILM COATED ORAL 2 TIMES DAILY PRN
Qty: 40 TABLET | Refills: 0 | Status: SHIPPED | OUTPATIENT
Start: 2022-09-21 | End: 2022-10-06

## 2022-09-21 RX ORDER — CLINDAMYCIN PHOSPHATE 900 MG/50ML
900 INJECTION, SOLUTION INTRAVENOUS
Status: COMPLETED | OUTPATIENT
Start: 2022-09-21 | End: 2022-09-21

## 2022-09-21 RX ORDER — DIAZEPAM 10 MG/2ML
2.5 INJECTION, SOLUTION INTRAMUSCULAR; INTRAVENOUS
Status: DISCONTINUED | OUTPATIENT
Start: 2022-09-21 | End: 2022-09-21 | Stop reason: HOSPADM

## 2022-09-21 RX ORDER — BUPIVACAINE HYDROCHLORIDE AND EPINEPHRINE 2.5; 5 MG/ML; UG/ML
INJECTION, SOLUTION INFILTRATION; PERINEURAL PRN
Status: DISCONTINUED | OUTPATIENT
Start: 2022-09-21 | End: 2022-09-21 | Stop reason: HOSPADM

## 2022-09-21 RX ORDER — METHOCARBAMOL 500 MG/1
500 TABLET, FILM COATED ORAL 4 TIMES DAILY PRN
Qty: 40 TABLET | Refills: 0 | Status: SHIPPED | OUTPATIENT
Start: 2022-09-21 | End: 2022-09-30

## 2022-09-21 RX ORDER — LIDOCAINE HYDROCHLORIDE 10 MG/ML
INJECTION, SOLUTION INFILTRATION; PERINEURAL PRN
Status: DISCONTINUED | OUTPATIENT
Start: 2022-09-21 | End: 2022-09-21

## 2022-09-21 RX ORDER — DIMENHYDRINATE 50 MG/ML
25 INJECTION, SOLUTION INTRAMUSCULAR; INTRAVENOUS
Status: DISCONTINUED | OUTPATIENT
Start: 2022-09-21 | End: 2022-09-21 | Stop reason: HOSPADM

## 2022-09-21 RX ORDER — DIPHENHYDRAMINE HYDROCHLORIDE 50 MG/ML
25 INJECTION INTRAMUSCULAR; INTRAVENOUS EVERY 6 HOURS PRN
Status: DISCONTINUED | OUTPATIENT
Start: 2022-09-21 | End: 2022-09-21 | Stop reason: HOSPADM

## 2022-09-21 RX ORDER — FENTANYL CITRATE 50 UG/ML
25 INJECTION, SOLUTION INTRAMUSCULAR; INTRAVENOUS EVERY 5 MIN PRN
Status: DISCONTINUED | OUTPATIENT
Start: 2022-09-21 | End: 2022-09-21 | Stop reason: HOSPADM

## 2022-09-21 RX ORDER — OXYCODONE HYDROCHLORIDE 5 MG/1
5 TABLET ORAL EVERY 6 HOURS PRN
Qty: 20 TABLET | Refills: 0 | Status: SHIPPED | OUTPATIENT
Start: 2022-09-21 | End: 2022-09-30

## 2022-09-21 RX ORDER — FENTANYL CITRATE 50 UG/ML
INJECTION, SOLUTION INTRAMUSCULAR; INTRAVENOUS PRN
Status: DISCONTINUED | OUTPATIENT
Start: 2022-09-21 | End: 2022-09-21

## 2022-09-21 RX ORDER — PROPOFOL 10 MG/ML
INJECTION, EMULSION INTRAVENOUS PRN
Status: DISCONTINUED | OUTPATIENT
Start: 2022-09-21 | End: 2022-09-21

## 2022-09-21 RX ORDER — DIPHENHYDRAMINE HCL 25 MG
25 CAPSULE ORAL EVERY 6 HOURS PRN
Status: DISCONTINUED | OUTPATIENT
Start: 2022-09-21 | End: 2022-09-21 | Stop reason: HOSPADM

## 2022-09-21 RX ORDER — ACETAMINOPHEN 325 MG/1
975 TABLET ORAL ONCE
Status: COMPLETED | OUTPATIENT
Start: 2022-09-21 | End: 2022-09-21

## 2022-09-21 RX ORDER — ONDANSETRON 4 MG/1
4 TABLET, ORALLY DISINTEGRATING ORAL EVERY 30 MIN PRN
Status: DISCONTINUED | OUTPATIENT
Start: 2022-09-21 | End: 2022-09-21 | Stop reason: HOSPADM

## 2022-09-21 RX ORDER — PROPOFOL 10 MG/ML
INJECTION, EMULSION INTRAVENOUS CONTINUOUS PRN
Status: DISCONTINUED | OUTPATIENT
Start: 2022-09-21 | End: 2022-09-21

## 2022-09-21 RX ORDER — HYDROMORPHONE HCL IN WATER/PF 6 MG/30 ML
0.2 PATIENT CONTROLLED ANALGESIA SYRINGE INTRAVENOUS EVERY 5 MIN PRN
Status: DISCONTINUED | OUTPATIENT
Start: 2022-09-21 | End: 2022-09-21 | Stop reason: HOSPADM

## 2022-09-21 RX ORDER — OXYCODONE HYDROCHLORIDE 5 MG/1
5 TABLET ORAL EVERY 4 HOURS PRN
Status: DISCONTINUED | OUTPATIENT
Start: 2022-09-21 | End: 2022-09-21 | Stop reason: HOSPADM

## 2022-09-21 RX ORDER — GLYCOPYRROLATE 0.2 MG/ML
INJECTION, SOLUTION INTRAMUSCULAR; INTRAVENOUS PRN
Status: DISCONTINUED | OUTPATIENT
Start: 2022-09-21 | End: 2022-09-21

## 2022-09-21 RX ADMIN — NEOSTIGMINE METHYLSULFATE 5 MG: 1 INJECTION, SOLUTION INTRAVENOUS at 11:57

## 2022-09-21 RX ADMIN — HYDROMORPHONE HYDROCHLORIDE 1 MG: 1 INJECTION, SOLUTION INTRAMUSCULAR; INTRAVENOUS; SUBCUTANEOUS at 10:49

## 2022-09-21 RX ADMIN — PROPOFOL 200 MG: 10 INJECTION, EMULSION INTRAVENOUS at 10:49

## 2022-09-21 RX ADMIN — DEXAMETHASONE SODIUM PHOSPHATE 8 MG: 4 INJECTION, SOLUTION INTRA-ARTICULAR; INTRALESIONAL; INTRAMUSCULAR; INTRAVENOUS; SOFT TISSUE at 10:49

## 2022-09-21 RX ADMIN — PROPOFOL 50 MCG/KG/MIN: 10 INJECTION, EMULSION INTRAVENOUS at 10:49

## 2022-09-21 RX ADMIN — FENTANYL CITRATE 25 MCG: 50 INJECTION, SOLUTION INTRAMUSCULAR; INTRAVENOUS at 13:33

## 2022-09-21 RX ADMIN — FENTANYL CITRATE 100 MCG: 50 INJECTION, SOLUTION INTRAMUSCULAR; INTRAVENOUS at 10:49

## 2022-09-21 RX ADMIN — KETOROLAC TROMETHAMINE 30 MG: 30 INJECTION, SOLUTION INTRAMUSCULAR at 10:49

## 2022-09-21 RX ADMIN — FENTANYL CITRATE 25 MCG: 50 INJECTION, SOLUTION INTRAMUSCULAR; INTRAVENOUS at 13:19

## 2022-09-21 RX ADMIN — MIDAZOLAM 2 MG: 1 INJECTION INTRAMUSCULAR; INTRAVENOUS at 10:44

## 2022-09-21 RX ADMIN — ONDANSETRON HYDROCHLORIDE 4 MG: 2 INJECTION, SOLUTION INTRAVENOUS at 10:49

## 2022-09-21 RX ADMIN — GLYCOPYRROLATE 0.2 MG: 0.2 INJECTION, SOLUTION INTRAMUSCULAR; INTRAVENOUS at 10:49

## 2022-09-21 RX ADMIN — ROCURONIUM BROMIDE 50 MG: 50 INJECTION, SOLUTION INTRAVENOUS at 10:49

## 2022-09-21 RX ADMIN — FENTANYL CITRATE 25 MCG: 50 INJECTION, SOLUTION INTRAMUSCULAR; INTRAVENOUS at 13:44

## 2022-09-21 RX ADMIN — ONDANSETRON HYDROCHLORIDE 4 MG: 2 INJECTION, SOLUTION INTRAVENOUS at 11:57

## 2022-09-21 RX ADMIN — FENTANYL CITRATE 25 MCG: 50 INJECTION, SOLUTION INTRAMUSCULAR; INTRAVENOUS at 13:49

## 2022-09-21 RX ADMIN — SODIUM CHLORIDE, POTASSIUM CHLORIDE, SODIUM LACTATE AND CALCIUM CHLORIDE: 600; 310; 30; 20 INJECTION, SOLUTION INTRAVENOUS at 10:44

## 2022-09-21 RX ADMIN — LIDOCAINE HYDROCHLORIDE 50 MG: 10 INJECTION, SOLUTION INFILTRATION; PERINEURAL at 10:49

## 2022-09-21 RX ADMIN — CLINDAMYCIN PHOSPHATE 900 MG: 900 INJECTION, SOLUTION INTRAVENOUS at 10:09

## 2022-09-21 RX ADMIN — GLYCOPYRROLATE 0.8 MG: 0.2 INJECTION, SOLUTION INTRAMUSCULAR; INTRAVENOUS at 11:57

## 2022-09-21 RX ADMIN — ACETAMINOPHEN 975 MG: 325 TABLET, FILM COATED ORAL at 13:40

## 2022-09-21 RX ADMIN — OXYCODONE HYDROCHLORIDE 5 MG: 5 TABLET ORAL at 13:40

## 2022-09-21 ASSESSMENT — ACTIVITIES OF DAILY LIVING (ADL)
ADLS_ACUITY_SCORE: 35
ADLS_ACUITY_SCORE: 35

## 2022-09-21 NOTE — DISCHARGE INSTRUCTIONS
DR. ANGELITO CAMPOS M.D.                 CLINIC PHONE NUMBER:  [phone]   [facility]     GENERAL ANESTHESIA OR SEDATION ADULT DISCHARGE INSTRUCTIONS   SPECIAL PRECAUTIONS FOR 24 HOURS AFTER SURGERY    IT IS NOT UNUSUAL TO FEEL LIGHT-HEADED OR FAINT, UP TO 24 HOURS AFTER SURGERY OR WHILE TAKING PAIN MEDICATION.  IF YOU HAVE THESE SYMPTOMS; SIT FOR A FEW MINUTES BEFORE STANDING AND HAVE SOMEONE ASSIST YOU WHEN YOU GET UP TO WALK OR USE THE BATHROOM.    YOU SHOULD REST AND RELAX FOR THE NEXT 24 HOURS AND YOU MUST MAKE ARRANGEMENTS TO HAVE SOMEONE STAY WITH YOU FOR AT LEAST 24 HOURS AFTER YOUR DISCHARGE.  AVOID HAZARDOUS AND STRENUOUS ACTIVITIES.  DO NOT MAKE IMPORTANT DECISIONS FOR 24 HOURS.    DO NOT DRIVE ANY VEHICLE OR OPERATE MECHANICAL EQUIPMENT FOR 24 HOURS FOLLOWING THE END OF YOUR SURGERY.  EVEN THOUGH YOU MAY FEEL NORMAL, YOUR REACTIONS MAY BE AFFECTED BY THE MEDICATION YOU HAVE RECEIVED.    DO NOT DRINK ALCOHOLIC BEVERAGES FOR 24 HOURS FOLLOWING YOUR SURGERY.    DRINK CLEAR LIQUIDS (APPLE JUICE, GINGER ALE, 7-UP, BROTH, ETC.).  PROGRESS TO YOUR REGULAR DIET AS YOU FEEL ABLE.    YOU MAY HAVE A DRY MOUTH, A SORE THROAT, MUSCLES ACHES OR TROUBLE SLEEPING.  THESE SHOULD GO AWAY AFTER 24 HOURS.    CALL YOUR DOCTOR FOR ANY OF THE FOLLOWING:  SIGNS OF INFECTION (FEVER, GROWING TENDERNESS AT THE SURGERY SITE, A LARGE AMOUNT OF DRAINAGE OR BLEEDING, SEVERE PAIN, FOUL-SMELLING DRAINAGE, REDNESS OR SWELLING.    IT HAS BEEN OVER 8 TO 10 HOURS SINCE SURGERY AND YOU ARE STILL NOT ABLE TO URINATE (PASS WATER).      You received Toradol, an IV form of Ibuprofen (Motrin) at 10:50 AM.  Do not take any Ibuprofen products until after 4:50 PM.      Maximum acetaminophen (Tylenol) dose from all sources should not exceed 4 grams (4000 mg) per day.  You received Tylenol 975 mg at 1:40 PM; next dose anytime after 7:40 PM.

## 2022-09-21 NOTE — ANESTHESIA PREPROCEDURE EVALUATION
"Anesthesia Pre-Procedure Evaluation    Patient: Dipak Fuentes   MRN: [id number] : 1995        Procedure : Procedure(s):  Right Lumbar 5 to Sacral 1 minimally invasive redo microdiscectomy          Past Medical History:   Diagnosis Date     Bipolar 1 disorder (H)      Complication of anesthesia     panic after having anesthesia     Lumbar disc herniation with radiculopathy      Mild intermittent asthma without complication      Other chronic pain     low back and buttocks leg pain     Uncomplicated asthma     mild      Past Surgical History:   Procedure Laterality Date     DISCECTOMY LUMBAR MINIMALLY INVASIVE ONE LEVEL Right 10/7/2020    Procedure: Right L5-S1 minimally invasive microdiskectomy;  Surgeon: Juancho Oquendo MD;  Location:  OR     [facility]        Allergies   Allergen Reactions     Ceclor [Cefaclor Monohydrate]      Lamictal [Lamotrigine]      Had known side effect of\"skin eating rash\"  Jaycob lauren syndrome     Latex      PN: Converted from LW Latex Sensitivity Flag     Cefaclor Rash      Social History     Tobacco Use     Smoking status: Former Smoker     Packs/day: 0.50     Years: 5.00     Pack years: 2.50     Types: Cigarettes, Other     Smokeless tobacco: Never Used     Tobacco comment: uses nicoting gum now9 did cigs and e cigs   Substance Use Topics     Alcohol use: Not Currently     Comment: over 6 months      Wt Readings from Last 1 Encounters:   22 101.6 kg (224 lb)        Anesthesia Evaluation   Pt has had prior anesthetic. Type: General.    No history of anesthetic complications       ROS/MED HX  ENT/Pulmonary:     (+) Intermittent, asthma Treatment: Inhaler prn,      Neurologic:     (+) peripheral neuropathy, - lumbar radiculopthy.     Cardiovascular:  - neg cardiovascular ROS     METS/Exercise Tolerance:     Hematologic:  - neg hematologic  ROS     Musculoskeletal:       GI/Hepatic:  - neg GI/hepatic ROS     Renal/Genitourinary:  - neg Renal ROS     Endo:  - " neg endo ROS     Psychiatric/Substance Use:     (+) psychiatric history bipolar and other (comment) (schizoaffective disorder) Recreational drug usage: Other (Comment) (amphetamine ).    Infectious Disease:  - neg infectious disease ROS     Malignancy:  - neg malignancy ROS     Other:  - neg other ROS    (+) , H/O Chronic Pain,        Physical Exam    Airway        Mallampati: II   TM distance: > 3 FB   Neck ROM: full   Mouth opening: > 3 cm    Respiratory Devices and Support         Dental  no notable dental history         Cardiovascular   cardiovascular exam normal       Rhythm and rate: regular and normal     Pulmonary   pulmonary exam normal        breath sounds clear to auscultation       Other findings: Lab Test        09/13/22     10/05/20     02/24/20                       0912          0815          1151          WBC          7.0          8.2          10.3          HGB          15.7         15.2         15.8          MCV          88           88           88            PLT          290          342          332            Lab Test        09/13/22     10/05/20     02/24/20                       0912          0815          1151          NA           138          137          135           POTASSIUM    4.0          4.1          3.4           CHLORIDE     106          104          104           CO2          28           29           25            BUN          19           11           12            CR           0.92         0.97         0.87          ANIONGAP     4            4            6             MARIA ESTHER          9.7          8.9          9.4           GLC          104*         103*         92                       OUTSIDE LABS:  CBC:   Lab Results   Component Value Date    WBC 7.0 09/13/2022    WBC 8.2 10/05/2020    HGB 15.7 09/13/2022    HGB 15.2 10/05/2020    HCT 46.5 09/13/2022    HCT 44.3 10/05/2020     09/13/2022     10/05/2020     BMP:   Lab Results   Component Value Date      09/13/2022     10/05/2020    POTASSIUM 4.0 09/13/2022    POTASSIUM 4.1 10/05/2020    CHLORIDE 106 09/13/2022    CHLORIDE 104 10/05/2020    CO2 28 09/13/2022    CO2 29 10/05/2020    BUN 19 09/13/2022    BUN 11 10/05/2020    CR 0.92 09/13/2022    CR 0.97 10/05/2020     (H) 09/13/2022     (H) 10/05/2020     COAGS: No results found for: PTT, INR, FIBR  POC: No results found for: BGM, HCG, HCGS  HEPATIC:   Lab Results   Component Value Date    ALBUMIN 4.0 02/24/2020    PROTTOTAL 7.9 02/24/2020    ALT 89 (H) 02/24/2020    AST 22 02/24/2020    ALKPHOS 97 02/24/2020    BILITOTAL 0.5 02/24/2020     OTHER:   Lab Results   Component Value Date    MARIA ESTHER 9.7 09/13/2022    LIPASE 76 02/24/2020       Anesthesia Plan    ASA Status:  2      Anesthesia Type: General.     - Airway: ETT   Induction: Intravenous.   Maintenance: Balanced.        Consents    Anesthesia Plan(s) and associated risks, benefits, and realistic alternatives discussed. Questions answered and patient/representative(s) expressed understanding.     - Discussed: Risks, Benefits and Alternatives for BOTH SEDATION and the PROCEDURE were discussed     - Discussed with:  Patient      - Extended Intubation/Ventilatory Support Discussed: No.      - Patient is DNR/DNI Status: No    Use of blood products discussed: No .     Postoperative Care    Pain management: IV analgesics, Oral pain medications, Multi-modal analgesia.   PONV prophylaxis: Ondansetron (or other 5HT-3), Dexamethasone or Solumedrol, Background Propofol Infusion     Comments:                Deep Patterson MD

## 2022-09-21 NOTE — ANESTHESIA CARE TRANSFER NOTE
Patient: Dipak Fuentes    Procedure: Procedure(s):  Right Lumbar 5 to Sacral 1 minimally invasive redo microdiscectomy       Diagnosis: Lumbar disc herniation with radiculopathy [M51.16]  Diagnosis Additional Information: No value filed.    Anesthesia Type:   General     Note:    Oropharynx: oropharynx clear of all foreign objects  Level of Consciousness: awake and drowsy  Oxygen Supplementation: face mask    Independent Airway: airway patency satisfactory and stable  Dentition: dentition unchanged  Vital Signs Stable: post-procedure vital signs reviewed and stable  Report to RN Given: handoff report given  Patient transferred to: PACU    Handoff Report: Identifed the Patient, Identified the Reponsible Provider, Reviewed the pertinent medical history, Discussed the surgical course, Reviewed Intra-OP anesthesia mangement and issues during anesthesia, Set expectations for post-procedure period and Allowed opportunity for questions and acknowledgement of understanding      Vitals:  Vitals Value Taken Time   BP     Temp     Pulse     Resp     SpO2 99 % 09/21/22 1228   Vitals shown include unvalidated device data.    Electronically Signed By: ZENA Eagle CRNA  September 21, 2022  12:30 PM

## 2022-09-21 NOTE — ANESTHESIA POSTPROCEDURE EVALUATION
Patient: Dipak Fuentes    Procedure: Procedure(s):  Right Lumbar 5 to Sacral 1 minimally invasive redo microdiscectomy       Anesthesia Type:  General    Note:  Disposition: Outpatient   Postop Pain Control: Uneventful            Sign Out: Well controlled pain   PONV: No   Neuro/Psych: Uneventful            Sign Out: Acceptable/Baseline neuro status   Airway/Respiratory: Uneventful            Sign Out: Acceptable/Baseline resp. status   CV/Hemodynamics: Uneventful            Sign Out: Acceptable CV status; No obvious hypovolemia; No obvious fluid overload   Other NRE: NONE   DID A NON-ROUTINE EVENT OCCUR? No           Last vitals:  Vitals Value Taken Time   /78 09/21/22 1315   Temp 97.7  F (36.5  C) 09/21/22 1228   Pulse 102 09/21/22 1322   Resp 18 09/21/22 1322   SpO2 100 % 09/21/22 1322   Vitals shown include unvalidated device data.    Electronically Signed By: Deep Patterson MD  September 21, 2022  1:23 PM

## 2022-09-21 NOTE — OP NOTE
Procedure Date: 09/21/2022    PREOPERATIVE DIAGNOSIS:  Recurrent right L5-S1 herniated disk with radiculopathy.    POSTOPERATIVE DIAGNOSIS:  Recurrent right L5-S1 herniated disk with radiculopathy.    PROCEDURE:  Right L5-S1 minimally invasive redo microdiskectomy.    SURGEON:  Juancho Oquendo MD    ASSISTANT:  Cara Palafox PA-C    ANESTHESIA:  General endotracheal anesthesia.    ESTIMATED BLOOD LOSS:  10 mL    INDICATIONS FOR PROCEDURE:  The patient is a 27-year-old male with a history of previous lumbar microdiskectomy with recurrent symptoms and recurrent herniated disc on MRI.  He has failed conservative measures.  She was brought for redo lumbar microdiskectomy. Please note that Cara Palafox PA-C's assistance was needed for positioning, retraction, suctioning, and closure.     DESCRIPTION OF PROCEDURE:  The patient was brought to the operating room.  General endotracheal anesthesia was induced.  The patient was rolled into the prone position on the Terell frame.  The back was prepped and draped in sterile fashion.  The previous incision was opened and the right sided approach was performed using the METRx tubular dilating system and the level confirmed with fluoroscopy.  The microscope was sterilely draped and brought into the field and the laminotomy and medial facetectomy was performed exposing the edge of the thecal sac.  There was significant adherent scar tissue in the area, but after extensive dissection, the nerve root was identified going around the pedicle of S1.  The disk space was incised with a 15 blade and multiple hard disk fragments were removed with care taken to dissect these free underneath the nerve root.  Once the area was well decompressed and hemostasis was achieved, the fascia was closed with 0 Vicryl, 2-0 inverted interrupted Vicryl was used for subcutaneous layer, 4-0 subcuticular Monocryl was used for skin and Exofin placed as a dressing.  The patient was awakened, extubated, and taken  to the recovery room in good condition.    Juancho Oquenod MD        D: 2022   T: 2022   MT: ARIADNA    Name:     AMBER CASTILLO  MRN:      [id number]        Account:        [id number]   :      1995           Procedure Date: 2022     Document: [id number]

## 2022-09-21 NOTE — ANESTHESIA PROCEDURE NOTES
Airway       Patient location during procedure: OR       Procedure Start/Stop Times: 9/21/2022 10:51 AM  Staff -        CRNA: Ashly Santos APRN CRNA       Performed By: CRNA  Consent for Airway        Urgency: elective  Indications and Patient Condition       Indications for airway management: justin-procedural       Induction type:intravenous       Mask difficulty assessment: 1 - vent by mask    Final Airway Details       Final airway type: endotracheal airway       Successful airway: ETT - single  Endotracheal Airway Details        ETT size (mm): 8.0       Cuffed: yes       Successful intubation technique: direct laryngoscopy       DL Blade Type: Moreau 2       Grade View of Cords: 1       Adjucts: stylet       Position: Right       Measured from: gums/teeth       Secured at (cm): 24       Bite block used: None    Post intubation assessment        Placement verified by: capnometry, equal breath sounds and chest rise        Number of attempts at approach: 1       Number of other approaches attempted: 0       Secured with: plastic tape       Ease of procedure: easy       Dentition: Intact and Unchanged    Medication(s) Administered   Medication Administration Time: 9/21/2022 10:51 AM

## 2022-09-21 NOTE — BRIEF OP NOTE
[facility] [place]    Brief Operative Note    Pre-operative diagnosis: Lumbar disc herniation with radiculopathy [M51.16]  Post-operative diagnosis Same as pre-operative diagnosis    Procedure: Procedure(s):  Right Lumbar 5 to Sacral 1 minimally invasive redo microdiscectomy  Surgeon: Surgeon(s) and Role:     * Juancho Oquendo MD - Primary     * Cara Palafox PA-C - Assisting  Anesthesia: General   Estimated Blood Loss: 10ml    Drains: None  Specimens: * No specimens in log *  Findings:   None.  Complications: None.  Implants: * No implants in log *      [id number]

## 2022-09-22 ENCOUNTER — TELEPHONE (OUTPATIENT)
Dept: NEUROSURGERY | Facility: CLINIC | Age: 27
End: 2022-09-22

## 2022-09-22 NOTE — TELEPHONE ENCOUNTER
Post-Op Call    DOS: 9/21/22  Surgery: Right L5-S1 minimally invasive redo microdiskectomy  Surgeon: Dr. Oquendo    Attempted to reach out to patient, no answer. Left voice message for patient to call clinic back with any questions or concerns.

## 2022-09-30 ENCOUNTER — TELEPHONE (OUTPATIENT)
Dept: NEUROSURGERY | Facility: CLINIC | Age: 27
End: 2022-09-30

## 2022-09-30 DIAGNOSIS — M54.41 ACUTE BILATERAL LOW BACK PAIN WITH RIGHT-SIDED SCIATICA: ICD-10-CM

## 2022-09-30 RX ORDER — METHYLPREDNISOLONE 4 MG
TABLET, DOSE PACK ORAL
Qty: 21 TABLET | Refills: 0 | Status: SHIPPED | OUTPATIENT
Start: 2022-09-30 | End: 2022-12-22

## 2022-09-30 RX ORDER — OXYCODONE HYDROCHLORIDE 5 MG/1
5 TABLET ORAL EVERY 6 HOURS PRN
Qty: 20 TABLET | Refills: 0 | Status: SHIPPED | OUTPATIENT
Start: 2022-09-30 | End: 2022-10-06

## 2022-09-30 RX ORDER — METHOCARBAMOL 500 MG/1
500 TABLET, FILM COATED ORAL 4 TIMES DAILY PRN
Qty: 40 TABLET | Refills: 0 | Status: SHIPPED | OUTPATIENT
Start: 2022-09-30 | End: 2022-10-06

## 2022-09-30 NOTE — TELEPHONE ENCOUNTER
Patient is having post op concerns and would like a call back. He would also like refill of oxyCODONE.

## 2022-09-30 NOTE — TELEPHONE ENCOUNTER
Spouse is calling in regards to her paperwork for disability for her 's surgery. She dropped the form off here in the office about a week & a half ago & her work says they never received it.  Can someone please follow up with this & get back to Bina as soon as possible?  Thank you.

## 2022-09-30 NOTE — TELEPHONE ENCOUNTER
Called to update about the paperwork and the medications that were refilled and added MDP.     Patient unable to take MDP due to behavoral changes.  So he declines this medication. Patient will continue to monitor and update nsgy office as needed. He has 2 week rn visit next Wednesday.     Paperwork scanned to email

## 2022-09-30 NOTE — TELEPHONE ENCOUNTER
Patient calling for a refill of Oxycodone. Calls with increased pain and numbness post op. Try MDP?     DOS: 9/21/22  Surgery: Right L5-S1 minimally invasive redo microdiskectomy  Surgeon: Dr. Oquendo  POD: 9    Before surgery: pain is similar before     Current symptom(s): Severe pain in back when stands straight. Radiates into the butt and down the back of the right leg.   Legs numb and tingling from the knee down and the numbess moves into thighs when he lays down.   No pain in left leg  Muscle spasms in bilateral calf.  Pain prior to surgery is the same pattern but worse now post op.  Numbness prior to surgery was only in feet and ankles  Reports he is needing to use a cane now as he can't walk far Denies weakness or foot drag    Current pain management:   Oxycodone: 1-3 per day, asks for refill   Zanaflex: 2-3 per day   Robaxin: 3-4 times per day  Tylenol: prn  Ibuprofen: prn    Last fill: 9/21/22  Next visit: 10/6/22    Medication pended for your approval, if appropriate. Pharmacy verified.     Any patient questions or concerns: Routed to provider to address increased pain/numbness. MDP?    Informed patient request will be forwarded to care team.

## 2022-09-30 NOTE — TELEPHONE ENCOUNTER
Provider approved refills and MDP. Per patiient he is unale to take MDP due to side effects.   Patient will continue to monitor.  Reviewed red flag symptoms. Patient denies new numbness, tingling, weakness, foot drag/drop, saddle anesthesia, incontinence, intractable pain, or shortness of breath. Informed patient to seek emergency care should these symptoms arise.

## 2022-10-06 ENCOUNTER — OFFICE VISIT (OUTPATIENT)
Dept: NEUROSURGERY | Facility: CLINIC | Age: 27
End: 2022-10-06
Attending: NEUROLOGICAL SURGERY
Payer: COMMERCIAL

## 2022-10-06 VITALS
HEART RATE: 81 BPM | TEMPERATURE: 98.6 F | OXYGEN SATURATION: 99 % | DIASTOLIC BLOOD PRESSURE: 89 MMHG | SYSTOLIC BLOOD PRESSURE: 148 MMHG

## 2022-10-06 DIAGNOSIS — Z98.890 H/O LUMBAR DISCECTOMY: ICD-10-CM

## 2022-10-06 DIAGNOSIS — Z98.890 S/P LUMBAR MICRODISCECTOMY: Primary | ICD-10-CM

## 2022-10-06 DIAGNOSIS — Z98.890 S/P LUMBAR MICRODISCECTOMY: ICD-10-CM

## 2022-10-06 DIAGNOSIS — M54.41 ACUTE BILATERAL LOW BACK PAIN WITH RIGHT-SIDED SCIATICA: ICD-10-CM

## 2022-10-06 DIAGNOSIS — R32 URINARY INCONTINENCE: Primary | ICD-10-CM

## 2022-10-06 DIAGNOSIS — M54.16 LUMBAR RADICULOPATHY: ICD-10-CM

## 2022-10-06 PROCEDURE — 99207 PR NO CHARGE NURSE ONLY: CPT

## 2022-10-06 PROCEDURE — G0463 HOSPITAL OUTPT CLINIC VISIT: HCPCS

## 2022-10-06 RX ORDER — OXYCODONE HYDROCHLORIDE 5 MG/1
5 TABLET ORAL EVERY 6 HOURS PRN
Qty: 20 TABLET | Refills: 0 | Status: SHIPPED | OUTPATIENT
Start: 2022-10-06 | End: 2022-11-22

## 2022-10-06 RX ORDER — SENNA AND DOCUSATE SODIUM 50; 8.6 MG/1; MG/1
1 TABLET, FILM COATED ORAL 2 TIMES DAILY PRN
Qty: 40 TABLET | Refills: 0 | Status: SHIPPED | OUTPATIENT
Start: 2022-10-06 | End: 2023-03-02

## 2022-10-06 ASSESSMENT — PAIN SCALES - GENERAL: PAINLEVEL: MODERATE PAIN (4)

## 2022-10-06 NOTE — NURSING NOTE
Per Cara Palafox PA-C would like MRI either tonight or tomorrow. [place] booked. Called [facility]. [place] has opening 10/7/22 at 0930. Scheduled patient. Writer provided patient's insurance info and  verified no auth needed for MRI.     Called and updated patient. Sent mychart with all the appt details. Reviewed red flag symptoms.     Faxed STAT lumbar MRI order to [facility] October 6, 2022 to fax number [phone]    Right Fax confirmed at 4:39 PM

## 2022-10-06 NOTE — CONFIDENTIAL NOTE
Post-op Nurse Visit:    Patient presents today s/p Right L5-S1 minimally invasive redo microdiskectomy  on 9/21/22 with Juancho Oquendo MD    Pain/Neuro Assessment  4-5/10 pain while on medication and once it wears off about 6-7/10. Reports low back Pain, right buttock, hip and radiates to RLE, diffficult to bear weight on right leg, using cane for ambulation. Described as stabbing pain to right groin, stabbing pain near belly button, pain in both areas not constant but happens at random times, sometimes concurrently. Patient said he has the same pain as pre op but worse post op.Said his weakness and nerve pain in RLE progressively getting worse than it was pre op. Reports hard to discern when he needs to urinate, has a faint sensation when he needs to go. One episode of incontinence today 2-3 hours ago, this is a new symptom. He mentioned he did not know he urinated until he felt it down his leg. Cara Palafox PA-C updated of red flag symptom.   Numbness/tingling: BLE n/t from knees down. He said he noticed cold feet prep op after 1st surgery and progressively gotten worst since 2nd surgery.  Strength: Reports RLE weakness. Weakness noted in RLE on plantar and dorsi flexion.    Pain Relief Measures:  Oxycodone:2-3 tabs per day  Tylenol: prn  Ibuprofen 800 mg as prescribed  Robaxin: reports he received a recent Rx and has been taking this along with tizanidine. He said he's not taking them together, he's alternating. Writer updated patient that typically it would be only one muscle relaxer not both. Writer confirmed this with Cara Palafox PA-C. Patient felt the tizanidine was more effective. Discontinued Robaxin.   Ice: yes     Incision   Incision inspected. Edges well-approximated. No redness, swelling, drainage, or warmth noted. Mild scabbing. Exofin nearly gone. Incision healing well.     Activity  Following restrictions   Falls: none  Patient is walking frequently with difficulty if not using cane, can only walk 5  feet but with cane is able to walk longer distances.   Legs examined in clinic; no redness, swelling, or warmth noted. Patient denies any pain in bilateral calves.   Wearing brace? N/A    GI/  Difficulty swallowing? No  Patient's appetite is normal  Bowel/bladder problems? Yes, episode of urinary incontinence, that's new and happened a few hours ago.   Taking stool softeners? Yes     Cara Palafox PA-C came in to evaluate patient. Please refer to her note for further details.     Refills/x-rays/return to work  Refills given at this appointment? Yes, refilled tizanidine, oxycodone and senna   Sent for x-rays after this appointment? No  Ordered future x-rays? N/A  Return to work discussed at this appointment? Yes , patient will contact our office when needing a RTW letter. He works as IT tech.     All of patient's questions addressed today. Patient was instructed to call with any additional questions/concerns.       [facility]  [facility]  [address] [place] [address]  Telephone:  [phone]   Fax:  [phone]

## 2022-10-06 NOTE — PROGRESS NOTES
Progress note     S/p Right L5-S1 minimally invasive redo microdiskectomy DOS 9/21/22  Pre op had 3+/5 with right plantarflexion, 5/5 DF on the right    Since surgery, patient has noted progressive worsening in right ankle weakness. He notes pain distribution is similar down his RLE but has increased in intensity.   He recently lost the urge of when he has to urinate (started today). He will set timers for every 30 minutes to  front of the toilet to go. He notes he feels he is voiding in less amounts than normal. He notes he has sensation WHEN he is voiding and WHEN he is wiping after bowel movement. He had episode today where he was incontinent of urine and did not notice until he felt it running down his legs. This has never happened before. Denies prior urologic history, changes in medications, new medications. Continues taking oxycodone three times daily which he has done since surgery.     Denies incision changes, fever erythema, drainage. Denies RLE symptoms, recent falls or trauma.     PE:   5/5 LLE, 3/5 right PF, 4/5 right DF, 5/5 at right hip and knee   Walking with walker   Incision healing well without evidence of erythema, fluctuance, drainage     PLAN:   -clinical history and plans reviewed myself as well as with Dr. Oquendo and Emily Morales RN. Due to new symptoms of incontinence, will recommend updated lumbar MRI. Our office will work on a time to obtain MRI within upcoming days. Reviewed with patient if symptoms significantly worsen, recommend he present to ED sooner. Dr. Oquendo has reviewed and in agreement with all above stated plans. Patient and Dr. Oquendo in agreement.     Full details noted in Emily Morales RN note    Cara MARTIN [facility]  [address]  [address] [place] [id number]    Tel [phone]

## 2022-10-06 NOTE — PATIENT INSTRUCTIONS
Instructions for Patient    Incision   Keep your incision clean and dry at all times.   It is okay to shower, just pat the incision dry   No submerging incision in water such as pools, hot tubs, or baths for at least 8 weeks and until the incision is healed  Do not apply lotions or ointments to incision    Activity  No lifting greater than 10 pounds. Limited bending, twisting, or overhead reaching.  Walking is the best way to start exercise after surgery. Take short frequent walks. You may gradually increase the distance as tolerated. If you feel pain, decrease your activity, but DO NOT stop walking. Walking will help you gain strength, prevent muscle soreness and spasms, and prevent blood clots.   Avoid bed rest and prolonged sitting for longer than 30 minutes (change positions frequently while awake)  No contact sports or high impact activities such as; running/jogging, snowmobile or 4 hogan riding or any other recreational vehicles until after given clearance at one of your follow up visits    Medications   Refills of pain medication:   Please call the neurosurgery clinic to request 2-3 days before you run out  Weaning from narcotic pain medications  When it is time, start weaning by extending the time between doses.   For example, if you're taking 2 tabs every 4 hours, spread it out to 2 tabs over 4.5, 5, 6 hours. At that point you can certainly cut down to 1 tab, then wean to an as needed basis until completely done with them.Refills: call our clinic 2-3 business days before you are out of medication. A nurse will call you back to obtain a pain assessment.   Don't take more than 3000mg of Acetaminophen in 24 hours  Ok to begin taking Aspirin and NSAIDs (ex: ibuprofen/Advil)  Encouraged icing for at least 3-4 times throughout the day for 20-30 minutes at a time. Avoid heat to the incision area.   Taking stool softeners regularly can reduce constipation commonly caused by narcotic pain medications.    Contact  clinic or Emergency Room if you develop:   Infection (redness, swelling, warmth, drainage, fever over 101 F)  New injury  Bladder or bowel changes or loss of control    Signs of blood clot:  Swelling and/or warmth in one or both legs  Pain or tenderness in your leg, ankle, foot, or arm   Red or discolored skin     Go to the Emergency Room   If sudden onset of severe headache, weakness, confusion, change in level of consciousness, pain, or loss of movement.  Chest pain  Trouble breathing     Post-operative appointments  6 week and 3 months post-op    [facility]  [facility]  [address]  [place] [address]  Telephone:  [phone]   Fax:  [phone]

## 2022-10-06 NOTE — LETTER
10/6/2022         RE: Dipak Fuentes  [address]  [facility] [address]        Dear Colleague,    Thank you for referring your patient, Dipak Fuentes, to the [facility] NEUROSURGERY CLINIC [place]. Please see a copy of my visit note below.    Progress note     S/p Right L5-S1 minimally invasive redo microdiskectomy DOS 9/21/22  Pre op had 3+/5 with right plantarflexion, 5/5 DF on the right    Since surgery, patient has noted progressive worsening in right ankle weakness. He notes pain distribution is similar down his RLE but has increased in intensity.   He recently lost the urge of when he has to urinate (started today). He will set timers for every 30 minutes to  front of the toilet to go. He notes he feels he is voiding in less amounts than normal. He notes he has sensation WHEN he is voiding and WHEN he is wiping after bowel movement. He had episode today where he was incontinent of urine and did not notice until he felt it running down his legs. This has never happened before. Denies prior urologic history, changes in medications, new medications. Continues taking oxycodone three times daily which he has done since surgery.     Denies incision changes, fever erythema, drainage. Denies RLE symptoms, recent falls or trauma.     PE:   5/5 LLE, 3/5 right PF, 4/5 right DF, 5/5 at right hip and knee   Walking with walker   Incision healing well without evidence of erythema, fluctuance, drainage     PLAN:   -clinical history and plans reviewed myself as well as with Dr. Oquendo and Emily Morales RN. Due to new symptoms of incontinence, will recommend updated lumbar MRI. Our office will work on a time to obtain MRI within upcoming days. Reviewed with patient if symptoms significantly worsen, recommend he present to ED sooner. Dr. Oquendo has reviewed and in agreement with all above stated plans. Patient and Dr. Oquendo in agreement.     Full details noted in Emily Morales RN note    Cara  Vivienne MARTÍNEZ  [facility] Neurosurgery  [facility]  [address] [place] [id number]    Tel [phone]          Again, thank you for allowing me to participate in the care of your patient.        Sincerely,         Spine/Brain Nurse

## 2022-10-07 ENCOUNTER — TELEPHONE (OUTPATIENT)
Dept: NEUROSURGERY | Facility: CLINIC | Age: 27
End: 2022-10-07

## 2022-10-07 ENCOUNTER — DOCUMENTATION ONLY (OUTPATIENT)
Dept: NEUROSURGERY | Facility: CLINIC | Age: 27
End: 2022-10-07

## 2022-10-07 NOTE — PROGRESS NOTES
Contacted patient to review lumbar MRI.  No concerning findings.  Follow up as scheduled.    JANET Haynes  [facility] Neurosurgery  [address] [address]    Tel [phone]  Pager [phone]

## 2022-10-07 NOTE — TELEPHONE ENCOUNTER
Called [facility] to push MRI lumbar over to  PACS and fax report.     Image attached in PACS. Message sent to Dr Oquendo and on call ALFREDITO to review results and update patient. Report forwarded to Dr. Oquendo and ALFREDITO.

## 2022-10-15 ENCOUNTER — HEALTH MAINTENANCE LETTER (OUTPATIENT)
Age: 27
End: 2022-10-15

## 2022-10-18 ENCOUNTER — TELEPHONE (OUTPATIENT)
Dept: NEUROSURGERY | Facility: CLINIC | Age: 27
End: 2022-10-18

## 2022-10-18 DIAGNOSIS — M54.16 LUMBAR RADICULOPATHY: Primary | ICD-10-CM

## 2022-10-18 NOTE — TELEPHONE ENCOUNTER
Patient would like a call back - his sciatic pain is still not getting better and he is concerned. Please call him back at [phone]. Thank you~

## 2022-10-18 NOTE — TELEPHONE ENCOUNTER
"s/p Right L5-S1 minimally invasive redo microdiskectomy  on 9/21/22 with Juancho Oquendo MD  Last Visit: 10/6  Patient was assessed by Cara Palafox PA-C  At last RN visit due to symptoms. Updated MRI was ordered.  Per Lucia Carlos PA-C on 10/7  MRI showed \"No concerning findings.  Follow up as scheduled\".   Next Visit: 11/4    Assessment: Patient calls today concerned that his symptoms remain unchanged. He states the continued nerve pain in his low back and down his right leg are effecting his daily life. He continues to not be able to walk long distances and is still using a cane due to his right leg feeling like it will give out. He is not having any new symptoms, but is anxious that his symptoms have not improved at all.   He states he cannot take an MDP due to side effects. Writer also asked if he has ever used gabapentin, he states he used Lamictal before and got a bad rash so would be concerned  Trying a similar medication.   Advised patient that nerves can take longer to heal than normal incisional pain. He continues to use the oxycodone and zanaflex and states its \"better than nothing for his pain\".   Recommendation given: Will route to care team to see if they have any further recommendations for patient               "

## 2022-10-18 NOTE — TELEPHONE ENCOUNTER
Cara Palafox PA-C states she will review with Dr. Flores tomorrow and provide update/recommendations at that time.

## 2022-10-20 NOTE — TELEPHONE ENCOUNTER
Per Cara Palafox PA-C, she  reviewed MRI with Dr. Flores again   Dr. Oquendo has already reviewed it   No new findings on MRI to suggest NEW compression on the nerve or anything to do surgically.  -They would recommend gabapentin 300 TID to assist with nerve pain   -They also said patient can start physical therapy if he wishes to assist with stretching exercises.    Called patient to update on above. Patient states he has done PT prior and still knows the exercises. He does not need updated PT order at this time.   He is concerned about trying gabapentin due to his side effects from Lamictal. 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 Will route to provider to see if there are concerns with gabapentin due to Lamictal allergy.

## 2022-10-24 RX ORDER — GABAPENTIN 300 MG/1
300 CAPSULE ORAL 3 TIMES DAILY
Qty: 40 CAPSULE | Refills: 0 | Status: SHIPPED | OUTPATIENT
Start: 2022-10-24 | End: 2022-11-22

## 2022-10-24 NOTE — TELEPHONE ENCOUNTER
Cara Palafox PA-C reviewed with pharmacist who confirmed they are 2 different products and therefore should be OK, this was also reviewed with another pharmacist.    However, gabapentin like any other medication can cause side effects so she recommends he still look out for any adverse effects.     Called patient to provide update. Patient would like to try gabapentin. Medication sent to patients preferred pharmacy. Patient would also like to try PT at this time, order placed.     Advised patient to contact our clinic with further questions or concerns.

## 2022-10-31 ENCOUNTER — THERAPY VISIT (OUTPATIENT)
Dept: PHYSICAL THERAPY | Facility: CLINIC | Age: 27
End: 2022-10-31
Attending: PHYSICIAN ASSISTANT
Payer: COMMERCIAL

## 2022-10-31 DIAGNOSIS — M54.16 LUMBAR RADICULOPATHY: ICD-10-CM

## 2022-10-31 PROBLEM — M54.41 ACUTE BILATERAL LOW BACK PAIN WITH RIGHT-SIDED SCIATICA: Status: RESOLVED | Noted: 2022-08-30 | Resolved: 2022-10-31

## 2022-10-31 PROCEDURE — 97162 PT EVAL MOD COMPLEX 30 MIN: CPT | Mod: GP | Performed by: PHYSICAL THERAPIST

## 2022-10-31 PROCEDURE — 97110 THERAPEUTIC EXERCISES: CPT | Mod: GP | Performed by: PHYSICAL THERAPIST

## 2022-10-31 NOTE — PROGRESS NOTES
Patient seen for one time evaluation and treatment.  Patient did not return for further treatment and current status is unknown.  Please see initial evaluation for further information.  Pt underwent surgery and will return for post op care.

## 2022-10-31 NOTE — PROGRESS NOTES
Physical Therapy Initial Evaluation  Subjective:  The history is provided by the patient. No  was used.   Therapist Generated HPI Evaluation  Problem details: The patient underwent a minimally invasive redo lumbar microdiscectomy on 9/21/22. He has a hx of lumbar microdiscectomy from 2020 that went well. He reported back to PT for one visit before his most recent surgery and was experiencing a significant amount of pain , then underwent his redo procedure. The patient reports that since surgery, he has experienced urinary incontinence, R LE weakness, pain and N/T. He reports that he is unable to walk without use of his cane and feels that he is unable to use his R LE much. Unable to drive due to not being able to control his R LE well. He underwent an MRI after surgery when he began experiencing these symptoms, but per chart review and per patient, there was nothing of concern on the MRI. The aptient reports that there has been no improvement in his pain or radicular/urinary symptoms over the past several weeks. Patient is growing concerned. Unable to sit for prolonged time, unable to stand or walk without significant difficulty. The patient also does report saddle anesthesia that has been ongoing since he saw the MD last. .         Type of problem:  Lumbar.    This is a chronic condition.      Patient reports pain:  Lumbar spine right.  Pain is described as aching   Pain radiates to:  Gluteals right and thigh right. Pain is the same all the time.  Since onset symptoms are unchanged.  Associated symptoms:  Loss of motion/stiffness, incontinence, loss of strength, numbness and tingling. Symptoms are exacerbated by bending, sitting, standing, carrying, certain positions, stress, lifting, twisting, lying down and walking  and relieved by nothing.  Special tests included:  X-ray and MRI.  Previous treatment includes surgery and physical therapy. There was mild improvement following previous  treatment.  Restrictions due to condition include:  Working in normal job without restrictions.  Barriers include:  None as reported by patient.    Patient Health History  Dipak Fuentes being seen for can't put weight on R leg. After surgery.     Date of Onset: 3 months ago.   Problem occurred: falling on boat and then surgery    Pain is reported as 6/10 on pain scale.  General health as reported by patient is fair.  Pertinent medical history includes: asthma, mental illness and smoking.   Red flags:  Changes in bowel and bladder habits and pain at rest/night.  Medical allergies: adhesives.   Surgeries include:  Orthopedic surgery. Other surgery history details: 2 discectomies .    Current medications:  Anti-seizure medication.    Current occupation is IT .   Primary job tasks include:  Computer work and prolonged sitting.                                    Objective:    Gait:  Pt utilizing SPC on R Side. Significant lateral trunk lean and inability to put much weight through involved limb. Trialed cane on left side, but increased pain throughout R LE.     Gait Type:  Antalgic   Assistive Devices:  Cane                 Lumbar/SI Evaluation  ROM:    AROM Lumbar:   Flexion:        Unable to perform   Ext:                    Unable to stand erect    Side Bend:        Left:     Right:   Rotation:           Left:     Right:   Side Glide:        Left:     Right:           Lumbar Myotomes:    T12-L3 (Hip Flex):  Left: 5    Right: 3+  L2-4 (Quads):  Left:  5    Right:  3+  L4 (Ankle DF):  Left:  5    Right:  3+  L5 (Great Toe Ext): Left: 5    Right: 3+   S1 (Toe Raise):  Left: 5    Right: 3+        Neural Tension/Mobility:      Right side:   Slump and SLR positive.  Lumbar Palpation:      Tenderness present at Right: Quadratus Lumborum; Erector Spinae; Piriformis and PSIS                                                     General     ROS  Evaluation limited due to patient's high level of pain and being highly symptomatic.  The patient is unable to perform any prolonged positioning. Hooklying Transverse abdominus and gluteal sets trialed with increased LB pain and radicular symptoms.     Assessment/Plan:    Patient is a 27 year old male with lumbar complaints.    Patient has the following significant findings with corresponding treatment plan.                Diagnosis 1:  S/P Lumbar Microdiscectomy   Pain -  hot/cold therapy, US, electric stimulation, manual therapy and home program  Decreased ROM/flexibility - manual therapy and therapeutic exercise  Decreased joint mobility - manual therapy and therapeutic exercise  Decreased strength - therapeutic exercise and therapeutic activities  Impaired gait - gait training  Impaired muscle performance - neuro re-education    Therapy Evaluation Codes:   1) History comprised of:   Personal factors that impact the plan of care:      Age, Anxiety, Overall behavior pattern, Past/current experiences, Social history/culture, Time since onset of symptoms and Work status.    Comorbidity factors that impact the plan of care are:      Asthma, Bowel/bladder changes, Depression, Mental illness, Pain at night/rest, Smoking and Weakness.     Medications impacting care: Pain.  2) Examination of Body Systems comprised of:   Body structures and functions that impact the plan of care:      Ankle, Hip, Knee, Lumbar spine and Sacral illiac joint.   Activity limitations that impact the plan of care are:      Bathing, Bending, Driving, Dressing, Lifting, Reading/Computer work, Running, Sitting, Squatting/kneeling, Stairs, Standing, Walking, Working, Sleeping and Urinary incontinence.  3) Clinical presentation characteristics are:   Evolving/Changing.  4) Decision-Making    Moderate complexity using standardized patient assessment instrument and/or measureable assessment of functional outcome.  Cumulative Therapy Evaluation is: Moderate complexity.    Previous and current functional limitations:  (See Goal Flow  Sheet for this information)    Short term and Long term goals: (See Goal Flow Sheet for this information)     Communication ability:  Patient appears to be able to clearly communicate and understand verbal and written communication and follow directions correctly.  Treatment Explanation - The following has been discussed with the patient:   RX ordered/plan of care  Anticipated outcomes  Possible risks and side effects  This patient would benefit from PT intervention to resume normal activities.   Rehab potential is fair.     Frequency:  1 X week, once daily  Duration:  for 6 weeks  Discharge Plan:  Achieve all LTG.  Independent in home treatment program.  Reach maximal therapeutic benefit.    Please refer to the daily flowsheet for treatment today, total treatment time and time spent performing 1:1 timed codes.

## 2022-11-01 PROBLEM — M54.16 LUMBAR RADICULOPATHY: Status: ACTIVE | Noted: 2022-11-01

## 2022-11-04 ENCOUNTER — OFFICE VISIT (OUTPATIENT)
Dept: NEUROSURGERY | Facility: CLINIC | Age: 27
End: 2022-11-04
Attending: PHYSICIAN ASSISTANT
Payer: COMMERCIAL

## 2022-11-04 VITALS — SYSTOLIC BLOOD PRESSURE: 122 MMHG | DIASTOLIC BLOOD PRESSURE: 83 MMHG | HEART RATE: 84 BPM | OXYGEN SATURATION: 100 %

## 2022-11-04 DIAGNOSIS — Z98.890 S/P LUMBAR MICRODISCECTOMY: Primary | ICD-10-CM

## 2022-11-04 PROCEDURE — 99024 POSTOP FOLLOW-UP VISIT: CPT | Performed by: PHYSICIAN ASSISTANT

## 2022-11-04 PROCEDURE — G0463 HOSPITAL OUTPT CLINIC VISIT: HCPCS

## 2022-11-04 ASSESSMENT — PAIN SCALES - GENERAL: PAINLEVEL: MODERATE PAIN (4)

## 2022-11-04 NOTE — PROGRESS NOTES
"NEUROSURGERY CLINIC PROGRESS NOTE    DATE OF VISIT: 11/4/2022    HPI:     Dipak Fuentes is a pleasant 27 year old male who presents to the clinic today for a six-week post-operative follow-up visit. On 09/21/2022 the patient underwent a right L5-S1 minimally invasive redo microdiskectomy with Dr. Oquendo for recurrent right L5-S1 herniated disk with radiculopathy. Per chart review, the patient's pre-operative symptoms consisted of a daily with fluctuating intensity, sharp, aching, burning pain that would initiates in the midline low lumbar region and radiates distally in the right S1 distribution. This pain was  accompanied by paresthesia, numbness and perceived weakness in the same distribution.  Today, the patient reports that he really has not obtained much alleviation at this point. In fact, he is still experiencing some urinary incontinence. He is also walking with a cane.     Current Outpatient Medications   Medication     gabapentin (NEURONTIN) 300 MG capsule     ibuprofen (ADVIL/MOTRIN) 800 MG tablet     methylPREDNISolone (MEDROL DOSEPAK) 4 MG tablet therapy pack     oxyCODONE (ROXICODONE) 5 MG tablet     SENNA-docusate sodium (SENNA S) 8.6-50 MG tablet     tiZANidine (ZANAFLEX) 4 MG tablet     No current facility-administered medications for this visit.     Facility-Administered Medications Ordered in Other Visits   Medication     Self Administer Medications: Behavioral Services       Allergies   Allergen Reactions     Ceclor [Cefaclor Monohydrate]      Lamictal [Lamotrigine]      Had known side effect of\"skin eating rash\"  Jaycob lauren syndrome     Latex      PN: Converted from LW Latex Sensitivity Flag     Cefaclor Rash       Past Medical History:   Diagnosis Date     Bipolar 1 disorder (H)      Complication of anesthesia     panic after having anesthesia     Lumbar disc herniation with radiculopathy      Mild intermittent asthma without complication      Other chronic pain     low back and buttocks leg " pain     Uncomplicated asthma     mild       Review Of Systems    Skin: negative  Eyes: negative  Ears/Nose/Throat: negative  Respiratory: No shortness of breath, dyspnea on exertion, cough, or hemoptysis  Cardiovascular: negative  Gastrointestinal: negative  Musculoskeletal: back pain and muscular weakness  Neurologic: numbness or tingling of feet  Psychiatric: negative  Hematologic/Lymphatic/Immunologic: negative  Endocrine: negative    OBJECTIVE:    /83   Pulse 84   SpO2 100%     Exam:    Patient appears comfortable and in no apparent distress. Moving all extremities.  Gait is non-antalgic - walking with a cane.  CN II-XII grossly intact, alert and appropriate with conversation and following  commands  Bilateral upper extremities with full strength including hand intrinsics and grasp.  Sensation intact throughout.  Left lower extremities 5/5 strength including plantar and dorsiflexion. Right PF 4/5.  Clonus  Normal sensation throughout bilaterally.  Lumbar incision edges well approximated. Absent for edema, erythema, or drainage.    PLAN:    Dipak Fuentes is six weeks out from a right L5-S1 minimally invasive redo microdiskectomy performed by Dr. Oquendo on 09/2/2022. Today the patient  reports that he really has not obtained much alleviation at this point. In fact, he is still experiencing some urinary incontinence. He is also walking with a cane.     The patient has remained compliant with the post-operative restrictions which included  not lifting anything greater than 5-10 lbs. Today we discussed increasing activity from  10 lbs by 2-5 lbs per week, but encouraged continuing to avoid excessive bending,  twisting, and turning at the waist and to avoid jostling and jarring activities.    He is participating in physical therapy and is not interested in a MDP.     We will simply give this a bit more time before obtaining further advanced imaging. He is aware that symptoms will in most case, take much longer  than the six week point to improve.     The patient gave verbal understanding and is in agreement with the above plan. He  will call or return to the clinic for any worsening or changes in symptoms.    Respectfully,     JANET Peña, PAHiteshC

## 2022-11-04 NOTE — LETTER
"    11/4/2022         RE: Dipak Fuentes  [address]  [facility] [address]        Dear Colleague,    Thank you for referring your patient, Dipak Fuentes, to the [facility] NEUROSURGERY CLINIC [place]. Please see a copy of my visit note below.    NEUROSURGERY CLINIC PROGRESS NOTE    DATE OF VISIT: 11/4/2022    HPI:     Dipak Fuentes is a pleasant 27 year old male who presents to the clinic today for a six-week post-operative follow-up visit. On 09/21/2022 the patient underwent a right L5-S1 minimally invasive redo microdiskectomy with Dr. Oquendo for recurrent right L5-S1 herniated disk with radiculopathy. Per chart review, the patient's pre-operative symptoms consisted of a daily with fluctuating intensity, sharp, aching, burning pain that would initiates in the midline low lumbar region and radiates distally in the right S1 distribution. This pain was  accompanied by paresthesia, numbness and perceived weakness in the same distribution.  Today, the patient reports that he really has not obtained much alleviation at this point. In fact, he is still experiencing some urinary incontinence. He is also walking with a cane.     Current Outpatient Medications   Medication     gabapentin (NEURONTIN) 300 MG capsule     ibuprofen (ADVIL/MOTRIN) 800 MG tablet     methylPREDNISolone (MEDROL DOSEPAK) 4 MG tablet therapy pack     oxyCODONE (ROXICODONE) 5 MG tablet     SENNA-docusate sodium (SENNA S) 8.6-50 MG tablet     tiZANidine (ZANAFLEX) 4 MG tablet     No current facility-administered medications for this visit.     Facility-Administered Medications Ordered in Other Visits   Medication     Self Administer Medications: Behavioral Services       Allergies   Allergen Reactions     Ceclor [Cefaclor Monohydrate]      Lamictal [Lamotrigine]      Had known side effect of\"skin eating rash\"  Jaycob lauren syndrome     Latex      PN: Converted from LW Latex Sensitivity Flag     Cefaclor Rash       Past Medical " History:   Diagnosis Date     Bipolar 1 disorder (H)      Complication of anesthesia     panic after having anesthesia     Lumbar disc herniation with radiculopathy      Mild intermittent asthma without complication      Other chronic pain     low back and buttocks leg pain     Uncomplicated asthma     mild       Review Of Systems    Skin: negative  Eyes: negative  Ears/Nose/Throat: negative  Respiratory: No shortness of breath, dyspnea on exertion, cough, or hemoptysis  Cardiovascular: negative  Gastrointestinal: negative  Musculoskeletal: back pain and muscular weakness  Neurologic: numbness or tingling of feet  Psychiatric: negative  Hematologic/Lymphatic/Immunologic: negative  Endocrine: negative    OBJECTIVE:    /83   Pulse 84   SpO2 100%     Exam:    Patient appears comfortable and in no apparent distress. Moving all extremities.  Gait is non-antalgic - walking with a cane.  CN II-XII grossly intact, alert and appropriate with conversation and following  commands  Bilateral upper extremities with full strength including hand intrinsics and grasp.  Sensation intact throughout.  Left lower extremities 5/5 strength including plantar and dorsiflexion. Right PF 4/5.  Clonus  Normal sensation throughout bilaterally.  Lumbar incision edges well approximated. Absent for edema, erythema, or drainage.    PLAN:    Dipak Fuentes is six weeks out from a right L5-S1 minimally invasive redo microdiskectomy performed by Dr. Oquendo on 09/2/2022. Today the patient  reports that he really has not obtained much alleviation at this point. In fact, he is still experiencing some urinary incontinence. He is also walking with a cane.     The patient has remained compliant with the post-operative restrictions which included  not lifting anything greater than 5-10 lbs. Today we discussed increasing activity from  10 lbs by 2-5 lbs per week, but encouraged continuing to avoid excessive bending,  twisting, and turning at the waist  and to avoid jostling and jarring activities.    He is participating in physical therapy and is not interested in a MDP.     We will simply give this a bit more time before obtaining further advanced imaging. He is aware that symptoms will in most case, take much longer than the six week point to improve.     The patient gave verbal understanding and is in agreement with the above plan. He  will call or return to the clinic for any worsening or changes in symptoms.    Respectfully,     JANET Peña PA-C      Again, thank you for allowing me to participate in the care of your patient.        Sincerely,        Richy Schumacher PA-C

## 2022-11-04 NOTE — NURSING NOTE
"Dipak Fuentes is a 27 year old male who presents for:  Chief Complaint   Patient presents with     Surgical Followup     DOS 9/21/22, states still painful, can't walk very far without cane, still numbness from the knees down at times, trouble with holding in urine and cold feet        Vitals:    Vitals:    11/04/22 1400   BP: 122/83   Pulse: 84   SpO2: 100%       BMI:  Estimated body mass index is 29.27 kg/m  as calculated from the following:    Height as of 9/21/22: 6' 2\" (1.88 m).    Weight as of 9/21/22: 227 lb 15.3 oz (103.4 kg).    Pain Score:  Moderate Pain (4)        Amendo Phorn      "

## 2022-11-07 ENCOUNTER — THERAPY VISIT (OUTPATIENT)
Dept: PHYSICAL THERAPY | Facility: CLINIC | Age: 27
End: 2022-11-07
Attending: PHYSICIAN ASSISTANT
Payer: COMMERCIAL

## 2022-11-07 DIAGNOSIS — M54.16 LUMBAR RADICULOPATHY: Primary | ICD-10-CM

## 2022-11-07 PROCEDURE — 97110 THERAPEUTIC EXERCISES: CPT | Mod: GP | Performed by: PHYSICAL THERAPIST

## 2022-11-14 ENCOUNTER — THERAPY VISIT (OUTPATIENT)
Dept: PHYSICAL THERAPY | Facility: CLINIC | Age: 27
End: 2022-11-14
Attending: PHYSICIAN ASSISTANT
Payer: COMMERCIAL

## 2022-11-14 DIAGNOSIS — M54.16 LUMBAR RADICULOPATHY: Primary | ICD-10-CM

## 2022-11-14 PROCEDURE — 97110 THERAPEUTIC EXERCISES: CPT | Mod: GP | Performed by: PHYSICAL THERAPIST

## 2022-11-15 ENCOUNTER — TELEPHONE (OUTPATIENT)
Dept: NEUROSURGERY | Facility: CLINIC | Age: 27
End: 2022-11-15

## 2022-11-15 NOTE — TELEPHONE ENCOUNTER
Pain in right leg is not improving at all, still in pain. Patient was instructed to call if still having concern.

## 2022-11-15 NOTE — TELEPHONE ENCOUNTER
Patient seen in clinic 11/4 fo his 6 week follow up appt for Right L5-S1 redo microdiskectomy. At that appt reported continuing right leg pain and voiding issues.   Recommendation at that time was to continue to monitor for a couple of weeks for now and to call clinic if no improvement, changes or worsening symptoms. Possible further imaging discussed.    Patient called today reporting no improvement and slightly worse pain if anything.    Routed to Provider.

## 2022-11-21 DIAGNOSIS — M54.16 LUMBAR RADICULOPATHY: ICD-10-CM

## 2022-11-21 DIAGNOSIS — M54.41 ACUTE BILATERAL LOW BACK PAIN WITH RIGHT-SIDED SCIATICA: ICD-10-CM

## 2022-11-21 DIAGNOSIS — Z98.890 S/P LUMBAR MICRODISCECTOMY: ICD-10-CM

## 2022-11-21 NOTE — TELEPHONE ENCOUNTER
Last Visit: 11/4/22    Next Visit: 12/19/22    Name of Provider:  Dr Oquendo    Assessment: patient s/p L5-S1 minimally invasive redo microdiskectomy 9/21/22.   Patient calls reporting his post concerns continue without any relief, if anything pain is getting worse. His symptoms include severe pain in back and right leg,  numbness and tingling in that right leg, RLE weakness, issues with voiding.   These symptoms were reported to the team and MRI was ordered and completed October 7. No concerning findings, and plan was to monitor symptoms and followup as scheduled. At followup appt 11/4, reported no improvement and advised to give it more time.   Patient reports pain is getting worse and is preventing him from sleeping. Weakness, n/t, voiding issues same, not worse or better.    Patients states he is not taking the pain medication anymore. Did not request more once he ran out. He is not optimistic that the medication will help, but he is willing to try again.      Recommendation given: Continue to monitor symptoms. Advised to present to the ED if pain uncontrolled with medication or if symptoms get worse.    Routed to Provider for further recs

## 2022-11-21 NOTE — TELEPHONE ENCOUNTER
Patient is experiencing pain in his back and legs after his appt with Richy Schumacher and would like advice on his next steps with treatment. Patient is unable to put weight on his legs and was told to call back if he was experiencing more pain in his back and legs. Thank you.

## 2022-11-22 RX ORDER — GABAPENTIN 300 MG/1
300 CAPSULE ORAL 3 TIMES DAILY
Qty: 40 CAPSULE | Refills: 0 | Status: SHIPPED | OUTPATIENT
Start: 2022-11-22 | End: 2022-12-14

## 2022-11-22 RX ORDER — OXYCODONE HYDROCHLORIDE 5 MG/1
5 TABLET ORAL EVERY 6 HOURS PRN
Qty: 20 TABLET | Refills: 0 | Status: SHIPPED | OUTPATIENT
Start: 2022-11-22 | End: 2022-12-14

## 2022-11-22 NOTE — TELEPHONE ENCOUNTER
Medication refills approved. Message sent to Dr Oquendo for his input/recommendations.     Called patient and updated.

## 2022-11-28 ENCOUNTER — THERAPY VISIT (OUTPATIENT)
Dept: PHYSICAL THERAPY | Facility: CLINIC | Age: 27
End: 2022-11-28
Payer: COMMERCIAL

## 2022-11-28 DIAGNOSIS — M54.16 LUMBAR RADICULOPATHY: Primary | ICD-10-CM

## 2022-11-28 PROCEDURE — 97014 ELECTRIC STIMULATION THERAPY: CPT | Mod: GP | Performed by: PHYSICAL THERAPIST

## 2022-11-28 PROCEDURE — 97110 THERAPEUTIC EXERCISES: CPT | Mod: GP | Performed by: PHYSICAL THERAPIST

## 2022-12-05 ENCOUNTER — OFFICE VISIT (OUTPATIENT)
Dept: NEUROSURGERY | Facility: CLINIC | Age: 27
End: 2022-12-05
Attending: NEUROLOGICAL SURGERY
Payer: COMMERCIAL

## 2022-12-05 ENCOUNTER — THERAPY VISIT (OUTPATIENT)
Dept: PHYSICAL THERAPY | Facility: CLINIC | Age: 27
End: 2022-12-05
Payer: COMMERCIAL

## 2022-12-05 VITALS — DIASTOLIC BLOOD PRESSURE: 81 MMHG | HEART RATE: 83 BPM | OXYGEN SATURATION: 97 % | SYSTOLIC BLOOD PRESSURE: 130 MMHG

## 2022-12-05 DIAGNOSIS — Z98.890 S/P LUMBAR MICRODISCECTOMY: ICD-10-CM

## 2022-12-05 DIAGNOSIS — M54.16 LUMBAR RADICULOPATHY: Primary | ICD-10-CM

## 2022-12-05 PROCEDURE — 97112 NEUROMUSCULAR REEDUCATION: CPT | Mod: GP | Performed by: PHYSICAL THERAPIST

## 2022-12-05 PROCEDURE — G0463 HOSPITAL OUTPT CLINIC VISIT: HCPCS

## 2022-12-05 PROCEDURE — 97110 THERAPEUTIC EXERCISES: CPT | Mod: GP | Performed by: PHYSICAL THERAPIST

## 2022-12-05 PROCEDURE — 99024 POSTOP FOLLOW-UP VISIT: CPT | Performed by: NEUROLOGICAL SURGERY

## 2022-12-05 ASSESSMENT — PAIN SCALES - GENERAL: PAINLEVEL: MODERATE PAIN (5)

## 2022-12-05 NOTE — PROGRESS NOTES
"It was a pleasure to see Dipak Fuentes today in [facility]. He is a 27 year old male who underwent:    Procedure Date: 09/21/2022     PREOPERATIVE DIAGNOSIS:  Recurrent right L5-S1 herniated disk with radiculopathy.     POSTOPERATIVE DIAGNOSIS:  Recurrent right L5-S1 herniated disk with radiculopathy.     PROCEDURE:  Right L5-S1 minimally invasive redo microdiskectomy.     SURGEON:  Juancho Oquendo MD     ASSISTANT:  Cara Palafox PA-C     ANESTHESIA:  General endotracheal anesthesia.     ESTIMATED BLOOD LOSS:  10 mL    He continues to have persistent pain and what sounds like a right S1 distribution.    He had a follow-up MRI in October which really did not show any concerning findings or problems at the surgical site.    Vitals:    12/05/22 0915   BP: 130/81   Pulse: 83   SpO2: 97%     Estimated body mass index is 29.27 kg/m  as calculated from the following:    Height as of 9/21/22: 1.88 m (6' 2\").    Weight as of 9/21/22: 103.4 kg (227 lb 15.3 oz).  Moderate Pain (5)    Well-healed incision.  Antalgic gait.      Imaging: MRI as above.    Assessment: Status post lumbar discectomy.    Plan: At this point I do not see any surgical intervention that would be necessarily helpful he has a referral to pain management and will be seeing them in a few weeks.  He continues with physical therapy.  Hopefully he will continue to make some slow progress over time.     "

## 2022-12-05 NOTE — LETTER
"    12/5/2022         RE: Dipak Fuentes  [address]  [facility] [address]        Dear Colleague,    Thank you for referring your patient, Dipak Fuentes, to the [facility] [place]. Please see a copy of my visit note below.    It was a pleasure to see Dipak Fuentes today in [facility]surgery Clinic. He is a 27 year old male who underwent:    Procedure Date: 09/21/2022     PREOPERATIVE DIAGNOSIS:  Recurrent right L5-S1 herniated disk with radiculopathy.     POSTOPERATIVE DIAGNOSIS:  Recurrent right L5-S1 herniated disk with radiculopathy.     PROCEDURE:  Right L5-S1 minimally invasive redo microdiskectomy.     SURGEON:  Juancho Oquendo MD     ASSISTANT:  Cara Palafox PA-C     ANESTHESIA:  General endotracheal anesthesia.     ESTIMATED BLOOD LOSS:  10 mL    He continues to have persistent pain and what sounds like a right S1 distribution.    He had a follow-up MRI in October which really did not show any concerning findings or problems at the surgical site.    Vitals:    12/05/22 0915   BP: 130/81   Pulse: 83   SpO2: 97%     Estimated body mass index is 29.27 kg/m  as calculated from the following:    Height as of 9/21/22: 1.88 m (6' 2\").    Weight as of 9/21/22: 103.4 kg (227 lb 15.3 oz).  Moderate Pain (5)    Well-healed incision.  Antalgic gait.      Imaging: MRI as above.    Assessment: Status post lumbar discectomy.    Plan: At this point I do not see any surgical intervention that would be necessarily helpful he has a referral to pain management and will be seeing them in a few weeks.  He continues with physical therapy.  Hopefully he will continue to make some slow progress over time.         Again, thank you for allowing me to participate in the care of your patient.        Sincerely,        Juancho Oquendo MD    "

## 2022-12-12 ENCOUNTER — THERAPY VISIT (OUTPATIENT)
Dept: PHYSICAL THERAPY | Facility: CLINIC | Age: 27
End: 2022-12-12
Payer: COMMERCIAL

## 2022-12-12 DIAGNOSIS — M54.16 LUMBAR RADICULOPATHY: Primary | ICD-10-CM

## 2022-12-12 DIAGNOSIS — M51.16 LUMBAR DISC HERNIATION WITH RADICULOPATHY: Primary | ICD-10-CM

## 2022-12-12 DIAGNOSIS — Z98.890 S/P LUMBAR MICRODISCECTOMY: ICD-10-CM

## 2022-12-12 PROCEDURE — 99207 PR NO CHARGE LOS: CPT | Mod: GP | Performed by: PHYSICAL THERAPIST

## 2022-12-14 ENCOUNTER — MYC REFILL (OUTPATIENT)
Dept: PALLIATIVE MEDICINE | Facility: CLINIC | Age: 27
End: 2022-12-14

## 2022-12-14 DIAGNOSIS — M54.16 LUMBAR RADICULOPATHY: ICD-10-CM

## 2022-12-14 DIAGNOSIS — Z98.890 S/P LUMBAR MICRODISCECTOMY: ICD-10-CM

## 2022-12-14 DIAGNOSIS — M54.41 ACUTE BILATERAL LOW BACK PAIN WITH RIGHT-SIDED SCIATICA: ICD-10-CM

## 2022-12-15 RX ORDER — OXYCODONE HYDROCHLORIDE 5 MG/1
5 TABLET ORAL EVERY 6 HOURS PRN
Qty: 20 TABLET | Refills: 0 | Status: SHIPPED | OUTPATIENT
Start: 2022-12-15 | End: 2023-02-02

## 2022-12-15 RX ORDER — GABAPENTIN 300 MG/1
300 CAPSULE ORAL 3 TIMES DAILY
Qty: 40 CAPSULE | Refills: 0 | Status: SHIPPED | OUTPATIENT
Start: 2022-12-15 | End: 2023-02-02

## 2022-12-15 NOTE — TELEPHONE ENCOUNTER
Patient requesting refill of Oxycodone, gabapentin,  zanaflex    DOS: 9/21/22  Surgery: Right L5-S1 minimally invasive redo microdiskectomy  Surgeon: Dr. Jere Huerta Post op: 12  Last refilled: 11/22/22    Pt scheduled with pain management on 12/22/22      Pain assessment completed via My Chart. Please see encounter for further details. Refill request will be forwarded to care team.

## 2022-12-21 NOTE — PROGRESS NOTES
Dipak Fuentes elected to cancel this appointment when I let him know that we do not allow patients to change providers due to location.  He thought he could have an intake today in [place] and then follow up with a different provider in [place] which is closer to his home.  Riding in a car in very painful for him. I reviewed with Tika GUARDADO Clinic Manager and it was determined that we would schedule with another provider at next available appt in [place] and he will be placed on a waiting list for an earlier appt. He is currently scheduled on Feb 2 with Marni Gray. ZENA.     Marni is likely to be opening up more hours in January but we do not have a date for this yet.     Routing to referring provider, Dr Juancho Oquendo, at the [facility]surgery [facility] as an FYI and to request that he continue to prescribe Dipak's pain related controlled substance medications until Dipak is established in the [facility].     ZENA Liz, NP-C  [facility]          Appointment cancelled.

## 2022-12-22 ENCOUNTER — OFFICE VISIT (OUTPATIENT)
Dept: PALLIATIVE MEDICINE | Facility: OTHER | Age: 27
End: 2022-12-22
Attending: NEUROLOGICAL SURGERY
Payer: COMMERCIAL

## 2022-12-22 VITALS
HEART RATE: 90 BPM | DIASTOLIC BLOOD PRESSURE: 116 MMHG | OXYGEN SATURATION: 99 % | WEIGHT: 245 LBS | SYSTOLIC BLOOD PRESSURE: 180 MMHG | BODY MASS INDEX: 31.46 KG/M2

## 2022-12-22 DIAGNOSIS — M54.16 LUMBAR RADICULOPATHY: ICD-10-CM

## 2022-12-22 DIAGNOSIS — Z98.890 S/P LUMBAR MICRODISCECTOMY: ICD-10-CM

## 2022-12-22 DIAGNOSIS — M54.41 ACUTE BILATERAL LOW BACK PAIN WITH RIGHT-SIDED SCIATICA: ICD-10-CM

## 2022-12-22 DIAGNOSIS — Z53.9 ERRONEOUS ENCOUNTER--DISREGARD: Primary | ICD-10-CM

## 2022-12-22 PROCEDURE — G0463 HOSPITAL OUTPT CLINIC VISIT: HCPCS

## 2022-12-22 ASSESSMENT — ANXIETY QUESTIONNAIRES
GAD7 TOTAL SCORE: 3
2. NOT BEING ABLE TO STOP OR CONTROL WORRYING: NOT AT ALL
7. FEELING AFRAID AS IF SOMETHING AWFUL MIGHT HAPPEN: NOT AT ALL
4. TROUBLE RELAXING: NOT AT ALL
3. WORRYING TOO MUCH ABOUT DIFFERENT THINGS: NOT AT ALL
GAD7 TOTAL SCORE: 3
6. BECOMING EASILY ANNOYED OR IRRITABLE: MORE THAN HALF THE DAYS
IF YOU CHECKED OFF ANY PROBLEMS ON THIS QUESTIONNAIRE, HOW DIFFICULT HAVE THESE PROBLEMS MADE IT FOR YOU TO DO YOUR WORK, TAKE CARE OF THINGS AT HOME, OR GET ALONG WITH OTHER PEOPLE: NOT DIFFICULT AT ALL
8. IF YOU CHECKED OFF ANY PROBLEMS, HOW DIFFICULT HAVE THESE MADE IT FOR YOU TO DO YOUR WORK, TAKE CARE OF THINGS AT HOME, OR GET ALONG WITH OTHER PEOPLE?: NOT DIFFICULT AT ALL
1. FEELING NERVOUS, ANXIOUS, OR ON EDGE: SEVERAL DAYS
7. FEELING AFRAID AS IF SOMETHING AWFUL MIGHT HAPPEN: NOT AT ALL
5. BEING SO RESTLESS THAT IT IS HARD TO SIT STILL: NOT AT ALL

## 2022-12-22 ASSESSMENT — PAIN SCALES - PAIN ENJOYMENT GENERAL ACTIVITY SCALE (PEG)
AVG_PAIN_PASTWEEK: 5
AVG_PAIN_PASTWEEK: 5
INTERFERED_GENERAL_ACTIVITY: 10 - COMPLETELY INTERFERES
PEG_TOTALSCORE: 7.67
INTERFERED_ENJOYMENT_LIFE: 8
PEG_TOTALSCORE: 7.67
INTERFERED_ENJOYMENT_LIFE: 8
INTERFERED_GENERAL_ACTIVITY: 10

## 2022-12-22 ASSESSMENT — PAIN SCALES - GENERAL: PAINLEVEL: SEVERE PAIN (6)

## 2023-01-19 ENCOUNTER — THERAPY VISIT (OUTPATIENT)
Dept: PHYSICAL THERAPY | Facility: CLINIC | Age: 28
End: 2023-01-19
Attending: NEUROLOGICAL SURGERY
Payer: COMMERCIAL

## 2023-01-19 DIAGNOSIS — M62.89 PELVIC FLOOR DYSFUNCTION: Primary | ICD-10-CM

## 2023-01-19 DIAGNOSIS — R32 URINARY INCONTINENCE: ICD-10-CM

## 2023-01-19 DIAGNOSIS — M51.16 LUMBAR DISC HERNIATION WITH RADICULOPATHY: ICD-10-CM

## 2023-01-19 PROCEDURE — 90912 BFB TRAINING 1ST 15 MIN: CPT | Mod: GP | Performed by: PHYSICAL THERAPIST

## 2023-01-19 PROCEDURE — 97535 SELF CARE MNGMENT TRAINING: CPT | Mod: GP | Performed by: PHYSICAL THERAPIST

## 2023-01-19 PROCEDURE — 97163 PT EVAL HIGH COMPLEX 45 MIN: CPT | Mod: GP | Performed by: PHYSICAL THERAPIST

## 2023-01-19 NOTE — PROGRESS NOTES
Physical Therapy Initial Evaluation  Subjective:  SUBJECTIVE:  Patient reports onset of symptoms after lumbar surgery 09/2022. He had a minimally invasive redo lumbar microdiscectomy on 9/21/22. He has a hx of lumbar microdiscectomy from 2020. Symptoms include urinary urgency/mild incontinence.  Since onset symptoms have been getting better, worse or staying the same? Same to worse. Does have some saddle parasthesias but comes and goes and describes more as pain vs numbness. Goal is to have better control with getting to the bathroom, more time as has to walk very slowly now due to severe LB and R leg pain.     Urination:  Do you leak on the way to the bathroom or with a strong urge to void? Usually makes it but occasional slight leakage    Do you leak with cough,sneeze, jumping, running?yes, few dribbles   Any other activities that cause leaking? After sitting on toilet, when stands gets a couple dribbles    Do you have triggers that make you feel you can't wait to go to the bathroom? No   Type of pad and number used per day? 0  When you leak what is the amount? Small dribbles    How long can you delay the need to urinate? Usually 2-3 hours daytime, just sometimes when urge is very strong has to go within 20-30 seconds. .   How many times do you get up to urinate at night? 1    Can you stop the flow of urine when on the toilet? Not asked  Is the volume of urine passed usually: average. (8sec rule=  250ml with average bladder storing  400-600ml)    Do you strain to pass urine? No  Do you have a slow or hesitant urinary stream? No  Do you have difficulty initiating the urine stream? No        Bowel habits:  Frequency of bowel movements?7 times a week  Consistancy of stool? soft formed, [place] Stool Scale NA  Do you ignore the urge to defecate? No  Do you strain to pass stool? No    Pelvic Pain:  Do you have pain with erections? Can get an erection but often goes away b/c of pain. Not sure if pain b/c erection or if  coming from low back.   Do you have pain with ejaculation? No  Do you have pain with sitting?  Yes, also when low back really flared up with get pelvic pain (rectal straight up into pelvis).   Any other activities that produce the pelvic pain    Are you sexually active?usually but not much now due to severe low back and R leg pain  Have you ever been worried for your physical safety? Not asked   Any abdominal or pelvic surgeries? 2 lumbar microdiscectomies  Are you having any regular exercise?extremely limited now due to back surgery. Did start pool PT 1x week last week  Have you practiced the PF(kegel) exercises for 4 or more weeks?no                            Objective:    Gait:  Moves extremely guarded and slow with cane.   Gait Type:  Antalgic                                            Pelvic Dysfunction Evaluation:        Flexibility:  Flexibility pelvic: Did not tolerate hip adduction or really any movement involving legs as LBP/R leg pain significantly increases with any movement.                   SEMG Biofeedback:  Semg biofeedback pelvic: patient significant pain and difficulty with all transitional movements.   Equipment:  Telesis  Surface electrode placement--Abdominals: no  Suraface electrode placement--Perianal:  Yes  Baseline EMG PM:  5-7mV in hooklying    Peak pelvic muscle contraction:  10-12mV in hooklying, produced significant pain in rectal area.     EMG interpretation to fatigue:  Less than3 seconds                         General     ROS    Assessment/Plan:    Patient is a 27 year old male with pelvic complaints.    Patient has the following significant findings with corresponding treatment plan.                Diagnosis 1:  Urinary urgency/incontinence  Pain -  splint/taping/bracing/orthotics, self management, education, directional preference exercise and home program  Decreased ROM/flexibility - manual therapy and therapeutic exercise  Decreased joint mobility - manual therapy and  therapeutic exercise  Decreased strength - therapeutic exercise and therapeutic activities  Decreased proprioception - neuro re-education and therapeutic activities  Inflammation - self management/home program  Impaired gait - gait training  Impaired muscle performance - biofeedback and neuro re-education  Decreased function - therapeutic activities    Therapy Evaluation Codes:   1) History comprised of:   Personal factors that impact the plan of care:      None.    Comorbidity factors that impact the plan of care are:      Asthma, Bowel/bladder changes, Mental illness, Numbness/tingling and Smoking.     Medications impacting care: Anti-inflammatory, Muscle relaxant and Pain.  2) Examination of Body Systems comprised of:   Body structures and functions that impact the plan of care:      Lumbar spine and Pelvis.   Activity limitations that impact the plan of care are:      Walking, Stress incontinence, Urgency and Urge incontinence.  3) Clinical presentation characteristics are:   Unstable/Unpredictable.  4) Decision-Making    High complexity using standardized patient assessment instrument and/or measureable assessment of functional outcome.  Cumulative Therapy Evaluation is: High complexity.    Previous and current functional limitations:  (See Goal Flow Sheet for this information)    Short term and Long term goals: (See Goal Flow Sheet for this information)     Communication ability:  Patient appears to be able to clearly communicate and understand verbal and written communication and follow directions correctly.  Treatment Explanation - The following has been discussed with the patient:   RX ordered/plan of care  Anticipated outcomes  Possible risks and side effects  This patient would benefit from PT intervention to resume normal activities.   Rehab potential is questionable.    Frequency:  1 X a month, once daily  Duration:  for 3 months  Discharge Plan:  Achieve all LTG.  Independent in home treatment  program.  Reach maximal therapeutic benefit.    Please refer to the daily flowsheet for treatment today, total treatment time and time spent performing 1:1 timed codes.

## 2023-01-19 NOTE — PROGRESS NOTES
[facility]    OUTPATIENT Physical Therapy ORTHOPEDIC EVALUATION  PLAN OF TREATMENT FOR OUTPATIENT REHABILITATION  (COMPLETE FOR INITIAL CLAIMS ONLY)  Patient's Last Name, First Name, M.I.  YOB: 1995  Dipak Fuentes    Provider s Name:  [facility]   Medical Record No.  [id number]   Start of Care Date:  01/19/23   Onset Date:   12/12/22 (date of order)   Treatment Diagnosis:  pelvic floor dysfunction /urgency Medical Diagnosis:     Lumbar disc herniation with radiculopathy  Pelvic floor dysfunction  Urinary incontinence       Goals:     01/19/23 0700   Urinary Leakage   Previous Functional Level No problems   Current Functional Level Leaking with urge   Performance Level after 20-30 seconds   STG Target Performance Decrease leakage with urge   Performance Level able to delay void 2 minutes, no leakage   Rationale continence throughout the day;for healthy hygiene and to prevent skin breakdown   Due Date 02/16/23   LTG Target Performance Decrease leakage with urge   Performance Level able to delay void 10 minutes   Rationale continence throughout the day;for healthy hygiene and to prevent skin breakdown   Due Date 03/16/23           Therapy Frequency:  1x month  Predicted Duration of Therapy Intervention:  3 months    John Joseph, PT                 I CERTIFY THE NEED FOR THESE SERVICES FURNISHED UNDER        THIS PLAN OF TREATMENT AND WHILE UNDER MY CARE     (Physician attestation of this document indicates review and certification of the therapy plan).                     Certification Date From:  01/19/23   Certification Date To:  04/18/23    Referring Provider:  Juancho Oquendo MD    Initial Assessment        See Epic Evaluation SOC Date: 01/19/23

## 2023-02-02 ENCOUNTER — OFFICE VISIT (OUTPATIENT)
Dept: PALLIATIVE MEDICINE | Facility: CLINIC | Age: 28
End: 2023-02-02
Payer: COMMERCIAL

## 2023-02-02 VITALS — HEART RATE: 88 BPM | DIASTOLIC BLOOD PRESSURE: 48 MMHG | OXYGEN SATURATION: 98 % | SYSTOLIC BLOOD PRESSURE: 106 MMHG

## 2023-02-02 DIAGNOSIS — G89.29 OTHER CHRONIC PAIN: ICD-10-CM

## 2023-02-02 DIAGNOSIS — M54.16 LUMBAR RADICULOPATHY: Primary | ICD-10-CM

## 2023-02-02 PROCEDURE — 99214 OFFICE O/P EST MOD 30 MIN: CPT | Performed by: NURSE PRACTITIONER

## 2023-02-02 RX ORDER — ACETAMINOPHEN 500 MG
500-1000 TABLET ORAL EVERY 6 HOURS PRN
COMMUNITY

## 2023-02-02 RX ORDER — GABAPENTIN 300 MG/1
600 CAPSULE ORAL 3 TIMES DAILY
Qty: 180 CAPSULE | Refills: 1 | Status: SHIPPED | OUTPATIENT
Start: 2023-02-02 | End: 2023-03-02

## 2023-02-02 RX ORDER — DICLOFENAC SODIUM 75 MG/1
75 TABLET, DELAYED RELEASE ORAL 2 TIMES DAILY
Qty: 60 TABLET | Refills: 1 | Status: SHIPPED | OUTPATIENT
Start: 2023-02-02 | End: 2023-03-02

## 2023-02-02 ASSESSMENT — PAIN SCALES - GENERAL: PAINLEVEL: MODERATE PAIN (5)

## 2023-02-02 NOTE — NURSING NOTE
PEG Score 2/2/2023   PEG Total Score 8         Kaylee Mayorga MA  [facility] [place]

## 2023-02-02 NOTE — PROGRESS NOTES
[facility]     Date of visit: 2/2/2023    Reason for visit:  Consultation: Dipak Fuentes is a 27 year old male is a 27 year old male with PMH significant for lumbar ddd with radiculopathy, s/p microdiscectomy x2, bipolar depression, substance use disorder who is seen in consultation today at the request of Dr. Juancho Oquendo for evaluation of his pain issues and recommendations for management.     Referred By    Juancho Oquendo MD   [address] [place] [address] [place] [id number]   Phone: [phone]   Fax: [phone]    Diagnoses: Lumbar radiculopathy   Acute bilateral low back pain with right-sided sciatica   S/P lumbar microdiscectomy   Order: Comprehensive Pain Evaluation        History:  Last seen by Dr. Oquendo on 12/5/2022 in follow-up after L5-S1 microdiscectomy, at that time he was continuing to have persistent pain in an right S1 distribution. He had undergone follow-up MRI in October 2022 which really did not show any concerning findings or problems at the surgical site. Prior to surgery was experiencing pain and urinary incontinence, this did not resolve after surgery. Has been participating in pelvic floor therapy and phyiscal therapy for his lower back. Recently started pool PT at [facility] with [facility].  ____________________________________________________________    Chief Complaint:    Chief Complaint   Patient presents with     Pain       Today Dipak Fuentes reports:  Lumbar microdiscectomy surgery 9/2022. Pain and urinary incontinence prior to surgery (~1 month). Immediately after surgery, had a lot of incisional pain. After surgery pain in the right leg was still present, burning pain was less, but after surgery it changed to stabbing pain. Weaned off oxycodone in December. Pelvic therapy ongoing; exercises are okay. Was encouraged to not immediately respond to urinary urgency. Has not found that helpful as if he waits he will have incontinence. Mostly  coping with pain. Traditional PT was not helpful as he could not tolerate the exercises as he was unable to tolerate the pain. Now at [facility] for pool therapy, 3 visits completed so far. Going once a week. Has not helped him to tolerate weight bearing on the right leg, but is able to move a little faster and have improved length in his gait.     Did not have any persistent pain after first lumbar surgery, more like a soreness with activities; this subsided after a year. Then he fell in August of 2022; was on his boat and fell on the edge of his live well on the right side of his lower back. Initially did not think that he would have persistent pain, but then over the next weeks pain worse. Had [facility] with Dr. Shay and pain worsened.     Pain description:  Low back with radiation into the right groin (mostly resolves with being still or sitting), rectum (stabbing), right leg shooting/burning pain with walking and standing. Struggles to bear weight on that leg. Right side numbness from the knee down, intermittently. Occasionally on the left side too.       Aggravating factors include: lying down makes back pain worse, standing makes right leg pain worse  Relieving factors include: icing helps some, tends to provide more immediate relief, delta 9 THC helps reduce pain, sleep    The patient otherwise denies new bowel or bladder incontinence, parasthesias, weakness, saddle anesthesia, unintentional weight loss, or fever/chills/sweats.    Current pain medications:  Tizanidine 4 mg TID prn  Ibuprofen 800 mg TID prn - uses rarely  Gabapentin 300 mg TID, stopped when there no refille    Review of [place] Prescription Monitoring Program ():   No concern for abuse or misuse of controlled medications based on this report. Viewed on 2/2/2023.           PAIN MANAGEMENT TREATMENT HISTORY  1. MEDICATIONS:  Opiates: Oxycodone after surgery- H  NSAIDS: Ibuprofen (Advil, Motrin).  Medication was not helpful , only takes it when his  "wife and mom \"bug me to take it because I am in pain\"  Muscle Relaxants: Cyclobenzaprine (Flexeril).  Medication was helpful  Methocarbamol (Robaxin).  Medication was helpful  Tizanidine (Zanaflex).  Medication was helpful  Helps with spasms, tizanidine most helpful  Anti-depressants: Remeron most recently - helpful. Mood stabilizers have been helpful in the past; tried on many anti-psychotics: had hallucinations, lithium: made him hard to be around, lamotrigine - skin rash. Now uses THC to manage polo with success  Sleep aids: None  Anxiolytics: has used in the past, has tried hydroxyzine in the past  Neuropathics: Gabapentin (Neurontin).  Medication was not helpful, stopped taking it  Topicals: CBD spray - [place], lidocaine patches - [place]  Adjuvant pain medications: delta 9 THC helps reduce pain, sleep  2. PHYSICAL THERAPY:   Yes: traditional PT after surgery not helpful. Now doing pool therapy and making progress. Also in pelvic floor therapy.   3. PAIN PSYCHOLOGY:   Therapy in the past for biopolar management, not in several years  4. SURGERY:   9/21/2022 Right L5-S1 minimally invasive microdiscectomy with Dr. Oquendo  10/7/2020  Right L5-S1 minimally invasive microdiscectomy with Dr. Oquendo  5. INJECTIONS:   8/25/22: right S1-S2 transforaminal epidural corticosteroid injection with Dr. Shay - worsened pain  3/10/20: Lumbar L5-S1 interlaminar epidural steroid injection with Dr. Esteves- had 2 weeks of nothing, then 2 weeks of relief and then back to painful  6. COMPLEMENTARY THERAPY:  Chiropractic  not helpful  Acupuncture/acupressure/ not tried  TENS unit  not helpful made pain worse  cold/cold packs: helpful  distraction/mindfulness: somewhat helpful   meditation/guided imagery: somewhat helpful    Imaging:  10/7/2022 Lumbar [facility] at [facility]          Past Medical History:  Past Medical History:   Diagnosis Date     Bipolar I disorder, single manic episode (H) 03/25/2013    Problem list name updated by automated process. " "Provider to review     Complication of anesthesia     panic after having anesthesia     History of substance use disorder     as a teenager, \"took extra adderall\"     Lumbar disc herniation with radiculopathy      Mild intermittent asthma without complication      Other chronic pain     low back and buttocks leg pain     Past Surgical History:  Past Surgical History:   Procedure Laterality Date     DISCECTOMY LUMBAR MINIMALLY INVASIVE ONE LEVEL Right 10/7/2020    Procedure: Right L5-S1 minimally invasive microdiskectomy;  Surgeon: Juancho Oquendo MD;  Location: [facility] OR     DISCECTOMY LUMBAR MINIMALLY INVASIVE ONE LEVEL Right 9/21/2022    Procedure: Right L5-S1 minimally invasive redo microdiskectomy;  Surgeon: Juancho Oquendo MD;  Location:  OR     [facility]       Past Social History:  Social History     Tobacco Use     Smoking status: Former     Packs/day: 0.50     Years: 5.00     Pack years: 2.50     Types: Cigarettes, Other     Smokeless tobacco: Never     Tobacco comments:     uses nicoting gum now9 did cigs and e cigs   Vaping Use     Vaping Use: Every day     Substances: Nicotine, THC, CBD     Devices: Pre-filled or refillable cartridge, Refillable tank   Substance Use Topics     Alcohol use: Not Currently     Comment: over 6 months     Drug use: Yes     Types: Marijuana     Comment: bipolar-concentrates 1-2 x day      Social History     Social History Narrative     Not on file      Medications:  Current Outpatient Medications   Medication Sig Dispense Refill     gabapentin (NEURONTIN) 300 MG capsule Take 1 capsule (300 mg) by mouth 3 times daily 40 capsule 0     ibuprofen (ADVIL/MOTRIN) 800 MG tablet Take 1 tablet (800 mg) by mouth every 8 hours as needed for moderate pain (4-6) or mild pain 40 tablet 0     oxyCODONE (ROXICODONE) 5 MG tablet Take 1 tablet (5 mg) by mouth every 6 hours as needed for moderate to severe pain 20 tablet 0     SENNA-docusate sodium (SENNA S) 8.6-50 MG tablet " "Take 1 tablet by mouth 2 times daily as needed (take while on oxycodone) 40 tablet 0     tiZANidine (ZANAFLEX) 4 MG tablet Take 1 tablet (4 mg) by mouth 3 times daily as needed for muscle spasms 40 tablet 0     Allergies:     Allergies   Allergen Reactions     Ceclor [Cefaclor Monohydrate]      Lamictal [Lamotrigine]      Had known side effect of\"skin eating rash\"  Jaycob lauren syndrome     Latex      PN: Converted from LW Latex Sensitivity Flag     Cefaclor Rash     Family history:  Family History   Problem Relation Age of Onset     Depression Mother      Depression Father      Substance Abuse Father      Depression Maternal Grandmother      Heart Disease Maternal Grandmother      Depression Paternal Grandmother      Anxiety Disorder Paternal Grandmother      Parkinsonism Paternal Grandmother      Depression Brother      Unknown/Adopted No family hx of      Schizophrenia No family hx of      Bipolar Disorder No family hx of      Suicide No family hx of      Dementia No family hx of      [place] Disease No family hx of      Autism Spectrum Disorder No family hx of      Intellectual Disability (Mental Retardation) No family hx of      Mental Illness No family hx of      Family history of rheumatological conditions: three cousins with RA on mom's side, two have had early hip replacements    Review of Systems:      (Positive responses bolded)  GENERAL: fever/chills, fatigue, general unwell feeling, weight gain/loss.   HEAD/EYES:  headache, dizziness, or vision changes.    EARS/NOSE/THROAT: nosebleeds, hearing loss, sinus infection, earache, tinnitus.  IMMUNE:  allergies, cancer, immune deficiency, or infections.  SKIN:  itching, rash, hives  HEME/Lymphatic: anemia, easy bruising, easy bleeding.  RESPIRATORY: cough, wheezing, or shortness of breath  CARDIOVASCULAR/Circulation: extremity edema, syncope, hypertension, tachycardia, or angina.  GASTROINTESTINAL: abdominal pain, nausea/emesis, diarrhea, constipation,  " hematochezia, or melena.   ENDOCRINE:  diabetes, steroid use,  thyroid disease or osteoporosis.  MUSCULOSKELETAL: joint pain, stiffness, neck pain, back pain, arthritis, or gout.  GENITOURINARY: frequency, urgency, dysuria, difficulty voiding, hematuria or incontinence.  NEUROLOGIC: weakness, numbness, paresthesias, seizure, tremor, stroke or memory loss.  PSYCHIATRIC: depression, anxiety, stress, suicidal thoughts or mood swings. Brain fog due to increased pain    OBJECTIVE  Vitals:    02/02/23 0801   BP: 106/48   Pulse: 88   SpO2: 98%     Constitutional: Well developed, well nourished, appears stated age. No acute distress.  Gait is steady, antalgic and uses cane  HEENT: Head atraumatic, normocephalic. Eyes without conjunctival injection or jaundice.   Skin: No obvious rash, lesions, or petechiae of exposed skin. tattoos  Extremities: Peripheral pulses intact. No clubbing, cyanosis, or edema. Moves all extremities.  Psychiatric/mental status: Alert, without lethargy or stupor. Speech fluent. Appropriate affect. Mood normal. Able to follow commands without difficulty.   Musculoskeletal exam: Normal bulk and tone. Unremarkable spinal curvature.    Lumbar/Thoracic spine:   ROM: flexion 45 degrees and extension 5 degrees  Rotation/ext to right: painful   Rotation/ext to left: painful   Myofascial tenderness:paraspinal lumbar muscles  Normal 5/5 LE strength bilaterally   Normal sensation to light touch in the lower extremities bilaterally   No dysesthesia, or hyperalgesia in the lower extremities bilaterally. Allodynia in RLE  Reflexes: Lower extremity reflexes within normal limits bilaterally    ASSESSMENT AND PLAN:  The following recommendations were given to the patient. Diagnosis, treatment options, risks, benefits, and alternatives were discussed, and all questions were answered. The patient expressed understanding of the plan for pain management. I am recommending a multidisciplinary treatment plan to help this  patient better manage his pain.      1. Lumbar radiculopathy  2. Other chronic pain   Discussed allodynia and heightened pain response. Will restart gabapentin and titrate dose upwards to attempt to reduce centrally mediated pain response. Will also have him stop ibuprofen and trial diclofenac for pain; advised taking before or after PT to help aid in recovery. Continue PT and pelvic floor therapy.   - gabapentin (NEURONTIN) 300 MG capsule; Take 2 capsules (600 mg) by mouth 3 times daily Increase as directed  Dispense: 180 capsule; Refill: 1  - diclofenac (VOLTAREN) 75 MG EC tablet; Take 1 tablet (75 mg) by mouth 2 times daily  Dispense: 60 tablet; Refill: 1    Follow up with me in 1 month to reassess symptoms and response to treatment.  Scheduled on 3/2/23 at 8am.     Marni Gray, CNP-BC, PMGT-BC, AP-PMN  [facility]

## 2023-02-02 NOTE — PATIENT INSTRUCTIONS
Gabapentin 1 tab= 300mg    AM   PM   Bedtime  300mg (1 capsule  300mg (1 capsule )  300mg (1 capsule .  After 3-5 days, increase as tolerated   300mg (1 capsule )  300mg (1 capsule )  600mg (2 capsule). After 3-5 days, increase as tolerated   600mg (2 capsule )  300mg (1 capsule )  600mg (2 capsule). After 3-5 days, increase as tolerated   600mg (2 capsule )  600mg (2 capsule )  600mg (2 capsule).     Call with any problems.  Caution for sedation.  You can go slower if you need to or increasing only one dose at a time. Do not stop abruptly once at higher doses.  This medication must be tapered off.       ----------------------------------------------------------------  Clinic Number:  [phone]   Call with any questions about your care and for scheduling assistance.   Calls are returned Monday through Friday between 8 AM and 4:30 PM. We usually get back to you within 2 business days depending on the issue/request.    If we are prescribing your medications:  For opioid medication refills, call the clinic or send a [employer] message 7 days in advance.  Please include:  Name of requested medication  Name of the pharmacy.  For non-opioid medications, call your pharmacy directly to request a refill. Please allow 3-4 days to be processed.   Per [place] State Law:  All controlled substance prescriptions must be filled within 30 days of being written.    For those controlled substances allowing refills, pickup must occur within 30 days of last fill.      We believe regular attendance is key to your success in our program!    Any time you are unable to keep your appointment we ask that you call us at least 24 hours in advance to cancel.This will allow us to offer the appointment time to another patient.   Multiple missed appointments may lead to dismissal from the clinic.

## 2023-02-09 ENCOUNTER — THERAPY VISIT (OUTPATIENT)
Dept: PHYSICAL THERAPY | Facility: CLINIC | Age: 28
End: 2023-02-09
Payer: COMMERCIAL

## 2023-02-09 DIAGNOSIS — M62.89 PELVIC FLOOR DYSFUNCTION: Primary | ICD-10-CM

## 2023-02-09 DIAGNOSIS — N39.46 MIXED STRESS AND URGE URINARY INCONTINENCE: ICD-10-CM

## 2023-02-09 PROCEDURE — 97110 THERAPEUTIC EXERCISES: CPT | Mod: GP | Performed by: PHYSICAL THERAPIST

## 2023-02-09 PROCEDURE — 97112 NEUROMUSCULAR REEDUCATION: CPT | Mod: GP | Performed by: PHYSICAL THERAPIST

## 2023-02-09 PROCEDURE — 90912 BFB TRAINING 1ST 15 MIN: CPT | Mod: GP | Performed by: PHYSICAL THERAPIST

## 2023-03-02 ENCOUNTER — OFFICE VISIT (OUTPATIENT)
Dept: PALLIATIVE MEDICINE | Facility: CLINIC | Age: 28
End: 2023-03-02
Payer: COMMERCIAL

## 2023-03-02 VITALS
BODY MASS INDEX: 31.98 KG/M2 | HEART RATE: 75 BPM | SYSTOLIC BLOOD PRESSURE: 128 MMHG | OXYGEN SATURATION: 99 % | DIASTOLIC BLOOD PRESSURE: 83 MMHG | WEIGHT: 249.1 LBS

## 2023-03-02 DIAGNOSIS — Z98.890 S/P LUMBAR MICRODISCECTOMY: ICD-10-CM

## 2023-03-02 DIAGNOSIS — M54.16 LUMBAR RADICULOPATHY: ICD-10-CM

## 2023-03-02 PROCEDURE — 99214 OFFICE O/P EST MOD 30 MIN: CPT | Performed by: NURSE PRACTITIONER

## 2023-03-02 RX ORDER — PREGABALIN 150 MG/1
150 CAPSULE ORAL 2 TIMES DAILY
Qty: 60 CAPSULE | Refills: 2 | Status: SHIPPED | OUTPATIENT
Start: 2023-03-02 | End: 2023-04-20

## 2023-03-02 ASSESSMENT — PAIN SCALES - GENERAL: PAINLEVEL: MODERATE PAIN (5)

## 2023-03-02 NOTE — PROGRESS NOTES
[facility] Pain Management     Date of visit: 3/2/2023     Reason for visit:  Follow-up:  Dipak Fuentes is a 27 year old male with PMH significant for lumbar ddd with radiculopathy, s/p microdiscectomy x2, bipolar depression, substance use disorder who is seen today for follow-up from initial consultation on 2/2/2023.    History:  Lumbar microdiscectomy surgery 9/2022. After surgery pain in the right leg was still present, burning pain was less, but after surgery it changed to stabbing pain. Prior to surgery was experiencing pain and urinary incontinence, this did not resolve after surgery. Undergoing Pelvic therapy and Pool PT; traditional PT was not helpful as he could not tolerate the exercises as he was unable to tolerate the pain.. He had undergone follow-up MRI in October 2022 which really did not show any concerning findings or problems at the surgical site.    Recommendations from last visit:  Discussed allodynia and heightened pain response. Will restart gabapentin and titrate dose upwards to attempt to reduce centrally mediated pain response. Will also have him stop ibuprofen and trial diclofenac for pain; advised taking before or after PT to help aid in recovery. Continue PT and pelvic floor therapy.   ____________________________________________________________    Chief Complaint:    Chief Complaint   Patient presents with     Pain       Since last seen,  Dipak Fuentes reports:  - Rash on legs/ankles. Started about 2 weeks ago. Unsure if related to gabapentin. Worse after being in the pool. No change in intensity, duration or location of his pain with gabapentin at 600 mg TID.   - Taking diclofenac, if he misses a dose, doesn't notice a difference in pain.   -Only taking tizanidine when he has a lot of spasms, typically only once a day to help with sleep.   - Completing pool therapy, making slow progress. Still not able to tolerate weight bearing on the right leg, but is able to move a little faster  "and have improved length in his gait. Plan is to create a home exercise program that he can do in the pool at the [facility].   - Feels like he has some improved motion in the leg. No change in intensity or location of the pain. Occasionally can have no pain, but thinks that he is able to effectively distract himself from the pain.   - Pelvic therapy has been more spaced out. Thinks that this is improving slowly over time.   - Mood - chronically feels like he wants to die, \"this is just how I am though.\" Not actively suicidal.    - Thinks he might lose his job as there is more pressure to return to the office. 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   - PT notes from pelvic therapist and pool therapist reviewed. Making slow progress per their assessments.     Pain description:  Low back with radiation into the right groin/testicle (mostly resolves with being still or sitting), rectum (stabbing), right leg shooting/burning pain with walking and standing. Struggles to bear weight on that leg. Right side numbness from the knee down, intermittently. Occasionally on the left side too.       Aggravating factors include: lying down makes back pain worse, standing makes right leg pain worse  Relieving factors include: icing helps some, tends to provide more immediate relief, delta 9 THC helps reduce pain, sleep  Current pain rating 5-6/10 now, was 4-5/10. Best in the past 2 weeks: 3/10      Current pain medications:  Tizanidine 4 mg TID prn- using at least once a day  Diclofenac 75 mg BID  Gabapentin 600 mg TID    Review of [place] Prescription Monitoring Program ():   No concern for abuse or misuse of controlled medications based on this report. Viewed on 3/2/2023.       PAIN MANAGEMENT TREATMENT HISTORY  1. MEDICATIONS:  Opiates: Oxycodone after surgery- Helped  NSAIDS: Ibuprofen (Advil, Motrin).  Medication was not helpful , only takes it when his wife and mom \"bug me to take it because I am in " "pain\"  Muscle Relaxants: Cyclobenzaprine (Flexeril).  Medication was helpful  Methocarbamol (Robaxin).  Medication was helpful  Tizanidine (Zanaflex).  Medication was helpful  Helps with spasms, tizanidine most helpful  Anti-depressants: Remeron most recently - helpful. Mood stabilizers have been helpful in the past; tried on many anti-psychotics: had hallucinations, lithium: made him hard to be around, lamotrigine - skin rash. Now uses THC to manage polo with success  Sleep aids: None  Anxiolytics: has used in the past, has tried hydroxyzine in the past  Neuropathics: Gabapentin (Neurontin).  Medication was not helpful, trialed once at 300 mg TID, then again at 600 mg TID  Topicals: CBD spray - not helpful, lidocaine patches - not helpful  Adjuvant pain medications: delta 9 THC helps reduce pain, sleep  2. PHYSICAL THERAPY:   Yes: traditional PT after surgery not helpful. Now doing pool PT at [facility] with Sara Ro, PT and making progress. Also in pelvic floor therapy.   3. PAIN PSYCHOLOGY:   Therapy in the past for biopolar management, not in several years  4. SURGERY:   9/21/2022 Right L5-S1 minimally invasive microdiscectomy with Dr. Oquendo  10/7/2020  Right L5-S1 minimally invasive microdiscectomy with Dr. Oquendo  5. INJECTIONS:   8/25/22: right S1-S2 transforaminal epidural corticosteroid injection with Dr. Shay - worsened pain  3/10/20: Lumbar L5-S1 interlaminar epidural steroid injection with Dr. Esteves- had 2 weeks of nothing, then 2 weeks of relief and then back to painful  6. COMPLEMENTARY THERAPY:  Chiropractic  not helpful  Acupuncture/acupressure/ not tried  TENS unit  not helpful made pain worse  cold/cold packs: helpful  distraction/mindfulness: somewhat helpful   meditation/guided imagery: somewhat helpful    Imaging:  10/7/2022 Lumbar MRI at [facility]          Past Social History:  Social History     Tobacco Use     Smoking status: Former     Packs/day: 0.50     Years: 5.00     Pack " "years: 2.50     Types: Cigarettes, Other     Smokeless tobacco: Never   Vaping Use     Vaping Use: Every day     Substances: Nicotine, THC, CBD     Devices: Pre-filled or refillable cartridge, Refillable tank   Substance Use Topics     Alcohol use: Not Currently     Comment: over 6 months     Drug use: Yes     Types: Marijuana     Comment: bipolar-concentrates 1-2 x day      Social History     Social History Narrative    .     Works in IT    Likes to bake for fun, mostly bread. Likes to play video games with his daughter.    8 y.o. daughter       Medications:  Current Outpatient Medications   Medication Sig Dispense Refill     acetaminophen (TYLENOL) 500 MG tablet Take 500-1,000 mg by mouth every 6 hours as needed       diclofenac (VOLTAREN) 75 MG EC tablet Take 1 tablet (75 mg) by mouth 2 times daily 60 tablet 1     gabapentin (NEURONTIN) 300 MG capsule Take 2 capsules (600 mg) by mouth 3 times daily Increase as directed 180 capsule 1     SENNA-docusate sodium (SENNA S) 8.6-50 MG tablet Take 1 tablet by mouth 2 times daily as needed (take while on oxycodone) 40 tablet 0     tiZANidine (ZANAFLEX) 4 MG tablet Take 1 tablet (4 mg) by mouth 3 times daily as needed for muscle spasms 40 tablet 0     Allergies:     Allergies   Allergen Reactions     Ceclor [Cefaclor Monohydrate]      Lamictal [Lamotrigine]      Had known side effect of\"skin eating rash\"  Jaycob lauren syndrome     Latex      PN: Converted from LW Latex Sensitivity Flag     Cefaclor Rash     Family history:  Family History   Problem Relation Age of Onset     Depression Mother      Depression Father      Substance Abuse Father      Depression Maternal Grandmother      Heart Disease Maternal Grandmother      Depression Paternal Grandmother      Anxiety Disorder Paternal Grandmother      Parkinsonism Paternal Grandmother      Depression Brother      Unknown/Adopted No family hx of      Schizophrenia No family hx of      Bipolar Disorder No family hx " of      Suicide No family hx of      Dementia No family hx of      Gonzalo Disease No family hx of      Autism Spectrum Disorder No family hx of      Intellectual Disability (Mental Retardation) No family hx of      Mental Illness No family hx of      OBJECTIVE  Vitals:    03/02/23 0809   BP: 128/83   Pulse: 75   SpO2: 99%   Weight: 113 kg (249 lb 1.6 oz)     Constitutional: Well developed, well nourished, appears stated age. No acute distress.  Gait is steady, antalgic and uses cane  HEENT: Head atraumatic, normocephalic. Eyes without conjunctival injection or jaundice.   Skin: No obvious lesions, or petechiae of exposed skin. Follicular redness in patches on thigh. Mulitple tattoos present  Extremities: Peripheral pulses intact. No clubbing, cyanosis, or edema. Moves all extremities.  Psychiatric/mental status: Alert, without lethargy or stupor. Speech fluent. Appropriate affect. Mood normal. Able to follow commands without difficulty.   Musculoskeletal exam: Normal bulk and tone. Unremarkable spinal curvature.    ASSESSMENT AND PLAN:  The following recommendations were given to the patient. Diagnosis, treatment options, risks, benefits, and alternatives were discussed, and all questions were answered. The patient expressed understanding of the plan for pain management. I am recommending a multidisciplinary treatment plan to help this patient better manage his pain.      1. Lumbar radiculopathy  2. S/P lumbar microdiscectomy  Direct transition from gabapentin to lyrica. Will change to BID dosing to decrease pill burden. Encouraged continued radical acceptance of pain, continue to engage in PT and look for small gains. Stop diclofenac as he has not noticed a difference in pain when he takes it or misses it. Continue zanaflex. Advised on med disposal for leftover gabapentin. Could consider pain PT, pain psychology or acupuncture; will discuss when done with current pool and pelvic therapies.   - pregabalin  (LYRICA) 150 MG capsule; Take 1 capsule (150 mg) by mouth 2 times daily  Dispense: 60 capsule; Refill: 2  - tiZANidine (ZANAFLEX) 4 MG tablet; Take 1 tablet (4 mg) by mouth 3 times daily as needed for muscle spasms  Dispense: 60 tablet; Refill: 1    Follow up with me in 6-8 weeks, scheduled for 4/20/23.    Marni Gray, CNP-BC, PMGT-BC, AP-PMN  [facility]

## 2023-03-02 NOTE — PATIENT INSTRUCTIONS
----------------------------------------------------------------  [facility] Number:  [phone]   Call with any questions about your care and for scheduling assistance.   Calls are returned Monday through Friday between 8 AM and 4:30 PM. We usually get back to you within 2 business days depending on the issue/request.    If we are prescribing your medications:  For opioid medication refills, call the clinic or send a [facility] message 7 days in advance.  Please include:  Name of requested medication  Name of the pharmacy.  For non-opioid medications, call your pharmacy directly to request a refill. Please allow 3-4 days to be processed.   Per [place] State Law:  All controlled substance prescriptions must be filled within 30 days of being written.    For those controlled substances allowing refills, pickup must occur within 30 days of last fill.      We believe regular attendance is key to your success in our program!    Any time you are unable to keep your appointment we ask that you call us at least 24 hours in advance to cancel.This will allow us to offer the appointment time to another patient.   Multiple missed appointments may lead to dismissal from the clinic.

## 2023-03-07 ENCOUNTER — THERAPY VISIT (OUTPATIENT)
Dept: PHYSICAL THERAPY | Facility: CLINIC | Age: 28
End: 2023-03-07
Payer: COMMERCIAL

## 2023-03-07 DIAGNOSIS — Z98.890 S/P LUMBAR MICRODISCECTOMY: ICD-10-CM

## 2023-03-07 DIAGNOSIS — M62.89 PELVIC FLOOR DYSFUNCTION: Primary | ICD-10-CM

## 2023-03-07 DIAGNOSIS — N39.46 MIXED STRESS AND URGE URINARY INCONTINENCE: ICD-10-CM

## 2023-03-07 PROCEDURE — 90913 BFB TRAINING EA ADDL 15 MIN: CPT | Mod: GP | Performed by: PHYSICAL THERAPIST

## 2023-03-07 PROCEDURE — 90912 BFB TRAINING 1ST 15 MIN: CPT | Mod: GP | Performed by: PHYSICAL THERAPIST

## 2023-03-07 PROCEDURE — 97110 THERAPEUTIC EXERCISES: CPT | Mod: GP | Performed by: PHYSICAL THERAPIST

## 2023-03-28 ENCOUNTER — TELEPHONE (OUTPATIENT)
Dept: PALLIATIVE MEDICINE | Facility: CLINIC | Age: 28
End: 2023-03-28

## 2023-03-28 DIAGNOSIS — M54.16 LUMBAR RADICULOPATHY: Primary | ICD-10-CM

## 2023-03-28 RX ORDER — PREGABALIN 100 MG/1
100 CAPSULE ORAL 2 TIMES DAILY
Qty: 60 CAPSULE | Refills: 0 | Status: SHIPPED | OUTPATIENT
Start: 2023-03-28 | End: 2023-04-20

## 2023-03-28 NOTE — TELEPHONE ENCOUNTER
"Pt states he is having \"more good days than bad\" on Lyrica, would like to reduce meds and see if this helps    Routing prepped rx    Zaynab JETER RN Care Coordinator  [facility]        "

## 2023-03-28 NOTE — TELEPHONE ENCOUNTER
[facility] Call Center    Phone Message    May a detailed message be left on voicemail: yes     Reason for Call: Medication Question or concern regarding medication   Prescription Clarification  Name of Medication: pregabalin (LYRICA) 150 MG capsule  Prescribing Provider: Marni Gray NP   Pharmacy: [id number] [address]   What on the order needs clarification? Pt is calling and wanting to know side effects. When Pt is taking this medication he gets shortness of breath, and when Pt lays down after taking this medication he stated that it is had for him to breath. Please call Pt back.           Action Taken: Message routed to:  Other: [facility]    Travel Screening: Not Applicable

## 2023-03-28 NOTE — TELEPHONE ENCOUNTER
"Routing to review and advise    For a few hours after taking Lyrica 150mg pt experiences sensation of chest feeling heavy and having issues with being able to take a deep breath. Pt normally takes it with 4mgs of tizanidine but sometimes takes w/o and does notice the same sensation     If he is taking it during the evening he will wake from sleep with pauses in breathing/sonorous resps    Pt states this has been happening \"the entire time but my wife just encouraged me to call\"    Zaynab JETER RN Care Coordinator  [facility]    "

## 2023-03-28 NOTE — TELEPHONE ENCOUNTER
1. If pain is unchanged in the past month of Lyrica, then we can taper off of it.   2.  If this just started when he began taking Lyrica, I would recommend decreasing the dose and monitoring to see if this approves.  3. If he has been having these periods before starting Lyrica, then he should connect with his PCP to see if he needs to be referred for a sleep study.     Please let me know what he thinks, thanks.    Marni Gray, [place]

## 2023-04-18 ENCOUNTER — THERAPY VISIT (OUTPATIENT)
Dept: PHYSICAL THERAPY | Facility: CLINIC | Age: 28
End: 2023-04-18
Payer: COMMERCIAL

## 2023-04-18 DIAGNOSIS — Z98.890 S/P LUMBAR MICRODISCECTOMY: ICD-10-CM

## 2023-04-18 DIAGNOSIS — M62.89 PELVIC FLOOR DYSFUNCTION: Primary | ICD-10-CM

## 2023-04-18 DIAGNOSIS — N39.46 MIXED STRESS AND URGE URINARY INCONTINENCE: ICD-10-CM

## 2023-04-18 PROCEDURE — 97110 THERAPEUTIC EXERCISES: CPT | Mod: GP | Performed by: PHYSICAL THERAPIST

## 2023-04-18 PROCEDURE — 97535 SELF CARE MNGMENT TRAINING: CPT | Mod: GP | Performed by: PHYSICAL THERAPIST

## 2023-04-18 NOTE — PROGRESS NOTES
Subjective:  HPI  Physical Exam                    Objective:  System    Physical Exam    General     ROS    Assessment/Plan:    PROGRESS  REPORT    Progress reporting period is from 01/19/23 to 04/18/23.       SUBJECTIVE  Subjective changes noted by patient:  .  Subjective: Better control with bladder overall. Still dribbling when pain level escalates but base pain level slightly better. Still will occasionally grab end of penis and stops leaking. Resting pain better but still limited with activity. Pain clinic on Thursday.More aware of kegels in general.Patient continues to walk with cane and unable to WB on R LE.    Current pain level is 6/10  .     Previous pain level was  8/10  .   Changes in function:  Yes (See Goal flowsheet attached for changes in current functional level)  Adverse reaction to treatment or activity: None    OBJECTIVE  Changes noted in objective findings:    Objective: No BFB in clinic today, due to feeling more awareness of kegels.Added blue band instead of green. Trial of standing plie with no band. Patient extremely limited with movement.     ASSESSMENT/PLAN  Updated problem list and treatment plan: Diagnosis 1:  Pelvic floor   Pain -  manual therapy, self management, education and home program  Decreased ROM/flexibility - manual therapy and therapeutic exercise  Decreased strength - therapeutic exercise and therapeutic activities  Impaired balance - neuro re-education and therapeutic activities  Decreased proprioception - neuro re-education and therapeutic activities  Inflammation - self management/home program  Impaired gait - gait training  Impaired muscle performance - neuro re-education  Decreased function - therapeutic activities  STG/LTGs have been met or progress has been made towards goals:  Yes (See Goal flow sheet completed today.)  Assessment of Progress: The patient's progress has slowed.  The patient has had set backs in their progress.  Self Management Plans:  Patient has  been instructed in a home treatment program.  Patient  has been instructed in self management of symptoms.    Dipak continues to require the following intervention to meet STG and LTG's:  PT    Recommendations:  This patient would benefit from continued therapy.     Frequency:  1 visit every 6 weeks  Duration:  for 12 weeks      This patient would benefit from further evaluation.    Please refer to the daily flowsheet for treatment today, total treatment time and time spent performing 1:1 timed codes.

## 2023-04-18 NOTE — PROGRESS NOTES
[facility]    OUTPATIENT Physical Therapy ORTHOPEDIC EVALUATION  PLAN OF TREATMENT FOR OUTPATIENT REHABILITATION  (COMPLETE FOR INITIAL CLAIMS ONLY)  Patient's Last Name, First Name, M.I.  YOB: 1995  Dipak Fuentes    Provider s Name:  [facility]   Medical Record No.  [id number]   Start of Care Date:  01/19/23   Onset Date:   12/12/22 (date of order)   Treatment Diagnosis:  pelvic floor dysfunction /urgency Medical Diagnosis:     Pelvic floor dysfunction  Mixed stress and urge urinary incontinence  S/P lumbar microdiscectomy       Goals:     04/18/23 0700   Urinary Leakage   Previous Functional Level No problems   Current Functional Level Leaking with urge   Performance Level 5-10 minutes   STG Target Performance Decrease leakage with urge   Performance Level able to delay void 2 minutes, no leakage   Rationale continence throughout the day;for healthy hygiene and to prevent skin breakdown   Due Date 02/16/23   Date Goal Met 03/07/23   LTG Target Performance Decrease leakage with urge   Performance Level able to delay void 10 minutes   Rationale continence throughout the day;for healthy hygiene and to prevent skin breakdown   Due Date 07/11/23   If goal not met, Why? progressing not met           Therapy Frequency:  1x every 8 weeks  Predicted Duration of Therapy Intervention:  4 months    John Joseph, PT                 I CERTIFY THE NEED FOR THESE SERVICES FURNISHED UNDER        THIS PLAN OF TREATMENT AND WHILE UNDER MY CARE     (Physician attestation of this document indicates review and certification of the therapy plan).                     Certification Date From:  04/19/23   Certification Date To:  07/16/23    Referring Provider:  Cara Palafox    Initial Assessment        See Epic Evaluation SOC Date: 01/19/23

## 2023-04-20 ENCOUNTER — OFFICE VISIT (OUTPATIENT)
Dept: PALLIATIVE MEDICINE | Facility: CLINIC | Age: 28
End: 2023-04-20
Payer: COMMERCIAL

## 2023-04-20 VITALS — SYSTOLIC BLOOD PRESSURE: 132 MMHG | DIASTOLIC BLOOD PRESSURE: 93 MMHG | OXYGEN SATURATION: 97 % | HEART RATE: 111 BPM

## 2023-04-20 DIAGNOSIS — M54.16 LUMBAR RADICULOPATHY: ICD-10-CM

## 2023-04-20 PROCEDURE — 99214 OFFICE O/P EST MOD 30 MIN: CPT | Performed by: NURSE PRACTITIONER

## 2023-04-20 RX ORDER — PREGABALIN 150 MG/1
150 CAPSULE ORAL 2 TIMES DAILY
Qty: 60 CAPSULE | Refills: 2 | Status: SHIPPED | OUTPATIENT
Start: 2023-04-20 | End: 2023-07-20

## 2023-04-20 ASSESSMENT — PAIN SCALES - GENERAL: PAINLEVEL: MODERATE PAIN (5)

## 2023-04-20 NOTE — PROGRESS NOTES
[facility]   Date of Visit: 4/20/2023  Last visit:  3/2/2023   Original Consult: 2/2/2023    Reason for visit:  Follow-up:  Dipak Fuentes is a 27 year old male with PMH significant for lumbar ddd with radiculopathy, s/p microdiscectomy x2, bipolar depression, substance use disorder who is seen today for follow-up on chronic pain treatment.     History:  Lumbar microdiscectomy surgery 9/2022. After surgery pain in the right leg was still present, burning pain was less, but after surgery it changed to stabbing pain. Prior to surgery was experiencing pain and urinary incontinence, this did not resolve after surgery. Undergoing Pelvic therapy and Pool PT; traditional PT was not helpful as he could not tolerate the exercises as he was unable to tolerate the pain.. He had undergone follow-up MRI in October 2022 without any concerning findings or problems at the surgical site.    Recommendations from last visit:  Direct transition from gabapentin to lyrica. Will change to BID dosing to decrease pill burden. Encouraged continued radical acceptance of pain, continue to engage in PT and look for small gains. Stop diclofenac as he has not noticed a difference in pain when he takes it or misses it. Continue zanaflex. Advised on med disposal for leftover gabapentin. Could consider pain PT, pain psychology or acupuncture; will discuss when done with current pool and pelvic therapies.     Records reviewed:  4/18/23 PT progress note from [facility] (Pelvic PT): Symptoms have improved, continue to work on controlling urine dribbling when pain is high. Continue 1 visit every 6 weeks.   3/6/23: discharged from PT with Monika Loredo at [facility]  ____________________________________________________________    Since last seen, Dipak reports:  - breathing issue with pregablin 150 BID (was waking up feeling short of breath), decreased to 100 mg BID and this improved to only once a week versus every day.   - Lyrica  "seems to be working better. At times, after he has rest, he has periods of no pain. Now can tolerate pain for longer and can go for up to 30 minutes before he needs to sit.   - Now doing independent pool exercises at the [facility], sometimes hard to do it - feels weird to be doing it in public pool.  Land therapy did not really improve his pain.   - Getting up to make his own food, picking up around the house again now as the pain is better controlled.   - Stopped taking diclofenac, didn't notice a difference in pain with stopping it.   -Only taking tizanidine when he has a lot of spasms, typically only before with sleep. If he doesn't take it, woken with spasms.  - Mood, about the same. Still chronically feels like he wants to die, \"this is just how I am though.\" Not actively suicidal, states \"this is normal for me.\"  - Still working at his same place - still thinks that he may be let go at some point.  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 Interested in the creation and development of a video game.   - PT notes from pelvic therapist and pool therapist reviewed. Making slow progress per their assessments.   - Pelvic therapy has been more spaced out. Thinks that this is improving slowly over time. They are considering stopping, seems reasonable if he feels that he has mastered self-care.   - goals of pain management are to be able to participate in fishing and disc golf.  - Has been thinking about spinal cord stimulator option and working to decrease and quit nicotine use.     Pain description:  Low back with radiation into the right groin/testicle (mostly resolves with being still or sitting), rectum (stabbing), right leg shooting/burning pain with walking and standing. Struggles to bear weight on that leg. Right side numbness from the knee down, intermittently. Occasionally on the left side too- feels more muscular due to compensation.         Aggravating factors include: " "Sitting on hard chairs, lying down makes back pain worse, standing makes right leg pain worse  Relieving factors include: icing helps some, tends to provide more immediate relief, delta 9 THC helps reduce pain, sleep  Current pain rating 5/10 now, was 4-5/10. Best in the past 2 weeks: 3/10 Pain is not less intense, but more tolerable.     Current pain medications:  Tizanidine 4 mg TID prn- using at least once a day  Pregabalin 100 mg BID    Review of [place] Prescription Monitoring Program ():   No concern for abuse or misuse of controlled medications based on this report. Viewed on 4/20/2023.    PAIN MANAGEMENT TREATMENT HISTORY  1. MEDICATIONS:  Opiates: Oxycodone after surgery- Helped  NSAIDS: Ibuprofen (Advil, Motrin).  Medication was not helpful , only takes it when his wife and mom \"bug me to take it because I am in pain\" Diclofenac - -not helpful  Muscle Relaxants: Cyclobenzaprine (Flexeril).  Medication was helpful  Methocarbamol (Robaxin).  Medication was helpful  Tizanidine (Zanaflex).  Medication was helpful  Helps with spasms, tizanidine most helpful  Anti-depressants: Remeron most recently - helpful. Mood stabilizers have been helpful in the past; tried on many anti-psychotics: had hallucinations, lithium: made him hard to be around, lamotrigine - skin rash. Now uses THC to manage polo with success  Sleep aids: None  Anxiolytics: has used in the past, has tried hydroxyzine in the past  Neuropathics: Gabapentin (Neurontin).  Medication was not helpful, trialed once at 300 mg TID, then again at 600 mg TID. Lyrica - -helpful  Topicals: CBD spray - not helpful, lidocaine patches - not helpful  Adjuvant pain medications: delta 9 THC helps reduce pain, sleep  2. PHYSICAL THERAPY:   Yes: traditional PT after surgery not helpful. Now doing pool PT at [facility] with Sara Ro, PT and making progress. Also in pelvic floor therapy.   3. PAIN PSYCHOLOGY:   Therapy in the past for biopolar " management, not in several years  4. SURGERY:   9/21/2022 Right L5-S1 minimally invasive microdiscectomy with Dr. Oquendo  10/7/2020  Right L5-S1 minimally invasive microdiscectomy with Dr. Oquendo  5. INJECTIONS:   8/25/22: right S1-S2 transforaminal epidural corticosteroid injection with Dr. Shay - worsened pain  3/10/20: Lumbar L5-S1 interlaminar epidural steroid injection with Dr. Esteves- had 2 weeks of nothing, then 2 weeks of relief and then back to painful  6. COMPLEMENTARY THERAPY:  Chiropractic  not helpful  Acupuncture/acupressure/ not tried  TENS unit  not helpful made pain worse  cold/cold packs: helpful  distraction/mindfulness: somewhat helpful   meditation/guided imagery: somewhat helpful    Imaging:  10/7/2022 Lumbar MRI at [facility]          Past Social History:  Social History     Tobacco Use     Smoking status: Former     Packs/day: 0.50     Years: 5.00     Pack years: 2.50     Types: Cigarettes, Other     Smokeless tobacco: Never   Vaping Use     Vaping status: Every Day     Substances: Nicotine, THC, CBD     Devices: Pre-filled or refillable cartridge, Refillable tank   Substance Use Topics     Alcohol use: Not Currently     Comment: over 6 months     Drug use: Yes     Types: Marijuana     Comment: bipolar-concentrates 1-2 x day      Social History     Social History Narrative    .     Works in IT    Likes to bake for fun, mostly bread. Likes to play video games with his daughter.    8 y.o. daughter       Medications and Allergies reviewed.    OBJECTIVE  Vitals:    04/20/23 0955   BP: (!) 132/93   Pulse: 111   SpO2: 97%     Constitutional: Well developed, well nourished, appears stated age. No acute distress.  Gait is steady, antalgic and uses cane  HEENT: Head atraumatic, normocephalic. Eyes without conjunctival injection or jaundice.   Skin: No obvious lesions, or petechiae of exposed skin. Follicular redness in patches on thigh. Mulitple tattoos present  Extremities: Peripheral pulses intact.  No clubbing, cyanosis, or edema. Moves all extremities.  Psychiatric/mental status: Alert, without lethargy or stupor. Speech fluent. Appropriate affect. Mood normal. Able to follow commands without difficulty.   Musculoskeletal exam: Normal bulk and tone. Unremarkable spinal curvature. Prefers to stand versus sit.     ASSESSMENT AND PLAN:  1. Lumbar radiculopathy  2. S/P lumbar microdiscectomy    Will try increasing Lyrica to 150 again. Consider topamax in the future if Lyrica is no longer effective. Discussed process for SCS evaluation and reasonable expectations. He will check websites for information with links provided in his AVS. If interested, I asked him to reach out and I can coordinate a visit with Dr. Duarte or Dr. Mi to discuss. Understands that the step following that would be a psych evaluation.   - pregabalin (LYRICA) 150 MG capsule; Take 1 capsule (150 mg) by mouth 2 times daily  Dispense: 60 capsule; Refill: 2  - tiZANidine (ZANAFLEX) 4 MG tablet; Take 1 tablet (4 mg) by mouth 3 times daily as needed for muscle spasms  Dispense: 60 tablet; Refill: 1    Follow up with me in 3 months    Marni Gray, CNP-BC, PMGT-BC, AP-PMN  [facility] [place]

## 2023-04-20 NOTE — PATIENT INSTRUCTIONS
[employer]:  https://www.neuromodulation.abbott/us/en/chronic-pain/getting-neurostimulation.html    [employer]:  https://www.medtronic.com/us-en/patients/treatments-therapies/spinal-cord-stimulation-chronic-pain.html    [employer]:  https://www.Vo[employer].com/en/chronic-pain-solutions/spinal-cord-stimulation.html    Check into these resources out above - If you want to pursue this, call me, I would place an order for you to meet with one of my partners who does this procedure.   _____________________________________________________  Scheduling/Clinic telephone number for ALL locations:  [phone]    After Hours On-Call Service for Emergencies:  [phone]  Call with any questions about your care and for scheduling assistance.   Calls are returned Monday through Friday between 8 AM and 4:00 PM. We usually get back to you within 2-3 business days depending on the issue/request. I am not in the clinic on Fridays.   If we are prescribing your medications:  For medication refills, call the clinic or send a SCYFIX message 7 days in advance.  Please include the name of the requested medication and your preferred pharmacy. Please allow 3-4 days to be processed.   Per [place] State Law, all controlled substance prescriptions must be filled within 30 days of being written. For those controlled substances allowing refills, pickup must occur within 30 days of last fill.    We believe regular attendance is key to your success in our program!    If you are unable to keep your appointment we ask that you call us at least 24 hours in advance to cancel.This will allow us to offer the appointment time to another patient. Multiple missed appointments may lead to dismissal from the clinic.

## 2023-07-14 ENCOUNTER — OFFICE VISIT (OUTPATIENT)
Dept: INTERNAL MEDICINE | Facility: CLINIC | Age: 28
End: 2023-07-14
Payer: COMMERCIAL

## 2023-07-14 VITALS
SYSTOLIC BLOOD PRESSURE: 135 MMHG | HEIGHT: 74 IN | BODY MASS INDEX: 32.08 KG/M2 | RESPIRATION RATE: 16 BRPM | DIASTOLIC BLOOD PRESSURE: 85 MMHG | HEART RATE: 81 BPM | WEIGHT: 250 LBS | TEMPERATURE: 97.4 F | OXYGEN SATURATION: 99 %

## 2023-07-14 DIAGNOSIS — J45.20 MILD INTERMITTENT ASTHMA WITHOUT COMPLICATION: ICD-10-CM

## 2023-07-14 DIAGNOSIS — F30.9 BIPOLAR I DISORDER, SINGLE MANIC EPISODE (H): ICD-10-CM

## 2023-07-14 DIAGNOSIS — Z00.00 ROUTINE GENERAL MEDICAL EXAMINATION AT A HEALTH CARE FACILITY: Primary | ICD-10-CM

## 2023-07-14 DIAGNOSIS — M54.16 LUMBAR RADICULOPATHY: ICD-10-CM

## 2023-07-14 PROCEDURE — 99214 OFFICE O/P EST MOD 30 MIN: CPT | Mod: 25 | Performed by: INTERNAL MEDICINE

## 2023-07-14 PROCEDURE — 99395 PREV VISIT EST AGE 18-39: CPT | Performed by: INTERNAL MEDICINE

## 2023-07-14 RX ORDER — ALBUTEROL SULFATE 90 UG/1
2 AEROSOL, METERED RESPIRATORY (INHALATION) EVERY 6 HOURS PRN
Qty: 18 G | Refills: 0 | Status: SHIPPED | OUTPATIENT
Start: 2023-07-14 | End: 2024-05-14

## 2023-07-14 ASSESSMENT — ENCOUNTER SYMPTOMS
HEADACHES: 0
WEAKNESS: 1
SORE THROAT: 0
MYALGIAS: 1
JOINT SWELLING: 0
EYE PAIN: 0
HEMATURIA: 0
ARTHRALGIAS: 0
FREQUENCY: 0
SHORTNESS OF BREATH: 1
DYSURIA: 1
DIARRHEA: 0
NAUSEA: 0
CHILLS: 0
COUGH: 1
PALPITATIONS: 0
HEMATOCHEZIA: 0
NERVOUS/ANXIOUS: 0
FEVER: 0
ABDOMINAL PAIN: 0
DIZZINESS: 0
CONSTIPATION: 0
PARESTHESIAS: 1
HEARTBURN: 0

## 2023-07-14 ASSESSMENT — ASTHMA QUESTIONNAIRES: ACT_TOTALSCORE: 22

## 2023-07-14 NOTE — PROGRESS NOTES
SUBJECTIVE:   CC: Dipak is an 27 year old who presents for preventative health visit.        No data to display              HPI    Today's PHQ-2 Score:       7/14/2023    10:47 AM   PHQ-2 ( 1999 [employer])   Q1: Little interest or pleasure in doing things 1   Q2: Feeling down, depressed or hopeless 1   PHQ-2 Score 2   Q1: Little interest or pleasure in doing things Several days   Q2: Feeling down, depressed or hopeless Several days   PHQ-2 Score 2     Medical history of lumbar radiculopathy s/p lumbar surgery.  Follows up with the pain clinic.  Currently using pregabalin at 150 mg twice daily with appreciable symptom control as endorsed by the patient.  He also uses Zanaflex as needed and mostly uses the Zanaflex QHS for spasms.  This is continues also works with physical therapy for pelvic therapy.  Additional medical history of bipolar disorder.  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  He would like to hold off on reestablishing care with the psychiatry team since he may not want to start the medications due to side effects and feels like that the THC is helping with symptoms controlled.uses THC inhaled and gets that OTC as well as edible version.  Quit smoking 1 month ago.                Have you ever done Advance Care Planning? (For example, a Health Directive, POLST, or a discussion with a medical provider or your loved ones about your wishes): no    Social History     Tobacco Use     Smoking status: Former     Packs/day: 0.50     Years: 5.00     Pack years: 2.50     Types: Cigarettes, Other     Smokeless tobacco: Never   Substance Use Topics     Alcohol use: Not Currently     Comment: over 6 months             7/14/2023    10:47 AM   Alcohol Use   Prescreen: >3 drinks/day or >7 drinks/week? Not Applicable       Last PSA: No results found for: PSA    Reviewed orders with patient. Reviewed health  "maintenance and updated orders accordingly - Yes  Labs reviewed in EPIC    Reviewed and updated as needed this visit by clinical staff    Allergies  Meds  Problems             Reviewed and updated as needed this visit by Provider     Meds  Problems                Review of Systems   Constitutional: Negative for chills and fever.   HENT: Negative for congestion, ear pain, hearing loss and sore throat.    Eyes: Positive for visual disturbance. Negative for pain.   Respiratory: Positive for cough and shortness of breath.    Cardiovascular: Negative for chest pain, palpitations and peripheral edema.   Gastrointestinal: Negative for abdominal pain, constipation, diarrhea, heartburn, hematochezia and nausea.   Genitourinary: Positive for dysuria and urgency. Negative for frequency, genital sores, hematuria, impotence and penile discharge.   Musculoskeletal: Positive for myalgias. Negative for arthralgias and joint swelling.   Skin: Negative for rash.   Neurological: Positive for weakness and paresthesias. Negative for dizziness and headaches.   Psychiatric/Behavioral: Positive for mood changes. The patient is not nervous/anxious.          OBJECTIVE:   /85 (BP Location: Right arm, Patient Position: Chair, Cuff Size: Adult Large)   Pulse 81   Temp 97.4  F (36.3  C) (Oral)   Resp 16   Ht 1.88 m (6' 2\")   Wt 113.4 kg (250 lb)   SpO2 99%   BMI 32.10 kg/m      Physical Exam  GENERAL: healthy, alert and no distress  EYES: Eyes grossly normal to inspection, PERRL and conjunctivae and sclerae normal  HENT: ear canals and TM's normal, nose and mouth without ulcers or lesions  RESP: lungs clear to auscultation - no rales, rhonchi or wheezes  CV: regular rate and rhythm, normal S1 S2  ABDOMEN: soft, nontender, no hepatosplenomegaly, no masses  MS: no gross musculoskeletal defects noted, no edema  SKIN: no suspicious lesions or rashes  NEURO: Normal strength and tone, mentation intact and speech normal  PSYCH: " "mentation appears normal, affect normal/bright        ASSESSMENT/PLAN:   (Z00.00) Routine general medical examination at a health care facility  (primary encounter diagnosis)  Plan: Lipid panel reflex to direct LDL Fasting, Basic        metabolic panel, TSH with free T4 reflex            (M54.16) Lumbar radiculopathy  Comment: Follows up with pain clinic and management as per the pain clinic.  Refills also being provided by the pain clinic.  Also currently participating in physical therapy for pelvic floor therapy.  Patient provided with handicap parking for 1 year.  Currently walks with a cane.      (J45.20) Mild intermittent asthma without complication  Comment: Overall symptoms are stable.  Worsening of symptoms during the smoke episode this summer.  Albuterol inhaler refilled for the patient.  Plan: albuterol (PROAIR HFA/PROVENTIL HFA/VENTOLIN         HFA) 108 (90 Base) MCG/ACT inhaler            (F30.9) Bipolar I disorder, single manic episode (H)  Comment: Would like to hold off on establishing care with a psychiatrist and since he has tried antipsychotics in the past and has not done very well due to side effects of mood concerns.  Also tried any lithium medication which caused excessive anger concerns.  Symptoms are currently stable and patient uses over-the-counter THC in both inhaled and edible form and would like to continue with that for now.      Patient has been advised of split billing requirements and indicates understanding: Yes      COUNSELING:        Vision screening       Hearing screening      BMI:   Estimated body mass index is 32.1 kg/m  as calculated from the following:    Height as of this encounter: 1.88 m (6' 2\").    Weight as of this encounter: 113.4 kg (250 lb).         He reports that he has quit smoking. His smoking use included cigarettes and other. He has a 2.50 pack-year smoking history. He has never used smokeless tobacco.            Neela Smith MD  [facility] " [place]

## 2023-07-20 ENCOUNTER — TELEPHONE (OUTPATIENT)
Dept: PALLIATIVE MEDICINE | Facility: CLINIC | Age: 28
End: 2023-07-20

## 2023-07-20 ENCOUNTER — MYC MEDICAL ADVICE (OUTPATIENT)
Dept: PALLIATIVE MEDICINE | Facility: CLINIC | Age: 28
End: 2023-07-20

## 2023-07-20 ENCOUNTER — OFFICE VISIT (OUTPATIENT)
Dept: PALLIATIVE MEDICINE | Facility: CLINIC | Age: 28
End: 2023-07-20
Payer: COMMERCIAL

## 2023-07-20 ENCOUNTER — LAB (OUTPATIENT)
Dept: LAB | Facility: CLINIC | Age: 28
End: 2023-07-20
Payer: COMMERCIAL

## 2023-07-20 VITALS — SYSTOLIC BLOOD PRESSURE: 143 MMHG | HEART RATE: 92 BPM | DIASTOLIC BLOOD PRESSURE: 85 MMHG | OXYGEN SATURATION: 99 %

## 2023-07-20 DIAGNOSIS — M54.16 LUMBAR RADICULOPATHY: Primary | ICD-10-CM

## 2023-07-20 DIAGNOSIS — M54.16 LUMBAR RADICULOPATHY: ICD-10-CM

## 2023-07-20 DIAGNOSIS — Z98.890 S/P LUMBAR MICRODISCECTOMY: ICD-10-CM

## 2023-07-20 DIAGNOSIS — Z00.00 ROUTINE GENERAL MEDICAL EXAMINATION AT A HEALTH CARE FACILITY: ICD-10-CM

## 2023-07-20 PROCEDURE — 36415 COLL VENOUS BLD VENIPUNCTURE: CPT

## 2023-07-20 PROCEDURE — 84443 ASSAY THYROID STIM HORMONE: CPT

## 2023-07-20 PROCEDURE — 80048 BASIC METABOLIC PNL TOTAL CA: CPT

## 2023-07-20 PROCEDURE — 99214 OFFICE O/P EST MOD 30 MIN: CPT | Performed by: NURSE PRACTITIONER

## 2023-07-20 PROCEDURE — 80061 LIPID PANEL: CPT

## 2023-07-20 RX ORDER — PREGABALIN 150 MG/1
150 CAPSULE ORAL 2 TIMES DAILY
Qty: 180 CAPSULE | Refills: 1 | Status: SHIPPED | OUTPATIENT
Start: 2023-07-20 | End: 2024-01-10

## 2023-07-20 ASSESSMENT — PAIN SCALES - GENERAL: PAINLEVEL: MODERATE PAIN (5)

## 2023-07-20 NOTE — NURSING NOTE
2/2/2023     8:01 AM 7/20/2023     9:55 AM   PEG Score   PEG Total Score 8 4         DARLEEN Menon [facility] [place]

## 2023-07-20 NOTE — TELEPHONE ENCOUNTER
Left message for patient to call back, needs to schedule 60 minute Stim Eval with Dr. Felicia menjivar.     Marni, please enter the Stim Eval order for Dr. Duarte

## 2023-07-20 NOTE — PROGRESS NOTES
"[facility] Management     Date of Visit: 7/20/2023  Last visit:  4/20/2023  Original Consult: 2/2/2023    Reason for visit:  Follow-up:  Dipak Fuentes is a 27 year old male with PMH significant for lumbar ddd with radiculopathy, s/p microdiscectomy x2, bipolar depression, substance use disorder who is seen today for follow-up on chronic pain treatment.     History:  Lumbar microdiscectomy surgery 9/2022. After surgery pain in the right leg was still present, burning pain was less, but after surgery it changed to stabbing pain. Prior to surgery was experiencing pain and urinary incontinence, this did not resolve after surgery. Undergoing Pelvic therapy and Pool PT; traditional PT was not helpful as he could not tolerate the exercises as he was unable to tolerate the pain. Completed Pool therapy 4/2023. He had undergone follow-up MRI in October 2022 without any concerning findings or problems at the surgical site.     Recommendations from last visit:  Will try increasing Lyrica to 150 again. Consider topamax in the future if Lyrica is no longer effective. Discussed process for SCS evaluation and reasonable expectations. He will check websites for information with links provided in his AVS. If interested, I asked him to reach out and I can coordinate a visit with Dr. Duarte or Dr. Mi to discuss. Understands that the step following that would be a psych evaluation.     ____________________________________________________________    Since last seen, Dipak reports:  - Quit smoking since last seen. Motivated to do this in order to be a candidate for SCS trial. Wife quit too in support. Has noticed less anxiety with stopping nicotine.   - Lyrica at 150 BID has decreased his right leg symptoms, but has \"brain scramble\" from it. Started walking 20 minutes a day. Believes that relief is better at 150 dose compared to 100 mg dose, but overall remains hopeful that he can find other methods to control his pain " without medications.   - THC also helps his neuropathic pain, can tolerate this better since stopping nicotine. Can have moments of no pain, but then has CNS effects.  - Had a few hours when he rearranged furniture in his home and now has noticed increased lower back pain.   - Graduated with BS degree! 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  Interested in the creation and development of a video game.   -  independent pool exercises at the [facility], sometimes hard to do it - feels weird to be doing it in public pool.  Land therapy did not really improve his pain.    -Only taking tizanidine when he has a lot of spasms, typically only before with sleep. If he doesn't take it, woken with spasms.  - Mood, about the same.  - Still working at his same place - still thinks that he may be let go at some point.  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   - PT notes from pelvic therapist and pool therapist reviewed. Making slow progress per their assessments.   - Pelvic therapy has been more spaced out, he feels that he has mastered self-care.  - goals of pain management are to be able to participate in fishing and disc golf.    Pain description:  Low back with radiation into the right groin/testicle (mostly resolves with being still or sitting), rectum (stabbing), right leg shooting/burning pain with walking and standing. Struggles to bear weight on that leg, somewhat better with lyrica. Right side numbness from the knee down, intermittently.          Aggravating factors include: Sitting on hard chairs, lying down makes back pain worse, standing makes right leg pain worse  Relieving factors include: icing helps some, tends to provide more immediate relief, delta 9 THC helps reduce pain, sleep  Current pain rating 5/10   Pain is not less intense, but more tolerable.     Current pain medications:  Tizanidine 4 mg TID prn- using at least once a day  Pregabalin 150 mg BID    Review  "of [place] Prescription Monitoring Program ():   No concern for abuse or misuse of controlled medications based on this report. Last viewed on 7/20/2023    PAIN MANAGEMENT TREATMENT HISTORY  1. MEDICATIONS:  Opiates: Oxycodone after surgery- Helped  NSAIDS: Ibuprofen (Advil, Motrin).  Medication was not helpful , only takes it when his wife and mom \"bug me to take it because I am in pain\" Diclofenac - -not helpful  Muscle Relaxants: Cyclobenzaprine (Flexeril).  Medication was helpful  Methocarbamol (Robaxin).  Medication was helpful  Tizanidine (Zanaflex).  Medication was helpful  Helps with spasms, tizanidine most helpful  Anti-depressants: Remeron most recently - helpful. Mood stabilizers have been helpful in the past; tried on many anti-psychotics: had hallucinations, lithium: made him hard to be around, lamotrigine - skin rash. Now uses THC to manage polo with success  Sleep aids: None  Anxiolytics: has used in the past, has tried hydroxyzine in the past  Neuropathics: Gabapentin (Neurontin).  Medication was not helpful, trialed once at 300 mg TID, then again at 600 mg TID. Lyrica -helpful  Topicals: CBD spray - not helpful, lidocaine patches - not helpful  Adjuvant pain medications: delta 9 THC helps reduce pain, sleep  2. PHYSICAL THERAPY:   Yes: traditional PT after surgery not helpful. Now doing pool PT at [facility] with Sara Ro, PT and making progress. Also in pelvic floor therapy.   3. PAIN PSYCHOLOGY:   Therapy in the past for biopolar management, not in several years  4. SURGERY:   9/21/2022 Right L5-S1 minimally invasive microdiscectomy with Dr. Oquendo  10/7/2020  Right L5-S1 minimally invasive microdiscectomy with Dr. Oquendo  5. INJECTIONS:   8/25/22: right S1-S2 transforaminal epidural corticosteroid injection with Dr. Shay - worsened pain  3/10/20: Lumbar L5-S1 interlaminar epidural steroid injection with Dr. Esteves- had 2 weeks of nothing, then 2 weeks of relief and then back to " painful  6. COMPLEMENTARY THERAPY:  Chiropractic  not helpful  Acupuncture/acupressure/ not tried  TENS unit  not helpful made pain worse  cold/cold packs: helpful  distraction/mindfulness: somewhat helpful   meditation/guided imagery: somewhat helpful    Imaging:  10/7/2022 Lumbar MRI at [facility]          Past Social History:  Social History     Tobacco Use     Smoking status: Former     Packs/day: 0.50     Years: 5.00     Pack years: 2.50     Types: Cigarettes, Other     Smokeless tobacco: Never   Vaping Use     Vaping Use: Every day     Substances: Nicotine, THC, CBD     Devices: Pre-filled or refillable cartridge, Refillable tank   Substance Use Topics     Alcohol use: Not Currently     Comment: over 6 months     Drug use: Yes     Types: Marijuana     Comment: bipolar-concentrates 1-2 x day      Social History     Social History Narrative    .     Works in IT    Likes to bake for fun, mostly bread. Likes to play video games with his daughter.    8 y.o. daughter       Medications and Allergies reviewed.    OBJECTIVE  Vitals:    07/20/23 0954   BP: (!) 143/85   Pulse: 92   SpO2: 99%     Constitutional: Well developed, well nourished, appears stated age. No acute distress.  Gait is steady, antalgic and uses cane  HEENT: Head atraumatic, normocephalic. Eyes without conjunctival injection or jaundice.   Skin: No obvious lesions, or petechiae of exposed skin. Follicular redness in patches on thigh. Mulitple tattoos present  Extremities: Peripheral pulses intact. No clubbing, cyanosis, or edema. Moves all extremities.  Psychiatric/mental status: Alert, without lethargy or stupor. Speech fluent. Appropriate affect. Mood normal. Able to follow commands without difficulty.   Musculoskeletal exam: Normal bulk and tone. Unremarkable spinal curvature. Prefers to stand versus sit.     ASSESSMENT AND PLAN:  1. Lumbar radiculopathy  2. S/P lumbar microdiscectomy  Lyrica and Tizanidine refilled, doses have been stable.    He  does have underlying mental health issues, however has been stable for a prolonged period of time. I do feel that he would be an appropriate candidate for SCS, and have referred him for psychology evaluation. He has reviewed information about SCS and is able to verbalize understanding of the device. He has also spoken to a person who has an SCS device. He appears to have realistic expectations and understanding of this treatment modality.   Follow up with me with MyChart message after evaluation.     Marni Gray, CNP-BC, PMGT-BC, AP-PMN  [facility]

## 2023-07-20 NOTE — PATIENT INSTRUCTIONS
Meet with Emily for psychology evaluation.   Tizanidine and pregabalin refilled.   Send me a mychart message after you meet with Emily.  _____________________________________________________  Scheduling/Clinic telephone number for ALL locations:  [phone]    After Hours On-Call Service for Emergencies:  [phone]  Call with any questions about your care and for scheduling assistance.   Calls are returned Monday through Friday between 8 AM and 4:00 PM. We usually get back to you within 2-3 business days depending on the issue/request. I am not in the clinic on Fridays.   If we are prescribing your medications:  For medication refills, call the clinic or send a MyChart message 7 days in advance.  Please include the name of the requested medication and your preferred pharmacy. Please allow 3-4 days to be processed.   Per [place] State Law, all controlled substance prescriptions must be filled within 30 days of being written. For those controlled substances allowing refills, pickup must occur within 30 days of last fill.    We believe regular attendance is key to your success in our program!    If you are unable to keep your appointment we ask that you call us at least 24 hours in advance to cancel.This will allow us to offer the appointment time to another patient. Multiple missed appointments may lead to dismissal from the clinic.

## 2023-07-20 NOTE — TELEPHONE ENCOUNTER
[facility] Call Center    Phone Message    May a detailed message be left on voicemail: yes     Reason for Call: Other: Patient is calling to schedule with Emily Valentine for stim eval. Writer was unable to bring up scheduling template to assist the patient with scheduling. Please call patient back to schedule.      Action Taken: Message routed to:  Other: [facility]    Travel Screening: Not Applicable

## 2023-07-21 LAB
ANION GAP SERPL CALCULATED.3IONS-SCNC: 7 MMOL/L (ref 7–15)
BUN SERPL-MCNC: 12.3 MG/DL (ref 6–20)
CALCIUM SERPL-MCNC: 8.9 MG/DL (ref 8.6–10)
CHLORIDE SERPL-SCNC: 104 MMOL/L (ref 98–107)
CHOLEST SERPL-MCNC: 168 MG/DL
CREAT SERPL-MCNC: 1.05 MG/DL (ref 0.67–1.17)
DEPRECATED HCO3 PLAS-SCNC: 27 MMOL/L (ref 22–29)
GFR SERPL CREATININE-BSD FRML MDRD: >90 ML/MIN/1.73M2
GLUCOSE SERPL-MCNC: 94 MG/DL (ref 70–99)
HDLC SERPL-MCNC: 38 MG/DL
LDLC SERPL CALC-MCNC: 110 MG/DL
NONHDLC SERPL-MCNC: 130 MG/DL
POTASSIUM SERPL-SCNC: 4 MMOL/L (ref 3.4–5.3)
SODIUM SERPL-SCNC: 138 MMOL/L (ref 136–145)
TRIGL SERPL-MCNC: 98 MG/DL
TSH SERPL DL<=0.005 MIU/L-ACNC: 3.36 UIU/ML (ref 0.3–4.2)

## 2023-07-24 NOTE — TELEPHONE ENCOUNTER
Pt needs to wait until he sees Dr. Duarte before scheduling w/ Emily.    Called pt and informed him.    Susan, RN-BSN  [facility]   [phone]

## 2023-07-24 NOTE — TELEPHONE ENCOUNTER
[facility] Call Center    Phone Message    May a detailed message be left on voicemail: yes     Reason for Call: Other: Patient is wondering if he is able to schedule with Emily at a date later than he is scheduled with Dr. Duarte or if he will need to wait to schedule until he has completed appointment. Please advise.     Action Taken: Message routed to:  Other: [facility]    Travel Screening: Not Applicable

## 2023-09-06 ENCOUNTER — OFFICE VISIT (OUTPATIENT)
Dept: PALLIATIVE MEDICINE | Facility: CLINIC | Age: 28
End: 2023-09-06
Payer: COMMERCIAL

## 2023-09-06 VITALS — SYSTOLIC BLOOD PRESSURE: 132 MMHG | HEART RATE: 78 BPM | OXYGEN SATURATION: 98 % | DIASTOLIC BLOOD PRESSURE: 82 MMHG

## 2023-09-06 DIAGNOSIS — Z98.890 S/P LUMBAR MICRODISCECTOMY: ICD-10-CM

## 2023-09-06 DIAGNOSIS — M96.1 FAILED BACK SYNDROME: Primary | ICD-10-CM

## 2023-09-06 DIAGNOSIS — M54.16 LUMBAR RADICULOPATHY: ICD-10-CM

## 2023-09-06 PROCEDURE — 99204 OFFICE O/P NEW MOD 45 MIN: CPT | Performed by: PAIN MEDICINE

## 2023-09-06 ASSESSMENT — PAIN SCALES - GENERAL: PAINLEVEL: MODERATE PAIN (5)

## 2023-09-06 NOTE — PROGRESS NOTES
"[facility] SCS evaluation     Date of visit: 9/6/2023    Reason for consultation:    Primary Care Provider is Neela Smith.  Pain medications are being prescribed by n/a.    Please see the [facility] [place] health questionnaire which the patient completed and reviewed with me in detail.  Patient is a Dipak Fuentes is a 28 year old malewho I was asked to see in consultation by Neela Smith for evaluation of   Chief Complaint   Patient presents with    Pain   .   Chief Complaint:    Chief Complaint   Patient presents with    Pain       Pain history:  Dipak Fuentes is a 28 year old male who first started having problems with pain in chronic  RLBP rad to his RLE  S/p Lumbar microdiscectomy surgery 9/2022 Right L5-S1 minimally invasive redo microdiskectomy.  Some improvement with lbp   But rad symp worsening  Overall maybe pain  is stable  The pain varies in nature and severity  The pain is sharp shooting  + spasms  The pt reports burn/numb/jennifer  Rad to plantar surface  Worse with prolonged activity  Worse with beneding and lifting  Benefit with rest  Benefit when seated  Benefit with lyrica but hard to tolerate increase 150mg bid  Denies recent falls  Denies overt weakness- does cont to use joyner  Intermittent urinary incontinence  Pain affects sleep  Pain sig affects quality of life  Currently can walk about 2 blocks     Pain rating: intensity  Averages 5/10 on a 0-10 scale.    Current pain medications:  Tizanidine 4 mg TID prn- using at least once a day  Pregabalin 150 mg BID     Review of [place] Prescription Monitoring Program ():   No concern for abuse or misuse of controlled medications based on this report. Last viewed on 7/20/2023     PAIN MANAGEMENT TREATMENT HISTORY  1. MEDICATIONS:  Opiates: Oxycodone after surgery- Helped  NSAIDS: Ibuprofen (Advil, Motrin).  Medication was not helpful , only takes it when his wife and mom \"bug me to take it because I am in pain\" " Diclofenac - -not helpful  Muscle Relaxants: Cyclobenzaprine (Flexeril).  Medication was helpful  Methocarbamol (Robaxin).  Medication was helpful  Tizanidine (Zanaflex).  Medication was helpful  Helps with spasms, tizanidine most helpful  Anti-depressants: Remeron most recently - helpful. Mood stabilizers have been helpful in the past; tried on many anti-psychotics: had hallucinations, lithium: made him hard to be around, lamotrigine - skin rash. Now uses THC to manage polo with success  Sleep aids: None  Anxiolytics: has used in the past, has tried hydroxyzine in the past  Neuropathics: Gabapentin (Neurontin).  Medication was not helpful, trialed once at 300 mg TID, then again at 600 mg TID. Lyrica -helpful  Topicals: CBD spray - not helpful, lidocaine patches - not helpful  Adjuvant pain medications: delta 9 THC helps reduce pain, sleep  2. PHYSICAL THERAPY:   Yes: traditional PT after surgery not helpful. Now doing pool PT at [facility] with Sara Ro, PT and making progress. Also in pelvic floor therapy.   3. PAIN PSYCHOLOGY:   Therapy in the past for biopolar management, not in several years  4. SURGERY:   9/21/2022 Right L5-S1 minimally invasive microdiscectomy with Dr. Oquendo  10/7/2020  Right L5-S1 minimally invasive microdiscectomy with Dr. Oquendo  5. INJECTIONS:   8/25/22: right S1-S2 transforaminal epidural corticosteroid injection with Dr. Shay - worsened pain  3/10/20: Lumbar L5-S1 interlaminar epidural steroid injection with Dr. Esteves- had 2 weeks of nothing, then 2 weeks of relief and then back to painful  6. COMPLEMENTARY THERAPY:  Chiropractic  not helpful  Acupuncture/acupressure/ not tried  TENS unit  not helpful made pain worse  cold/cold packs: helpful  distraction/mindfulness: somewhat helpful   meditation/guided imagery: somewhat helpful     Imaging:    Past Medical History:  Past Medical History:   Diagnosis Date    Bipolar I disorder, single manic episode (H) 03/25/2013     "Problem list name updated by automated process. Provider to review    Complication of anesthesia     panic after having anesthesia    History of substance use disorder     as a teenager, \"took extra adderall\"    Lumbar disc herniation with radiculopathy     Mild intermittent asthma without complication     Other chronic pain     low back and buttocks leg pain     Past Surgical History:  Past Surgical History:   Procedure Laterality Date    DISCECTOMY LUMBAR MINIMALLY INVASIVE ONE LEVEL Right 10/7/2020    Procedure: Right L5-S1 minimally invasive microdiskectomy;  Surgeon: Juancho Oquendo MD;  Location: [facility] OR    DISCECTOMY LUMBAR MINIMALLY INVASIVE ONE LEVEL Right 9/21/2022    Procedure: Right L5-S1 minimally invasive redo microdiskectomy;  Surgeon: Juancho Oquendo MD;  [facility]       Medications:  Current Outpatient Medications   Medication Sig Dispense Refill    acetaminophen (TYLENOL) 500 MG tablet Take 500-1,000 mg by mouth every 6 hours as needed      albuterol (PROAIR HFA/PROVENTIL HFA/VENTOLIN HFA) 108 (90 Base) MCG/ACT inhaler Inhale 2 puffs into the lungs every 6 hours as needed for shortness of breath, wheezing or cough 18 g 0    pregabalin (LYRICA) 150 MG capsule Take 1 capsule (150 mg) by mouth 2 times daily 180 capsule 1    tiZANidine (ZANAFLEX) 4 MG tablet Take 1 tablet (4 mg) by mouth 3 times daily as needed for muscle spasms 180 tablet 1     Allergies:     Allergies   Allergen Reactions    Ceclor [Cefaclor Monohydrate]     Lamictal [Lamotrigine]      Had known side effect of\"skin eating rash\"  Jaycob lauren syndrome    Latex      PN: Converted from LW Latex Sensitivity Flag    Cefaclor Rash     Social History:  N/a    Family history:  Family History   Problem Relation Age of Onset    Depression Mother     Depression Father     Substance Abuse Father     Depression Maternal Grandmother     Heart Disease Maternal Grandmother     Depression Paternal Grandmother  "    Anxiety Disorder Paternal Grandmother     Parkinsonism Paternal Grandmother     Depression Brother     Unknown/Adopted No family hx of     Schizophrenia No family hx of     Bipolar Disorder No family hx of     Suicide No family hx of     Dementia No family hx of     Rock Disease No family hx of     Autism Spectrum Disorder No family hx of     Intellectual Disability (Mental Retardation) No family hx of     Mental Illness No family hx of      Family history of headaches: n      Physical Exam:  Vitals:    09/06/23 0824   BP: 132/82   Pulse: 78   SpO2: 98%     Exam:  Constitutional: healthy, alert, and no distress  Head: normocephalic. Atraumatic.   Skin: no suspicious lesions or rashes  Psychiatric: mentation appears normal and affect normal/bright    Musculoskeletal exam:  Gait/Station/Posture: antalgic   Cervical spine: ROM wnl           Lumbar spine:     ROM: dec   Myofascial tenderness:  ++    Castro's: ++               Straight leg exam: ++ on the right   LYNETTE/FADIR: neg              SI: neg   Greater trochanteric bursa: neg  Neurologic exam:    Motor:  5/5 LE although hard to assess dorsi/plant flexion 2/2 to pain  Reflexes:       Achilles:  +2    Sensory:  (upper and lower extremities):   Light touch: normal    Allodynia: absent    Dysethesia: absent    Hyperalgesia: absent     Diagnostic tests:  MRI   T12-L1: No significant bulge or posterior disc protrusion. No significant thecal sac stenosis. No significant neural foraminal stenosis.     L1-L2: No significant bulge or posterior disc protrusion. No significant thecal sac stenosis. No significant neural foraminal stenosis.     L2-L3: No significant bulge or posterior disc protrusion. No significant thecal sac stenosis. No significant neural foraminal stenosis.     L3-L4: No significant bulge or posterior disc protrusion. No significant thecal sac stenosis. No significant neural foraminal stenosis.     L4-L5: Slight bulge with mild left foraminal  "extension. No significant thecal sac stenosis. Minimal left foraminal stenosis. No significant right foraminal stenosis.     L5-S1: Prominent bulge with superimposed right greater than left broad-based posterior disc protrusion. No significant thecal sac stenosis. Mild left foraminal stenosis. Moderate right foraminal stenosis.     EXTRASPINAL FINDINGS:  No significant findings.                                                                       IMPRESSION:  1.  Right L5-S1 hemilaminectomy. Right L5-S1 subarticular zone demonstrates a prominent perineural sleeve. Right L5-S1 subarticular zone demonstrates a round area of nonenhancement measuring 8 mm with surrounding enhancement. This likely reflects   enhancement around a right perineural sleeve as well as recurrent disc herniation with surrounding epidural fibrosis. This may be affecting the descending right S1 nerve root.     2.  L5-S1 degenerative disc disease described above.\"      Assessment/Plan:  Dipak Fuentes is a 28 year old male who presents with the complaints of rlbp rad to his rle.   Dipak was seen today for pain.    Diagnoses and all orders for this visit:    Failed back syndrome  -     PAIN PHD EVAL/TREAT/FOLLOW UP; Future    Lumbar radiculopathy  -     Pain Management  Referral    S/P lumbar microdiscectomy  -     Pain Management  Referral         - Physical Therapy: continue   - Clinical Health Psychologist to address issues of relaxation, behavioral change, coping style, and other factors important to improvement:    Order placed   - Diagnostic Studies: no  - Medication Management: no  - Further procedures recommended:    - will start the process for SCS placement    - Follow up:    - Will start scs process     Total time spent was 30 minutes, and more than 50% of face to face time was spent counseling and/or coordination of care regarding principles of multidisciplinary care and medication management     West Duarte, " MD [facility]

## 2023-09-06 NOTE — PATIENT INSTRUCTIONS
- Physical Therapy: continue   - Clinical Health Psychologist to address issues of relaxation, behavioral change, coping style, and other factors important to improvement:    Order placed   - Diagnostic Studies: no  - Medication Management: no  - Further procedures recommended:    - will start the process for SCS placement    - Follow up:    - Will start scs process     ----------------------------------------------------------------  Clinic Number:  [phone]   Call with any questions about your care and for scheduling assistance.   Calls are returned Monday through Friday between 8 AM and 4:30 PM. We usually get back to you within 2 business days depending on the issue/request.    If we are prescribing your medications:  For opioid medication refills, call the clinic or send a [facility] message 7 days in advance.  Please include:  Name of requested medication  Name of the pharmacy.  For non-opioid medications, call your pharmacy directly to request a refill. Please allow 3-4 days to be processed.   Per [place] State Law:  All controlled substance prescriptions must be filled within 30 days of being written.    For those controlled substances allowing refills, pickup must occur within 30 days of last fill.      We believe regular attendance is key to your success in our program!    Any time you are unable to keep your appointment we ask that you call us at least 24 hours in advance to cancel.This will allow us to offer the appointment time to another patient.   Multiple missed appointments may lead to dismissal from the clinic.    Daily Note     Today's date: 2018  Patient name: Ted Marc  : 1974  MRN: [id number]  Referring provider: Mike Tierney MD  Dx:   Encounter Diagnosis     ICD-10-CM    1  Lateral epicondylitis of right elbow M77 11    2  Lumbar radiculopathy M54 16    3  Anterior tibialis tendonitis of left leg M76 812                   Subjective: Pt reports he had to install step-up ladder for his truck, used a wrench for a couple hours, R elbow pain increased today  Pt hasn't gotten a CAM bot for his L foot yet, planning on getting it soon  Objective: See treatment diary below      Assessment: Tolerated treatment well  Patient exhibited good technique with therapeutic exercises and would benefit from continued PT  Pt had increased tension in his common extensors  Pt reported relief after manuals  No c/o after session  Plan: Continue per plan of care     Daily Treatment Diary     Manual              measurements 15           IASTM R elbow common extensors stretch 10 10 10          BL piriformis stretch 5 5 5          k-tape ant tib 5 IASTM ant tib 10 10           20 40 25              Exercise Diary            Recumbent bike  TE held           TB ankle 4-way HEP                                      Hip flex stretch             Dying bug with DLS             SL clamshells             Bridges with ball squeeze             SLR flex with PPT             LTR             Wrist ext eccentrics HEP            Supine SA punches 4# 3x10  4# 3x10          SL ER 4# 3x10  4# 3x10          TB row, ext, hitchiker BTB 2x10  MTB 3x10          Standing scaption 4# 3x10  4# 3x10          Prone ext, MT "T" 4# 3x10 each  4# 3x10                                                  15  10              Modalities            US to L anterior tib 8 8 8                        8 8 8            HEP: DF stretch, wall calf stretch, figure-4 stretch, piriformis stretch, quad stretch, common extensors stretch, wrist eccentric curls, SA punches, SL ER, prone abd, standing scaption, TB inv/ev L foot,

## 2023-09-11 ENCOUNTER — MYC MEDICAL ADVICE (OUTPATIENT)
Dept: PALLIATIVE MEDICINE | Facility: CLINIC | Age: 28
End: 2023-09-11

## 2023-09-11 DIAGNOSIS — Z01.818 PRE-OP TESTING: ICD-10-CM

## 2023-09-11 DIAGNOSIS — M96.1 FAILED BACK SYNDROME: Primary | ICD-10-CM

## 2023-09-11 NOTE — TELEPHONE ENCOUNTER
9/6/23: spinal cord stimulator eval with Dr. Duarte   9/18/23: behavioral eval with YAMIL Barbour.      Susan, RN-BSN  [facility]   [phone]

## 2023-09-18 ENCOUNTER — VIRTUAL VISIT (OUTPATIENT)
Dept: PALLIATIVE MEDICINE | Facility: OTHER | Age: 28
End: 2023-09-18
Attending: PAIN MEDICINE
Payer: COMMERCIAL

## 2023-09-18 DIAGNOSIS — G89.4 CHRONIC PAIN SYNDROME: ICD-10-CM

## 2023-09-18 DIAGNOSIS — F31.81 BIPOLAR 2 DISORDER (H): Primary | ICD-10-CM

## 2023-09-18 PROCEDURE — 90791 PSYCH DIAGNOSTIC EVALUATION: CPT | Mod: 95 | Performed by: COUNSELOR

## 2023-09-18 ASSESSMENT — ANXIETY QUESTIONNAIRES
2. NOT BEING ABLE TO STOP OR CONTROL WORRYING: SEVERAL DAYS
1. FEELING NERVOUS, ANXIOUS, OR ON EDGE: SEVERAL DAYS
6. BECOMING EASILY ANNOYED OR IRRITABLE: SEVERAL DAYS
7. FEELING AFRAID AS IF SOMETHING AWFUL MIGHT HAPPEN: SEVERAL DAYS
GAD7 TOTAL SCORE: 7
IF YOU CHECKED OFF ANY PROBLEMS ON THIS QUESTIONNAIRE, HOW DIFFICULT HAVE THESE PROBLEMS MADE IT FOR YOU TO DO YOUR WORK, TAKE CARE OF THINGS AT HOME, OR GET ALONG WITH OTHER PEOPLE: SOMEWHAT DIFFICULT
GAD7 TOTAL SCORE: 7
3. WORRYING TOO MUCH ABOUT DIFFERENT THINGS: SEVERAL DAYS
5. BEING SO RESTLESS THAT IT IS HARD TO SIT STILL: SEVERAL DAYS
4. TROUBLE RELAXING: SEVERAL DAYS

## 2023-09-18 ASSESSMENT — PATIENT HEALTH QUESTIONNAIRE - PHQ9
SUM OF ALL RESPONSES TO PHQ QUESTIONS 1-9: 11
10. IF YOU CHECKED OFF ANY PROBLEMS, HOW DIFFICULT HAVE THESE PROBLEMS MADE IT FOR YOU TO DO YOUR WORK, TAKE CARE OF THINGS AT HOME, OR GET ALONG WITH OTHER PEOPLE: VERY DIFFICULT
SUM OF ALL RESPONSES TO PHQ QUESTIONS 1-9: 11
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
## 2023-09-25 NOTE — TELEPHONE ENCOUNTER
Spinal cord stimulator trial:    Approved by interventional team? Yes  Has pt had a behavioral eval and is it okay to proceed?  yes   Diagnosis:  Failed back syndrome  Vendor:  [employer]  Anticoagulants:  No  NSAIDS: No  MRI's done:  8/2022: lumbar MRI; CT cervical 8/2016  Pre-trial IV Antibiotic to be used:  ancef  Other No  MD doing trial? Dr. Duarte   Location of the trial? [facility]  Established Pain Provider:  Marni Gray, RN-BSN  [facility]   [phone]

## 2023-09-26 ENCOUNTER — OFFICE VISIT (OUTPATIENT)
Dept: PALLIATIVE MEDICINE | Facility: CLINIC | Age: 28
End: 2023-09-26
Attending: NURSE PRACTITIONER
Payer: COMMERCIAL

## 2023-09-26 VITALS — OXYGEN SATURATION: 99 % | DIASTOLIC BLOOD PRESSURE: 88 MMHG | SYSTOLIC BLOOD PRESSURE: 128 MMHG | HEART RATE: 76 BPM

## 2023-09-26 DIAGNOSIS — M54.16 LUMBAR RADICULOPATHY: Primary | ICD-10-CM

## 2023-09-26 DIAGNOSIS — Z98.890 S/P LUMBAR MICRODISCECTOMY: ICD-10-CM

## 2023-09-26 DIAGNOSIS — M96.1 FAILED BACK SYNDROME: ICD-10-CM

## 2023-09-26 PROCEDURE — 99214 OFFICE O/P EST MOD 30 MIN: CPT | Performed by: NURSE PRACTITIONER

## 2023-09-26 ASSESSMENT — PAIN SCALES - GENERAL: PAINLEVEL: MODERATE PAIN (4)

## 2023-09-26 ASSESSMENT — PAIN SCALES - PAIN ENJOYMENT GENERAL ACTIVITY SCALE (PEG)
INTERFERED_ENJOYMENT_LIFE: 3
PEG_TOTALSCORE: 5
INTERFERED_GENERAL_ACTIVITY: 7
AVG_PAIN_PASTWEEK: 5

## 2023-09-26 NOTE — PATIENT INSTRUCTIONS
Will proceed with getting approval for spinal cord stim trial.  Please reach out with any questions or concerns.  _____________________________________________________  Scheduling/Clinic telephone number for ALL locations:  [phone]    After Hours On-Call Service for Emergencies:  [phone]  Call with any questions about your care and for scheduling assistance.   Calls are returned Monday through Friday between 8 AM and 4:00 PM. We usually get back to you within 2-3 business days depending on the issue/request. I am not in the clinic on Fridays.   If we are prescribing your medications:  For medication refills, call the clinic or send a MeetMeTix message 7 days in advance.  Please include the name of the requested medication and your preferred pharmacy. Please allow 3-4 days to be processed.   Per [place] State Law, all controlled substance prescriptions must be filled within 30 days of being written. For those controlled substances allowing refills, pickup must occur within 30 days of last fill.    We believe regular attendance is key to your success in our program!    If you are unable to keep your appointment we ask that you call us at least 24 hours in advance to cancel.This will allow us to offer the appointment time to another patient. Multiple missed appointments may lead to dismissal from the clinic.

## 2023-09-26 NOTE — TELEPHONE ENCOUNTER
AJ: Seeing Dipak today, interested in proceeding. His last MRI was at [facility].    10/7/2022 Lumbar MRI at [facility]            Clindamycin Counseling: I counseled the patient regarding use of clindamycin as an antibiotic for prophylactic and/or therapeutic purposes. Clindamycin is active against numerous classes of bacteria, including skin bacteria. Side effects may include nausea, diarrhea, gastrointestinal upset, rash, hives, yeast infections, and in rare cases, colitis.

## 2023-09-26 NOTE — PROGRESS NOTES
[facility] Pain Management     Date of Visit: Sep 26, 2023  Last visit:  7/20/2023  Original Consult: 2/2/2023    Dipak Fuentes is a 27 year old male with PMH significant for lumbar ddd with radiculopathy, s/p microdiscectomy x2, bipolar depression, substance use disorder who is seen today for follow-up on chronic pain treatment.     History:  Lumbar microdiscectomy surgery 9/2022. After surgery pain in the right leg was still present, burning pain was less, but after surgery it changed to stabbing pain. Prior to surgery was experiencing pain and urinary incontinence, this did not resolve after surgery. Undergoing Pelvic therapy and Pool PT; traditional PT was not helpful as he could not tolerate the exercises as he was unable to tolerate the pain. Completed Pool therapy 4/2023. He had undergone follow-up MRI in October 2022 without any concerning findings or problems at the surgical site.     Recommendations from last visit:  Lyrica and Tizanidine refilled, doses have been stable.  He does have underlying mental health issues, however has been stable for a prolonged period of time. I do feel that he would be an appropriate candidate for SCS, and have referred him for psychology evaluation. He has reviewed information about SCS and is able to verbalize understanding of the device. He has also spoken to a person who has an SCS device. He appears to have realistic expectations and understanding of this treatment modality. Follow up with me with [employer] message after evaluation.   ____________________________________________________________    Since last seen, Dipak reports:  - Met with Dr. Duarte and Sina Isabel to discuss spinal cord stimulator. He was cleared for trial and is awaiting further direction. Since meeting with them, he also watched videos on YouTube from patients with factorable and unfavorable experiences with spinal cord stimulation. Ultimately, he remains hopeful that this intervention will  "allow him to have improved functioning.   - Has been pleasantly surprised that quitting smoking was easier than he thought it would be and he has been able to remain.   - Lyrica at 150 BID has decreased his right leg symptoms, but he continues to have \"brain scramble\" from it. Believes that relief is better at 150 dose compared to 100 mg dose, but overall remains hopeful that he can find other methods to control his pain without medications.   - THC also helps his neuropathic pain, can tolerate this better since stopping nicotine. Can have moments of no pain, but then has CNS effects.  - Graduated with BS degree mid 2023. 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  Interested in the creation and development of a video game.   -  independent pool exercises at the [facility], sometimes hard to do it - feels weird to be doing it in public pool.  Land therapy did not really improve his pain.    -Only taking tizanidine when he has a lot of spasms, typically only before with sleep. If he doesn't take it, woken with spasms.  - Mood, about the same. Mental health has been stable.  - Still working in IT.    - goals of pain management are to be able to participate in fishing and disc golf.    Pain description:  Low back with radiation into the right groin/testicle (mostly resolves with being still or sitting), rectum (stabbing), right leg shooting/burning pain with walking and standing. Struggles to bear weight on that leg, somewhat better with lyrica. Right side numbness from the knee down, intermittently.          Aggravating factors include: Sitting on hard chairs, lying down makes back pain worse, standing makes right leg pain worse  Relieving factors include: icing helps some, tends to provide more immediate relief, delta 9 THC helps reduce pain, sleep  Current pain rating 4/10   Pain is not less intense, but more tolerable.     Current pain medications:  Tizanidine 4 mg TID prn- using at least once a day  Pregabalin " "150 mg BID    Review of [place] Prescription Monitoring Program ():   No concern for abuse or misuse of controlled medications based on this report. Last viewed on 7/20/2023    PAIN MANAGEMENT TREATMENT HISTORY  1. MEDICATIONS:  Opiates: Oxycodone after surgery- Helped  NSAIDS: Ibuprofen (Advil, Motrin).  Medication was not helpful , only takes it when his wife and mom \"bug me to take it because I am in pain\" Diclofenac - -not helpful  Muscle Relaxants: Cyclobenzaprine (Flexeril).  Medication was helpful  Methocarbamol (Robaxin).  Medication was helpful  Tizanidine (Zanaflex).  Medication was helpful  Helps with spasms, tizanidine most helpful  Anti-depressants: Remeron most recently - helpful. Mood stabilizers have been helpful in the past; tried on many anti-psychotics: had hallucinations, lithium: made him hard to be around, lamotrigine - skin rash. Now uses THC to manage polo with success  Sleep aids: None  Anxiolytics: has used in the past, has tried hydroxyzine in the past  Neuropathics: Gabapentin (Neurontin).  Medication was not helpful, trialed once at 300 mg TID, then again at 600 mg TID. Lyrica -helpful  Topicals: CBD spray - not helpful, lidocaine patches - not helpful  Adjuvant pain medications: delta 9 THC helps reduce pain, sleep  2. PHYSICAL THERAPY:   Yes: traditional PT after surgery not helpful. Now doing pool PT at [facility] with Sara Ro, PT and making progress. Also in pelvic floor therapy.   3. PAIN PSYCHOLOGY:   Therapy in the past for biopolar management, not in several years  4. SURGERY:   9/21/2022 Right L5-S1 minimally invasive microdiscectomy with Dr. Oquendo  10/7/2020  Right L5-S1 minimally invasive microdiscectomy with Dr. Oquendo  5. INJECTIONS:   8/25/22: right S1-S2 transforaminal epidural corticosteroid injection with Dr. Shay - worsened pain  3/10/20: Lumbar L5-S1 interlaminar epidural steroid injection with Dr. Esteves- had 2 weeks of nothing, then 2 weeks of " relief and then back to painful  6. COMPLEMENTARY THERAPY:  Chiropractic  not helpful  Acupuncture/acupressure/ not tried  TENS unit  not helpful made pain worse  cold/cold packs: helpful  distraction/mindfulness: somewhat helpful   meditation/guided imagery: somewhat helpful    Imaging:  10/7/2022 Lumbar MRI at [facility]          Past Social History:  Social History     Tobacco Use    Smoking status: Former     Packs/day: 0.50     Years: 5.00     Pack years: 2.50     Types: Cigarettes, Other    Smokeless tobacco: Never   Vaping Use    Vaping Use: Every day    Substances: Nicotine, THC, CBD    Devices: Pre-filled or refillable cartridge, Refillable tank   Substance Use Topics    Alcohol use: Not Currently     Comment: over 6 months    Drug use: Yes     Types: Marijuana     Comment: bipolar-concentrates 1-2 x day      Social History     Social History Narrative    .     Works in IT    Likes to bake for fun, mostly bread. Likes to play video games with his daughter.    8 y.o. daughter       Medications and Allergies reviewed.    OBJECTIVE  Vitals:    09/26/23 0803   BP: 128/88   Pulse: 76   SpO2: 99%   Constitutional: Well developed, well nourished, appears stated age. No acute distress.  Gait is steady, antalgic and uses cane  HEENT: Head atraumatic, normocephalic. Eyes without conjunctival injection or jaundice.   Skin: No obvious lesions, or petechiae of exposed skin. Follicular redness in patches on thigh. Mulitple tattoos present  Extremities: Peripheral pulses intact. No clubbing, cyanosis, or edema. Moves all extremities.  Psychiatric/mental status: Alert, without lethargy or stupor. Speech fluent. Appropriate affect. Mood normal. Able to follow commands without difficulty.   Musculoskeletal exam: Normal bulk and tone. Unremarkable spinal curvature. Prefers to stand versus sit.     ASSESSMENT AND PLAN:  1. Lumbar radiculopathy  2. S/P lumbar microdiscectomy    I am in support of a SCS trial. Advised him to  reach out for any questions or concerns. Continue current doses of Lyrica and Tizanidine for now. Discussed that if scs provides relief, we can try to reduce medications as tolerated.     Marni Gray, CNP-BC, PMGT-BC, AP-PMN  [facility]

## 2023-09-26 NOTE — TELEPHONE ENCOUNTER
Routed to [place] to check insurance coverage.    Spinal cord stimulator trial:    Approved by interventional team? Yes  Has pt had a behavioral eval and is it okay to proceed?  yes   Diagnosis:  Failed back syndrome  Vendor:  [employer]  Anticoagulants:  No  NSAIDS: No  MRI's done: 10/7/2022: lumbar MRI @ [facility]; CT cervical 8/2016  Pre-trial IV Antibiotic to be used:  ancef  Other No  MD doing trial? Dr. Duarte   Location of the trial? [facility]  Established Pain Provider:  Marni Gray, RN-BSN  [facility]   [phone]

## 2023-09-27 NOTE — TELEPHONE ENCOUNTER
----- Message from Susan Mazariegos RN sent at 9/25/2023  8:35 AM CDT -----  Regarding: RE: SCS evals  Thank you!!  ----- Message -----  From: Maria Guadalupe Isabel [facility]  Sent: 9/20/2023   5:42 PM CDT  To: Susan Mazariegos RN; #  Subject: RE: SCS evals                                    Dipak's eval is complete. The recommendations are near the end of the eval.     Thanks, Dylan     ----- Message -----  From: Susan Mazariegos RN  Sent: 9/11/2023  10:42 AM CDT  To: Maria Guadalupe Isabel [facility]; #  Subject: SCS evals                                        Hi Maria Guadalupe,    You see Dipak on 9/18/23 for an SCS eval.    Just wondering if you are able to send us (Susan and Tika) a staff message after you see these pts for eval so we can start the process of checking insurance coverage for the trial.    Our clinic does it differently than [facility]. Our schedulers here check coverage and nursing facilitates.    Thanks,    Susan, RN-BSN  [facility] with [facility]. Duarte   [phone]

## 2023-09-28 NOTE — TELEPHONE ENCOUNTER
Per [employer], no PA required.      Guidelines that are followed:      [employer] SCS Trial and Implant guidelines    Spinal Cord Stimulation  Effective September 1, 2022  Indications  The determination of medical necessity for implantation of a dorsal spinal cord stimulator (SCS) is always made on a case-by-case basis.  A dorsal column stimulator capable of using either high-frequency or non-highfrequency stimulation (dual-mode) is considered equally effective alternative for the treatment of any of the medically necessary indications listed below when the device uses non-high-frequency stimulation.  A dorsal column stimulator using high frequency is considered equally effective alternative to non-high-frequency stimulation only for the treatment of chronic intractable pain secondary to failed back surgery syndrome (FBSS) as noted below.  Chronic Intractable Pain Secondary to Failed Back Surgery Syndrome (FBSS)  A short-term trial (e.g., greater than 48 hours) spinal cord stimulation [i.e., non-highfrequency or high-frequency (HF10 SCS)] is considered medically necessary for the treatment of chronic, intractable pain secondary to failed back surgery syndrome (FBSS) with intractable neuropathic leg pain (after prior surgery in the same spinal region) when ALL of the following criteria are met:  Failure of at least six (6) consecutive months of physician-supervised conservative medical management (e.g., pharmacotherapy, physical therapy, cognitive behavioral therapy, or activity lifestyle modification)  Surgical intervention is not indicated or for patients who do not wish to proceed with spinal surgery  An attestation by a behavioral health provider (i.e., a face-to-face or virtual assessment with or without psychological questionnaires and/or psychological testing) reveals no evidence of an inadequately controlled mental and/or behavioral health conditions/issues (e.g., substance use disorders, depression, or  psychosis) that would impact perception of pain and/or negatively impact the success of a SCS or contraindicate placement of the device  Permanent implantation of a spinal cord stimulator (i.e., non-high-frequency, HF10 SCS) is considered medically necessary for the treatment of chronic, intractable pain secondary to failed back surgery syndrome (FBSS) with intractable neuropathic leg pain (after prior surgery in the same spinal region) when at least a 50% reduction in pain has been demonstrated during a short-term trial of SCS.        Ok to schedule      Jessica NIELSEN    [facility]

## 2023-10-02 NOTE — TELEPHONE ENCOUNTER
Pre-screening for [facility]    Procedure ordered? spinal cord stimulator trial  What insurance are we billing for this procedure?  [employer]  Is  Needed?: No  Will Pt have a ?  Yes if pt is given sedation meds, no driving for 24 hours.  Is pt taking a cab or transportation service?   No   If so will need to be accompanied by an adult too (friend/family member) in order for IV sedation to be given.    Per [place] Policy:  Outpatients are to have responsible adult or family member to accompany them at discharge and drive them home. A service providing medically trained drivers or attendants would be acceptable. Public transportation would not be acceptable unless the patient is accompanied by a responsible adult or family member.  See below info about further driving instructions during trial.     Restrict what you eat and drink before surgery as follows:   reviewed            No solid food, milk/milk products or chewing tobacco for 8 hours prior to the procedure.            You may have clear liquids until 4 hours before procedure              Nothing by mouth 4 hours before the procedure   Is pt on any blood thinners? No   Aspirin?  No   mg/day       Pt was informed to hold their daily aspirin 7 days before procedure? N/A   NSAIDS? No Use DEYA guidelines. Q med has different hold time.  Pt was informed to hold their daily NSAIDS per DEYA guidelines? N/A  Take normal medications with sips of water, except for blood thinners.  No  Does Pt have an allergy to contrast dye, iodine or shellfish?  No  Any allergies to specific antibiotics?   Yes: Ceclor - develops rash from this medication. Be sure to review if allergy to PCN.  Another antibiotic may need to be ordered. Touch base with provider.  Is Pt diabetic?   No  If on insulin have them bring their gluometer  Is Pt on antibiotics?   No if you develop an infection between now and trial date contact us via nurse line, it may have to be  "rescheduled.  Does Pt have a bleeding or clotting disorder?  No   Any chance of Pregnancy?  NO  You will need to arrive 1- 1.5 hours before your procedure time for IV start, IV antibiotics and to meet with the vendor.  reviewed  Any complications from sedation medications?  No  Does pt have any cardiac issues?  No  Hx of sleep apnea?  No  Is a work note needed?  Yes: Address to [facility].  Vendor? [employer]   [employer]: López Padilla   [employer]:  Bill [place]:  [email or web address]       Has vendor been notified? Yes: [employer]   The following email was sent to vendor:   Procedure: SCS trial   Patient: Dipak Fuentes  Dx:  Failed back syndrome   Date: 12/12/2023.  Pt arriving @ 0800 for prep  Location: [facility]2nd floor    [address] [place] [id number]   Providers: Felicia  Midtrial adjustment: 12/15/2023 0830   Lead pull:  12/19/2023 1330    Was patient sent instructions via email/fax/mail?  Yes  For the duration of the trial, patients should not:   - Raise their arms over their heads of move their shoulders beyond 90 degrees in any direction   - Bend, twist, stretch, or lift more than 5 lbs   - Drive or operate machinery when the stimulator is on, because an unexpected electrical surge can cause distracting sensations and unwanted muscle contractions or spasms.   - Shower or bathe- do not submerge the area or let water directly contact the area where the leads exit.   For the duration of the trial, patients should:   - Position hospital bed to maintain body alignment (keep bed as flat, straight as possible)   - Sleep on a firm mattress that supports their legs and back equally   - Maintain a try trial lead insertion site by limiting bathing to sponge baths. Do not wash the trial lead area at all.   - Obtain physician approval before undergoing any spinal or chiropractic manipulation   - Move shoulders and hips at the same time, in a \"log rolling\" movement that minimizes " "twisting of the spine.   - Realize that because the trial lead will never \"scar into place\" the lead will be more mobile within the spine and is therefore subject to changes with abrupt movements. Small shifts can lead to dramatic changes in coverage, strength of stimulation (too weak or too strong), and would require the representative to interrogate and adjust your settings in person.   - Realize that you do have the option of turning it off if there is a dramatic, uncomfortable stimulation occuring on account of a shifted lead.          "

## 2023-11-09 ENCOUNTER — OFFICE VISIT (OUTPATIENT)
Dept: PALLIATIVE MEDICINE | Facility: CLINIC | Age: 28
End: 2023-11-09
Payer: COMMERCIAL

## 2023-11-09 VITALS — SYSTOLIC BLOOD PRESSURE: 143 MMHG | HEART RATE: 89 BPM | OXYGEN SATURATION: 99 % | DIASTOLIC BLOOD PRESSURE: 95 MMHG

## 2023-11-09 DIAGNOSIS — M54.16 LUMBAR RADICULOPATHY: Primary | ICD-10-CM

## 2023-11-09 DIAGNOSIS — M96.1 FAILED BACK SYNDROME: ICD-10-CM

## 2023-11-09 DIAGNOSIS — G89.4 CHRONIC PAIN SYNDROME: ICD-10-CM

## 2023-11-09 DIAGNOSIS — Z98.890 S/P LUMBAR MICRODISCECTOMY: ICD-10-CM

## 2023-11-09 PROCEDURE — 99215 OFFICE O/P EST HI 40 MIN: CPT | Performed by: NURSE PRACTITIONER

## 2023-11-09 ASSESSMENT — PAIN SCALES - PAIN ENJOYMENT GENERAL ACTIVITY SCALE (PEG)
PEG_TOTALSCORE: 8.67
INTERFERED_ENJOYMENT_LIFE: 10 - COMPLETELY INTERFERES
AVG_PAIN_PASTWEEK: 6
INTERFERED_GENERAL_ACTIVITY: 10 - COMPLETELY INTERFERES

## 2023-11-09 ASSESSMENT — PAIN SCALES - GENERAL: PAINLEVEL: MODERATE PAIN (5)

## 2023-11-09 NOTE — PROGRESS NOTES
[facility] Management     Date of Visit: Nov 9, 2023  Last visit:  9/26/2023  Original Consult: 2/2/2023    Dipak Fuentes is a 28 year old male with PMH significant for lumbar ddd with radiculopathy, s/p microdiscectomy x2, bipolar depression, substance use disorder who is seen today for follow-up on chronic pain treatment.     History:  Lumbar microdiscectomy surgery 9/2022. After surgery pain in the right leg was still present, burning pain was less, but after surgery it changed to stabbing pain. Prior to surgery was experiencing pain and urinary incontinence, this did not resolve after surgery. Undergoing Pelvic therapy and Pool PT; traditional PT was not helpful as he could not tolerate the exercises as he was unable to tolerate the pain. Completed Pool therapy 4/2023. He had undergone follow-up MRI in October 2022 without any concerning findings or problems at the surgical site.     Recommendations from last visit:  I am in support of a SCS trial. Advised him to reach out for any questions or concerns. Continue current doses of Lyrica and Tizanidine for now. Discussed that if scs provides relief, we can try to reduce medications as tolerated.   ____________________________________________________________    Since last seen, Dipak reports:  -Pt says he fell down the stairs last week and his pain has increased from that.   Stepped to far forward on the tread and then slid on his back down about 8 stairs. Initial symptoms were worsened lower back pain. Right leg pain increased since that incident. Feels like he is having more stabbing rectal pain and muscle spasms in right leg. Resting, using THC seems to help some. Trying to keep up on walking even though it hurts.   - Has some questions about SCS trial and potential outcomes. SCS trial is scheduled for early December with Dr. Duarte. Ultimately, he remains hopeful that this intervention will allow him to have improved functioning.   - Lyrica at 150  "BID, tizanadine prn and THC are helpful.  Overall remains hopeful that he can find other methods to control his pain without medications.   - Since THC helps his neuropathic pain, he would like to have certification through the state.   - Graduated with BS degree mid 2023. 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  Interested in the creation and development of a video game. Would like to have improved pain control to broaden his opportunities for employment.   -  independent pool exercises at the [facility], sometimes hard to do it - feels weird to be doing it in public pool.  Land therapy did not really improve his pain.  Goals of pain management are to be able to participate in fishing and disc golf, to be able to drive without fear of right leg pain.    Pain description:  Low back with radiation into the right groin/testicle (mostly resolves with being still or sitting), rectum (stabbing), right leg shooting/burning pain with walking and standing. Struggles to bear weight on that leg, somewhat better with lyrica. Right side numbness from the knee down, intermittently.   Aggravating factors include: Sitting on hard chairs, lying down makes back pain worse, standing makes right leg pain worse  Relieving factors include: icing helps some, tends to provide more immediate relief, delta 9 THC helps reduce pain, sleep  Current pain rating 4/10   Pain is not less intense, but more tolerable.     Current pain medications:  Tizanidine 4 mg TID prn- using at least once a day  Pregabalin 150 mg BID    Review of [place] Prescription Monitoring Program ():   No concern for abuse or misuse of controlled medications based on this report. Last viewed on 7/20/2023    PAIN MANAGEMENT TREATMENT HISTORY  1. MEDICATIONS:  Opiates: Oxycodone after surgery- Helped  NSAIDS: Ibuprofen (Advil, Motrin).  Medication was not helpful , only takes it when his wife and mom \"bug me to take it because I am in pain\" Diclofenac -not " helpful  Muscle Relaxants: Cyclobenzaprine (Flexeril).  Medication was helpful  Methocarbamol (Robaxin).  Medication was helpful  Tizanidine (Zanaflex).  Medication was helpful  Helps with spasms, tizanidine most helpful  Anti-depressants: Remeron most recently - helpful. Mood stabilizers have been helpful in the past; tried on many anti-psychotics: had hallucinations, lithium: made him hard to be around, lamotrigine - skin rash. Now uses THC to manage polo with success  Anxiolytics: tried hydroxyzine in the past  Neuropathics: Gabapentin (Neurontin).  Medication was not helpful, trialed once at 300 mg TID, then again at 600 mg TID. Lyrica -helpful  Topicals: CBD spray - not helpful, lidocaine patches - not helpful  Adjuvant pain medications: delta 9 THC helps reduce pain, sleep  2. PHYSICAL THERAPY:   Yes: traditional PT after surgery not helpful. Now doing pool PT at [facility] with Sara Ro, PT and making progress. Also in pelvic floor therapy.   3. PAIN PSYCHOLOGY:   Therapy in the past for biopolar management, not in several years  4. SURGERY:   9/21/2022 Right L5-S1 minimally invasive microdiscectomy with Dr. Oquendo  10/7/2020  Right L5-S1 minimally invasive microdiscectomy with Dr. Oquendo  5. INJECTIONS:   8/25/22: right S1-S2 transforaminal epidural corticosteroid injection with Dr. Shay - worsened pain  3/10/20: Lumbar L5-S1 interlaminar epidural steroid injection with Dr. Esteves- had 2 weeks of nothing, then 2 weeks of relief and then back to painful  6. COMPLEMENTARY THERAPY:  Chiropractic  not helpful  Acupuncture/acupressure/ not tried  TENS unit  not helpful made pain worse  cold/cold packs: helpful  distraction/mindfulness: somewhat helpful   meditation/guided imagery: somewhat helpful    Imaging:  10/7/2022 Lumbar MRI at [facility]          Social History   Social History     Tobacco Use    Smoking status: Former     Packs/day: 0.50     Years: 5.00     Additional pack years: 0.00     Total  pack years: 2.50     Types: Cigarettes, Other    Smokeless tobacco: Never   Vaping Use    Vaping Use: Every day    Substances: Nicotine, THC, CBD    Devices: Pre-filled or refillable cartridge, Refillable tank   Substance Use Topics    Alcohol use: Not Currently     Comment: over 6 months    Drug use: Yes     Types: Marijuana     Comment: bipolar-concentrates 1-2 x day      Social History     Social History Narrative    .     Works in IT    Likes to bake for fun, mostly bread. Likes to play video games with his daughter.    8 y.o. daughter       Medications and Allergies reviewed.    Objective   Vitals:    11/09/23 0910   BP: (!) 143/95   BP Location: Right arm   Cuff Size: Adult Large   Pulse: 89   SpO2: 99%   Constitutional: Well developed, well nourished, appears stated age. No acute distress.  Gait is steady, antalgic and uses cane  HEENT: Head atraumatic, normocephalic. Eyes without conjunctival injection or jaundice.   Skin: No obvious lesions, or petechiae of exposed skin. Follicular redness in patches on thigh. Mulitple tattoos present  Extremities: Peripheral pulses intact. No clubbing, cyanosis, or edema. Moves all extremities.  Psychiatric/mental status: Alert, without lethargy or stupor. Speech fluent. Appropriate affect. Mood normal. Able to follow commands without difficulty.   Musculoskeletal exam: Normal bulk and tone. Unremarkable spinal curvature. Prefers to stand versus sit.   Lumbar/Thoracic spine ROM: moderately restricted.  PEG today = 8    Assessment & Plan    1. Lumbar radiculopathy  2. S/P lumbar microdiscectomy  3. Failed back syndrome  4. Chronic pain syndrome    Dipak continues to meet the criteria for the diagnosis intractable pain; the cause of his pain cannot be easily removed, he has tried most means of traditional pain management, and is a candidate for medical cannabis. For this reason, I will certify him through the [place] Department of Health website for medical cannabis. They  will contact him for next steps via email.He will follow up with me for ongoing pain management and to discuss benefits and effects of medical cannabis. Certification is good for one year, will need to follow-up if interested in re-certification.     Discussed questions about SCS trial. Advised him to reach out for any further questions or concerns. Continue current doses of Lyrica and Tizanidine for now. Discussed that if scs provides relief, we can try to reduce medications as tolerated.     Marni Gray, CNP-BC, PMGT-BC, AP-PMN  [facility]        BILLING TIME DOCUMENTATION: The total TIME spent on this patient on the date of the encounter/appointment was 51 minutes.   TOTAL TIME INCLUDED  Time spent preparing to see the patient by reviewing available medical information in the patient's medical record, including relevant provider notes, laboratory work, and imaging 3 minutes  Time spent face to face (or over the phone) with the patient 43 minutes   Time spent documenting clinical information in Epic 5 minutes

## 2023-11-09 NOTE — PATIENT INSTRUCTIONS
People at risk of dependence and addiction  Use of medical cannabis could lead to cannabis dependence and addiction. Risk of addiction is higher for people who have experienced other addictions, or for people who use large doses of products with high THC levels.    For these reasons:  ? Do not use medical cannabis, or use with great caution, if you have an addiction disorder to any other substance, including tobacco/nicotine.  ? If you are a regular, heavy user of medical cannabis, stopping use suddenly can lead to uncomfortable and serious withdrawal symptoms that can last for several days. Withdrawal symptoms are more likely with large doses of products with high THC content.    People with mental health conditions  Recreational use of marijuana is known to sometimes cause psychotic episodes. These can be unpleasant and potentially dangerous. The following can increase the risk of psychotic  episodes:  ? Using medical cannabis extract products with high levels of THC, or high ratios of THC to CBD.  ? Having a family history of schizophrenia or other psychotic disorder. For that reason, medical cannabis should not be used or should be used with great caution in patients with  a family history of psychotic disorder, such as schizophrenia. A person who has a psychotic disorder such as schizophrenia or has a family history of schizophrenia or other psychotic disorder should not use medical cannabis. In particular, medical cannabis products with high levels of THC should not be used. When people with schizophrenia or a psychotic disorder stop using cannabis, their psychotic symptoms typically improve.    People with serious heart or liver disease  ? A person who has serious heart or liver disease should not use medical cannabis or use it with great caution. There is some evidence that use of medical cannabis could cause a heart attack in patients known to have serious heart disease.  ? Serious liver disease could cause  problems with how the body metabolizes cannabis.    IMPORTANT INFORMATION AND WARNINGS ABOUT USING MEDICAL CANNABIS  Cannabinoid hyperemesis syndrome  People who develop hyperemesis syndrome have nausea, severe vomiting, and abdominal pain for days that repeats every week or every few weeks. A few patients enrolled in the Medical Cannabis Program have been hospitalized for cannabinoid hyperemesis syndrome. Check with your health care practitioner or dispensary pharmacist if you experience any signs or symptoms of this syndrome.  2. Do not drive, operate machinery, or do work that could harm people while using medical cannabis  Using medical cannabis can slow reflexes and reaction time, make it difficult to pay attention or make decisions, and change the way people perceive things around them. This can make it dangerous to drive, operate machinery, or engage in any activity that could harm others or cause professional malpractice. How long these changes last depend on the person, the type of medical cannabis product that was used, the dose of the product, and other factors. At a minimum, the changes can last several hours.  3. The impact of taking medical cannabis is greater when people:  ? Use products with higher THC levels.  ? Drink alcohol while using medical cannabis.  4. Keep medications secure and in their original containers  When medications are not in their original containers, it is easier to mix up the identity of a drug. The label on the original container identifies the lawful owner of the product. As with any medication, medical cannabis should be kept in a secure place where others such as children cannot gain access to it.  5. Do not use medical cannabis where it is illegal  Under [place] law, it remains illegal for a patient enrolled in the Medical Cannabis Program to possess or use medical cannabis:  ? on a school bus or van.  ? on the grounds of a  or a primary or secondary school.  ?  in any correctional facility.  ? on the grounds of any child-care facility or home day care.  ? on the grounds of federal facilities (such as courthouses, post offices, airports, and national wilder).  Vaporizing medical cannabis is illegal:  ? on any form of public transportation.  ? in any location where vapor would be inhaled by a non-patient minor child.  ? in any public place, including indoor or outdoor areas used by or open to the general public or at a place of employment.  6. Do not give away or sell medical cannabis that you purchase  Sharing medical cannabis with others is a crime and could result in a patient facing criminal charges and being excluded from the [place] Medical Cannabis Program.

## 2023-12-05 DIAGNOSIS — Z98.890 S/P LUMBAR MICRODISCECTOMY: ICD-10-CM

## 2023-12-05 DIAGNOSIS — M54.16 LUMBAR RADICULOPATHY: ICD-10-CM

## 2023-12-05 NOTE — TELEPHONE ENCOUNTER
Received fax from pharmacy requesting refill(s) for     tiZANidine (ZANAFLEX) 4 MG tablet     Last refilled on 11/1/2023.    Pt last seen on 11/9/2023.  Next appt scheduled for NONE.     E-prescribe to:    [id number] - [place], [place] - [address] [place] [address] [address] [facility] [address] [facility] [address]     Will facilitate refill.

## 2023-12-12 ENCOUNTER — RADIOLOGY INJECTION OFFICE VISIT (OUTPATIENT)
Dept: PALLIATIVE MEDICINE | Facility: CLINIC | Age: 28
End: 2023-12-12
Payer: COMMERCIAL

## 2023-12-12 VITALS
RESPIRATION RATE: 15 BRPM | OXYGEN SATURATION: 99 % | DIASTOLIC BLOOD PRESSURE: 93 MMHG | SYSTOLIC BLOOD PRESSURE: 130 MMHG | HEART RATE: 75 BPM

## 2023-12-12 DIAGNOSIS — Z79.2 PREVENTIVE ANTIBIOTIC: Primary | ICD-10-CM

## 2023-12-12 DIAGNOSIS — M96.1 FAILED BACK SYNDROME: ICD-10-CM

## 2023-12-12 PROCEDURE — 99152 MOD SED SAME PHYS/QHP 5/>YRS: CPT | Performed by: PAIN MEDICINE

## 2023-12-12 PROCEDURE — 99153 MOD SED SAME PHYS/QHP EA: CPT | Performed by: PAIN MEDICINE

## 2023-12-12 PROCEDURE — 63650 IMPLANT NEUROELECTRODES: CPT | Performed by: PAIN MEDICINE

## 2023-12-12 PROCEDURE — 63650 IMPLANT NEUROELECTRODES: CPT | Mod: 59 | Performed by: PAIN MEDICINE

## 2023-12-12 RX ORDER — FENTANYL CITRATE 50 UG/ML
12.5-5 INJECTION, SOLUTION INTRAMUSCULAR; INTRAVENOUS EVERY 5 MIN PRN
Status: ACTIVE | OUTPATIENT
Start: 2023-12-12 | End: 2023-12-13

## 2023-12-12 RX ORDER — OXYCODONE HYDROCHLORIDE 5 MG/1
5 TABLET ORAL EVERY 12 HOURS PRN
Qty: 6 TABLET | Refills: 0 | Status: CANCELLED
Start: 2023-12-12 | End: 2023-12-15

## 2023-12-12 RX ADMIN — FENTANYL CITRATE 50 MCG: 50 INJECTION, SOLUTION INTRAMUSCULAR; INTRAVENOUS at 15:53

## 2023-12-12 ASSESSMENT — PAIN SCALES - GENERAL
PAINLEVEL: SEVERE PAIN (7)
PAINLEVEL: SEVERE PAIN (6)

## 2023-12-12 NOTE — NURSING NOTE
Pre-procedure Intake  If YES to any questions or NO to having a   Please complete laminated checklist and leave on the computer keyboard for Provider, verbally inform provider if able.    For SCS Trial, RFA's or any sedation procedure:  Have you been fasting? Yes  If yes, for how long? 6 hours  Are you taking any any blood thinners such as Coumadin, Warfarin, Jantoven, Pradaxa Xarelto, Eliquis, Edoxaban, Enoxaparin, Lovenox, Heparin, Arixtra, Fondaparinux, or Fragmin? OR Antiplatelet medication such as Plavix, Brilinta, or Effient?   No   If yes, when did you take your last dose?     Do you take aspirin?  No  If cervical procedure, have you held aspirin for 6 days?   NA    Do you have any allergies to contrast dye, iodine, steroid and/or numbing medications?  NO    Are you currently taking antibiotics or have an active infection?  NO    Have you had a fever/elevated temperature within the past week? NO    Are you currently taking oral steroids? NO    Do you have a ? Yes    Are you pregnant or breastfeeding?  Not Applicable    Have you received the COVID-19 vaccine? NA  If yes, was it your 1st, 2nd or only dose needed?   Date of most recent vaccine:     Notify provider and RNs if systolic BP >170, diastolic BP >100, P >100 or O2 sats < 90%

## 2023-12-12 NOTE — PATIENT INSTRUCTIONS
Spinal Cord Stimulator Trial    POST PROCEDURE INSTRUCTIONS    Check the incision area often and make sure it is sealed. If at anytime you are concerned about redness/drainage then contact the clinic right away at [phone].     If it is after business hours call the after hours call [facility]  and ask for the pain consult provider on-call: [phone]    Spinal Cord Stimulator Trial Information and Instructions:   Dr. West Duarte performed your trial today, you were given antibiotics prior to the procedure; Clindamycin IV.    We plan to see you back at the clinic to check that all is going as expected.     -Monitor the lead site area frequently throughout the day to make sure the area is intact and fully covered.  Monitor closely for any swelling, bleeding, redness, drainage, warmth at the site  -Progressive weakness or numbness in your legs   -Loss of bowel or bladder function  -Unusual headache that is not relieved by Tylenol or other pain reliever  -Unusual new onset of pain that is not improving    For the duration of the trial, patients should not:   Raise their arms over their heads of move their shoulders beyond 90 degrees in any direction   Bend, twist, stretch, or lift more than 10 lbs    or operate machinery when the stimulator is on, because an unexpected electrical surge can cause distracting sensations and unwanted muscle contractions or spasms.   Shower or bathe- do not submerge the area or let water directly contact the area where the leads exit.     For the duration of the trial, patients should:   Position hospital bed to maintain body alignment (keep bed as flat, straight as possible)   Sleep on a firm mattress that supports their legs and back equally   Maintain a try trial lead insertion site by limiting bathing to sponge baths. Do not wash the trial lead area at all.   Obtain physician approval before undergoing any spinal or chiropractic manipulation   Move  "shoulders and hips at the same time, in a \"log rolling\" movement that minimizes twisting of the spine.   Realize that because the trial lead will never \"scar into place\" the lead will be more mobile within the spine and is therefore subject to changes with abrupt movements. Small shifts can lead to dramatic changes in coverage, strength of stimulation (too weak or too strong), and would require the representative to interrogate and adjust your settings in person.   Realize that you do have the option of turning it off if there is a dramatic, uncomfortable stimulation occuring on account of a shifted lead.     The following instructions can be helpful if you are experiencing a change while you are at home:   If stimulation increases when you bend your neck back (look up) or lean back or when you sit or lay down, try decreasing the simulation by lowering the amplitude   If stimulation decreases when you stand up, try increasing the amplitude   Stimulation may stop when you bend your neck forward or lean forward. Stimulation will resume when you assume a different position. If this occurs too quickly when you move, try increasing the amplitude slightly   To smooth out the sensation, adjust the rate of stimulation until you feel comfortable.   Turn the neurostimulator off or the amplitude down when changing positions or adjustments   If the stimulation is uncomfortable at any time, turn the neurostimulator off.     During the trial, we are interested to note the following:   Are there any areas of your pain that are not covered, or loose coverage as the trial proceeds?   Are there areas that are getting stimulated that do not need to be, or are annoying or bothersome?   Are there certain body positions that cause the coverage to be lost, or cause stimulation to be too intense   Can you walk 50% more distance than before   Can you take 50% less medication and still get relief   Do you have a reduction in side effects " because you are taking less medications.     [facility]   Procedure Discharge Instructions

## 2023-12-12 NOTE — NURSING NOTE
22 gauge Peripheral IV inserted into right hand - attempts: 1      0915:  900mg IV clindamycin consuelo.    Susan, RN-BSN  [facility]   [phone]

## 2023-12-15 ENCOUNTER — ALLIED HEALTH/NURSE VISIT (OUTPATIENT)
Dept: PALLIATIVE MEDICINE | Facility: CLINIC | Age: 28
End: 2023-12-15
Payer: COMMERCIAL

## 2023-12-15 VITALS — TEMPERATURE: 98 F

## 2023-12-15 DIAGNOSIS — Z96.89 SPINAL CORD STIMULATOR STATUS: Primary | ICD-10-CM

## 2023-12-15 PROCEDURE — 99207 PR NO CHARGE NURSE ONLY: CPT

## 2023-12-15 NOTE — NURSING NOTE
Patient presented for midtrial adjustment.     SCS site is clean, dry, and dressings are intact.   No noted redness, exudate, bleeding, swelling or warmth at site.     Tika Palma RN  [facility]  [phone]

## 2023-12-18 NOTE — PROGRESS NOTES
Date of Procedure:  12/12/2023  Pre-procedure diagnosis:  Chronic pain syndrome, Failed back syndrome   Post-procedure diagnosis:  Same  Procedure:  Spinal Cord Stimulator trial, fluoroscopically guided  Indication:  Diagnostic procedure for intractable  pain  Monitors:  Automatic blood pressure cuff, pulse oximetry  Medications:    MD Time IN: 1003   Sedation start time:  1009  Sedation end time:  1110     Medications given: fentanyl 25 mcg IV; versed 2 mg IV; toradol 0 mg IV  Intravenous fluids were administered, normal saline 425 cc's.  Complications:  none    Physician:  West Duarte MD and Enrique Ariza MD     Brief history:  Dipak Fuentes is a 28 year old year old male who presents today for a Spinal Cord Stimulator trial.  Pt has a history of chronic, intractable  pain that has not responded to conservative measures including meds/pt/injections.  Due to the failure of conservative treatments, the patient has elected to proceed with spinal cord stimulation as a treatment modality to decrease pain and improve function as well as to allow for decreased need for pain medications.  The patient has undergone psychological evaluation and has been found to be a good candidate for this form of treatment.    Vitals:    12/12/23 1051 12/12/23 1100 12/12/23 1110 12/12/23 1119   BP: 128/79 (!) 140/73 (!) 132/95 (!) 130/93   Pulse: 74 77 76 75   Resp: 15 15 15    SpO2: 96% 98% 97% 99%     Pre-procedure pain:  7    Description of Procedure:  The patient's medical history, medications, and allergies were reviewed and reconciled.  The patient denied the use of any blood thinner, recent infection, new neurological or constitutional problems, or the use of any illicit substances or nonprescribed medications or drugs.     The risks, indications, benefits, and alternative treatments were discussed with the patient including, but not limited to bleeding, infection, and nerve injury and the patient verbalized understanding and  wished to proceed.  The patient had a responsible adult available to drive them home following the procedure.     A 22 gauge IV saline lock was placed prior to entering the procedure room and was maintained throughout the procedure.  The patient was given an IV prophylactic antibiotic of clindamycin  900 mg over 30 minutes prior to the procedure start.     After obtaining signed informed consent, the patient was taken to the procedure room and positioned in the prone position with pillows beneath their abdomen and chest to place their thoracic and lumbar spine in a slightly flexed position.  A Pause for the Cause was performed with concurrence.       AP fluoroscopic views were obtained to identify the midline position of the T12 and L1 spinous processes, as well as the pedicle of L2 on the left as a Tuohy needle entry site.  The skin and subcutaneous tissues were anesthetized with  Lidocaine 1% 3 ml first with a 30 gauge 1 inch needle, then utilizing a 27 gauge 3.5 inch spinal needle for deep tissue anesthesia.  Then a small nick was made in the Tuohy needle entry site with an 18 gauge needle.  A 14 gauge Tuohy needle was then advanced utilizing a left paramedian approach towards midline at T12-L1 at an approximate 45 degree angle utilizing air for loss of resistance and intermittent AP and Lateral fluoroscopic guidance.  The epidural space was encountered on the first pass without difficulty.  Aspiration was negative for blood or CSF.     Then, a 60 cm Legend of the Elfris Surescan 1x8 compact spinal cord stimulator lead ([id number], lot # [id number]) was advanced through the Tuohy needle into the epidural space and advanced under direct fluoroscopic visualization slightly left of midline, with the tip of the stimulating lead being aligned at bottom of the T7 vertebral body.    AP fluoroscopic views were obtained to identify the midline position of the T12 and L1 spinous processes, as well as the pedicle of L2 on the right as a  Tuohy needle entry site.  The skin and subcutaneous tissues were anesthetized with  Lidocaine 1% 3 ml first with a 30 gauge 1 inch needle, then utilizing a 27 gauge 3.5 inch spinal needle for deep tissue anesthesia.  Then a small nick was made in the Tuohy needle entry site with an 18 gauge needle.  A 14 gauge Tuohy needle was then advanced utilizing a right paramedian approach towards midline at T12-L1 at an approximate 45 degree angle utilizing air for loss of resistance and intermittent AP and Lateral fluoroscopic guidance.  The epidural space was encountered on the first pass without difficulty.  Aspiration was negative for blood or CSF.     Then, a 60 cm Vectris Surescan 1x8 compact spinal cord stimulator lead ([id number], lot # [id number]) was advanced through the Tuohy needle into the epidural space and advanced under direct fluoroscopic visualization slightly left of midline, with the tip of the stimulating lead being aligned at top of the T8 vertebral body.      At this point, a GTX Messaging wireless external neurostimulator (WENS) device ([id number]; Serial number:  [id number]) was connected to the end of the stimulating lead and normal impedence was confirmed      Then, after verifying the position of the tip of the stimulator lead, the stimulator lead stylette was withdrawn and the Tuohy needle was then withdrawn at which time the position of the stimulator lead was confirmed to be in the same location at the top of the T8 vertebral body on the right and bottom of T7 on the left .  The skin was then painted with Mastisol and once it was tacky, the lead was secured to the skin with Steri-Strips.  This was then covered with gauze.  A Tegaderm was placed over the gauze and Steri-Strips with complete coverage.  This was then secured in place with medipore tape.     The wireless external neurostimulator device was also secured to the patient's right flank with a WENS adhesive boot.  The patient was then taken to the  recovery area for continued observation.  The patient tolerated the procedure well without complications.  Their vital signs remained stable throughout the procedure and during the recovery period.     The Spinal Cord Stimulator device representative then activated the stimulator and and programmed the stimulator to cover the patient's pain distribution.  They also instructed the patient on the stimulator use during the trial and explained activity restrictions and precautions such as avoiding getting the area wet or sudden movements of the neck, back, or extremities, and avoidance of reaching overhead.  The patient also received an informational pamphlet detailing additional precautions.     Post-Procedure Pain: 7/10     The patient was given instructions to contact the clinic if there are any complications such as signs of infection at the needle entry site, fever, chills, increasing neck or back pain, or bleeding from the site.  The patient verbalized understanding of the instructions.  They were scheduled to return to the clinic for a mid-trial follow-up to assess the efficacy of the trial and in 4 days for final efficacy assessment and removal of the stimulator lead.     The patient was discharged to home in good condition after meeting discharge criteria.     West Duarte MD  [facility]

## 2023-12-19 ENCOUNTER — ALLIED HEALTH/NURSE VISIT (OUTPATIENT)
Dept: PALLIATIVE MEDICINE | Facility: CLINIC | Age: 28
End: 2023-12-19
Payer: COMMERCIAL

## 2023-12-19 DIAGNOSIS — M96.1 FAILED BACK SYNDROME: Primary | ICD-10-CM

## 2023-12-19 PROCEDURE — 99207 PR NO CHARGE NURSE ONLY: CPT

## 2023-12-19 NOTE — NURSING NOTE
Spinal cord stimulator leads were pulled w/out complication. Catheter intact.  Pressure applied. Pt tolerated this well.    Incision site:  Clean, dry, and intact    Incision cleaned with:  Chlorhexidene and alcohol (choose one)    Dressin         band aids applied    Discharge criteria:  AVS information reviewed and AVS was given to pt.  If the trial was successful pt was informed that insurance will be checked.  See the AVS for more information.      Spinal cord stimulator trial patient response:    Were there any areas of your pain that were not covered, or lost coverage as the trial proceeded?   No, he had to increase and decrease stimulation w/out issue when needed.   Were there areas that were getting stimulated that did not need to be, or were annoying or bothersome?   No  Were there certain body positions that caused the coverage to be lost or caused stimulation to be too intense?   Yes, when sitting but would change settings and it was helpful  Could you walk 50% more distance than before?   Yes, was able to walk w/out a cane 90 percent of the time.  Before trial he was able to walk 3-5 blocks but would flare afterwards for the remainder of the day.  During the trial he was able to walk the 5 blocks w/out pain and w/out his cane.  Were there activities you were able to participate in that you hadn't previously been able to do?   Yes, walking w/out a cane.  He felt like a normal person during the trial.  Mood was much improved. He was able to cook for a longer amount of time and did this daily.   Overall, what percentage of pain reduction did you receive during the trial?   85%  Were you able to decrease any of your pain medications during the trial?  NA.  He was told not to decrease medication during the trial. Is on lyrica, muscle relaxant and OTC meds.  Was the spinal cord stimulator trial successful enough to proceed to the implant?   Yes,          A referral was done for Dr. Alfonso for the spinal  cord stimulator implant.

## 2023-12-19 NOTE — PATIENT INSTRUCTIONS
DISCHARGE INSTRUCTIONS for post-spinal cord stimulator trial lead pull    Appt line:  [phone]   After hours line:  [phone]     You may shower 24 hours after your trial leads are pulled. You may soak/bathe 72 hours after your trial leads are pulled.  You will need to refrain from having any dental work or a colonoscopy for 2 weeks after this procedure    SYMPTOMS TO REPORT  Signs of infection such as chills, fever, increased pain, redness, drainage or swelling from the incision site.  Headache persisting beyond 48 hours.  Unusual changes in sensation or loss of sensation.  It is considered a medical emergency if you experience:  Sudden severe back pain  Sudden onset leg weakness or severe spasms  Loss of bladder control and/or bowel function  GO TO THE HOSPITAL OR CALL DOCTOR ASAP!    A referral has been placed for Dr. Giovanni Alfonso at the [facility].  He is the surgeon that will do the implant.  [phone].  Their office will check insurance coverage. You may need to se him in clinic first before the implant.

## 2023-12-20 NOTE — TELEPHONE ENCOUNTER
RECORDS RECEIVED FROM: Internal    REASON FOR VISIT: Failed back syndrome   Date of Appt: 1/8/24 @ 8:15 am    NOTES (FOR ALL VISITS) STATUS DETAILS   OFFICE NOTE from referring provider Internal 12/19/23 (Phone Note), 9/6/23 West Duarte MD @[facility]     OFFICE NOTE from other specialist Internal 11/9/23, 9/26/23, 7/20/23 Marni Gray, NP @[facility]    7/14/23 Neela Smith MD @[facility]    12/5/22, 9/12/22 Oquendo, Juancho Lora MD @[facility]    11/4/22, 9/8/22 Richy Schumacher PA-C @[facility]    10/6/22 [facility]    See Additional Encounters    DISCHARGE SUMMARY from hospital Internal 9/21/22 Juancho Oquendo MD @[facility]    10/7/20 Juancho Oquendo MD @[facility]    2/24/20-2/25/20 Jaime Quintero MD @[facility]     OPERATIVE REPORT Internal 9/21/22 Juancho Oquendo MD @[facility] Right L5-S1 minimally invasive redo microdiskectomy     10/7/20 Juancho Oquendo MD @[facility] Right L5-S1 minimally invasive microdiskectomy      MEDICATION LIST Internal    IMAGING  (FOR ALL VISITS)     X-RAY Internal [facility]  8/17/22 XR Lumbar Bending  3/30/20 XR Lumbar Spine     MRI (HEAD, NECK, SPINE) Internal [facility]  8/11/22 MR Lumbar Spine  3/3/20 MR Lumbar Spine

## 2023-12-21 ENCOUNTER — DOCUMENTATION ONLY (OUTPATIENT)
Dept: PALLIATIVE MEDICINE | Facility: OTHER | Age: 28
End: 2023-12-21
Payer: COMMERCIAL

## 2023-12-21 NOTE — PROGRESS NOTES
Discharge Summary  Single Session    Client Name: Dipak Fuentes MRN#: [id number] YOB: 1995      Intake / Discharge Date: 9/18/23 to 12/21/23      DSM5 Diagnoses: (Sustained by DSM5 Criteria Listed Above)  Diagnoses: Bipolar 2 Disorder, Chronic Pain Syndrome  Psychosocial & Contextual Factors: Chronic pain, history of mental health concerns  WHODAS 2.0 (12 item) Score: N/A          Presenting Concern:  Patient was seen for a Spinal Cord Stimulator (SCS) evaluation. He also has a history of Bipolar disorder.       Reason for Discharge:  Client did not return. Patient was offered pain psychotherapy, however, he did not follow up.       Disposition at Time of Last Encounter:   Comments:   Patient was seen for an evaluation only. He reported stability with his mental health symptoms at that time.      Risk Management:   Client has had a history of suicidal ideation: He reported that he was safe and did not have an immediate plan or means.   Recommended that patient call 911 or go to the local ED should there be a change in any of these risk factors.      Referred To:  Pain center provider to follow up with SCS. Follow up on pain psychotherapy, if interested.         YAMIL MACHADO   12/21/2023

## 2023-12-26 NOTE — TELEPHONE ENCOUNTER
You  Dipak Blevins now (2:57 PM)       Dr. Naty Dorman's team require further imaging before hte implant is done.  Dr. Duarte has placed an order for a thoracic MRI.  [facility]  Imaging Scheduling for all locations:  [phone]      Susan RN-BSN  [facility]   [phone]

## 2023-12-26 NOTE — NURSING NOTE
Discharge Information    IV Discontiued Time:  1110      Discharge Criteria = When patient returns to baseline or as per MD order    Consciousness:  Pt is fully awake    Circulation:  BP +/- 20% of pre-procedure level    Respiration:  Patient is able to breathe deeply    O2 Sat:  Patient is able to maintain O2 Sat >92% on room air    Activity:  Moves 4 extremities on command    Ambulation:  Patient is able to stand and walk or stand and pivot into wheelchair    Dressing:  Clean/dry or No Dressing    Notes:   Discharge instructions and AVS given to patient    Patient meets criteria for discharge?  YES    Admitted to PCU?  No    Responsible adult present to accompany patient home?  Yes    Signature/Title:    jonel walls RN  RN Care Coordinator  [facility] [place]

## 2024-01-04 ENCOUNTER — ANCILLARY PROCEDURE (OUTPATIENT)
Dept: MRI IMAGING | Facility: CLINIC | Age: 29
End: 2024-01-04
Attending: PAIN MEDICINE
Payer: COMMERCIAL

## 2024-01-04 DIAGNOSIS — Z01.818 PRE-OP TESTING: ICD-10-CM

## 2024-01-04 DIAGNOSIS — M96.1 FAILED BACK SYNDROME: ICD-10-CM

## 2024-01-04 PROCEDURE — 72146 MRI CHEST SPINE W/O DYE: CPT

## 2024-01-08 ENCOUNTER — OFFICE VISIT (OUTPATIENT)
Dept: NEUROSURGERY | Facility: CLINIC | Age: 29
End: 2024-01-08
Attending: PAIN MEDICINE
Payer: COMMERCIAL

## 2024-01-08 ENCOUNTER — PRE VISIT (OUTPATIENT)
Dept: NEUROSURGERY | Facility: CLINIC | Age: 29
End: 2024-01-08

## 2024-01-08 ENCOUNTER — LAB (OUTPATIENT)
Dept: LAB | Facility: CLINIC | Age: 29
End: 2024-01-08
Payer: COMMERCIAL

## 2024-01-08 VITALS
HEART RATE: 87 BPM | SYSTOLIC BLOOD PRESSURE: 144 MMHG | OXYGEN SATURATION: 97 % | DIASTOLIC BLOOD PRESSURE: 96 MMHG | RESPIRATION RATE: 16 BRPM

## 2024-01-08 DIAGNOSIS — Z98.890 S/P LUMBAR MICRODISCECTOMY: Primary | ICD-10-CM

## 2024-01-08 DIAGNOSIS — M51.16 LUMBAR DISC HERNIATION WITH RADICULOPATHY: ICD-10-CM

## 2024-01-08 DIAGNOSIS — Z01.818 PREOPERATIVE EVALUATION TO RULE OUT SURGICAL CONTRAINDICATION: Primary | ICD-10-CM

## 2024-01-08 DIAGNOSIS — Z01.818 PREOPERATIVE EVALUATION TO RULE OUT SURGICAL CONTRAINDICATION: ICD-10-CM

## 2024-01-08 DIAGNOSIS — M96.1 FAILED BACK SYNDROME: ICD-10-CM

## 2024-01-08 LAB
ALBUMIN UR-MCNC: NEGATIVE MG/DL
ANION GAP SERPL CALCULATED.3IONS-SCNC: 9 MMOL/L (ref 7–15)
APPEARANCE UR: CLEAR
APTT PPP: 33 SECONDS (ref 22–38)
BILIRUB UR QL STRIP: NEGATIVE
BUN SERPL-MCNC: 13 MG/DL (ref 6–20)
CALCIUM SERPL-MCNC: 9.5 MG/DL (ref 8.6–10)
CHLORIDE SERPL-SCNC: 103 MMOL/L (ref 98–107)
COLOR UR AUTO: ABNORMAL
CREAT SERPL-MCNC: 0.94 MG/DL (ref 0.67–1.17)
DEPRECATED HCO3 PLAS-SCNC: 27 MMOL/L (ref 22–29)
EGFRCR SERPLBLD CKD-EPI 2021: >90 ML/MIN/1.73M2
ERYTHROCYTE [DISTWIDTH] IN BLOOD BY AUTOMATED COUNT: 12.8 % (ref 10–15)
GLUCOSE SERPL-MCNC: 105 MG/DL (ref 70–99)
GLUCOSE UR STRIP-MCNC: NEGATIVE MG/DL
HCT VFR BLD AUTO: 44.9 % (ref 40–53)
HGB BLD-MCNC: 15.6 G/DL (ref 13.3–17.7)
HGB UR QL STRIP: ABNORMAL
INR PPP: 1.09 (ref 0.85–1.15)
KETONES UR STRIP-MCNC: NEGATIVE MG/DL
LEUKOCYTE ESTERASE UR QL STRIP: NEGATIVE
MCH RBC QN AUTO: 29.4 PG (ref 26.5–33)
MCHC RBC AUTO-ENTMCNC: 34.7 G/DL (ref 31.5–36.5)
MCV RBC AUTO: 85 FL (ref 78–100)
MUCOUS THREADS #/AREA URNS LPF: PRESENT /LPF
NITRATE UR QL: NEGATIVE
PH UR STRIP: 5.5 [PH] (ref 5–7)
PLATELET # BLD AUTO: 289 10E3/UL (ref 150–450)
POTASSIUM SERPL-SCNC: 4.1 MMOL/L (ref 3.4–5.3)
RBC # BLD AUTO: 5.3 10E6/UL (ref 4.4–5.9)
RBC URINE: 1 /HPF
SODIUM SERPL-SCNC: 139 MMOL/L (ref 135–145)
SP GR UR STRIP: 1.01 (ref 1–1.03)
UROBILINOGEN UR STRIP-MCNC: NORMAL MG/DL
WBC # BLD AUTO: 8.2 10E3/UL (ref 4–11)
WBC URINE: <1 /HPF

## 2024-01-08 PROCEDURE — 80048 BASIC METABOLIC PNL TOTAL CA: CPT | Performed by: PATHOLOGY

## 2024-01-08 PROCEDURE — 99215 OFFICE O/P EST HI 40 MIN: CPT | Performed by: NEUROLOGICAL SURGERY

## 2024-01-08 PROCEDURE — 85610 PROTHROMBIN TIME: CPT | Performed by: PATHOLOGY

## 2024-01-08 PROCEDURE — 85027 COMPLETE CBC AUTOMATED: CPT | Performed by: PATHOLOGY

## 2024-01-08 PROCEDURE — 36415 COLL VENOUS BLD VENIPUNCTURE: CPT | Performed by: PATHOLOGY

## 2024-01-08 PROCEDURE — 85730 THROMBOPLASTIN TIME PARTIAL: CPT | Performed by: PATHOLOGY

## 2024-01-08 PROCEDURE — 99417 PROLNG OP E/M EACH 15 MIN: CPT | Performed by: NEUROLOGICAL SURGERY

## 2024-01-08 PROCEDURE — 81001 URINALYSIS AUTO W/SCOPE: CPT | Performed by: PATHOLOGY

## 2024-01-08 NOTE — LETTER
"1/8/2024       RE: Dipak Fuentes  [address] [facility] [id number]         Dear Colleague,    Thank you for referring your patient, Dipak Fuentes, to the [facility]UROSURG[facility] [place] at [facility]. Please see a copy of my visit note below.    NEUROSURGERY    HISTORY AND PHYSICAL EXAM    Chief Complaint   Patient presents with    New Patient     Consult for SCS placement. S/P successful SCS trial with Dr. Duarte.       HISTORY OF PRESENT ILLNESS  28 year old right handed male.who is referred by Dr. Duarte for placement of a permanent SCS system.  He is s/p SCS trial from 12/12/2023 to 12/19/2023 with Dr. Duarte.  Per patient, the SCS trial period was the \"nicest week\" he had in \"a year\" and he was \"able to walk without the cane\".  Dr. Duarte felt that the trial was successful and he was thus referred for a permanent SCS system.    Per patient, his pain in his right lower extremity and lower back began back in 2020.  He initially slipped and fell on a boat.  That resulted in right L5/S1 radiculopathy secondary to a HNP at L5/S1 level.  On 10/7/2020, he underwent minimally invasive right L5/S1 microdiscectomy with Dr. Oquendo.  He tolerated the procedure well and there were no complications.  His postoperative course was uneventful and he returned to his baseline.  His radiculopathy resolved.  He did not have any shooting pain.  He did not have any neurological deficit.  He then fell again on the boat and he began having the same right side radiculopathy.  He then underwent redo minimally invasive right L5/S1 microdiscectomy with Dr. Oquendo on 9/21/2022.  After the second surgery, the pain was different compared to his preop pain and he was continuing to get shooting and burning pain in the right leg, especially when he bears weight on it.  He describes the pain as burning, tingling, stabbing, shooting on the right side, which starts in the right " "iliac crest area, goes down the right buttock, starts to wrap around to the side and then goes down to the calf and to the foot.  It involves the outside bottom of the foot. The pain typically does not involve the toes.      Past Medical History:   Diagnosis Date    Bipolar I disorder, single manic episode (H) 03/25/2013    Problem list name updated by automated process. Provider to review    Complication of anesthesia     panic after having anesthesia    History of substance use disorder     as a teenager, \"took extra adderall\"    Lumbar disc herniation with radiculopathy     Mild intermittent asthma without complication     Other chronic pain     low back and buttocks leg pain       Past Surgical History:   Procedure Laterality Date    DISCECTOMY LUMBAR MINIMALLY INVASIVE ONE LEVEL Right 10/7/2020    Procedure: Right L5-S1 minimally invasive microdiskectomy;  Surgeon: Juancho Oquendo MD;  Location:  OR    DISCECTOMY LUMBAR MINIMALLY INVASIVE ONE LEVEL Right 9/21/2022    Procedure: Right L5-S1 minimally invasive redo microdiskectomy;  Surgeon: Juancho Oquendo MD;  Location:  OR    [facility]         Family History   Problem Relation Age of Onset    Depression Mother     Depression Father     Substance Abuse Father     Depression Maternal Grandmother     Heart Disease Maternal Grandmother     Depression Paternal Grandmother     Anxiety Disorder Paternal Grandmother     Parkinsonism Paternal Grandmother     Depression Brother     Unknown/Adopted No family hx of     Schizophrenia No family hx of     Bipolar Disorder No family hx of     Suicide No family hx of     Dementia No family hx of     Gonzalo Disease No family hx of     Autism Spectrum Disorder No family hx of     Intellectual Disability (Mental Retardation) No family hx of     Mental Illness No family hx of        Social History     Socioeconomic History    Marital status:      Spouse name: Not on file    Number of children: " "Not on file    Years of education: Not on file    Highest education level: Not on file   Occupational History    Not on file   Tobacco Use    Smoking status: Former     Packs/day: 0.50     Years: 5.00     Additional pack years: 0.00     Total pack years: 2.50     Types: Cigarettes, Other    Smokeless tobacco: Never   Vaping Use    Vaping Use: Every day    Substances: Nicotine, THC, CBD    Devices: Pre-filled or refillable cartridge, Refillable tank   Substance and Sexual Activity    Alcohol use: Not Currently     Comment: over 6 months    Drug use: Yes     Types: Marijuana     Comment: bipolar-concentrates 1-2 x day    Sexual activity: Yes     Partners: Female     Birth control/protection: I.U.D.   Other Topics Concern    Parent/sibling w/ CABG, MI or angioplasty before 65F 55M? Not Asked   Social History Narrative    .     Works in IT    Likes to bake for fun, mostly bread. Likes to play video games with his daughter.    8 y.o. daughter      Social Determinants of Health     Financial Resource Strain: Not on file   Food Insecurity: Not on file   Transportation Needs: Not on file   Physical Activity: Not on file   Stress: Not on file   Social Connections: Not on file   Interpersonal Safety: Not on file   Housing Stability: Not on file       Allergies   Allergen Reactions    Ceclor [Cefaclor Monohydrate]     Lamictal [Lamotrigine]      Had known side effect of\"skin eating rash\"  Jaycob lauren syndrome    Latex      PN: Converted from LW Latex Sensitivity Flag    Cefaclor Rash       Current Outpatient Medications   Medication    acetaminophen (TYLENOL) 500 MG tablet    albuterol (PROAIR HFA/PROVENTIL HFA/VENTOLIN HFA) 108 (90 Base) MCG/ACT inhaler    pregabalin (LYRICA) 150 MG capsule    tiZANidine (ZANAFLEX) 4 MG tablet     No current facility-administered medications for this visit.       REVIEW OF SYSTEMS:  General: POSITIVE for difficulty sleeping and weight gain (decreased activity secondary to pain).  " Negative for chills/sweats/fever, headache, recent fatigue.  Eyes: Negative for blurred vision, crossed eyes, double vision, recent eye infections, vision flashes, or vision halos.  Ears/Nose/Mouth/Throat: Negative for bleeding gums, difficulty swallowing, earache, ear discharge, hearing loss, hoarseness, nosebleeds, tinnitus, or sinus problems.  Respiratory: Negative for chronic cough, coughing blood, night sweats, shortness of breath, Tuberculosis, or wheezing.  Cardiovascular: POSITIVE for swollen leg secondary to decreased activity.  Negative for chest pain, dyspnea at night, heart murmur, palpitations, pacemaker, poor circulation, or varicose veins.  Gastrointestinal: Negative for melena, hematochezia, chronic diarrhea, heartburn, Hepatitis A/B/C, increasing constipation, Liver Disease, nausea, or vomiting.   Genitourinary: POSITIVE for urinary incontinence when there is severe pain.  Also decreased sensation for urination. Decreased sensation or abnormal sensation around the saddle area.  Negative for urinary retention, genital discharge, prostate problems, urgency, or UTI.   Neurological: POSITIVE for numbness and tingling in the right leg.  Negative for syncope, headaches, memory problems, or seizures.  Psychological: POSITIVE for bipolar disorder.  Negative for anxiety, depression, panic attacks, or restlessness.  Skin: POSITIVE for scars being big.  Does also have a pimple in his back.  Negative for chronic skin itching, color changes in hand/feet when cold, poor scarring, non-healing ulcers, skin rashes/hives, unusual moles.   Musculoskeletal: Negative for arthritis, joint swelling in hands/wrists/hips/knees/joints, muscle tenderness in arms/legs, or osteoporosis.  Endocrine: Negative for excessive thirst/hunger, intolerance for warm rooms, loss of libido, multiple broken bones, rapid weight gain/loss, galactorrhea, or thyroid issues.  Hematologic/Lymphatic: Negative for easy skin bruising, significant  fatigue, prolonged bleeding, tender glands/lymph nodes.  Allergies: Negative for asthma or hay fever.      PHYSICAL EXAM  BP (!) 144/96 (BP Location: Right arm, Patient Position: Sitting, Cuff Size: Adult Large)   Pulse 87   Resp 16   SpO2 97%     General: Awake, alert, oriented. Well nourished, well developed, he is somewhat uncomfortable sitting in his chair.  HEENT: Head normocephalic, atraumatic. No carotid bruit. Neck supple. Good range of motion. No palpable thyroid mass.  Heart: Regular rhythm and rate. No murmurs.  Lungs: Clear to auscultation and percussion bilaterally. No ronchi, rales or wheeze.  Abdomen: Soft, non-tender, non-distended. No hepatosplenomegaly.  Extremity: Warm with no clubbing or cyanosis. No lower extremity edema.  Incisions: right paraspinal L5/S1 area incision is well healed.  There is a hyperdense area x 2 where the trial electrodes were placed.  There is a comedone above the electrode insertion sites, approximately 1 cm above.    Neurological  Awake, alert and oriented to date, time, place and person. Speech fluent.   Pupils equal, round, reactive to light.  Extraocular movement intact.  Visual fields are full on confrontation.  Hearing is grossly normal to finger rub.   Facial sensation intact.  Face symmetric.  Tongue midline.  Uvula elevates equally.    Motor: full strength throughout except for right lower extremity, especially dorsi/plantar flexion which causes significant pain and discomfort.  Sensation: intact to light touch and pinpoint, except for decreased sensation below the right and left knee when sitting for a long time.  Deep tendon reflexes: 3+ throughout but more hyperreflexic on the right lower extremity.  Negative for clonus. Positive for Hensley's sign bilaterally. No dysmetria.    Patient does have a hardware/jewelry in his right upper cheek - not removable.  Had it since 2018.      IMAGING  MRI thoracic spine on 1/4/2023  IMPRESSION:     1.  Multilevel  thoracic spondylosis with multiple small disc herniations as detailed above. The largest is an inferiorly dissecting disc extrusion at T4-T5 with disc material indenting the ventral cord and contributing to mild spinal canal stenosis.  2.  No greater than mild spinal canal stenosis in the thoracic spine.  3.  No substantial neural foraminal stenosis.  4.  No cord signal abnormality.      ASSESSMENT   28 year old right handed male  Right sided L5/S1 radiculopathy  Status post minimally invasive right L5/S1 discectomy on 10/7/2020  Status post redo minimally invasive right L5/S1 microdiscectomy on 9/21/2022  Failed back sydrome  Chronic low back pain with right radiculopathy  Lumbar degenerative disc disease  Status post successful spinal cord stimulator trial    Mr. Dipak Fuentes presents for permanent SCS system implantation after having undergone a successful SCS trial with Dr. Duarte.  Per Dr. Duarte, on 12/12/2023, the two percutaneous type electrodes were introduced at T12/L1 level with one electrode located left of midline and tip at the bottom of T7 vertebral body and with second electrode located right of midline and tip at the top of T8 vertebral body.  He subsequently reported improved pain control and significant improvement in his activity level.  The following was reported.    Spinal cord stimulator trial patient response:     Were there any areas of your pain that were not covered, or lost coverage as the trial proceeded?   No, he had to increase and decrease stimulation w/out issue when needed.   Were there areas that were getting stimulated that did not need to be, or were annoying or bothersome?   No  Were there certain body positions that caused the coverage to be lost or caused stimulation to be too intense?   Yes, when sitting but would change settings and it was helpful  Could you walk 50% more distance than before?   Yes, was able to walk w/out a cane 90 percent of the time.  Before trial he was  able to walk 3-5 blocks but would flare afterwards for the remainder of the day.  During the trial he was able to walk the 5 blocks w/out pain and w/out his cane.  Were there activities you were able to participate in that you hadn't previously been able to do?   Yes, walking w/out a cane.  He felt like a normal person during the trial.  Mood was much improved. He was able to cook for a longer amount of time and did this daily.   Overall, what percentage of pain reduction did you receive during the trial?   85%  Were you able to decrease any of your pain medications during the trial?  NA.  He was told not to decrease medication during the trial. Is on lyrica, muscle relaxant and OTC meds.  Was the spinal cord stimulator trial successful enough to proceed to the implant?   Yes,        The patient was trialed with the [employer] device.  Based on the above, the SCS trial was considered to be a success.  The electrodes were removed without any incident on 12/19/2023.      During today's visit, we discussed the spinal cord stimulator placement, phase I and II combined.  I explained that the patient would undergo implantation of a spinal cord stimulator electrodes, percutaneous types, through the T12/L1 approach.  We may also try L1/2 approach.. We will try to replicate the test trial electrode location.  Stimulation coverage will be tested intraoperatively with an awake neurophysiological or stimulation test.  If the stimulation coverage is good, then the generator/battery will be implanted on the right buttock, the side of his pain.  Also, he is right handed.      Risks, benefits, alternative therapies were discussed with the patient, including but not limited to infection and bleeding, cerebrospinal fluid leak, neurological deficit, nerve injury and/or spinal cord injury, paralysis, stroke and death.  All questions were answered, and he expressed understanding.     We did briefly discuss the paddle electrode option but  "since he was tested on a percutaneous type electrode, those will be implanted.    He is also found to have an active pimple near the test electrode insertion site.  It looks inflamed.  I told him that this \"pimple\" must be resolved before we can proceed with the SCS implantation.  Patient expressed understanding.    A full history and physical exam was performed during today's visit.      PLAN  1.  Preop labs today  2.  History and physical exam has been completed  3.  Await resolution of the pimple in his lower back near the intended surgical site      4.  Spinal cord stimulator placement, phase I and II combined, placement of two percutaneous type electrodes in the thoracic spine and placement of the generator/battery over the right buttock      62 minutes were spent face to face with the patient of which more than 50% of the time was spent counseling and discussing the above issues regarding diagnosis, differential, treatment options, and steps for further evaluation.  6 minutes were spent reviewing patient's chart, imaging in PACS.  20 minutes were spent on documentation for this encounter.  88 minutes total were spent on this encounter today.        Again, thank you for allowing me to participate in the care of your patient.      Sincerely,    Giovanni Alfonso MD    "

## 2024-01-08 NOTE — PROGRESS NOTES
"NEUROSURGERY    HISTORY AND PHYSICAL EXAM    Chief Complaint   Patient presents with    New Patient     Consult for SCS placement. S/P successful SCS trial with Dr. Duarte.       HISTORY OF PRESENT ILLNESS  28 year old right handed male.who is referred by Dr. Duarte for placement of a permanent SCS system.  He is s/p SCS trial from 12/12/2023 to 12/19/2023 with Dr. Duarte.  Per patient, the SCS trial period was the \"nicest week\" he had in \"a year\" and he was \"able to walk without the cane\".  Dr. Duarte felt that the trial was successful and he was thus referred for a permanent SCS system.    Per patient, his pain in his right lower extremity and lower back began back in 2020.  He initially slipped and fell on a boat.  That resulted in right L5/S1 radiculopathy secondary to a HNP at L5/S1 level.  On 10/7/2020, he underwent minimally invasive right L5/S1 microdiscectomy with Dr. Oquendo.  He tolerated the procedure well and there were no complications.  His postoperative course was uneventful and he returned to his baseline.  His radiculopathy resolved.  He did not have any shooting pain.  He did not have any neurological deficit.  He then fell again on the boat and he began having the same right side radiculopathy.  He then underwent redo minimally invasive right L5/S1 microdiscectomy with Dr. Oquendo on 9/21/2022.  After the second surgery, the pain was different compared to his preop pain and he was continuing to get shooting and burning pain in the right leg, especially when he bears weight on it.  He describes the pain as burning, tingling, stabbing, shooting on the right side, which starts in the right iliac crest area, goes down the right buttock, starts to wrap around to the side and then goes down to the calf and to the foot.  It involves the outside bottom of the foot. The pain typically does not involve the toes.      Past Medical History:   Diagnosis Date    Bipolar I disorder, single manic episode (H) " "03/25/2013    Problem list name updated by automated process. Provider to review    Complication of anesthesia     panic after having anesthesia    History of substance use disorder     as a teenager, \"took extra adderall\"    Lumbar disc herniation with radiculopathy     Mild intermittent asthma without complication     Other chronic pain     low back and buttocks leg pain       Past Surgical History:   Procedure Laterality Date    DISCECTOMY LUMBAR MINIMALLY INVASIVE ONE LEVEL Right 10/7/2020    Procedure: Right L5-S1 minimally invasive microdiskectomy;  Surgeon: Juancho Oquendo MD;  Location:  OR    DISCECTOMY LUMBAR MINIMALLY INVASIVE ONE LEVEL Right 9/21/2022    Procedure: Right L5-S1 minimally invasive redo microdiskectomy;  Surgeon: Juancho Oquendo MD;  Location:  OR    [facility]         Family History   Problem Relation Age of Onset    Depression Mother     Depression Father     Substance Abuse Father     Depression Maternal Grandmother     Heart Disease Maternal Grandmother     Depression Paternal Grandmother     Anxiety Disorder Paternal Grandmother     Parkinsonism Paternal Grandmother     Depression Brother     Unknown/Adopted No family hx of     Schizophrenia No family hx of     Bipolar Disorder No family hx of     Suicide No family hx of     Dementia No family hx of     [place] Disease No family hx of     Autism Spectrum Disorder No family hx of     Intellectual Disability (Mental Retardation) No family hx of     Mental Illness No family hx of        Social History     Socioeconomic History    Marital status:      Spouse name: Not on file    Number of children: Not on file    Years of education: Not on file    Highest education level: Not on file   Occupational History    Not on file   Tobacco Use    Smoking status: Former     Packs/day: 0.50     Years: 5.00     Additional pack years: 0.00     Total pack years: 2.50     Types: Cigarettes, Other    Smokeless tobacco: " "Never   Vaping Use    Vaping Use: Every day    Substances: Nicotine, THC, CBD    Devices: Pre-filled or refillable cartridge, Refillable tank   Substance and Sexual Activity    Alcohol use: Not Currently     Comment: over 6 months    Drug use: Yes     Types: Marijuana     Comment: bipolar-concentrates 1-2 x day    Sexual activity: Yes     Partners: Female     Birth control/protection: I.U.D.   Other Topics Concern    Parent/sibling w/ CABG, MI or angioplasty before 65F 55M? Not Asked   Social History Narrative    .     Works in IT    Likes to bake for fun, mostly bread. Likes to play video games with his daughter.    8 y.o. daughter      Social Determinants of Health     Financial Resource Strain: Not on file   Food Insecurity: Not on file   Transportation Needs: Not on file   Physical Activity: Not on file   Stress: Not on file   Social Connections: Not on file   Interpersonal Safety: Not on file   Housing Stability: Not on file       Allergies   Allergen Reactions    Ceclor [Cefaclor Monohydrate]     Lamictal [Lamotrigine]      Had known side effect of\"skin eating rash\"  Jaycob lauren syndrome    Latex      PN: Converted from LW Latex Sensitivity Flag    Cefaclor Rash       Current Outpatient Medications   Medication    acetaminophen (TYLENOL) 500 MG tablet    albuterol (PROAIR HFA/PROVENTIL HFA/VENTOLIN HFA) 108 (90 Base) MCG/ACT inhaler    pregabalin (LYRICA) 150 MG capsule    tiZANidine (ZANAFLEX) 4 MG tablet     No current facility-administered medications for this visit.       REVIEW OF SYSTEMS:  General: POSITIVE for difficulty sleeping and weight gain (decreased activity secondary to pain).  Negative for chills/sweats/fever, headache, recent fatigue.  Eyes: Negative for blurred vision, crossed eyes, double vision, recent eye infections, vision flashes, or vision halos.  Ears/Nose/Mouth/Throat: Negative for bleeding gums, difficulty swallowing, earache, ear discharge, hearing loss, hoarseness, " nosebleeds, tinnitus, or sinus problems.  Respiratory: Negative for chronic cough, coughing blood, night sweats, shortness of breath, Tuberculosis, or wheezing.  Cardiovascular: POSITIVE for swollen leg secondary to decreased activity.  Negative for chest pain, dyspnea at night, heart murmur, palpitations, pacemaker, poor circulation, or varicose veins.  Gastrointestinal: Negative for melena, hematochezia, chronic diarrhea, heartburn, Hepatitis A/B/C, increasing constipation, Liver Disease, nausea, or vomiting.   Genitourinary: POSITIVE for urinary incontinence when there is severe pain.  Also decreased sensation for urination. Decreased sensation or abnormal sensation around the saddle area.  Negative for urinary retention, genital discharge, prostate problems, urgency, or UTI.   Neurological: POSITIVE for numbness and tingling in the right leg.  Negative for syncope, headaches, memory problems, or seizures.  Psychological: POSITIVE for bipolar disorder.  Negative for anxiety, depression, panic attacks, or restlessness.  Skin: POSITIVE for scars being big.  Does also have a pimple in his back.  Negative for chronic skin itching, color changes in hand/feet when cold, poor scarring, non-healing ulcers, skin rashes/hives, unusual moles.   Musculoskeletal: Negative for arthritis, joint swelling in hands/wrists/hips/knees/joints, muscle tenderness in arms/legs, or osteoporosis.  Endocrine: Negative for excessive thirst/hunger, intolerance for warm rooms, loss of libido, multiple broken bones, rapid weight gain/loss, galactorrhea, or thyroid issues.  Hematologic/Lymphatic: Negative for easy skin bruising, significant fatigue, prolonged bleeding, tender glands/lymph nodes.  Allergies: Negative for asthma or hay fever.      PHYSICAL EXAM  BP (!) 144/96 (BP Location: Right arm, Patient Position: Sitting, Cuff Size: Adult Large)   Pulse 87   Resp 16   SpO2 97%     General: Awake, alert, oriented. Well nourished, well  developed, he is somewhat uncomfortable sitting in his chair.  HEENT: Head normocephalic, atraumatic. No carotid bruit. Neck supple. Good range of motion. No palpable thyroid mass.  Heart: Regular rhythm and rate. No murmurs.  Lungs: Clear to auscultation and percussion bilaterally. No ronchi, rales or wheeze.  Abdomen: Soft, non-tender, non-distended. No hepatosplenomegaly.  Extremity: Warm with no clubbing or cyanosis. No lower extremity edema.  Incisions: right paraspinal L5/S1 area incision is well healed.  There is a hyperdense area x 2 where the trial electrodes were placed.  There is a comedone above the electrode insertion sites, approximately 1 cm above.    Neurological  Awake, alert and oriented to date, time, place and person. Speech fluent.   Pupils equal, round, reactive to light.  Extraocular movement intact.  Visual fields are full on confrontation.  Hearing is grossly normal to finger rub.   Facial sensation intact.  Face symmetric.  Tongue midline.  Uvula elevates equally.    Motor: full strength throughout except for right lower extremity, especially dorsi/plantar flexion which causes significant pain and discomfort.  Sensation: intact to light touch and pinpoint, except for decreased sensation below the right and left knee when sitting for a long time.  Deep tendon reflexes: 3+ throughout but more hyperreflexic on the right lower extremity.  Negative for clonus. Positive for Hensley's sign bilaterally. No dysmetria.    Patient does have a hardware/jewelry in his right upper cheek - not removable.  Had it since 2018.      IMAGING  MRI thoracic spine on 1/4/2023  IMPRESSION:     1.  Multilevel thoracic spondylosis with multiple small disc herniations as detailed above. The largest is an inferiorly dissecting disc extrusion at T4-T5 with disc material indenting the ventral cord and contributing to mild spinal canal stenosis.  2.  No greater than mild spinal canal stenosis in the thoracic spine.  3.   No substantial neural foraminal stenosis.  4.  No cord signal abnormality.      ASSESSMENT   28 year old right handed male  Right sided L5/S1 radiculopathy  Status post minimally invasive right L5/S1 discectomy on 10/7/2020  Status post redo minimally invasive right L5/S1 microdiscectomy on 9/21/2022  Failed back sydrome  Chronic low back pain with right radiculopathy  Lumbar degenerative disc disease  Status post successful spinal cord stimulator trial    Mr. Dipak Fuentes presents for permanent SCS system implantation after having undergone a successful SCS trial with Dr. Duarte.  Per Dr. Duarte, on 12/12/2023, the two percutaneous type electrodes were introduced at T12/L1 level with one electrode located left of midline and tip at the bottom of T7 vertebral body and with second electrode located right of midline and tip at the top of T8 vertebral body.  He subsequently reported improved pain control and significant improvement in his activity level.  The following was reported.    Spinal cord stimulator trial patient response:     Were there any areas of your pain that were not covered, or lost coverage as the trial proceeded?   No, he had to increase and decrease stimulation w/out issue when needed.   Were there areas that were getting stimulated that did not need to be, or were annoying or bothersome?   No  Were there certain body positions that caused the coverage to be lost or caused stimulation to be too intense?   Yes, when sitting but would change settings and it was helpful  Could you walk 50% more distance than before?   Yes, was able to walk w/out a cane 90 percent of the time.  Before trial he was able to walk 3-5 blocks but would flare afterwards for the remainder of the day.  During the trial he was able to walk the 5 blocks w/out pain and w/out his cane.  Were there activities you were able to participate in that you hadn't previously been able to do?   Yes, walking w/out a cane.  He felt like a  "normal person during the trial.  Mood was much improved. He was able to cook for a longer amount of time and did this daily.   Overall, what percentage of pain reduction did you receive during the trial?   85%  Were you able to decrease any of your pain medications during the trial?  NA.  He was told not to decrease medication during the trial. Is on lyrica, muscle relaxant and OTC meds.  Was the spinal cord stimulator trial successful enough to proceed to the implant?   Yes,        The patient was trialed with the [employer] device.  Based on the above, the SCS trial was considered to be a success.  The electrodes were removed without any incident on 12/19/2023.      During today's visit, we discussed the spinal cord stimulator placement, phase I and II combined.  I explained that the patient would undergo implantation of a spinal cord stimulator electrodes, percutaneous types, through the T12/L1 approach.  We may also try L1/2 approach.. We will try to replicate the test trial electrode location.  Stimulation coverage will be tested intraoperatively with an awake neurophysiological or stimulation test.  If the stimulation coverage is good, then the generator/battery will be implanted on the right buttock, the side of his pain.  Also, he is right handed.      Risks, benefits, alternative therapies were discussed with the patient, including but not limited to infection and bleeding, cerebrospinal fluid leak, neurological deficit, nerve injury and/or spinal cord injury, paralysis, stroke and death.  All questions were answered, and he expressed understanding.     We did briefly discuss the paddle electrode option but since he was tested on a percutaneous type electrode, those will be implanted.    He is also found to have an active pimple near the test electrode insertion site.  It looks inflamed.  I told him that this \"pimple\" must be resolved before we can proceed with the SCS implantation.  Patient expressed " understanding.    A full history and physical exam was performed during today's visit.      PLAN  1.  Preop labs today  2.  History and physical exam has been completed  3.  Await resolution of the pimple in his lower back near the intended surgical site  4.  Spinal cord stimulator placement, phase I and II combined, placement of two percutaneous type electrodes in the thoracic spine and placement of the generator/battery over the right buttock      62 minutes were spent face to face with the patient of which more than 50% of the time was spent counseling and discussing the above issues regarding diagnosis, differential, treatment options, and steps for further evaluation.  6 minutes were spent reviewing patient's chart, imaging in PACS.  20 minutes were spent on documentation for this encounter.  88 minutes total were spent on this encounter today.

## 2024-01-08 NOTE — NURSING NOTE
Chief Complaint   Patient presents with    New Patient     Here for consult, confirmed with patient     Mary Alvarez

## 2024-01-09 NOTE — PROGRESS NOTES
[facility] Pain Management     Date of Visit: Cristian 10, 2024  Last visit:  11/09/2023  Original Consult: 2/2/2023    Dipak Fuentes is a 28 year old male with PMH significant for lumbar ddd with radiculopathy, s/p microdiscectomy x2, bipolar depression, substance use disorder in prolonged remission who is seen today for follow-up on chronic pain treatment.     History:  Lumbar microdiscectomy surgery 9/2022. After surgery pain in the right leg was still present, burning pain was less, but after surgery it changed to stabbing pain. Prior to surgery was experiencing pain and urinary incontinence, this did not resolve after surgery. Undergoing Pelvic therapy and Pool PT; traditional PT was not helpful as he could not tolerate the exercises as he was unable to tolerate the pain. Completed Pool therapy 4/2023. He had undergone follow-up MRI in October 2022 without any concerning findings or problems at the surgical site. Successful SCS trial 12/12/23-12/19/23.    Recommendations from last visit:  Certified for medical cannabis.  Discussed questions about SCS trial. Advised him to reach out for any further questions or concerns. Continue current doses of Lyrica and Tizanidine for now. Discussed that if scs provides relief, we can try to reduce medications as tolerated.   ____________________________________________________________    Since last seen, Dipak reports:  -SCS trial completed 12/12/23-12/19/23. A referral was done for Dr. Alfonso for the spinal cord stimulator implant and surgery is planned for 1/30/24.   - He was able to walk without a cane 90 percent of the time and tolerate weight bearing on his right leg. Before trial he was not able to walk far or standing his right leg without significant persistent pain. He also noted that his  mood was much improved. He (and his family) are looking forward to the implant and the potential activities he may be able to now more fully participate in.     - Lyrica at 150  BID, tizanadine prn and THC are helpful.  Overall remains hopeful that he can control his pain without medications, however also is focused on gaining and maintaining function.    - Graduated with BS degree mid 2023. 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  Interested in the creation and development of a video game. Would like to have improved pain control to broaden his opportunities for employment.   -  Independent pool exercises at the [facility].  Land therapy did not really improve his pain.  Goals of pain management are to be able to participate in fishing and disc golf, to be able to drive without fear of right leg pain.    Current pain medications:  Tizanidine 4 mg TID prn- using at least once a day  Pregabalin 150 mg BID    Pain description:  Low back with radiation into the right groin/testicle (mostly resolves with being still or sitting), rectum (stabbing), right leg shooting/burning pain with walking and standing. Struggles to bear weight on that leg, somewhat better with lyrica. Right side numbness from the knee down, intermittently.   Aggravating factors include: Sitting on hard chairs, lying down makes back pain worse, standing makes right leg pain worse  Relieving factors include: icing helps some, tends to provide more immediate relief, delta 9 THC helps reduce pain, sleep  Current pain rating 6/10- feels slightly worse after the trial, mostly because he had forgotten how good it felt to have less pain    PEG: A Three-Item Scale Assessing Pain Intensity and Interference  What number best describes your pain on average in the past week? 6  What number best describes how, during the past week, pain has interfered with your ENJOYMENT OF LIFE? 3  What number best describes how, during the past week, pain has interfered with your GENERAL ACTIVITY? 10 - Completely interferes  PEG Total Score: 6.33    PAIN MANAGEMENT TREATMENT HISTORY  1. MEDICATIONS:  Opiates: Oxycodone after surgery-  "Helped  NSAIDS: Ibuprofen (Advil, Motrin).  Medication was not helpful , only takes it when his wife and mom \"bug me to take it because I am in pain\" Diclofenac -not helpful  Muscle Relaxants: Cyclobenzaprine (Flexeril).  Medication was helpful  Methocarbamol (Robaxin).  Medication was helpful  Tizanidine (Zanaflex).  Medication was helpful  Helps with spasms, tizanidine most helpful  Anti-depressants: Remeron most recently - helpful. Mood stabilizers have been helpful in the past; tried on many anti-psychotics: had hallucinations, lithium: made him hard to be around, lamotrigine - skin rash. Now uses THC to manage polo with success  Anxiolytics: tried hydroxyzine in the past  Neuropathics: Gabapentin (Neurontin).  Medication was not helpful, trialed once at 300 mg TID, then again at 600 mg TID. Lyrica -helpful  Topicals: CBD spray - not helpful, lidocaine patches - not helpful  Adjuvant pain medications: THC helps his neuropathic pain sleep  2. PHYSICAL THERAPY:   Pool PT at [facility] with Sara Ro, PT was helpful, continues HEP  Pelvic floor therapy helpful for urinary symptoms after back surgery   Traditional PT after surgery not helpful.  3. PAIN PSYCHOLOGY:   Therapy in the past for biopolar management, not in several years  4. SURGERY:   9/21/2022 Right L5-S1 minimally invasive microdiscectomy with Dr. Oquendo  10/7/2020  Right L5-S1 minimally invasive microdiscectomy with Dr. Oquendo  5. INJECTIONS:   8/25/22: right S1-S2 transforaminal epidural corticosteroid injection with Dr. Shay - worsened pain  3/10/20: Lumbar L5-S1 interlaminar epidural steroid injection with Dr. Esteves- had 2 weeks of nothing, then 2 weeks of relief and then back to painful  6. COMPLEMENTARY THERAPY:  Chiropractic  not helpful  Acupuncture/acupressure/ not tried  TENS unit  not helpful made pain worse  cold/cold packs: helpful  distraction/mindfulness: somewhat helpful   meditation/guided imagery: somewhat " helpful    [id number] Thoracic MRI  IMPRESSION:  1.  Multilevel thoracic spondylosis with multiple small disc herniations as detailed above. The largest is an inferiorly dissecting disc extrusion at T4-T5 with disc material indenting the ventral cord and contributing to mild spinal canal stenosis.  2.  No greater than mild spinal canal stenosis in the thoracic spine.  3.  No substantial neural foraminal stenosis.  4.  No cord signal abnormality.    10/7/2022 Lumbar [facility]          Social History   Social History     Tobacco Use    Smoking status: Former     Packs/day: 0.50     Years: 5.00     Additional pack years: 0.00     Total pack years: 2.50     Types: Cigarettes, Other    Smokeless tobacco: Never   Vaping Use    Vaping Use: Every day    Substances: Nicotine, THC, CBD    Devices: Pre-filled or refillable cartridge, Refillable tank   Substance Use Topics    Alcohol use: Not Currently     Comment: over 6 months    Drug use: Yes     Types: Marijuana     Comment: bipolar-concentrates 1-2 x day      Social History     Social History Narrative    .     Works in IT    Likes to bake for fun, mostly bread. Likes to play video games with his daughter.    8 y.o. daughter       Medications and Allergies reviewed.    Objective   Vitals:    01/10/24 1552   BP: (!) 145/94   BP Location: Right arm   Cuff Size: Adult Regular   Pulse: 81   SpO2: 97%     Constitutional: Well developed, well nourished, appears stated age. No acute distress.  Gait is steady, antalgic and uses cane  HEENT: Head atraumatic, normocephalic. Eyes without conjunctival injection or jaundice.   Skin: No obvious lesions, or petechiae of exposed skin. Mulitple tattoos present  Extremities: Peripheral pulses intact. No clubbing, cyanosis, or edema. Moves all extremities.  Psychiatric/mental status: Alert, without lethargy or stupor. Speech fluent. Appropriate affect. Mood normal. Able to follow commands without difficulty.    Musculoskeletal exam: Normal bulk and tone. Unremarkable spinal curvature. Prefers to stand versus sit.   Lumbar/Thoracic spine ROM: moderately restricted.    Assessment & Plan    1. Lumbar radiculopathy  2. S/P lumbar microdiscectomy  3. Failed back syndrome  4. Chronic pain syndrome    Will proceed with implant with Dr. Alfonso. I would like to see him back 4-6 weeks after surgery to reassess. Will plan to continue to discuss medications and activities as he acclimates to life after SCS implant.     Marni Gray, CNP-BC, PMGT-BC, AP-PMN  [facility]

## 2024-01-10 ENCOUNTER — OFFICE VISIT (OUTPATIENT)
Dept: PALLIATIVE MEDICINE | Facility: CLINIC | Age: 29
End: 2024-01-10
Payer: COMMERCIAL

## 2024-01-10 ENCOUNTER — TELEPHONE (OUTPATIENT)
Dept: NEUROSURGERY | Facility: CLINIC | Age: 29
End: 2024-01-10

## 2024-01-10 VITALS — DIASTOLIC BLOOD PRESSURE: 94 MMHG | OXYGEN SATURATION: 97 % | SYSTOLIC BLOOD PRESSURE: 145 MMHG | HEART RATE: 81 BPM

## 2024-01-10 DIAGNOSIS — M54.16 LUMBAR RADICULOPATHY: ICD-10-CM

## 2024-01-10 DIAGNOSIS — M96.1 FAILED BACK SYNDROME: ICD-10-CM

## 2024-01-10 DIAGNOSIS — Z98.890 S/P LUMBAR MICRODISCECTOMY: ICD-10-CM

## 2024-01-10 DIAGNOSIS — G89.4 CHRONIC PAIN SYNDROME: Primary | ICD-10-CM

## 2024-01-10 PROBLEM — Z87.898 HISTORY OF SUBSTANCE USE DISORDER: Status: ACTIVE | Noted: 2023-02-02

## 2024-01-10 PROCEDURE — 99214 OFFICE O/P EST MOD 30 MIN: CPT | Performed by: NURSE PRACTITIONER

## 2024-01-10 RX ORDER — PREGABALIN 150 MG/1
150 CAPSULE ORAL 2 TIMES DAILY
Qty: 180 CAPSULE | Refills: 3 | Status: SHIPPED | OUTPATIENT
Start: 2024-01-10 | End: 2024-07-11

## 2024-01-10 ASSESSMENT — PAIN SCALES - PAIN ENJOYMENT GENERAL ACTIVITY SCALE (PEG)
PEG_TOTALSCORE: 6.33
INTERFERED_GENERAL_ACTIVITY: 10 - COMPLETELY INTERFERES
INTERFERED_ENJOYMENT_LIFE: 3
INTERFERED_GENERAL_ACTIVITY: 10
AVG_PAIN_PASTWEEK: 6
AVG_PAIN_PASTWEEK: 6
PEG_TOTALSCORE: 6.33
INTERFERED_ENJOYMENT_LIFE: 3

## 2024-01-10 ASSESSMENT — PAIN SCALES - GENERAL: PAINLEVEL: MODERATE PAIN (5)

## 2024-01-10 NOTE — PATIENT INSTRUCTIONS
Please reach out if you need anything in the next few weeks.  Plan to follow-up about 4-6 weeks after the implant.   Refills sent to the pharmacy.  _____________________________________________________  Scheduling/Clinic telephone number for ALL locations:  [phone]    After Hours On-Call Service for Emergencies:  [phone]  Call with any questions about your care and for scheduling assistance.   Calls are returned Monday through Friday between 8 AM and 4:00 PM. We usually get back to you within 2-3 business days depending on the issue/request. I am not in the clinic on Fridays.   If we are prescribing your medications:  For medication refills, call the clinic or send a [employer] message 7 days in advance.  Please include the name of the requested medication and your preferred pharmacy. Please allow 3-4 days to be processed.   Per [place] State Law, all controlled substance prescriptions must be filled within 30 days of being written. For those controlled substances allowing refills, pickup must occur within 30 days of last fill.    We believe regular attendance is key to your success in our program!    If you are unable to keep your appointment we ask that you call us at least 24 hours in advance to cancel.This will allow us to offer the appointment time to another patient. Multiple missed appointments may lead to dismissal from the clinic.

## 2024-01-10 NOTE — TELEPHONE ENCOUNTER
Scheduled surgery with Dr. Alfonso on 1/30    Spoke with: Patient    Surgery is located at [facility]    H&P to be completed by Dr. Alfnoso AM of surgery per surgery order.    Anesthesia type: MAC + Local      Requested Imaging required for surgery: nothing listed on surgery order - will confirm    Patient is scheduled for their 2 week post op on 2/12 at 1015am    Patient is aware they need a  to & from surgery and that they will receive start time for surgery + pre op instructions 3-5 days before surgery    Additional comments: Patient will call back if they have any questions or concerns.    Falguni Mazariegos on 1/10/2024 at 10:01 AM

## 2024-01-17 NOTE — ED AVS SNAPSHOT
[facility] Emergency Department    [address] [place] [address]    Phone:  [phone]    Fax:  [phone]                                       Dipak Fuentes   MRN: [id number]    Department:  [facility] Emergency Department   Date of Visit:  11/5/2018           Patient Information     Date Of Birth          1995        Your diagnoses for this visit were:     Febrile illness     Viral illness        You were seen by Chandrakant Suazo MD.      Follow-up Information     Follow up with Edward Sibley MD. Go in 2 days.    Specialty:  Family Practice    Why:  As needed    Contact information:    [address] [place] [address]  [phone]          Discharge Instructions       Albuterol every 4 hours  Motrin 600mg every 6 hours  Tylenol 1000mg every 6 hours for fever  Push fluid and rest      Febrile Illness with Uncertain Cause (Adult)  You have a fever, but the cause is unknown. A fever is a natural reaction of the body to an illness such as infection due to a virus or bacteria. Sometimes other conditions such as cancer or immune diseases can cause fever, especially if the fever has lasted for more than a week or 2. In most cases, the temperature itself is not harmful. It actually helps the body fight infections. A fever does not need to be treated unless you feel very uncomfortable.  Sometimes a fever can be an early sign of a more serious infection, so make sure to follow up if your condition worsens.  Home care  Unless given other instructions by your healthcare provider, follow these guidelines when caring for yourself at home.  General care    If your symptoms are not severe, rest at home for the first 2 to 3 days. When you resume activity, don't let yourself get too tired.    For your overall health, don't smoke. Also avoid being exposed to secondhand smoke.    Your appetite may be poor, so a light diet is fine. Avoid dehydration by drinking 6 to 8  [No Pertinent Cardiac History] : no history of aortic stenosis, atrial fibrillation, coronary artery disease, recent myocardial infarction, or implantable device/pacemaker glasses of fluids per day (such as water, soft drinks, sports drinks, juices, tea, or soup). If you have congestion, extra fluids will help loosen secretions in the nose and lungs.  Medicines    You can take acetaminophen or ibuprofen for pain or to lower your temperature, unless you were given a different medicine to use. (Note: If you have chronic liver or kidney disease or have ever had a stomach ulcer or gastrointestinal bleeding, talk with your healthcare provider before using these medicines. Also talk to your provider if you are taking medicine to prevent blood clots.) Aspirin should never be given to anyone younger than 18 years of age who is ill with a viral infection or fever. It may cause severe liver or brain damage.    If you were given antibiotics for an infection, take them until they are used up, or your healthcare provider tells you to stop. It is important to finish the antibiotics even though you feel better. This is to make sure the infection has cleared. Be aware that antibiotics are not usually given for a viral infection or a fever with an unknown cause.    Over-the-counter medicines will not shorten the duration of the illness. However, they may be helpful for the following symptoms: cough, sore throat, or nasal and sinus congestion. Ask your pharmacist for product suggestions. (Note: Don't use decongestants if you have high blood pressure.)  Follow-up care  Follow up with your healthcare provider, or as advised.    If a culture or other lab tests were done, you will be notified if your treatment needs to be changed. You can call as directed for the results.    If X-rays, a CT, or an ultrasound were done, a specialist will review them. You will be notified of any findings that may affect your care.  Call 911  Call 911 if any of these occur:    Trouble breathing or swallowing, or wheezing    Chest pain    Confusion    Extreme drowsiness or trouble awakening    Fainting or loss of  [No Pertinent Pulmonary History] : no history of asthma, COPD, sleep apnea, or smoking consciousness    Rapid heart rate    Low blood pressure    Vomiting blood, or large amounts of blood in stool    Seizure  When to seek medical advice  Call your healthcare provider right away if any of these occur:    Cough with lots of colored sputum (mucus) or blood in your sputum    Severe headache    Face, neck, throat, or ear pain    Feeling drowsy    Abdominal pain    Repeated vomiting or diarrhea    Joint pain or a new rash    Burning when urinating    Fever of 100.4 F (38 C) or higher, or as directed by your healthcare provider    Feeling weak or dizzy  Date Last Reviewed: 11/1/2017 2000-2017 [employer]. [address] [place] [address]. All rights reserved. This information is not intended as a substitute for professional medical care. Always follow your healthcare professional's instructions.          24 Hour Appointment Hotline       To make an appointment at any [facility], call [phone] ([phone]). If you don't have a family doctor or clinic, we will help you find one. [facility] clinics are conveniently located to serve the needs of you and your family.             Review of your medicines      Our records show that you are taking the medicines listed below. If these are incorrect, please call your family doctor or clinic.        Dose / Directions Last dose taken    IBUPROFEN PO        Refills:  0        LAMICTAL PO   Dose:  150 mg        Take 150 mg by mouth   Refills:  0        methocarbamol 500 MG tablet   Commonly known as:  ROBAXIN   Dose:  1000 mg   Quantity:  20 tablet        Take 2 tablets (1,000 mg) by mouth 3 times daily as needed for muscle spasms   Refills:  1        MIRTAZAPINE PO   Dose:  15 mg        Take 15 mg by mouth At Bedtime   Refills:  0        PROAIR  (90 Base) MCG/ACT inhaler   Dose:  2 puff   Generic drug:  albuterol        Inhale 2 puffs into the lungs every 6 hours as needed.   Refills:  0        TRAZODONE HCL PO   Dose:  50 mg         Take 50 mg by mouth   Refills:  0                Procedures and tests performed during your visit     Beta strep group A culture    Chest XR,  PA & LAT    Rapid strep screen      Orders Needing Specimen Collection     None      Pending Results     Date and Time Order Name Status Description    11/5/2018 0507 Beta strep group A culture In process             Pending Culture Results     Date and Time Order Name Status Description    11/5/2018 0507 Beta strep group A culture In process             Pending Results Instructions     If you had any lab results that were not finalized at the time of your Discharge, you can call the ED Lab Result RN at [phone]. You will be contacted by this team for any positive Lab results or changes in treatment. The nurses are available 7 days a week from 10A to 6:30P.  You can leave a message 24 hours per day and they will return your call.        Test Results From Your Hospital Stay        11/5/2018  5:33 AM      Narrative     CHEST 2 VIEWS   11/5/2018 5:21 AM     HISTORY: Cough.    COMPARISON: 5/29/2007.    FINDINGS: The lungs are clear. Normal-sized cardiac silhouette.        Impression     IMPRESSION: No evidence of active cardiopulmonary disease.    EDMOND PICKARD MD         11/5/2018  5:41 AM      Component Results     Component    Specimen Description    Throat    Rapid Strep A Screen    NEGATIVE: No Group A streptococcal antigen detected by immunoassay, await culture report.         11/5/2018  5:42 AM                Clinical Quality Measure: Blood Pressure Screening     Your blood pressure was checked while you were in the emergency department today. The last reading we obtained was  BP: (!) 145/95 . Please read the guidelines below about what these numbers mean and what you should do about them.  If your systolic blood pressure (the top number) is less than 120 and your diastolic blood pressure (the bottom number) is less than 80, then your blood pressure is normal.  [No Adverse Anesthesia Reaction] : no adverse anesthesia reaction in self or family member "There is nothing more that you need to do about it.  If your systolic blood pressure (the top number) is 120-139 or your diastolic blood pressure (the bottom number) is 80-89, your blood pressure may be higher than it should be. You should have your blood pressure rechecked within a year by a primary care provider.  If your systolic blood pressure (the top number) is 140 or greater or your diastolic blood pressure (the bottom number) is 90 or greater, you may have high blood pressure. High blood pressure is treatable, but if left untreated over time it can put you at risk for heart attack, stroke, or kidney failure. You should have your blood pressure rechecked by a primary care provider within the next 4 weeks.  If your provider in the emergency department today gave you specific instructions to follow-up with your doctor or provider even sooner than that, you should follow that instruction and not wait for up to 4 weeks for your follow-up visit.        Thank you for choosing [facility]       Thank you for choosing [facility] for your care. Our goal is always to provide you with excellent care. Hearing back from our patients is one way we can continue to improve our services. Please take a few minutes to complete the written survey that you may receive in the mail after you visit with us. Thank you!        [employer] Information     [employer] lets you send messages to your doctor, view your test results, renew your prescriptions, schedule appointments and more. To sign up, go to [email or web address] . Click on \"Log in\" on the left side of the screen, which will take you to the Welcome page. Then click on \"Sign up Now\" on the right side of the page.     You will be asked to enter the access code listed below, as well as some personal information. Please follow the directions to create your username and password.     Your access code is: [email or web address]  Expires: 2/3/2019  6:03 AM     Your access code will  in 90 days. " [(Patient denies any chest pain, claudication, dyspnea on exertion, orthopnea, palpitations or syncope)] : Patient denies any chest pain, claudication, dyspnea on exertion, orthopnea, palpitations or syncope If you need help or a new code, please call your [facility] clinic or [phone].        Care EveryWhere ID     This is your Care EveryWhere ID. This could be used by other organizations to access your [facility] medical records  [id number]        Equal Access to Services     LISANDRA QUESADA : Naveen Sheriff, bruce rivas, qatutu kaalmajulienne foley, bj huertas. So [facility] [phone].    ATENCIÓN: Si habla español, tiene a salguero disposición servicios gratuitos de asistencia lingüística. Llame al [phone].    We comply with applicable federal civil rights laws and [place] laws. We do not discriminate on the basis of race, color, national origin, age, disability, sex, sexual orientation, or gender identity.            After Visit Summary       This is your record. Keep this with you and show to your community pharmacist(s) and doctor(s) at your next visit.                   [Excellent (>10 METs)] : Excellent (>10 METs) [Chronic Anticoagulation] : no chronic anticoagulation [Chronic Kidney Disease] : no chronic kidney disease [Diabetes] : no diabetes [FreeTextEntry1] : left sided inguinal hernia  [FreeTextEntry2] : 2/9/24 [FreeTextEntry3] : Ena  [FreeTextEntry4] : 34 yo m presents for preop- left sided inguinal hernia  denies fevers, chills, cp, sob  active lifestyle-- no cp or sob with activity  has had surgery before-- no trouble with anesthesia

## 2024-01-23 ENCOUNTER — TELEPHONE (OUTPATIENT)
Dept: NEUROSURGERY | Facility: CLINIC | Age: 29
End: 2024-01-23
Payer: COMMERCIAL

## 2024-01-23 NOTE — TELEPHONE ENCOUNTER
Pt attempted to attach an FMLA form for his spouse to a [employer] message but was unable to. He doesn't have fax access so pt requested to send form via email. FMLA form received and forwarded to Melany Larsen LPN for completion.

## 2024-01-29 ENCOUNTER — ANESTHESIA EVENT (OUTPATIENT)
Dept: SURGERY | Facility: CLINIC | Age: 29
End: 2024-01-29
Payer: COMMERCIAL

## 2024-01-29 ENCOUNTER — DOCUMENTATION ONLY (OUTPATIENT)
Dept: NEUROSURGERY | Facility: CLINIC | Age: 29
End: 2024-01-29
Payer: COMMERCIAL

## 2024-01-29 NOTE — ANESTHESIA PREPROCEDURE EVALUATION
"Anesthesia Pre-Procedure Evaluation    Patient: Dipak Fuentes   MRN: [id number] : 1995        Procedure : Procedure(s):  Spinal cord stimulator placement, phase I and II combined, placement of percutaneous type electrodes in the thoracolumbar spine and placement of generator/battery over the right buttock  **Latex Allergy**          Past Medical History:   Diagnosis Date    Bipolar I disorder, single manic episode (H) 2013    Problem list name updated by automated process. Provider to review    Complication of anesthesia     panic after having anesthesia    History of substance use disorder     as a teenager, \"took extra adderall\"    Lumbar disc herniation with radiculopathy     Mild intermittent asthma without complication     Other chronic pain     low back and buttocks leg pain      Past Surgical History:   Procedure Laterality Date    DISCECTOMY LUMBAR MINIMALLY INVASIVE ONE LEVEL Right 10/7/2020    Procedure: Right L5-S1 minimally invasive microdiskectomy;  Surgeon: Juancho Oquendo MD;  Location: [facility] OR    DISCECTOMY LUMBAR MINIMALLY INVASIVE ONE LEVEL Right 2022    Procedure: Right L5-S1 minimally invasive redo microdiskectomy;  Surgeon: Juancho Oquendo MD;  Location: [facility] OR    WISDOM ST GUIDEWIRE        Allergies   Allergen Reactions    Ceclor [Cefaclor Monohydrate]     Lamictal [Lamotrigine]      Had known side effect of\"skin eating rash\"  Jaycob lauren syndrome    Latex      PN: Converted from LW Latex Sensitivity Flag    Cefaclor Rash      Social History     Tobacco Use    Smoking status: Former     Packs/day: 0.50     Years: 5.00     Additional pack years: 0.00     Total pack years: 2.50     Types: Cigarettes, Other    Smokeless tobacco: Never   Substance Use Topics    Alcohol use: Not Currently     Comment: over 6 months      Wt Readings from Last 1 Encounters:   23 113.4 kg (250 lb)        Anesthesia Evaluation   Pt has had prior anesthetic. Type: General.    No history " "of anesthetic complications       ROS/MED HX  ENT/Pulmonary:     (+)                     Intermittent, asthma  Treatment: Inhaler prn,                 Neurologic:     (+)    peripheral neuropathy, - lumbar radiculopthy.                           Cardiovascular:  - neg cardiovascular ROS     METS/Exercise Tolerance:     Hematologic:  - neg hematologic  ROS     Musculoskeletal:       GI/Hepatic:  - neg GI/hepatic ROS     Renal/Genitourinary:  - neg Renal ROS     Endo:  - neg endo ROS   (+)               Obesity,    (-) Type II DM   Psychiatric/Substance Use:     (+) psychiatric history bipolar and other (comment) (schizoaffective disorder)   Recreational drug usage: Other (Comment) (amphetamine ).    Infectious Disease:  - neg infectious disease ROS     Malignancy:  - neg malignancy ROS     Other:      (+)  , H/O Chronic Pain,         Physical Exam    Airway        Mallampati: II   TM distance: > 3 FB   Neck ROM: full   Mouth opening: > 3 cm    Respiratory Devices and Support         Dental       (+) Modest Abnormalities - crowns, retainers, 1 or 2 missing teeth      Cardiovascular   cardiovascular exam normal          Pulmonary   pulmonary exam normal                OUTSIDE LABS:  CBC:   Lab Results   Component Value Date    WBC 8.2 01/08/2024    WBC 7.0 09/13/2022    HGB 15.6 01/08/2024    HGB 15.7 09/13/2022    HCT 44.9 01/08/2024    HCT 46.5 09/13/2022     01/08/2024     09/13/2022     BMP:   Lab Results   Component Value Date     01/08/2024     07/20/2023    POTASSIUM 4.1 01/08/2024    POTASSIUM 4.0 07/20/2023    CHLORIDE 103 01/08/2024    CHLORIDE 104 07/20/2023    CO2 27 01/08/2024    CO2 27 07/20/2023    BUN 13.0 01/08/2024    BUN 12.3 07/20/2023    CR 0.94 01/08/2024    CR 1.05 07/20/2023     (H) 01/08/2024    GLC 94 07/20/2023     COAGS:   Lab Results   Component Value Date    PTT 33 01/08/2024    INR 1.09 01/08/2024     POC: No results found for: \"BGM\", \"HCG\", " "\"HCGS\"  HEPATIC:   Lab Results   Component Value Date    ALBUMIN 4.0 02/24/2020    PROTTOTAL 7.9 02/24/2020    ALT 89 (H) 02/24/2020    AST 22 02/24/2020    ALKPHOS 97 02/24/2020    BILITOTAL 0.5 02/24/2020     OTHER:   Lab Results   Component Value Date    MARIA ESTHER 9.5 01/08/2024    LIPASE 76 02/24/2020    TSH 3.36 07/20/2023       Anesthesia Plan    ASA Status:  3    NPO Status:  NPO Appropriate    Anesthesia Type: MAC.     - Reason for MAC: straight local not clinically adequate   Induction: Intravenous.   Maintenance: TIVA.        Consents    Anesthesia Plan(s) and associated risks, benefits, and realistic alternatives discussed. Questions answered and patient/representative(s) expressed understanding.     - Discussed: Risks, Benefits and Alternatives for BOTH SEDATION and the PROCEDURE were discussed     - Discussed with:  Patient      - Extended Intubation/Ventilatory Support Discussed: No.      - Patient is DNR/DNI Status: No     Use of blood products discussed: No .     Postoperative Care    Pain management: Multi-modal analgesia.   PONV prophylaxis: Dexamethasone or Solumedrol     Comments:               Sabina Patino MD    I have reviewed the pertinent notes and labs in the chart from the past 30 days and (re)examined the patient.  Any updates or changes from those notes are reflected in this note.                  "

## 2024-01-29 NOTE — PROGRESS NOTES
PAPERWORK DOCUMENTATION    How Paperwork is received:  Other    Type of Paperwork: FMLA    Who is the paperwork for:  Caregiver     Received Date January 23rd , 2024    Who Received the paperwork:  Cynthia    Form to be Completed by:  Melany    Anticipated Completion Date: January 30th-February 1st, 2024.     Date Completed Form Faxed to Requested Entity

## 2024-01-30 ENCOUNTER — ANESTHESIA (OUTPATIENT)
Dept: SURGERY | Facility: CLINIC | Age: 29
End: 2024-01-30
Payer: COMMERCIAL

## 2024-01-30 ENCOUNTER — HOSPITAL ENCOUNTER (OUTPATIENT)
Facility: CLINIC | Age: 29
Discharge: HOME | End: 2024-01-30
Attending: NEUROLOGICAL SURGERY | Admitting: NEUROLOGICAL SURGERY
Payer: COMMERCIAL

## 2024-01-30 ENCOUNTER — APPOINTMENT (OUTPATIENT)
Dept: GENERAL RADIOLOGY | Facility: CLINIC | Age: 29
End: 2024-01-30
Attending: NEUROLOGICAL SURGERY
Payer: COMMERCIAL

## 2024-01-30 VITALS
HEIGHT: 74 IN | OXYGEN SATURATION: 99 % | WEIGHT: 264.55 LBS | HEART RATE: 99 BPM | TEMPERATURE: 98 F | SYSTOLIC BLOOD PRESSURE: 123 MMHG | DIASTOLIC BLOOD PRESSURE: 62 MMHG | RESPIRATION RATE: 12 BRPM | BODY MASS INDEX: 33.95 KG/M2

## 2024-01-30 DIAGNOSIS — Z96.89 S/P INSERTION OF SPINAL CORD STIMULATOR: Primary | ICD-10-CM

## 2024-01-30 PROCEDURE — 272N000002 HC OR SUPPLY OTHER OPNP: Performed by: NEUROLOGICAL SURGERY

## 2024-01-30 PROCEDURE — 250N000013 HC RX MED GY IP 250 OP 250 PS 637: Performed by: STUDENT IN AN ORGANIZED HEALTH CARE EDUCATION/TRAINING PROGRAM

## 2024-01-30 PROCEDURE — C1820 GENERATOR NEURO RECHG BAT SY: HCPCS | Performed by: NEUROLOGICAL SURGERY

## 2024-01-30 PROCEDURE — 272N000001 HC OR GENERAL SUPPLY STERILE: Performed by: NEUROLOGICAL SURGERY

## 2024-01-30 PROCEDURE — 710N000012 HC RECOVERY PHASE 2, PER MINUTE: Performed by: NEUROLOGICAL SURGERY

## 2024-01-30 PROCEDURE — 250N000009 HC RX 250: Performed by: NEUROLOGICAL SURGERY

## 2024-01-30 PROCEDURE — 370N000017 HC ANESTHESIA TECHNICAL FEE, PER MIN: Performed by: NEUROLOGICAL SURGERY

## 2024-01-30 PROCEDURE — 999N000179 XR SURGERY CARM FLUORO LESS THAN 5 MIN W STILLS: Mod: TC

## 2024-01-30 PROCEDURE — 250N000009 HC RX 250: Performed by: STUDENT IN AN ORGANIZED HEALTH CARE EDUCATION/TRAINING PROGRAM

## 2024-01-30 PROCEDURE — 250N000011 HC RX IP 250 OP 636: Performed by: NEUROLOGICAL SURGERY

## 2024-01-30 PROCEDURE — 63685 INS/RPLC SPI NPG/RCVR POCKET: CPT | Mod: 51 | Performed by: NEUROLOGICAL SURGERY

## 2024-01-30 PROCEDURE — 999N000141 HC STATISTIC PRE-PROCEDURE NURSING ASSESSMENT: Performed by: NEUROLOGICAL SURGERY

## 2024-01-30 PROCEDURE — 258N000003 HC RX IP 258 OP 636: Performed by: STUDENT IN AN ORGANIZED HEALTH CARE EDUCATION/TRAINING PROGRAM

## 2024-01-30 PROCEDURE — 250N000011 HC RX IP 250 OP 636: Performed by: STUDENT IN AN ORGANIZED HEALTH CARE EDUCATION/TRAINING PROGRAM

## 2024-01-30 PROCEDURE — 63650 IMPLANT NEUROELECTRODES: CPT | Mod: GC | Performed by: NEUROLOGICAL SURGERY

## 2024-01-30 PROCEDURE — 250N000011 HC RX IP 250 OP 636: Performed by: ANESTHESIOLOGY

## 2024-01-30 PROCEDURE — 250N000011 HC RX IP 250 OP 636: Performed by: NURSE ANESTHETIST, CERTIFIED REGISTERED

## 2024-01-30 PROCEDURE — C1778 LEAD, NEUROSTIMULATOR: HCPCS | Performed by: NEUROLOGICAL SURGERY

## 2024-01-30 PROCEDURE — 360N000084 HC SURGERY LEVEL 4 W/ FLUORO, PER MIN: Performed by: NEUROLOGICAL SURGERY

## 2024-01-30 DEVICE — GENERATOR NEUROSTIM INTELLIS MRI SURESCAN 97715: Type: IMPLANTABLE DEVICE | Site: BUTTOCKS | Status: FUNCTIONAL

## 2024-01-30 DEVICE — IMP LEAD KIT VECTRIS SCS COMPACT SURESCAN 1X8MMX60CM 977A260: Type: IMPLANTABLE DEVICE | Site: SPINE LUMBAR | Status: FUNCTIONAL

## 2024-01-30 RX ORDER — CEFAZOLIN SODIUM/WATER 2 G/20 ML
2 SYRINGE (ML) INTRAVENOUS
Status: DISCONTINUED | OUTPATIENT
Start: 2024-01-30 | End: 2024-01-30 | Stop reason: HOSPADM

## 2024-01-30 RX ORDER — SODIUM CHLORIDE, SODIUM LACTATE, POTASSIUM CHLORIDE, CALCIUM CHLORIDE 600; 310; 30; 20 MG/100ML; MG/100ML; MG/100ML; MG/100ML
INJECTION, SOLUTION INTRAVENOUS CONTINUOUS
Status: DISCONTINUED | OUTPATIENT
Start: 2024-01-30 | End: 2024-01-30 | Stop reason: HOSPADM

## 2024-01-30 RX ORDER — ONDANSETRON 2 MG/ML
INJECTION INTRAMUSCULAR; INTRAVENOUS PRN
Status: DISCONTINUED | OUTPATIENT
Start: 2024-01-30 | End: 2024-01-30

## 2024-01-30 RX ORDER — LIDOCAINE HYDROCHLORIDE 20 MG/ML
INJECTION, SOLUTION INFILTRATION; PERINEURAL PRN
Status: DISCONTINUED | OUTPATIENT
Start: 2024-01-30 | End: 2024-01-30

## 2024-01-30 RX ORDER — PROPOFOL 10 MG/ML
INJECTION, EMULSION INTRAVENOUS PRN
Status: DISCONTINUED | OUTPATIENT
Start: 2024-01-30 | End: 2024-01-30

## 2024-01-30 RX ORDER — CLINDAMYCIN HCL 300 MG
300 CAPSULE ORAL 4 TIMES DAILY
Qty: 28 CAPSULE | Refills: 0 | Status: SHIPPED | OUTPATIENT
Start: 2024-01-30 | End: 2024-02-06

## 2024-01-30 RX ORDER — DEXMEDETOMIDINE HYDROCHLORIDE 4 UG/ML
INJECTION, SOLUTION INTRAVENOUS PRN
Status: DISCONTINUED | OUTPATIENT
Start: 2024-01-30 | End: 2024-01-30

## 2024-01-30 RX ORDER — PROPOFOL 10 MG/ML
INJECTION, EMULSION INTRAVENOUS CONTINUOUS PRN
Status: DISCONTINUED | OUTPATIENT
Start: 2024-01-30 | End: 2024-01-30

## 2024-01-30 RX ORDER — CEFAZOLIN SODIUM/WATER 2 G/20 ML
2 SYRINGE (ML) INTRAVENOUS SEE ADMIN INSTRUCTIONS
Status: DISCONTINUED | OUTPATIENT
Start: 2024-01-30 | End: 2024-01-30 | Stop reason: HOSPADM

## 2024-01-30 RX ORDER — OXYCODONE HYDROCHLORIDE 5 MG/1
5 TABLET ORAL EVERY 6 HOURS PRN
Qty: 12 TABLET | Refills: 0 | Status: SHIPPED | OUTPATIENT
Start: 2024-01-30 | End: 2024-02-02

## 2024-01-30 RX ORDER — ONDANSETRON 4 MG/1
4 TABLET, ORALLY DISINTEGRATING ORAL EVERY 30 MIN PRN
Status: DISCONTINUED | OUTPATIENT
Start: 2024-01-30 | End: 2024-02-05 | Stop reason: HOSPADM

## 2024-01-30 RX ORDER — LIDOCAINE 40 MG/G
CREAM TOPICAL
Status: DISCONTINUED | OUTPATIENT
Start: 2024-01-30 | End: 2024-01-30 | Stop reason: HOSPADM

## 2024-01-30 RX ORDER — ONDANSETRON 2 MG/ML
4 INJECTION INTRAMUSCULAR; INTRAVENOUS EVERY 30 MIN PRN
Status: DISCONTINUED | OUTPATIENT
Start: 2024-01-30 | End: 2024-02-05 | Stop reason: HOSPADM

## 2024-01-30 RX ORDER — OXYCODONE HYDROCHLORIDE 10 MG/1
10 TABLET ORAL
Status: DISCONTINUED | OUTPATIENT
Start: 2024-01-30 | End: 2024-02-05 | Stop reason: HOSPADM

## 2024-01-30 RX ORDER — SODIUM CHLORIDE, SODIUM LACTATE, POTASSIUM CHLORIDE, CALCIUM CHLORIDE 600; 310; 30; 20 MG/100ML; MG/100ML; MG/100ML; MG/100ML
INJECTION, SOLUTION INTRAVENOUS CONTINUOUS PRN
Status: DISCONTINUED | OUTPATIENT
Start: 2024-01-30 | End: 2024-01-30

## 2024-01-30 RX ORDER — FENTANYL CITRATE 50 UG/ML
INJECTION, SOLUTION INTRAMUSCULAR; INTRAVENOUS PRN
Status: DISCONTINUED | OUTPATIENT
Start: 2024-01-30 | End: 2024-01-30

## 2024-01-30 RX ORDER — OXYCODONE HYDROCHLORIDE 5 MG/1
5 TABLET ORAL
Status: COMPLETED | OUTPATIENT
Start: 2024-01-30 | End: 2024-01-30

## 2024-01-30 RX ADMIN — FENTANYL CITRATE 50 MCG: 50 INJECTION INTRAMUSCULAR; INTRAVENOUS at 17:25

## 2024-01-30 RX ADMIN — FENTANYL CITRATE 50 MCG: 50 INJECTION INTRAMUSCULAR; INTRAVENOUS at 17:38

## 2024-01-30 RX ADMIN — ONDANSETRON 4 MG: 2 INJECTION INTRAMUSCULAR; INTRAVENOUS at 17:53

## 2024-01-30 RX ADMIN — OXYCODONE HYDROCHLORIDE 5 MG: 5 TABLET ORAL at 19:36

## 2024-01-30 RX ADMIN — DEXMEDETOMIDINE 8 MCG: 100 INJECTION, SOLUTION, CONCENTRATE INTRAVENOUS at 17:06

## 2024-01-30 RX ADMIN — PROPOFOL 20 MG: 10 INJECTION, EMULSION INTRAVENOUS at 15:51

## 2024-01-30 RX ADMIN — LIDOCAINE HYDROCHLORIDE 40 MG: 20 INJECTION, SOLUTION INFILTRATION; PERINEURAL at 15:44

## 2024-01-30 RX ADMIN — Medication 3 G: at 15:41

## 2024-01-30 RX ADMIN — DEXMEDETOMIDINE 16 MCG: 100 INJECTION, SOLUTION, CONCENTRATE INTRAVENOUS at 18:16

## 2024-01-30 RX ADMIN — PROPOFOL 30 MG: 10 INJECTION, EMULSION INTRAVENOUS at 15:47

## 2024-01-30 RX ADMIN — DEXMEDETOMIDINE 16 MCG: 100 INJECTION, SOLUTION, CONCENTRATE INTRAVENOUS at 15:50

## 2024-01-30 RX ADMIN — PROPOFOL 150 MCG/KG/MIN: 10 INJECTION, EMULSION INTRAVENOUS at 15:44

## 2024-01-30 RX ADMIN — PROPOFOL 50 MG: 10 INJECTION, EMULSION INTRAVENOUS at 17:05

## 2024-01-30 RX ADMIN — SODIUM CHLORIDE, POTASSIUM CHLORIDE, SODIUM LACTATE AND CALCIUM CHLORIDE: 600; 310; 30; 20 INJECTION, SOLUTION INTRAVENOUS at 15:22

## 2024-01-30 RX ADMIN — PROPOFOL 30 MG: 10 INJECTION, EMULSION INTRAVENOUS at 16:06

## 2024-01-30 RX ADMIN — MIDAZOLAM 2 MG: 1 INJECTION INTRAMUSCULAR; INTRAVENOUS at 15:22

## 2024-01-30 ASSESSMENT — ACTIVITIES OF DAILY LIVING (ADL)
ADLS_ACUITY_SCORE: 20
ADLS_ACUITY_SCORE: 18
ADLS_ACUITY_SCORE: 20

## 2024-01-31 ENCOUNTER — PATIENT OUTREACH (OUTPATIENT)
Dept: NEUROSURGERY | Facility: CLINIC | Age: 29
End: 2024-01-31
Payer: COMMERCIAL

## 2024-01-31 ASSESSMENT — ACTIVITIES OF DAILY LIVING (ADL)
ADLS_ACUITY_SCORE: 20

## 2024-01-31 NOTE — BRIEF OP NOTE
[facility]    Brief Operative Note    Pre-operative diagnosis: Failed back syndrome [M96.1]  S/P lumbar microdiscectomy [Z98.890]  Lumbar disc herniation with radiculopathy [M51.16]  Post-operative diagnosis Same as pre-operative diagnosis    Procedure: Spinal cord stimulator placement, phase I and II combined, placement of percutaneous type electrodes in the thoracolumbar spine and placement of generator/battery over the right buttock  **Latex Allergy**, N/A - Spine    Surgeon: Surgeon(s) and Role:     * Giovanni Alfonso MD - Primary     * Allan Kemp MD - Resident - Assisting  Anesthesia: MAC with Local   Estimated Blood Loss: 5 mL from 1/30/2024  3:22 PM to 1/30/2024  6:38 PM      Drains: None  Specimens: * No specimens in log *  Findings:   Placement of  spinal cord stimulator. Skin closure wih running subQ Monocryl ad Dermabond. .  Complications: None.  Implants:   Implant Name Type Inv. Item Serial No.  Lot No. LRB No. Used Action   IMP LEAD KIT VECTRIS SCS COMPACT SURESCAN [id number] - [id number] Leads IMP LEAD KIT VECTRIS SCS COMPACT SURESCAN [id number]  [employer] [id number] Left 1 Implanted   IMP LEAD KIT VECTRIS SCS COMPACT SURESCAN [id number] - [id number] Leads IMP LEAD KIT VECTRIS SCS COMPACT SURESCAN [id number]  [employer] [id number] Right 1 Implanted   GENERATOR NEUROSTIM INTELLIS MRI SURESCAN [id number] - [id number] Neurology device GENERATOR NEUROSTIM INTELLIS MRI SURESCAN [id number] [employer]  Right 1 Implanted           Allan Kemp M.D.  Neurosurgery Resident, PGY-5    Please contact neurosurgery resident on call with questions.    Dial * * *[phone], enter [phone] when prompted.

## 2024-01-31 NOTE — ANESTHESIA CARE TRANSFER NOTE
Patient: Dipak Fuentes    Procedure: Procedure(s):  Spinal cord stimulator placement, phase I and II combined, placement of percutaneous type electrodes in the thoracolumbar spine and placement of generator/battery over the right buttock  **Latex Allergy**       Diagnosis: Failed back syndrome [M96.1]  S/P lumbar microdiscectomy [Z98.890]  Lumbar disc herniation with radiculopathy [M51.16]  Diagnosis Additional Information: No value filed.    Anesthesia Type:   MAC     Note:    Oropharynx: oropharynx clear of all foreign objects and spontaneously breathing  Level of Consciousness: drowsy  Oxygen Supplementation: room air    Independent Airway: airway patency satisfactory and stable  Dentition: dentition unchanged  Vital Signs Stable: post-procedure vital signs reviewed and stable  Report to RN Given: handoff report given  Patient transferred to: PACU    Handoff Report: Identifed the Patient, Identified the Reponsible Provider, Reviewed the pertinent medical history, Discussed the surgical course, Reviewed Intra-OP anesthesia mangement and issues during anesthesia, Set expectations for post-procedure period and Allowed opportunity for questions and acknowledgement of understanding      Vitals:  Vitals Value Taken Time   /73 01/30/24 1845   Temp     Pulse 76 01/30/24 1845   Resp     SpO2 95 % 01/30/24 1847   Vitals shown include unfiled device data.    Electronically Signed By: ZENA Phelps CRNA  January 30, 2024  6:48 PM

## 2024-01-31 NOTE — DISCHARGE INSTRUCTIONS
Contacting your Doctor -   To contact a doctor, call Dr Alfonso at [phone] at the [facility]surgery [facility] from 8 am till 5 pm --   or:  [phone] and ask for the resident on call for Neurosurgery (answered 24 hours a day)   Emergency Department:  [facility]: [phone]  [facility]: [phone] 911 if you are in need of immediate or emergent help     After Anesthesia (Sleep Medicine)  What should I do after anesthesia?  You should rest and relax for the next 24 hours. Avoid risky or difficult (strenuous) activity. A responsible adult should stay with you overnight.  Don't drive or use any heavy equipment for 24 hours. Even if you feel normal, your reactions may be affected by the sleep medicine given to you.  Don't drink alcohol or make any important decisions for 24 hours.  Slowly get back to your regular diet, as you feel able.  How should I expect to feel?  It's normal to feel dizzy, light-headed, or faint for up to a full day after anesthesia or while taking pain medicine. If this happens:   Sit down for a few minutes before standing.  Have someone help you when you get up to walk or use the bathroom.  If you have nausea (feel sick to your stomach) or vomit (throw up):   Drink clear liquids (such as apple juice, ginger ale, broth, or 7UP) until you feel better.  If you feel sick to your stomach, or you keep vomiting for 24 hours, please call the doctor.  What else should I know?  You might have a dry mouth, sore throat, muscle aches, or trouble sleeping. These should go away after 24 hours.  Please contact your doctor if you have any other symptoms that concern you, such as fever, pain, bleeding, fluid drainage, swelling, or headache, or if it's been over 8 to 10 hours and you still aren't able to pee (urinate).  If you have a history of sleep apnea, it's very important to use your CPAP machine for the next 24 hours when you nap or sleep.   For informational purposes only. Not to replace the  advice of your health care provider. Copyright   2023 [facility]. All rights reserved. Clinically reviewed by Adam Chand MD. [email or web address] - REV 09/23.

## 2024-01-31 NOTE — ANESTHESIA POSTPROCEDURE EVALUATION
Patient: Dipak Fuentes    Procedure: Procedure(s):  Spinal cord stimulator placement, phase I and II combined, placement of percutaneous type electrodes in the thoracolumbar spine and placement of generator/battery over the right buttock  **Latex Allergy**       Anesthesia Type:  MAC    Note:  Disposition: Outpatient   Postop Pain Control: Uneventful            Sign Out: Well controlled pain   PONV: No   Neuro/Psych:    Airway/Respiratory: Uneventful            Sign Out: Acceptable/Baseline resp. status   CV/Hemodynamics: Uneventful            Sign Out: Acceptable CV status; No obvious hypovolemia; No obvious fluid overload   Other NRE: NONE   DID A NON-ROUTINE EVENT OCCUR? No           Last vitals:  Vitals Value Taken Time   /69 01/30/24 1900   Temp     Pulse 74 01/30/24 1900   Resp     SpO2 95 % 01/30/24 1907   Vitals shown include unfiled device data.    Electronically Signed By: Kwan Ruiz MD  January 30, 2024  7:08 PM

## 2024-01-31 NOTE — PROGRESS NOTES
[facility]: Post-Discharge Note  SITUATION                                                      Admission:    Admission Date: 01/30/24   Reason for Admission: Spinal cord stimulator placement, phase I and II combined, placement of percutaneous type electrodes in the thoracolumbar spine and placement of generator/battery over the right buttock  Discharge:   Discharge Date: 01/30/24  Discharge Diagnosis: Failed back syndrome    BACKGROUND                                                      Per hospital discharge summary and inpatient provider notes:  Neurosurgery Discharge Coordination Note     Attending physician: Dr. Alfonso  Discharge to: Home     Current status: Patient states he is doing well and says his pain has not really increased from preop pain level. Denies redness, swelling, increased tenderness, drainage, incision opening or elevated temp. Denies new bowel issues (baseline constipation) or bladder issues.    Discharge instructions and medications reviewed with patient.  Follow up appointments/imaging/tests needed: 2 week post op with Dr. Alfonso on 2/12/24.     RN triage/on call number given: [phone]/ [phone]      ASSESSMENT           Discharge Assessment  How are your symptoms? (Red Flag symptoms escalate to triage hotline per guidelines): Improved  Do you feel your condition is stable enough to be safe at home until your provider visit?: Yes  Does the patient have their discharge instructions? : Yes  Does the patient have questions regarding their discharge instructions? : No  Were you started on any new medications or were there changes to any of your previous medications? : Yes  Does the patient have all of their medications?: Yes  Do you have questions regarding any of your medications? : No  Discharge follow-up appointment scheduled within 14 calendar days? : Yes  Discharge Follow Up Appointment Date: 02/12/24  Discharge Follow Up Appointment Scheduled with?: Specialty Care Provider          Post-op (Clinicians Only)  Did the patient have surgery or a procedure: Yes  Incision: closed;healing  Drainage: No  Bleeding: none  Fever: No  Chills: No  Redness: No  Warmth: No  Swelling: No  Incision site pain: Yes  Closure: suture;dissolving  Eating & Drinking: eating and drinking without complaints/concerns  PO Intake: regular diet  Bowel Function: constipation (baseline)  Urinary Status: voiding without complaint/concerns        PLAN                                                      Outpatient Plan:  Routine postop follow-up      Future Appointments   Date Time Provider Department Center   2/12/2024 10:15 AM Giovanni Alfonso MD [facility]   2/14/2024 10:00 AM Marni Gray NP [facility]         For any urgent concerns, please contact our 24 hour nurse triage line: [phone] ([phone])         EMMA ARTIS RN

## 2024-02-01 ASSESSMENT — ACTIVITIES OF DAILY LIVING (ADL)
ADLS_ACUITY_SCORE: 20

## 2024-02-02 ASSESSMENT — ACTIVITIES OF DAILY LIVING (ADL)
ADLS_ACUITY_SCORE: 20

## 2024-02-03 ASSESSMENT — ACTIVITIES OF DAILY LIVING (ADL)
ADLS_ACUITY_SCORE: 20

## 2024-02-04 ASSESSMENT — ACTIVITIES OF DAILY LIVING (ADL)
ADLS_ACUITY_SCORE: 20

## 2024-02-12 ENCOUNTER — OFFICE VISIT (OUTPATIENT)
Dept: NEUROSURGERY | Facility: CLINIC | Age: 29
End: 2024-02-12
Payer: COMMERCIAL

## 2024-02-12 VITALS
WEIGHT: 270 LBS | HEIGHT: 74 IN | HEART RATE: 88 BPM | BODY MASS INDEX: 34.65 KG/M2 | RESPIRATION RATE: 16 BRPM | DIASTOLIC BLOOD PRESSURE: 93 MMHG | SYSTOLIC BLOOD PRESSURE: 150 MMHG | OXYGEN SATURATION: 97 %

## 2024-02-12 DIAGNOSIS — M54.16 LUMBAR RADICULOPATHY: ICD-10-CM

## 2024-02-12 DIAGNOSIS — Z96.89 S/P INSERTION OF SPINAL CORD STIMULATOR: Primary | ICD-10-CM

## 2024-02-12 DIAGNOSIS — Z98.890 H/O LUMBAR DISCECTOMY: ICD-10-CM

## 2024-02-12 DIAGNOSIS — M51.16 LUMBAR DISC HERNIATION WITH RADICULOPATHY: ICD-10-CM

## 2024-02-12 DIAGNOSIS — Z98.890 S/P LUMBAR MICRODISCECTOMY: ICD-10-CM

## 2024-02-12 DIAGNOSIS — M96.1 FAILED BACK SYNDROME: ICD-10-CM

## 2024-02-12 DIAGNOSIS — M54.41 ACUTE BILATERAL LOW BACK PAIN WITH RIGHT-SIDED SCIATICA: ICD-10-CM

## 2024-02-12 PROCEDURE — 99213 OFFICE O/P EST LOW 20 MIN: CPT | Performed by: NEUROLOGICAL SURGERY

## 2024-02-12 ASSESSMENT — PAIN SCALES - GENERAL: PAINLEVEL: SEVERE PAIN (7)

## 2024-02-12 NOTE — PROGRESS NOTES
"NEUROSURGERY    HISTORY AND PHYSICAL EXAM    Chief Complaint   Patient presents with    Follow Up     WOUND CHECK.  S/P SPINAL CORD STIMULATOR PLACEMENT, PHASE I AND II COMBINED, PLACEMENT OF TWO PERCUTANEOUS TYPE ELECTRODES IN THE THORACOLUMBAR SPINE AND PLACEMENT OF SPINAL CORD STIMULATOR GENERATOR/BATTERY OVER THE RIGHT BUTTOCK ON 1/30/2024.       HISTORY OF PRESENT ILLNESS  Mr. Dipak Fuentes returns today for his follow-up.  He is s/p spinal cord stimulator placement, phase I and II combined, placement of two percutaneous type electrodes in the thoracolumbar spine and placement of spinal cord stimulator generator/battery over the right buttock on 1/30/2024.  Patient reports that he is doing well and there are no complaints.  He reports pain at the surgical site.  He denies any fevers, chills, nausea, vomiting, dizziness, weakness, numbness or seizure like activity.  He reports that the incisions are looking good and there is no redness, no drainage and no fluid collection.  Overall, he is quite happy with the recent surgery.        In summary, Mr. Dipak Fuentes is a 28 year old right handed male.who is referred by Dr. Duarte for placement of a permanent SCS system.  He is s/p SCS trial from 12/12/2023 to 12/19/2023 with Dr. Duarte.  Per patient, the SCS trial period was the \"nicest week\" he had in \"a year\" and he was \"able to walk without the cane\".  Dr. Duarte felt that the trial was successful and he was thus referred for a permanent SCS system.  Per patient, his pain in his right lower extremity and lower back began back in 2020.  He initially slipped and fell on a boat.  That resulted in right L5/S1 radiculopathy secondary to a HNP at L5/S1 level.  On 10/7/2020, he underwent minimally invasive right L5/S1 microdiscectomy with Dr. Oquendo.  He tolerated the procedure well and there were no complications.  His postoperative course was uneventful and he returned to his baseline.  His radiculopathy resolved.  He did " "not have any shooting pain.  He did not have any neurological deficit.  He then fell again on the boat and he began having the same right side radiculopathy.  He then underwent redo minimally invasive right L5/S1 microdiscectomy with Dr. Oquendo on 9/21/2022.  After the second surgery, the pain was different compared to his preop pain and he was continuing to get shooting and burning pain in the right leg, especially when he bears weight on it.  He describes the pain as burning, tingling, stabbing, shooting on the right side, which starts in the right iliac crest area, goes down the right buttock, starts to wrap around to the side and then goes down to the calf and to the foot.  It involves the outside bottom of the foot. The pain typically does not involve the toes.      Past Medical History:   Diagnosis Date    Bipolar I disorder, single manic episode (H) 03/25/2013    Problem list name updated by automated process. Provider to review    Complication of anesthesia     panic after having anesthesia    History of substance use disorder     as a teenager, \"took extra adderall\"    Lumbar disc herniation with radiculopathy     Mild intermittent asthma without complication     Other chronic pain     low back and buttocks leg pain       Past Surgical History:   Procedure Laterality Date    DISCECTOMY LUMBAR MINIMALLY INVASIVE ONE LEVEL Right 10/7/2020    Procedure: Right L5-S1 minimally invasive microdiskectomy;  Surgeon: Juancho Oquendo MD;  Location:  OR    DISCECTOMY LUMBAR MINIMALLY INVASIVE ONE LEVEL Right 9/21/2022    Procedure: Right L5-S1 minimally invasive redo microdiskectomy;  Surgeon: Juancho Oquendo MD;  Location:  OR    INSERT STIMULATOR AND LEADS INTERNAL DORSAL COLUMN N/A 1/30/2024    Procedure: Spinal cord stimulator placement, phase I and II combined, placement of percutaneous type electrodes in the thoracolumbar spine and placement of generator/battery over the right buttock  **Latex " Allergy**;  Surgeon: Giovanni Alfonso MD;  Location: [facility]         Social History     Socioeconomic History    Marital status:      Spouse name: Not on file    Number of children: Not on file    Years of education: Not on file    Highest education level: Not on file   Occupational History    Not on file   Tobacco Use    Smoking status: Former     Packs/day: 0.50     Years: 5.00     Additional pack years: 0.00     Total pack years: 2.50     Types: Cigarettes, Other    Smokeless tobacco: Never   Vaping Use    Vaping Use: Every day    Substances: Nicotine, THC, CBD    Devices: Pre-filled or refillable cartridge, Refillable tank   Substance and Sexual Activity    Alcohol use: Not Currently     Comment: over 6 months    Drug use: Yes     Types: Marijuana     Comment: bipolar-concentrates 1-2 x day    Sexual activity: Yes     Partners: Female     Birth control/protection: I.U.D.   Other Topics Concern    Parent/sibling w/ CABG, MI or angioplasty before 65F 55M? Not Asked   Social History Narrative    .     Works in IT    Likes to bake for fun, mostly bread. Likes to play video games with his daughter.    8 y.o. daughter      Social Determinants of Health     Financial Resource Strain: Not on file   Food Insecurity: Not on file   Transportation Needs: Not on file   Physical Activity: Not on file   Stress: Not on file   Social Connections: Not on file   Interpersonal Safety: Not on file   Housing Stability: Not on file       Family History   Problem Relation Age of Onset    Depression Mother     Depression Father     Substance Abuse Father     Depression Maternal Grandmother     Heart Disease Maternal Grandmother     Depression Paternal Grandmother     Anxiety Disorder Paternal Grandmother     Parkinsonism Paternal Grandmother     Depression Brother     Unknown/Adopted No family hx of     Schizophrenia No family hx of     Bipolar Disorder No family hx of     Suicide No family hx  "of     Dementia No family hx of     Powell Disease No family hx of     Autism Spectrum Disorder No family hx of     Intellectual Disability (Mental Retardation) No family hx of     Mental Illness No family hx of        Current Outpatient Medications   Medication    acetaminophen (TYLENOL) 500 MG tablet    albuterol (PROAIR HFA/PROVENTIL HFA/VENTOLIN HFA) 108 (90 Base) MCG/ACT inhaler    pregabalin (LYRICA) 150 MG capsule    tiZANidine (ZANAFLEX) 4 MG tablet     No current facility-administered medications for this visit.       Allergies   Allergen Reactions    Ceclor [Cefaclor Monohydrate]     Lamictal [Lamotrigine]      Had known side effect of\"skin eating rash\"  Jaycob lauern syndrome    Latex      PN: Converted from LW Latex Sensitivity Flag    Cefaclor Rash       REVIEW OF SYSTEMS: Also per HPI.  General: POSITIVE for difficulty sleeping and weight gain (decreased activity secondary to pain).  Negative for chills/sweats/fever, headache, recent fatigue.  Eyes: Negative for blurred vision, crossed eyes, double vision, recent eye infections, vision flashes, or vision halos.  Ears/Nose/Mouth/Throat: Negative for bleeding gums, difficulty swallowing, earache, ear discharge, hearing loss, hoarseness, nosebleeds, tinnitus, or sinus problems.  Respiratory: Negative for chronic cough, coughing blood, night sweats, shortness of breath, Tuberculosis, or wheezing.  Cardiovascular: POSITIVE for swollen leg secondary to decreased activity.  Negative for chest pain, dyspnea at night, heart murmur, palpitations, pacemaker, poor circulation, or varicose veins.  Gastrointestinal: Negative for melena, hematochezia, chronic diarrhea, heartburn, Hepatitis A/B/C, increasing constipation, Liver Disease, nausea, or vomiting.   Genitourinary: POSITIVE for urinary incontinence when there is severe pain.  Also decreased sensation for urination. Decreased sensation or abnormal sensation around the saddle area.  Negative for urinary " "retention, genital discharge, prostate problems, urgency, or UTI.   Neurological: POSITIVE for numbness and tingling in the right leg.  Negative for syncope, headaches, memory problems, or seizures.  Psychological: POSITIVE for bipolar disorder.  Negative for anxiety, depression, panic attacks, or restlessness.  Skin: POSITIVE for scars being big.  Does also have a pimple in his back.  Negative for chronic skin itching, color changes in hand/feet when cold, poor scarring, non-healing ulcers, skin rashes/hives, unusual moles.   Musculoskeletal: Negative for arthritis, joint swelling in hands/wrists/hips/knees/joints, muscle tenderness in arms/legs, or osteoporosis.  Endocrine: Negative for excessive thirst/hunger, intolerance for warm rooms, loss of libido, multiple broken bones, rapid weight gain/loss, galactorrhea, or thyroid issues.  Hematologic/Lymphatic: Negative for easy skin bruising, significant fatigue, prolonged bleeding, tender glands/lymph nodes.  Allergies: Negative for asthma or hay fever.      PHYSICAL EXAM  BP (!) 150/93   Pulse 88   Resp 16   Ht 1.88 m (6' 2\")   Wt 122.5 kg (270 lb)   SpO2 97%   BMI 34.67 kg/m       General: Awake, alert, oriented. Well nourished, well developed, he/she is not in any acute distress.  HEENT: Head normocephalic, atraumatic. No carotid bruit. Neck supple. Good range of motion. No palpable thyroid mass.  Extremity: Warm with no clubbing or cyanosis. No lower extremity edema.    Incisions: Midline upper lumbar spine incision is healing well.  No redness.  No drainage.  No fluid collection.  Right buttock incision is healing well.  No redness.  No drainage.  No fluid collection.  Remaining skin glue dressing was removed without difficulties.    Neurological  Awake, alert and oriented to date, time, place and person.  Speech is fluent.   Face symmetric.    Motor: full strength throughout.  Sensation: intact to light touch and pinpoint.      ASSESSMENT   28 year old " right handed male  Right sided L5/S1 radiculopathy  Status post minimally invasive right L5/S1 discectomy on 10/7/2020  Status post redo minimally invasive right L5/S1 microdiscectomy on 9/21/2022  Failed back sydrome  Chronic low back pain with right radiculopathy  Lumbar degenerative disc disease  Status post successful spinal cord stimulator trial, 12/12/2023 to 12/19/2023  Status post spinal cord stimulator placement, phase I and II combined, placement of two percutaneous type electrodes in the thoracolumbar spine and placement of spinal cord stimulator generator/battery over the right buttock on 1/30/2024    Patient is recovering well from the recent SCS placement surgery.  His incisions are healing well with no signs of infection.  He is doing well overall.      We activated his SCS and we were able to cover his areas of pain, with the assistance of the [employer] rep.  We instructed him on how to use his SCS system.      He will need to have his AdaptiveStim feature activated in about 4 to 6 weeks.  He will follow up with us for the AdaptiveStim activation.      PLAN  1.  Follow up in 4 to 6 weeks for activation of AdaptiveStim      10 minutes were spent face to face with the patient of which more than 50% of the time was spent counseling and discussing the above issues regarding diagnosis, differential, treatment options, and steps for further evaluation, as well as wound care.  3 minutes were spent reviewing patient's chart.  10 minutes were spent on documentation for this encounter.  23 minutes total were spent on this encounter today.

## 2024-02-12 NOTE — LETTER
"2/12/2024       RE: Dipak Fuentes  [address] [facility] [id number]     Dear Colleague,    Thank you for referring your patient, Dipak Fuentes, to the [facility] NEUROSURGERY [facility]IN[facility] [place] at [facility]. Please see a copy of my visit note below.    NEUROSURGERY    HISTORY AND PHYSICAL EXAM    Chief Complaint   Patient presents with    Follow Up     WOUND CHECK.  S/P SPINAL CORD STIMULATOR PLACEMENT, PHASE I AND II COMBINED, PLACEMENT OF TWO PERCUTANEOUS TYPE ELECTRODES IN THE THORACOLUMBAR SPINE AND PLACEMENT OF SPINAL CORD STIMULATOR GENERATOR/BATTERY OVER THE RIGHT BUTTOCK ON 1/30/2024.       HISTORY OF PRESENT ILLNESS  Mr. Dipak Fuentes returns today for his follow-up.  He is s/p spinal cord stimulator placement, phase I and II combined, placement of two percutaneous type electrodes in the thoracolumbar spine and placement of spinal cord stimulator generator/battery over the right buttock on 1/30/2024.  Patient reports that he is doing well and there are no complaints.  He reports pain at the surgical site.  He denies any fevers, chills, nausea, vomiting, dizziness, weakness, numbness or seizure like activity.  He reports that the incisions are looking good and there is no redness, no drainage and no fluid collection.  Overall, he is quite happy with the recent surgery.        In summary, Mr. Dipak Fuentes is a 28 year old right handed male.who is referred by Dr. Duarte for placement of a permanent SCS system.  He is s/p SCS trial from 12/12/2023 to 12/19/2023 with Dr. Duarte.  Per patient, the SCS trial period was the \"nicest week\" he had in \"a year\" and he was \"able to walk without the cane\".  Dr. Duarte felt that the trial was successful and he was thus referred for a permanent SCS system.  Per patient, his pain in his right lower extremity and lower back began back in 2020.  He initially slipped and fell on a boat.  That resulted in right L5/S1 " "radiculopathy secondary to a HNP at L5/S1 level.  On 10/7/2020, he underwent minimally invasive right L5/S1 microdiscectomy with Dr. Oquendo.  He tolerated the procedure well and there were no complications.  His postoperative course was uneventful and he returned to his baseline.  His radiculopathy resolved.  He did not have any shooting pain.  He did not have any neurological deficit.  He then fell again on the boat and he began having the same right side radiculopathy.  He then underwent redo minimally invasive right L5/S1 microdiscectomy with Dr. Oquendo on 9/21/2022.  After the second surgery, the pain was different compared to his preop pain and he was continuing to get shooting and burning pain in the right leg, especially when he bears weight on it.  He describes the pain as burning, tingling, stabbing, shooting on the right side, which starts in the right iliac crest area, goes down the right buttock, starts to wrap around to the side and then goes down to the calf and to the foot.  It involves the outside bottom of the foot. The pain typically does not involve the toes.      Past Medical History:   Diagnosis Date    Bipolar I disorder, single manic episode (H) 03/25/2013    Problem list name updated by automated process. Provider to review    Complication of anesthesia     panic after having anesthesia    History of substance use disorder     as a teenager, \"took extra adderall\"    Lumbar disc herniation with radiculopathy     Mild intermittent asthma without complication     Other chronic pain     low back and buttocks leg pain       Past Surgical History:   Procedure Laterality Date    DISCECTOMY LUMBAR MINIMALLY INVASIVE ONE LEVEL Right 10/7/2020    Procedure: Right L5-S1 minimally invasive microdiskectomy;  Surgeon: Juancho Oquendo MD;  Location: [facility] OR    DISCECTOMY LUMBAR MINIMALLY INVASIVE ONE LEVEL Right 9/21/2022    Procedure: Right L5-S1 minimally invasive redo microdiskectomy;  Surgeon: Jere, " Juancho Lora MD;  Location: [facility] OR    INSERT STIMULATOR AND LEADS INTERNAL DORSAL COLUMN N/A 1/30/2024    Procedure: Spinal cord stimulator placement, phase I and II combined, placement of percutaneous type electrodes in the thoracolumbar spine and placement of generator/battery over the right buttock  **Latex Allergy**;  Surgeon: Giovanni Alfonso MD;  [facility]         Social History     Socioeconomic History    Marital status:      Spouse name: Not on file    Number of children: Not on file    Years of education: Not on file    Highest education level: Not on file   Occupational History    Not on file   Tobacco Use    Smoking status: Former     Packs/day: 0.50     Years: 5.00     Additional pack years: 0.00     Total pack years: 2.50     Types: Cigarettes, Other    Smokeless tobacco: Never   Vaping Use    Vaping Use: Every day    Substances: Nicotine, THC, CBD    Devices: Pre-filled or refillable cartridge, Refillable tank   Substance and Sexual Activity    Alcohol use: Not Currently     Comment: over 6 months    Drug use: Yes     Types: Marijuana     Comment: bipolar-concentrates 1-2 x day    Sexual activity: Yes     Partners: Female     Birth control/protection: I.U.D.   Other Topics Concern    Parent/sibling w/ CABG, MI or angioplasty before 65F 55M? Not Asked   Social History Narrative    .     Works in IT    Likes to bake for fun, mostly bread. Likes to play video games with his daughter.    8 y.o. daughter      Social Determinants of Health     Financial Resource Strain: Not on file   Food Insecurity: Not on file   Transportation Needs: Not on file   Physical Activity: Not on file   Stress: Not on file   Social Connections: Not on file   Interpersonal Safety: Not on file   Housing Stability: Not on file       Family History   Problem Relation Age of Onset    Depression Mother     Depression Father     Substance Abuse Father     Depression Maternal  "Grandmother     Heart Disease Maternal Grandmother     Depression Paternal Grandmother     Anxiety Disorder Paternal Grandmother     Parkinsonism Paternal Grandmother     Depression Brother     Unknown/Adopted No family hx of     Schizophrenia No family hx of     Bipolar Disorder No family hx of     Suicide No family hx of     Dementia No family hx of     [place] Disease No family hx of     Autism Spectrum Disorder No family hx of     Intellectual Disability (Mental Retardation) No family hx of     Mental Illness No family hx of        Current Outpatient Medications   Medication    acetaminophen (TYLENOL) 500 MG tablet    albuterol (PROAIR HFA/PROVENTIL HFA/VENTOLIN HFA) 108 (90 Base) MCG/ACT inhaler    pregabalin (LYRICA) 150 MG capsule    tiZANidine (ZANAFLEX) 4 MG tablet     No current facility-administered medications for this visit.       Allergies   Allergen Reactions    Ceclor [Cefaclor Monohydrate]     Lamictal [Lamotrigine]      Had known side effect of\"skin eating rash\"  Jaycob lauren syndrome    Latex      PN: Converted from LW Latex Sensitivity Flag    Cefaclor Rash       REVIEW OF SYSTEMS: Also per HPI.  General: POSITIVE for difficulty sleeping and weight gain (decreased activity secondary to pain).  Negative for chills/sweats/fever, headache, recent fatigue.  Eyes: Negative for blurred vision, crossed eyes, double vision, recent eye infections, vision flashes, or vision halos.  Ears/Nose/Mouth/Throat: Negative for bleeding gums, difficulty swallowing, earache, ear discharge, hearing loss, hoarseness, nosebleeds, tinnitus, or sinus problems.  Respiratory: Negative for chronic cough, coughing blood, night sweats, shortness of breath, Tuberculosis, or wheezing.  Cardiovascular: POSITIVE for swollen leg secondary to decreased activity.  Negative for chest pain, dyspnea at night, heart murmur, palpitations, pacemaker, poor circulation, or varicose veins.  Gastrointestinal: Negative for melena, " "hematochezia, chronic diarrhea, heartburn, Hepatitis A/B/C, increasing constipation, Liver Disease, nausea, or vomiting.   Genitourinary: POSITIVE for urinary incontinence when there is severe pain.  Also decreased sensation for urination. Decreased sensation or abnormal sensation around the saddle area.  Negative for urinary retention, genital discharge, prostate problems, urgency, or UTI.   Neurological: POSITIVE for numbness and tingling in the right leg.  Negative for syncope, headaches, memory problems, or seizures.  Psychological: POSITIVE for bipolar disorder.  Negative for anxiety, depression, panic attacks, or restlessness.  Skin: POSITIVE for scars being big.  Does also have a pimple in his back.  Negative for chronic skin itching, color changes in hand/feet when cold, poor scarring, non-healing ulcers, skin rashes/hives, unusual moles.   Musculoskeletal: Negative for arthritis, joint swelling in hands/wrists/hips/knees/joints, muscle tenderness in arms/legs, or osteoporosis.  Endocrine: Negative for excessive thirst/hunger, intolerance for warm rooms, loss of libido, multiple broken bones, rapid weight gain/loss, galactorrhea, or thyroid issues.  Hematologic/Lymphatic: Negative for easy skin bruising, significant fatigue, prolonged bleeding, tender glands/lymph nodes.  Allergies: Negative for asthma or hay fever.      PHYSICAL EXAM  BP (!) 150/93   Pulse 88   Resp 16   Ht 1.88 m (6' 2\")   Wt 122.5 kg (270 lb)   SpO2 97%   BMI 34.67 kg/m       General: Awake, alert, oriented. Well nourished, well developed, he/she is not in any acute distress.  HEENT: Head normocephalic, atraumatic. No carotid bruit. Neck supple. Good range of motion. No palpable thyroid mass.  Extremity: Warm with no clubbing or cyanosis. No lower extremity edema.    Incisions: Midline upper lumbar spine incision is healing well.  No redness.  No drainage.  No fluid collection.  Right buttock incision is healing well.  No redness.  " No drainage.  No fluid collection.  Remaining skin glue dressing was removed without difficulties.    Neurological  Awake, alert and oriented to date, time, place and person.  Speech is fluent.   Face symmetric.    Motor: full strength throughout.  Sensation: intact to light touch and pinpoint.      ASSESSMENT   28 year old right handed male  Right sided L5/S1 radiculopathy  Status post minimally invasive right L5/S1 discectomy on 10/7/2020  Status post redo minimally invasive right L5/S1 microdiscectomy on 9/21/2022  Failed back sydrome  Chronic low back pain with right radiculopathy  Lumbar degenerative disc disease  Status post successful spinal cord stimulator trial, 12/12/2023 to 12/19/2023  Status post spinal cord stimulator placement, phase I and II combined, placement of two percutaneous type electrodes in the thoracolumbar spine and placement of spinal cord stimulator generator/battery over the right buttock on 1/30/2024    Patient is recovering well from the recent SCS placement surgery.  His incisions are healing well with no signs of infection.  He is doing well overall.      We activated his SCS and we were able to cover his areas of pain, with the assistance of the [employer] rep.  We instructed him on how to use his SCS system.      He will need to have his AdaptiveStim feature activated in about 4 to 6 weeks.  He will follow up with us for the AdaptiveStim activation.      PLAN  1.  Follow up in 4 to 6 weeks for activation of AdaptiveStim      10 minutes were spent face to face with the patient of which more than 50% of the time was spent counseling and discussing the above issues regarding diagnosis, differential, treatment options, and steps for further evaluation, as well as wound care.  3 minutes were spent reviewing patient's chart.  10 minutes were spent on documentation for this encounter.  23 minutes total were spent on this encounter today.      Again, thank you for allowing me to participate  in the care of your patient.      Sincerely,    Giovanni Alfonso MD

## 2024-02-12 NOTE — NURSING NOTE
"Chief Complaint   Patient presents with    Follow Up     BP (!) 161/94 (BP Location: Right arm, Patient Position: Sitting)   Pulse 85   Resp 16   Ht 1.88 m (6' 2\")   Wt 122.5 kg (270 lb)   SpO2 97%   BMI 34.67 kg/m    Second /93    .Rosa Singh NRP  "

## 2024-02-14 ENCOUNTER — OFFICE VISIT (OUTPATIENT)
Dept: PALLIATIVE MEDICINE | Facility: CLINIC | Age: 29
End: 2024-02-14
Attending: NURSE PRACTITIONER
Payer: COMMERCIAL

## 2024-02-14 VITALS — DIASTOLIC BLOOD PRESSURE: 93 MMHG | OXYGEN SATURATION: 98 % | HEART RATE: 96 BPM | SYSTOLIC BLOOD PRESSURE: 143 MMHG

## 2024-02-14 DIAGNOSIS — M54.16 LUMBAR RADICULOPATHY: Primary | ICD-10-CM

## 2024-02-14 DIAGNOSIS — Z98.890 S/P LUMBAR MICRODISCECTOMY: ICD-10-CM

## 2024-02-14 DIAGNOSIS — M96.1 FAILED BACK SYNDROME: ICD-10-CM

## 2024-02-14 DIAGNOSIS — G89.4 CHRONIC PAIN SYNDROME: ICD-10-CM

## 2024-02-14 PROCEDURE — 99214 OFFICE O/P EST MOD 30 MIN: CPT | Performed by: NURSE PRACTITIONER

## 2024-02-14 ASSESSMENT — PAIN SCALES - PAIN ENJOYMENT GENERAL ACTIVITY SCALE (PEG)
INTERFERED_GENERAL_ACTIVITY: 4
INTERFERED_GENERAL_ACTIVITY: 4
AVG_PAIN_PASTWEEK: 4
AVG_PAIN_PASTWEEK: 4
PEG_TOTALSCORE: 3
INTERFERED_ENJOYMENT_LIFE: 1
PEG_TOTALSCORE: 3
INTERFERED_ENJOYMENT_LIFE: 1

## 2024-02-14 ASSESSMENT — PAIN SCALES - GENERAL: PAINLEVEL: MILD PAIN (3)

## 2024-02-14 NOTE — OP NOTE
PATIENT NAME: AMBER CASTILLO  YOB: 1995  MRN:   [id number]  ACCOUNT:  [id number]      DATE OF PROCEDURE:  01/30/2024    PREOPERATIVE DIAGNOSIS:   1.  Right sided L5/S1 radiculopathy  2.  Status post minimally invasive right L5/S1 discectomy on 10/7/2020  3.  Status post redo minimally invasive right L5/S1 microdiscectomy on 9/21/2022  4.  Failed back sydrome  5.  Chronic low back pain with right radiculopathy  6.  Lumbar degenerative disc disease  7.  Status post successful spinal cord stimulator trial, 12/12/2023 to 12/19/2023    POSTOPERATIVE DIAGNOSIS:  1.  Right sided L5/S1 radiculopathy  2.  Status post minimally invasive right L5/S1 discectomy on 10/7/2020  3.  Status post redo minimally invasive right L5/S1 microdiscectomy on 9/21/2022  4.  Failed back sydrome  5.  Chronic low back pain with right radiculopathy  6.  Lumbar degenerative disc disease  7.  Status post successful spinal cord stimulator trial, 12/12/2023 to 12/19/2023    PROCEDURES PERFORMED:   1.  Spinal cord stimulator placement, phase I, placement of two percutaneous type MRI compatible spinal cord stimulator electrodes in the thoracic spine.  2.  Spinal cord stimulator placement, phase II, placement of spinal cord stimulator generator/battery over the right buttock.  3.  Connection of the two spinal cord stimulator electrode arrays to the generator/battery.  4.  Use of intraoperative fluoroscopy.   5.  Use of intraoperative interrogation and electrical analysis of spinal cord stimulator.   6.  Use of intraoperative electrophysiologic testing of the stimulation parameters.     ATTENDING SURGEON:  Giovanni Alfonso MD, PhD     RESIDENT SURGEON:  Allan Kemp MD     ANESTHESIA:  Monitored anesthesia care and local anesthetic.     ESTIMATED BLOOD LOSS:  5 mL     COMPLICATIONS:  None.     FINDINGS:  Two electrodes placed, entering the posterior spinal epidural space at the lumbar 1/2 interlaminar space.  Left paraspinal  "approach to L1/2 interlaminar space, electrode at midline, tip at top of T8, contacts designated as 0 to 7.  Right paraspinal approach to L1/2 interlaminar space, electrode at right of midline, tip at T7/8 disc space, contacts designated as 8 to 15.  All impedance values within normal.  Good coverage of the pain areas.    IMPLANTS:    1.  Medtronic 1 x 8 Compact Vectris lead - MRI compatible.  Model 270C962.  Midline electrode, Lot# [id number].  Right of midline electrode, Lot# [id number].   2.  Medtronic Intellis generator/battery, model 65021, S/N [id number].    INDICATIONS FOR PROCEDURE:  Mr. Dipak Fuentes is a 28 year old right handed male who is referred by Dr. Duarte for placement of a permanent SCS system.  He is s/p SCS trial from 12/12/2023 to 12/19/2023 with Dr. Duarte.  Per patient, the SCS trial period was the \"nicest week\" he had in \"a year\" and he was \"able to walk without the cane\".  Dr. Duarte felt that the trial was successful and he was thus referred for a permanent SCS system.  Per patient, his pain in his right lower extremity and lower back began back in 2020.  He initially slipped and fell on a boat.  That resulted in right L5/S1 radiculopathy secondary to a HNP at L5/S1 level.  On 10/7/2020, he underwent minimally invasive right L5/S1 microdiscectomy with Dr. Oquendo.  He tolerated the procedure well and there were no complications.  His postoperative course was uneventful and he returned to his baseline.  His radiculopathy resolved.  He did not have any shooting pain.  He did not have any neurological deficit.  He then fell again on the boat and he began having the same right side radiculopathy.  He then underwent redo minimally invasive right L5/S1 microdiscectomy with Dr. Oquendo on 9/21/2022.  After the second surgery, the pain was different compared to his preop pain and he was continuing to get shooting and burning pain in the right leg, especially when he bears weight on it.  He describes the " pain as burning, tingling, stabbing, shooting on the right side, which starts in the right iliac crest area, goes down the right buttock, starts to wrap around to the side and then goes down to the calf and to the foot.  It involves the outside bottom of the foot. The pain typically does not involve the toes.  He had a successful SCS trial with Dr. Duarte.  Per Dr. Duarte, on 12/12/2023, the two percutaneous type electrodes were introduced at T12/L1 level with one electrode located left of midline and tip at the bottom of T7 vertebral body and with second electrode located right of midline and tip at the top of T8 vertebral body.  He subsequently reported improved pain control and significant improvement in his activity level.  His response to the test trial met the criteria for a successful trial.  The patient was trialed with the [employer] device.  Based on the above, the SCS trial was considered to be a success.  The electrodes were removed without any incident on 12/19/2023.  We discussed the spinal cord stimulator placement, phase I and II combined.  I explained that the patient would undergo implantation of a spinal cord stimulator electrodes, percutaneous types, through the T12/L1 approach.  We may also try L1/2 approach. We will try to replicate the test trial electrode location.  Stimulation coverage will be tested intraoperatively with an awake neurophysiological or stimulation test.  If the stimulation coverage is good, then the generator/battery will be implanted on the right buttock, the side of his pain.  Also, he is right handed.  Risks, benefits, alternative therapies were discussed with the patient, including but not limited to infection and bleeding, cerebrospinal fluid leak, neurological deficit, nerve injury and/or spinal cord injury, paralysis, stroke and death.  All questions were answered, and he expressed understanding.  We did briefly discuss the paddle electrode option but since he was  "tested on a percutaneous type electrode, those will be implanted.  He is also found to have an active pimple near the test electrode insertion site.  It looks inflamed.  I told him that this \"pimple\" must be resolved before we can proceed with the SCS implantation.  Patient expressed understanding.  He now presents for the surgery with resolved \"pimple\".    DESCRIPTION OF PROCEDURE:  The patient was brought to the operating theater and after consent was confirmed and the brief was done, he was placed in a prone position on a flat Dominguez table with pillows as cushions.  All pressure points were well padded.  He was given some mild sedation to provide minimal sedation and some pain relief.  The patient was then positioned in such a way that his thoracic and lumbar spine as well as the right buttock area were exposed.  At this time, intraoperative fluoroscopy was brought in for localization.  The area of the intended surgery was marked using the fluoroscopy.  We intended to access or enter the epidural space at the lumbar 1/2 interlaminar space.  Therefore, thoracic 12, lumbar 1, lumbar 2 and lumbar 3 levels were identified.  Then, to facilitate the angle of approach to this level, skin incision was planned around lumbar 2 and lumbar 3 spinous process levels.  Once the levels were identified, we cleaned, prepped and draped his back, including the lower thoracic spine, as well as his right buttock area in a routine surgical fashion.  Time out was then performed confirming the patient's identity, procedure to be performed, site and side of the surgery, imaging corresponding with the patient and the administration of preoperative prophylactic antibiotic.     The areas of intended incisions were then injected with local anesthetic, which included the lumbar 1 to 3 area and the right buttock area.  1% Lidocaine with epinephrine, 1:100,000, mixed with 1/4% Marcaine plain, 50:50 mix, was used.  Attention was first turned to " the patient's upper lumbar area.  Using a #10 blade, a skin incision at the midline upper lumbar spine, previously marked, was made.  It was approximately 4 cm long.  This incision was then deepened down until the fascial layer was visible.  The space superficial to the fascia was then identified and blunt dissection technique was used to visualize the fascia.  Suprafascial pocket was also created for later anchoring and placement of the electrodes.  Meticulous hemostasis was obtained using a combination of monopolar and bipolar cautery.     At this time, intraoperative fluoroscopy was brought in for localization and for directing the Tuohy needle for electrode insertion.  A curved Tuohy needle was brought onto the field and was inserted at the level of inferior aspect of the lumbar 2 spinous process on the left side.  The needle was then slowly advanced in a superior mesial direction towards the lumbar 1/2 interlaminar space.  Then, under fluoroscopic guidance and using a loss-of-resistance technique, the Tuohy needle was slowly advanced into the L1/2 interlaminar space until the epidural space was reached.  There was a loss of resistance indicating that the tip had entered the epidural space.  No blood or cerebrospinal fluid was noted to be coming out of the needle.  First, soft stylet was used to confirm that epidural space was accessed.  Again, no blood or cerebrospinal fluid was noted to be coming out of the needle.  Then, Medtronic MRI compatible percutaneous type electrode was brought onto the field.  With a wire stylet in, the electrode was introduced to the epidural space through the Tuohy needle.  Then, the electrode was slowly advanced using intraoperative fluoroscopy.  There was some resistance in advancing the electrode at the lower thoracic area but ultimately, the electrode did advance.  Fluoroscopy confirmed that the electrode was entering at the L1/2 interlaminar space and that the electrode  position was in the posterior aspect of the spinal canal.  The electrode was slowly advanced until it reached the middle of the thoracic spine.  It was near the midline.  The electrode was positioned with the tip being placed at the top of T8 level.    Then, attention was turned to placing a second electrode.  Another curved Tuohy needle was brought onto the field and was inserted at the level of inferior aspect of the lumbar 2 spinous process on the right side.  The needle was then slowly advanced in a superior mesial direction towards the L1/2 interlaminar space, following the similar but opposite trajectory as the first side.  Then, under fluoroscopic guidance and using a loss-of-resistance technique, the Tuohy needle was slowly advanced into the L1/2 interlaminar space until the epidural space was reached.  There was a loss of resistance indicating that the tip had entered the epidural space.  No blood or cerebrospinal fluid was noted to be coming out of the needle.  First, soft stylet was used to confirm that epidural space was accessed.  Again, no blood or cerebrospinal fluid was noted to be coming out of the needle.  A second [employer] MRI compatible percutaneous type electrode was brought onto the field.  With a wire stylet in, the electrode was introduced to the epidural space through the Tuohy needle.  Then, the electrode was slowly advanced using intraoperative fluoroscopy.  Fluoroscopy confirmed that the electrode was entering at the L1/2 interlaminar space and that the electrode position was in the posterior aspect of the spinal canal.  The electrode did not crossed the first electrode and was on the right side of midline.  The electrode was positioned with the tip being placed at the T7/8 disc level, slightly above the first electrode and parallel to the first electrode.    At this time, the patient was awakened and tested for electrode stimulation coverage.  The electrodes were tested.  Patient  reported that he was getting the stimulation feeling in the area above his belt line and in his back and down his legs to his toes, mostly on the right side.  When using the midline electrode, some left leg coverage was reported.  We were covering mostly the right side pain.  Patient thus reported good coverage of his painful areas and he did report that this stimulation test was similar to the test trial in terms of overall coverage.     With the successful positioning and testing of the stimulation, it was decided that the two electrodes will be implanted at the current position.  The patient was again made more comfortable for the remainder of the procedure.  The two electrodes were anchored to the fascia using a deployable anchor and three 2-0 Ethibond sutures for each electrode.  At each manipulation, fluoroscopy confirmed that the electrodes did not move.  Once the electrodes were anchored, fluoroscopy confirmed that the electrodes did not move much from the desired original position.    Attention was then turned to the right buttock area.  Using a #10 blade, a horizontal incision was made over his right buttock area for the generator/battery placement.  Of note, this area was already injected with the above named local anesthetic.  The incision was deepened until we reached a subcutaneous fatty layer and using a combination of blunt and sharp dissection technique, a pocket was created in the inferior direction to the incision.  Meticulous hemostasis was obtained.  Care was taken to make the pocket approximately 1 cm below the skin.  Once an adequate size pocket was created, the pocket was then generously irrigated and meticulous hemostasis was obtained.    The two electrode leads were then tunneled from the lumbar incision to the right buttock incision using a tunneling instrument.  Care was taken to keep track of the relative electrode position, the midline and right of midline electrodes.  The wires were  then gently pulled until there were no more coiled or excess wires within the lumbar wound and that the wires were oriented in a straight parallel fashion.  Once the electrodes were tunneled to the right buttock pocket, a generator/battery was brought onto the field.  The electrode connectors were inserted into the generator/battery and secured with a screwdriver.  The top portal position was corresponding to the 0 - 7 contact position or the midline electrode.  The bottom portal position was corresponding to the 8 - 15 contact position or the right of midline electrode.  Therefore, two electrode arrays were connected to the generator/battery.  The connection was tested to be secure.  The pocket was again irrigated and dried.  No active bleeding was noted and the field remained dry.  The generator/battery was then placed into the pocket, with care being taken to place the excess wires behind and around the periphery of the generator/battery.      The spinal cord stimulator system was now tested and the impedances were all within normal.  No problems were found.  The generator/battery was then anchored to the pocket using two 2-0 Ethibond sutures.    With the satisfactory placement, we began closing the wounds.  The lumbar incision was reapproximated in a following manner.  First, the wound was generously irrigated and dried.  Meticulous hemostasis was obtained.  A layer above the fascia and overlying the hardware was reapproximated with 2-0 Vicryl sutures to protect the hardware.  Care was taken not to damage the wires.  The deep subcutaneous layer and the dermal layer were then reapproximated using 2-0 and 3-0 Vicryl sutures.  The skin was reapproximated using 4-0 Monocryl suture in a subcuticular fashion.  The incision was cleaned, dried and re-prepped using ChoraPrep.  Then, Dermabond skin glue was applied to the incision.    The right buttock incision was reapproximated in a following manner.  The deep pocket to  cover the generator/battery to create a capsule was reapproximated using 2-0 Vicryl sutures to protect the hardware.  The deep subcutaneous layer was reapproximated using 3-0 Vicryl sutures.  The dermal layer was then reapproximated using 3-0 Vicryl sutures.  The skin was reapproximated using 4-0 Monocryl suture in a subcuticular fashion.  The incisions were cleaned, dried and re-prepped using ChoraPrep.  Then, Dermabond skin glue was applied to the incision.    The patient was subsequently awakened and turned supine onto his bed.  He was taken to the recovery room in a stable condition.  At the end of the case, all counts including needle, sponge and instrument counts were correct x 2.  There were no complications.    ITrent M.D., Ph.D., Neurosurgery Attending, was present and scrubbed for the entire case and performed the key and critical portions of the case.      TRENT PETERSEN MD, PHD

## 2024-02-14 NOTE — PATIENT INSTRUCTIONS
----------------------------------------------------------------  [facility] Number:  [phone]   Call with any questions about your care and for scheduling assistance.   Calls are returned Monday through Friday between 8 AM and 4:30 PM. We usually get back to you within 2 business days depending on the issue/request.    If we are prescribing your medications:  For opioid medication refills, call the clinic or send a [employer] message 7 days in advance.  Please include:  Name of requested medication  Name of the pharmacy.  For non-opioid medications, call your pharmacy directly to request a refill. Please allow 3-4 days to be processed.   Per [place] State Law:  All controlled substance prescriptions must be filled within 30 days of being written.    For those controlled substances allowing refills, pickup must occur within 30 days of last fill.      We believe regular attendance is key to your success in our program!    Any time you are unable to keep your appointment we ask that you call us at least 24 hours in advance to cancel.This will allow us to offer the appointment time to another patient.   Multiple missed appointments may lead to dismissal from the clinic.

## 2024-02-14 NOTE — PROGRESS NOTES
[facility] Pain Management     Date of Visit: Feb 14, 2024  Last visit:  11/09/2023  Original Consult: 2/2/2023    Dipak Fuentes is a 28 year old male with PMH significant for lumbar ddd with radiculopathy, s/p microdiscectomy x2, bipolar depression, substance use disorder in prolonged remission who is seen today for follow-up on chronic pain treatment.     History:  Lumbar microdiscectomy surgery 9/2022 with persistent pain. Treated with PT (pelvic, pool and land). Symptom management with cannabis, pregabalin and tizanidine; underwent successful SCS trial 12/12/23-12/19/23, implant on 1/30/24.    Recommendations from last visit:  Will proceed with implant with Dr. Alfonos. I would like to see him back 4-6 weeks after surgery to reassess. Will plan to continue to discuss medications and activities as he acclimates to life after SCS implant.   ____________________________________________________________    Since last seen, Dipak reports:  -SCS trial completed 12/12/23-12/19/23. A referral was done for Dr. Alfonso for the spinal cord stimulator implant and surgery on 1/30/24.   - Stim turned on 2 days ago and he is feeling a significant reduction in pain.   - He is able to walk without a cane 90 percent of the time and tolerate weight bearing on his right leg. At the end of the day, he gets more fatigued because he is doing more walking. Started driving again, feels really hopeful about increased options for living.  - Lyrica at 150 BID, tizanidine prn and THC are helpful.  Overall is focused on gaining and maintaining function.    - Graduated with BS degree mid 2023. 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  Interested in the creation and development of a video game. Would like to have improved pain control to broaden his opportunities for employment.   -  Goals of pain management are to be able to participate in fishing and disc golf, to be able to drive without fear of right leg pain.    Current pain  "medications:  Tizanidine 4 mg TID prn- using at least once a day  Pregabalin 150 mg BID    Pain description:  Low back with radiation into right side numbness from the knee down, intermittently.   Current pain rating Mild Pain (3)- feels slightly worse after the trial, mostly because he had forgotten how good it felt to have less pain  Aggravating factors include: Sitting on hard chairs, lying down makes back pain worse, standing makes right leg pain worse  Relieving factors include: icing helps some, tends to provide more immediate relief, delta 9 THC helps reduce pain, SCS    PEG: A Three-Item Scale Assessing Pain Intensity and Interference  What number best describes your pain on average in the past week? 4  What number best describes how, during the past week, pain has interfered with your ENJOYMENT OF LIFE? 1  What number best describes how, during the past week, pain has interfered with your GENERAL ACTIVITY? 4  PEG Total Score: 3    PAIN MANAGEMENT TREATMENT HISTORY  1. MEDICATIONS:  Opiates: Oxycodone after surgery- Helped  NSAIDS: Ibuprofen (Advil, Motrin).  Medication was not helpful , only takes it when his wife and mom \"bug me to take it because I am in pain\" Diclofenac -not helpful  Muscle Relaxants: Cyclobenzaprine (Flexeril).  Medication was helpful  Methocarbamol (Robaxin).  Medication was helpful  Tizanidine (Zanaflex).  Medication was helpful  Helps with spasms, tizanidine most helpful  Anti-depressants: Remeron most recently - helpful. Mood stabilizers have been helpful in the past; tried on many anti-psychotics: had hallucinations, lithium: made him hard to be around, lamotrigine - skin rash. Now uses THC to manage polo with success  Anxiolytics: tried hydroxyzine in the past  Neuropathics: Gabapentin (Neurontin).  Medication was not helpful, trialed once at 300 mg TID, then again at 600 mg TID. Lyrica -helpful  Topicals: CBD spray - not helpful, lidocaine patches - not helpful  Adjuvant pain " medications: THC helps his neuropathic pain sleep  2. PHYSICAL THERAPY:   Pool PT at [facility] with Sara Ro, PT was helpful, continues HEP  Pelvic floor therapy helpful for urinary symptoms after back surgery   Traditional PT after surgery not helpful.  3. PAIN PSYCHOLOGY:   Therapy in the past for biopolar management, not in several years  4. SURGERY:   2022 Right L5-S1 minimally invasive microdiscectomy with Dr. Oquendo  10/7/2020  Right L5-S1 minimally invasive microdiscectomy with Dr. Oquendo  5. INJECTIONS:   24 spinal cord stimulator implant and surgery with Dr. Alfonso  22: right S1-S2 transforaminal epidural corticosteroid injection with Dr. Shay - worsened pain  3/10/20: Lumbar L5-S1 interlaminar epidural steroid injection with Dr. Esteves- had 2 weeks of nothing, then 2 weeks of relief and then back to painful  6. COMPLEMENTARY THERAPY:  Chiropractic  not helpful  Acupuncture/acupressure/ not tried  TENS unit  not helpful made pain worse  cold/cold packs: helpful  distraction/mindfulness: somewhat helpful   meditation/guided imagery: somewhat helpful    [id number] Thoracic MRI  IMPRESSION:  1.  Multilevel thoracic spondylosis with multiple small disc herniations as detailed above. The largest is an inferiorly dissecting disc extrusion at T4-T5 with disc material indenting the ventral cord and contributing to mild spinal canal stenosis.  2.  No greater than mild spinal canal stenosis in the thoracic spine.  3.  No substantial neural foraminal stenosis.  4.  No cord signal abnormality.    10/7/2022 Lumbar MRI at [facility]          Social History   Social History     Tobacco Use    Smoking status: Former     Packs/day: 0.50     Years: 5.00     Additional pack years: 0.00     Total pack years: 2.50     Types: Cigarettes, Other    Smokeless tobacco: Never   Vaping Use    Vaping Use: Every day    Substances: Nicotine, THC, CBD    Devices: Pre-filled or refillable cartridge, Refillable  tank   Substance Use Topics    Alcohol use: Not Currently     Comment: over 6 months    Drug use: Yes     Types: Marijuana     Comment: bipolar-concentrates 1-2 x day      Social History     Social History Narrative    .     Works in IT    Likes to bake for fun, mostly bread. Likes to play video games with his daughter.    8 y.o. daughter       Medications and Allergies reviewed.  Ceclor [cefaclor monohydrate], Lamictal [lamotrigine], Latex, and Cefaclor  Medications   has a current medication list which includes the following prescription(s): acetaminophen, albuterol, pregabalin, and tizanidine.    Objective   Vitals:    02/14/24 1006   BP: (!) 143/93   BP Location: Right arm   Patient Position: Chair   Cuff Size: Adult Regular   Pulse: 96   SpO2: 98%   Constitutional: Well developed, well nourished, appears stated age. No acute distress.  Gait is steady.  HEENT: Head atraumatic, normocephalic. Eyes without conjunctival injection or jaundice.   Skin: No obvious lesions, or petechiae of exposed skin. Mulitple tattoos present  Extremities: Peripheral pulses intact. No clubbing, cyanosis, or edema. Moves all extremities.  Psychiatric/mental status: Alert, without lethargy or stupor. Speech fluent. Appropriate affect. Mood normal. Able to follow commands without difficulty.   Musculoskeletal exam: Normal bulk and tone. Unremarkable spinal curvature.     Assessment & Plan   1. Lumbar radiculopathy  2. S/P lumbar microdiscectomy  3. Failed back syndrome  4. Chronic pain syndrome    Continue current pain management regimen. Advised pacing to avoid increasing pain. Will follow-up in 6 months, sooner if needed.     Marni Gray, CNP-BC, PMGT-BC, AP-PMN  [facility]

## 2024-03-11 ENCOUNTER — OFFICE VISIT (OUTPATIENT)
Dept: NEUROSURGERY | Facility: CLINIC | Age: 29
End: 2024-03-11
Payer: COMMERCIAL

## 2024-03-11 VITALS
SYSTOLIC BLOOD PRESSURE: 148 MMHG | HEART RATE: 83 BPM | OXYGEN SATURATION: 98 % | WEIGHT: 271 LBS | DIASTOLIC BLOOD PRESSURE: 83 MMHG | BODY MASS INDEX: 34.79 KG/M2

## 2024-03-11 DIAGNOSIS — M51.16 LUMBAR DISC HERNIATION WITH RADICULOPATHY: ICD-10-CM

## 2024-03-11 DIAGNOSIS — Z98.890 S/P LUMBAR MICRODISCECTOMY: ICD-10-CM

## 2024-03-11 DIAGNOSIS — M96.1 FAILED BACK SYNDROME: ICD-10-CM

## 2024-03-11 DIAGNOSIS — Z96.89 S/P INSERTION OF SPINAL CORD STIMULATOR: Primary | ICD-10-CM

## 2024-03-11 PROCEDURE — 99212 OFFICE O/P EST SF 10 MIN: CPT | Performed by: NEUROLOGICAL SURGERY

## 2024-03-11 ASSESSMENT — PAIN SCALES - GENERAL: PAINLEVEL: MILD PAIN (3)

## 2024-03-11 NOTE — LETTER
"3/11/2024       RE: Dipak Fuentes  [address] [id number]     Dear Colleague,    Thank you for referring your patient, Dipak Fuentes, to the [facility] NEUROSURGERY [facility]IC [place] at [facility]. Please see a copy of my visit note below.    NEUROSURGERY    HISTORY AND PHYSICAL EXAM    Chief Complaint   Patient presents with    RECHECK     SCS programming.  Activation of AdaptiveStim.     HISTORY OF PRESENT ILLNESS  Mr. Dipak Fuentes returns today for his follow-up.  He is s/p spinal cord stimulator placement, phase I and II combined, placement of two percutaneous type electrodes in the thoracolumbar spine and placement of spinal cord stimulator generator/battery over the right buttock on 1/30/2024.  He was seen back on 2/12/2024 for his incision check and initial SCS programming.  He was doing well and his incisions were healing well.  He presents today for activation of the AdaptiveStim feature of his device.  He states that he continues to get good coverage of his pain with the SCS and that it is much like his test trial.  He is quite happy.  He is walking without his cane.      In summary, Mr. Dipak Fuentes is a 28 year old right handed male.who is referred by Dr. Duarte for placement of a permanent SCS system.  He is s/p SCS trial from 12/12/2023 to 12/19/2023 with Dr. Duarte.  Per patient, the SCS trial period was the \"nicest week\" he had in \"a year\" and he was \"able to walk without the cane\".  Dr. Duarte felt that the trial was successful and he was thus referred for a permanent SCS system.  Per patient, his pain in his right lower extremity and lower back began back in 2020.  He initially slipped and fell on a boat.  That resulted in right L5/S1 radiculopathy secondary to a HNP at L5/S1 level.  On 10/7/2020, he underwent minimally invasive right L5/S1 microdiscectomy with Dr. Oquendo.  He tolerated the procedure well and there were no " "complications.  His postoperative course was uneventful and he returned to his baseline.  His radiculopathy resolved.  He did not have any shooting pain.  He did not have any neurological deficit.  He then fell again on the boat and he began having the same right side radiculopathy.  He then underwent redo minimally invasive right L5/S1 microdiscectomy with Dr. Oquendo on 9/21/2022.  After the second surgery, the pain was different compared to his preop pain and he was continuing to get shooting and burning pain in the right leg, especially when he bears weight on it.  He describes the pain as burning, tingling, stabbing, shooting on the right side, which starts in the right iliac crest area, goes down the right buttock, starts to wrap around to the side and then goes down to the calf and to the foot.  It involves the outside bottom of the foot. The pain typically does not involve the toes.    Past Medical History:   Diagnosis Date    Bipolar I disorder, single manic episode (H) 03/25/2013    Problem list name updated by automated process. Provider to review    Complication of anesthesia     panic after having anesthesia    History of substance use disorder     as a teenager, \"took extra adderall\"    Lumbar disc herniation with radiculopathy     Mild intermittent asthma without complication     Other chronic pain     low back and buttocks leg pain     Past Surgical History:   Procedure Laterality Date    DISCECTOMY LUMBAR MINIMALLY INVASIVE ONE LEVEL Right 10/7/2020    Procedure: Right L5-S1 minimally invasive microdiskectomy;  Surgeon: Juancho Oquendo MD;  Location: [facility] OR    DISCECTOMY LUMBAR MINIMALLY INVASIVE ONE LEVEL Right 9/21/2022    Procedure: Right L5-S1 minimally invasive redo microdiskectomy;  Surgeon: Juancho Oquendo MD;  Location:  OR    INSERT STIMULATOR AND LEADS INTERNAL DORSAL COLUMN N/A 1/30/2024    Procedure: Spinal cord stimulator placement, phase I and II combined, placement of " percutaneous type electrodes in the thoracolumbar spine and placement of generator/battery over the right buttock  **Latex Allergy**;  Surgeon: Giovanni Alfonso MD;  Location: [facility]       Social History     Socioeconomic History    Marital status:      Spouse name: Not on file    Number of children: Not on file    Years of education: Not on file    Highest education level: Not on file   Occupational History    Not on file   Tobacco Use    Smoking status: Former     Packs/day: 0.50     Years: 5.00     Additional pack years: 0.00     Total pack years: 2.50     Types: Cigarettes, Other    Smokeless tobacco: Never   Vaping Use    Vaping Use: Every day    Substances: Nicotine, THC, CBD    Devices: Pre-filled or refillable cartridge, Refillable tank   Substance and Sexual Activity    Alcohol use: Not Currently     Comment: over 6 months    Drug use: Yes     Types: Marijuana     Comment: bipolar-concentrates 1-2 x day    Sexual activity: Yes     Partners: Female     Birth control/protection: I.U.D.   Other Topics Concern    Parent/sibling w/ CABG, MI or angioplasty before 65F 55M? Not Asked   Social History Narrative    .     Works in IT    Likes to bake for fun, mostly bread. Likes to play video games with his daughter.    8 y.o. daughter      Social Determinants of Health     Financial Resource Strain: Not on file   Food Insecurity: Not on file   Transportation Needs: Not on file   Physical Activity: Not on file   Stress: Not on file   Social Connections: Not on file   Interpersonal Safety: Not on file   Housing Stability: Not on file     Family History   Problem Relation Age of Onset    Depression Mother     Depression Father     Substance Abuse Father     Depression Maternal Grandmother     Heart Disease Maternal Grandmother     Depression Paternal Grandmother     Anxiety Disorder Paternal Grandmother     Parkinsonism Paternal Grandmother     Depression Brother      "Unknown/Adopted No family hx of     Schizophrenia No family hx of     Bipolar Disorder No family hx of     Suicide No family hx of     Dementia No family hx of     Gonzalo Disease No family hx of     Autism Spectrum Disorder No family hx of     Intellectual Disability (Mental Retardation) No family hx of     Mental Illness No family hx of      Current Outpatient Medications   Medication    acetaminophen (TYLENOL) 500 MG tablet    albuterol (PROAIR HFA/PROVENTIL HFA/VENTOLIN HFA) 108 (90 Base) MCG/ACT inhaler    pregabalin (LYRICA) 150 MG capsule    tiZANidine (ZANAFLEX) 4 MG tablet     No current facility-administered medications for this visit.        Allergies   Allergen Reactions    Ceclor [Cefaclor Monohydrate]     Lamictal [Lamotrigine]      Had known side effect of\"skin eating rash\"  Jaycob lauren syndrome    Latex      PN: Converted from LW Latex Sensitivity Flag    Cefaclor Rash       REVIEW OF SYSTEMS: Also per HPI.  General: POSITIVE for difficulty sleeping and weight gain (decreased activity secondary to pain).  Negative for chills/sweats/fever, headache, recent fatigue.  Eyes: Negative for blurred vision, crossed eyes, double vision, recent eye infections, vision flashes, or vision halos.  Ears/Nose/Mouth/Throat: Negative for bleeding gums, difficulty swallowing, earache, ear discharge, hearing loss, hoarseness, nosebleeds, tinnitus, or sinus problems.  Respiratory: Negative for chronic cough, coughing blood, night sweats, shortness of breath, Tuberculosis, or wheezing.  Cardiovascular: POSITIVE for swollen leg secondary to decreased activity.  Negative for chest pain, dyspnea at night, heart murmur, palpitations, pacemaker, poor circulation, or varicose veins.  Gastrointestinal: Negative for melena, hematochezia, chronic diarrhea, heartburn, Hepatitis A/B/C, increasing constipation, Liver Disease, nausea, or vomiting.   Genitourinary: POSITIVE for urinary incontinence when there is severe pain.  " Also decreased sensation for urination. Decreased sensation or abnormal sensation around the saddle area.  Negative for urinary retention, genital discharge, prostate problems, urgency, or UTI.   Neurological: POSITIVE for numbness and tingling in the right leg.  Negative for syncope, headaches, memory problems, or seizures.  Psychological: POSITIVE for bipolar disorder.  Negative for anxiety, depression, panic attacks, or restlessness.  Skin: POSITIVE for scars being big.  Does also have a pimple in his back.  Negative for chronic skin itching, color changes in hand/feet when cold, poor scarring, non-healing ulcers, skin rashes/hives, unusual moles.   Musculoskeletal: Negative for arthritis, joint swelling in hands/wrists/hips/knees/joints, muscle tenderness in arms/legs, or osteoporosis.  Endocrine: Negative for excessive thirst/hunger, intolerance for warm rooms, loss of libido, multiple broken bones, rapid weight gain/loss, galactorrhea, or thyroid issues.  Hematologic/Lymphatic: Negative for easy skin bruising, significant fatigue, prolonged bleeding, tender glands/lymph nodes.  Allergies: Negative for asthma or hay fever.      PHYSICAL EXAM  BP (!) 148/83 (BP Location: Right arm, Patient Position: Sitting, Cuff Size: Adult Regular)   Pulse 83   Wt 122.9 kg (271 lb)   SpO2 98%   BMI 34.79 kg/m      General: Awake, alert, oriented. Well nourished, well developed, he is not in any acute distress.  HEENT: Head normocephalic, atraumatic. No carotid bruit. Neck supple. Good range of motion. No palpable thyroid mass.  Extremity: Warm with no clubbing or cyanosis. No lower extremity edema.    Incisions: Midline upper lumbar spine incision is healing well.  No redness.  No drainage.  No fluid collection.  Right buttock incision is healing well.  No redness.  No drainage.  No fluid collection.  Remaining skin glue dressing was removed without difficulties.    Neurological  Awake, alert and oriented to date, time,  place and person.  Speech is fluent.   Face symmetric.    Motor: full strength throughout.  Sensation: intact to light touch and pinpoint.  Walking without cane.      ASSESSMENT   28 year old right handed male  Right sided L5/S1 radiculopathy  Status post minimally invasive right L5/S1 discectomy on 10/7/2020  Status post redo minimally invasive right L5/S1 microdiscectomy on 9/21/2022  Failed back sydrome  Chronic low back pain with right radiculopathy  Lumbar degenerative disc disease  Status post successful spinal cord stimulator trial, 12/12/2023 to 12/19/2023  Status post spinal cord stimulator placement, phase I and II combined, placement of two percutaneous type electrodes in the thoracolumbar spine and placement of spinal cord stimulator generator/battery over the right buttock on 1/30/2024    Patient is recovering well from the recent SCS placement surgery.  His incisions are healing well with no signs of infection.  He is doing well overall.       We activated his SCS back in 2/12/2024 and he is getting good coverage of his pain.  He is quite happy with the result.  He is walking without his cane.      With the assistance of the [employer] rep, we activated his AdaptiveStim feature        PLAN  1.  Follow up with neurosurgery as needed.      5 minutes were spent face to face with the patient of which more than 50% of the time was spent counseling and discussing the above issues regarding diagnosis, differential, treatment options, and steps for further evaluation, as well as SCS programming/adjustment.  3 minutes were spent reviewing patient's chart.  5 minutes were spent on documentation for this encounter.  13 minutes total were spent on this encounter today.        Again, thank you for allowing me to participate in the care of your patient.      Sincerely,    Giovanni Alfonso MD

## 2024-03-12 NOTE — PROGRESS NOTES
"NEUROSURGERY    HISTORY AND PHYSICAL EXAM    Chief Complaint   Patient presents with    RECHECK     SCS programming.  Activation of AdaptiveStim.     HISTORY OF PRESENT ILLNESS  Mr. Dipak Fuentes returns today for his follow-up.  He is s/p spinal cord stimulator placement, phase I and II combined, placement of two percutaneous type electrodes in the thoracolumbar spine and placement of spinal cord stimulator generator/battery over the right buttock on 1/30/2024.  He was seen back on 2/12/2024 for his incision check and initial SCS programming.  He was doing well and his incisions were healing well.  He presents today for activation of the AdaptiveStim feature of his device.  He states that he continues to get good coverage of his pain with the SCS and that it is much like his test trial.  He is quite happy.  He is walking without his cane.      In summary, Mr. Dipak Fuentes is a 28 year old right handed male.who is referred by Dr. Duarte for placement of a permanent SCS system.  He is s/p SCS trial from 12/12/2023 to 12/19/2023 with Dr. Duarte.  Per patient, the SCS trial period was the \"nicest week\" he had in \"a year\" and he was \"able to walk without the cane\".  Dr. Duarte felt that the trial was successful and he was thus referred for a permanent SCS system.  Per patient, his pain in his right lower extremity and lower back began back in 2020.  He initially slipped and fell on a boat.  That resulted in right L5/S1 radiculopathy secondary to a HNP at L5/S1 level.  On 10/7/2020, he underwent minimally invasive right L5/S1 microdiscectomy with Dr. Oquendo.  He tolerated the procedure well and there were no complications.  His postoperative course was uneventful and he returned to his baseline.  His radiculopathy resolved.  He did not have any shooting pain.  He did not have any neurological deficit.  He then fell again on the boat and he began having the same right side radiculopathy.  He then underwent redo minimally " "invasive right L5/S1 microdiscectomy with Dr. Oquendo on 9/21/2022.  After the second surgery, the pain was different compared to his preop pain and he was continuing to get shooting and burning pain in the right leg, especially when he bears weight on it.  He describes the pain as burning, tingling, stabbing, shooting on the right side, which starts in the right iliac crest area, goes down the right buttock, starts to wrap around to the side and then goes down to the calf and to the foot.  It involves the outside bottom of the foot. The pain typically does not involve the toes.    Past Medical History:   Diagnosis Date    Bipolar I disorder, single manic episode (H) 03/25/2013    Problem list name updated by automated process. Provider to review    Complication of anesthesia     panic after having anesthesia    History of substance use disorder     as a teenager, \"took extra adderall\"    Lumbar disc herniation with radiculopathy     Mild intermittent asthma without complication     Other chronic pain     low back and buttocks leg pain     Past Surgical History:   Procedure Laterality Date    DISCECTOMY LUMBAR MINIMALLY INVASIVE ONE LEVEL Right 10/7/2020    Procedure: Right L5-S1 minimally invasive microdiskectomy;  Surgeon: Juancho Oquendo MD;  Location:  OR    DISCECTOMY LUMBAR MINIMALLY INVASIVE ONE LEVEL Right 9/21/2022    Procedure: Right L5-S1 minimally invasive redo microdiskectomy;  Surgeon: Juancho Oquendo MD;  Location:  OR    INSERT STIMULATOR AND LEADS INTERNAL DORSAL COLUMN N/A 1/30/2024    Procedure: Spinal cord stimulator placement, phase I and II combined, placement of percutaneous type electrodes in the thoracolumbar spine and placement of generator/battery over the right buttock  **Latex Allergy**;  Surgeon: Giovanni Alfonso MD;  Location:  OR    [facility]ly [facility]       Social History     Socioeconomic History    Marital status:      Spouse name: Not on file    Number " of children: Not on file    Years of education: Not on file    Highest education level: Not on file   Occupational History    Not on file   Tobacco Use    Smoking status: Former     Packs/day: 0.50     Years: 5.00     Additional pack years: 0.00     Total pack years: 2.50     Types: Cigarettes, Other    Smokeless tobacco: Never   Vaping Use    Vaping Use: Every day    Substances: Nicotine, THC, CBD    Devices: Pre-filled or refillable cartridge, Refillable tank   Substance and Sexual Activity    Alcohol use: Not Currently     Comment: over 6 months    Drug use: Yes     Types: Marijuana     Comment: bipolar-concentrates 1-2 x day    Sexual activity: Yes     Partners: Female     Birth control/protection: I.U.D.   Other Topics Concern    Parent/sibling w/ CABG, MI or angioplasty before 65F 55M? Not Asked   Social History Narrative    .     Works in IT    Likes to bake for fun, mostly bread. Likes to play video games with his daughter.    8 y.o. daughter      Social Determinants of Health     Financial Resource Strain: Not on file   Food Insecurity: Not on file   Transportation Needs: Not on file   Physical Activity: Not on file   Stress: Not on file   Social Connections: Not on file   Interpersonal Safety: Not on file   Housing Stability: Not on file     Family History   Problem Relation Age of Onset    Depression Mother     Depression Father     Substance Abuse Father     Depression Maternal Grandmother     Heart Disease Maternal Grandmother     Depression Paternal Grandmother     Anxiety Disorder Paternal Grandmother     Parkinsonism Paternal Grandmother     Depression Brother     Unknown/Adopted No family hx of     Schizophrenia No family hx of     Bipolar Disorder No family hx of     Suicide No family hx of     Dementia No family hx of     Dalton Disease No family hx of     Autism Spectrum Disorder No family hx of     Intellectual Disability (Mental Retardation) No family hx of     Mental Illness No  "family hx of      Current Outpatient Medications   Medication    acetaminophen (TYLENOL) 500 MG tablet    albuterol (PROAIR HFA/PROVENTIL HFA/VENTOLIN HFA) 108 (90 Base) MCG/ACT inhaler    pregabalin (LYRICA) 150 MG capsule    tiZANidine (ZANAFLEX) 4 MG tablet     No current facility-administered medications for this visit.        Allergies   Allergen Reactions    Ceclor [Cefaclor Monohydrate]     Lamictal [Lamotrigine]      Had known side effect of\"skin eating rash\"  Jaycob lauren syndrome    Latex      PN: Converted from LW Latex Sensitivity Flag    Cefaclor Rash       REVIEW OF SYSTEMS: Also per HPI.  General: POSITIVE for difficulty sleeping and weight gain (decreased activity secondary to pain).  Negative for chills/sweats/fever, headache, recent fatigue.  Eyes: Negative for blurred vision, crossed eyes, double vision, recent eye infections, vision flashes, or vision halos.  Ears/Nose/Mouth/Throat: Negative for bleeding gums, difficulty swallowing, earache, ear discharge, hearing loss, hoarseness, nosebleeds, tinnitus, or sinus problems.  Respiratory: Negative for chronic cough, coughing blood, night sweats, shortness of breath, Tuberculosis, or wheezing.  Cardiovascular: POSITIVE for swollen leg secondary to decreased activity.  Negative for chest pain, dyspnea at night, heart murmur, palpitations, pacemaker, poor circulation, or varicose veins.  Gastrointestinal: Negative for melena, hematochezia, chronic diarrhea, heartburn, Hepatitis A/B/C, increasing constipation, Liver Disease, nausea, or vomiting.   Genitourinary: POSITIVE for urinary incontinence when there is severe pain.  Also decreased sensation for urination. Decreased sensation or abnormal sensation around the saddle area.  Negative for urinary retention, genital discharge, prostate problems, urgency, or UTI.   Neurological: POSITIVE for numbness and tingling in the right leg.  Negative for syncope, headaches, memory problems, or " seizures.  Psychological: POSITIVE for bipolar disorder.  Negative for anxiety, depression, panic attacks, or restlessness.  Skin: POSITIVE for scars being big.  Does also have a pimple in his back.  Negative for chronic skin itching, color changes in hand/feet when cold, poor scarring, non-healing ulcers, skin rashes/hives, unusual moles.   Musculoskeletal: Negative for arthritis, joint swelling in hands/wrists/hips/knees/joints, muscle tenderness in arms/legs, or osteoporosis.  Endocrine: Negative for excessive thirst/hunger, intolerance for warm rooms, loss of libido, multiple broken bones, rapid weight gain/loss, galactorrhea, or thyroid issues.  Hematologic/Lymphatic: Negative for easy skin bruising, significant fatigue, prolonged bleeding, tender glands/lymph nodes.  Allergies: Negative for asthma or hay fever.      PHYSICAL EXAM  BP (!) 148/83 (BP Location: Right arm, Patient Position: Sitting, Cuff Size: Adult Regular)   Pulse 83   Wt 122.9 kg (271 lb)   SpO2 98%   BMI 34.79 kg/m      General: Awake, alert, oriented. Well nourished, well developed, he is not in any acute distress.  HEENT: Head normocephalic, atraumatic. No carotid bruit. Neck supple. Good range of motion. No palpable thyroid mass.  Extremity: Warm with no clubbing or cyanosis. No lower extremity edema.    Incisions: Midline upper lumbar spine incision is healing well.  No redness.  No drainage.  No fluid collection.  Right buttock incision is healing well.  No redness.  No drainage.  No fluid collection.  Remaining skin glue dressing was removed without difficulties.    Neurological  Awake, alert and oriented to date, time, place and person.  Speech is fluent.   Face symmetric.    Motor: full strength throughout.  Sensation: intact to light touch and pinpoint.  Walking without cane.      ASSESSMENT   28 year old right handed male  Right sided L5/S1 radiculopathy  Status post minimally invasive right L5/S1 discectomy on 10/7/2020  Status  post redo minimally invasive right L5/S1 microdiscectomy on 9/21/2022  Failed back sydrome  Chronic low back pain with right radiculopathy  Lumbar degenerative disc disease  Status post successful spinal cord stimulator trial, 12/12/2023 to 12/19/2023  Status post spinal cord stimulator placement, phase I and II combined, placement of two percutaneous type electrodes in the thoracolumbar spine and placement of spinal cord stimulator generator/battery over the right buttock on 1/30/2024    Patient is recovering well from the recent SCS placement surgery.  His incisions are healing well with no signs of infection.  He is doing well overall.       We activated his SCS back in 2/12/2024 and he is getting good coverage of his pain.  He is quite happy with the result.  He is walking without his cane.      With the assistance of the [employer]ach[employer] rep, we activated his AdaptiveStim feature        PLAN  1.  Follow up with neurosurgery as needed.      5 minutes were spent face to face with the patient of which more than 50% of the time was spent counseling and discussing the above issues regarding diagnosis, differential, treatment options, and steps for further evaluation, as well as SCS programming/adjustment.  3 minutes were spent reviewing patient's chart.  5 minutes were spent on documentation for this encounter.  13 minutes total were spent on this encounter today.

## 2024-05-14 ENCOUNTER — OFFICE VISIT (OUTPATIENT)
Dept: INTERNAL MEDICINE | Facility: CLINIC | Age: 29
End: 2024-05-14
Payer: COMMERCIAL

## 2024-05-14 VITALS
BODY MASS INDEX: 34.37 KG/M2 | SYSTOLIC BLOOD PRESSURE: 122 MMHG | TEMPERATURE: 97.6 F | DIASTOLIC BLOOD PRESSURE: 82 MMHG | WEIGHT: 267.8 LBS | HEART RATE: 89 BPM | OXYGEN SATURATION: 99 % | RESPIRATION RATE: 18 BRPM | HEIGHT: 74 IN

## 2024-05-14 DIAGNOSIS — M54.16 LUMBAR RADICULOPATHY: ICD-10-CM

## 2024-05-14 DIAGNOSIS — J45.20 MILD INTERMITTENT ASTHMA WITHOUT COMPLICATION: ICD-10-CM

## 2024-05-14 DIAGNOSIS — Z11.4 SCREENING FOR HIV (HUMAN IMMUNODEFICIENCY VIRUS): ICD-10-CM

## 2024-05-14 DIAGNOSIS — Z11.59 NEED FOR HEPATITIS C SCREENING TEST: ICD-10-CM

## 2024-05-14 DIAGNOSIS — Z00.00 ROUTINE GENERAL MEDICAL EXAMINATION AT A HEALTH CARE FACILITY: Primary | ICD-10-CM

## 2024-05-14 LAB
ERYTHROCYTE [DISTWIDTH] IN BLOOD BY AUTOMATED COUNT: 12.5 % (ref 10–15)
HCT VFR BLD AUTO: 45.4 % (ref 40–53)
HGB BLD-MCNC: 15.3 G/DL (ref 13.3–17.7)
MCH RBC QN AUTO: 28.7 PG (ref 26.5–33)
MCHC RBC AUTO-ENTMCNC: 33.7 G/DL (ref 31.5–36.5)
MCV RBC AUTO: 85 FL (ref 78–100)
PLATELET # BLD AUTO: 343 10E3/UL (ref 150–450)
RBC # BLD AUTO: 5.34 10E6/UL (ref 4.4–5.9)
WBC # BLD AUTO: 5.5 10E3/UL (ref 4–11)

## 2024-05-14 PROCEDURE — 90471 IMMUNIZATION ADMIN: CPT | Performed by: INTERNAL MEDICINE

## 2024-05-14 PROCEDURE — 80061 LIPID PANEL: CPT | Performed by: INTERNAL MEDICINE

## 2024-05-14 PROCEDURE — 99213 OFFICE O/P EST LOW 20 MIN: CPT | Mod: 25 | Performed by: INTERNAL MEDICINE

## 2024-05-14 PROCEDURE — 80053 COMPREHEN METABOLIC PANEL: CPT | Performed by: INTERNAL MEDICINE

## 2024-05-14 PROCEDURE — 90677 PCV20 VACCINE IM: CPT | Performed by: INTERNAL MEDICINE

## 2024-05-14 PROCEDURE — 36415 COLL VENOUS BLD VENIPUNCTURE: CPT | Performed by: INTERNAL MEDICINE

## 2024-05-14 PROCEDURE — 99395 PREV VISIT EST AGE 18-39: CPT | Mod: 25 | Performed by: INTERNAL MEDICINE

## 2024-05-14 PROCEDURE — 85027 COMPLETE CBC AUTOMATED: CPT | Performed by: INTERNAL MEDICINE

## 2024-05-14 PROCEDURE — 86803 HEPATITIS C AB TEST: CPT | Performed by: INTERNAL MEDICINE

## 2024-05-14 PROCEDURE — 87389 HIV-1 AG W/HIV-1&-2 AB AG IA: CPT | Performed by: INTERNAL MEDICINE

## 2024-05-14 RX ORDER — ALBUTEROL SULFATE 90 UG/1
2 AEROSOL, METERED RESPIRATORY (INHALATION) EVERY 6 HOURS PRN
Qty: 18 G | Refills: 0 | Status: SHIPPED | OUTPATIENT
Start: 2024-05-14 | End: 2024-07-09

## 2024-05-14 SDOH — HEALTH STABILITY: PHYSICAL HEALTH: ON AVERAGE, HOW MANY DAYS PER WEEK DO YOU ENGAGE IN MODERATE TO STRENUOUS EXERCISE (LIKE A BRISK WALK)?: 2 DAYS

## 2024-05-14 SDOH — HEALTH STABILITY: PHYSICAL HEALTH: ON AVERAGE, HOW MANY MINUTES DO YOU ENGAGE IN EXERCISE AT THIS LEVEL?: 60 MIN

## 2024-05-14 ASSESSMENT — ASTHMA QUESTIONNAIRES
QUESTION_1 LAST FOUR WEEKS HOW MUCH OF THE TIME DID YOUR ASTHMA KEEP YOU FROM GETTING AS MUCH DONE AT WORK, SCHOOL OR AT HOME: A LITTLE OF THE TIME
ACT_TOTALSCORE: 21
ACT_TOTALSCORE: 21
QUESTION_3 LAST FOUR WEEKS HOW OFTEN DID YOUR ASTHMA SYMPTOMS (WHEEZING, COUGHING, SHORTNESS OF BREATH, CHEST TIGHTNESS OR PAIN) WAKE YOU UP AT NIGHT OR EARLIER THAN USUAL IN THE MORNING: NOT AT ALL
QUESTION_4 LAST FOUR WEEKS HOW OFTEN HAVE YOU USED YOUR RESCUE INHALER OR NEBULIZER MEDICATION (SUCH AS ALBUTEROL): ONCE A WEEK OR LESS
QUESTION_2 LAST FOUR WEEKS HOW OFTEN HAVE YOU HAD SHORTNESS OF BREATH: ONCE OR TWICE A WEEK
QUESTION_5 LAST FOUR WEEKS HOW WOULD YOU RATE YOUR ASTHMA CONTROL: WELL CONTROLLED

## 2024-05-14 ASSESSMENT — SOCIAL DETERMINANTS OF HEALTH (SDOH): HOW OFTEN DO YOU GET TOGETHER WITH FRIENDS OR RELATIVES?: TWICE A WEEK

## 2024-05-14 ASSESSMENT — PAIN SCALES - GENERAL: PAINLEVEL: NO PAIN (0)

## 2024-05-14 NOTE — COMMUNITY RESOURCES LIST (ENGLISH)
May 14, 2024           YOUR PERSONALIZED LIST OF SERVICES & PROGRAMS               Bill Payment Assistance      [facility] - Utility payment assistance  [address] [place] [address] (Distance: 1.4 miles)  Language: English  Fee: Free      [employer] [place] [employer] [facility] [place] [facility]  [address] [place] [address] (Distance: 0.7 miles)  Phone: (108) [phone]  Language: English  Fee: Free  Accessibility: Ada accessible      - Dislocated Worker/Adult [employer] Employment Program  Phone: (552) 263-8083  Email: audra@[email or web address]  Website: https://[employer]/services/employment-services/dislocated-worker-program/  Language: English, [place] citizen  Hours: Mon 8:00 AM - 4:30 PM Tue 8:00 AM - 4:30 PM Wed 8:00 AM - 4:30 PM Thu 8:00 AM - 4:30 PM Fri 8:00 AM - 4:30 PM  Fee: Free  Accessibility: Ada accessible               IMPORTANT NUMBERS & WEBSITES        Emergency Services  911  .   [facility]  211 http://211unitedway.org  .   Poison Control  (239) 141-1272 http://[place]poison.org http://wis[place]poison.org  .     Suicide and Crisis Lifeline  988 http://988lifeline.org  .   Childhelp [place] Child Abuse Hotline  504.247.1579 http://Childhelphotline.org   .   [place] Sexual Assault Hotline  (851) 178-9592 (HOPE) http://Rainn.org   .     National Runaway Safeline  (712) 369-7355 (RUNAWAY) http://1800runaPeach & Lily.org  .   Pregnancy & Postpartum Support  Call/text 958-038-5592  [place]: http://ppsupportmn.org  [place]: http://SIPphone.com/wi  .   Substance Abuse National Helpline ([facility])  767-065-HELP (2012) http://Findtreatment.gov   .                DISCLAIMER: These resources have been generated via the [employer] Platform. [employer] does not endorse any service providers mentioned in this resource list. [employer] does not guarantee that the services mentioned in this resource list will be available to you or will improve your health or wellness.    [facility]

## 2024-05-14 NOTE — PROGRESS NOTES
"Preventive Care Visit  [facility] [place]  Neela Smith MD, Internal Medicine  May 14, 2024      Assessment & Plan     Routine general medical examination at a health care facility  Fasting lab work ordered.  Patient received pneumococcal vaccination today.  - Lipid panel reflex to direct LDL Fasting; Future  - Comprehensive metabolic panel; Future  - CBC with platelets; Future  - Lipid panel reflex to direct LDL Fasting  - Comprehensive metabolic panel  - CBC with platelets    Screening for HIV (human immunodeficiency virus)  - HIV Antigen Antibody Combo; Future  - HIV Antigen Antibody Combo    Need for hepatitis C screening test  - Hepatitis C Screen Reflex to HCV RNA Quant and Genotype; Future  - Hepatitis C Screen Reflex to HCV RNA Quant and Genotype    Lumbar radiculopathy  Follows up with the neurosurgery team.  S/p stimulator placement.  Uses Flexeril nightly for muscle spasms.  Handicap parking paperwork filled out.  Ambulates with a cane    Mild intermittent asthma without complication  Overall, symptoms stable.  Use of albuterol inhaler as needed.  - albuterol (PROAIR HFA/PROVENTIL HFA/VENTOLIN HFA) 108 (90 Base) MCG/ACT inhaler; Inhale 2 puffs into the lungs every 6 hours as needed for shortness of breath, wheezing or cough    Patient has been advised of split billing requirements and indicates understanding: Yes          BMI  Estimated body mass index is 34.38 kg/m  as calculated from the following:    Height as of this encounter: 1.88 m (6' 2\").    Weight as of this encounter: 121.5 kg (267 lb 12.8 oz).       Counseling  Appropriate preventive services were discussed with this patient, including applicable screening as appropriate for fall prevention, nutrition, physical activity          Subjective   Dipak is a 28 year old, presenting for the following:  Physical         Health Care Directive  Patient does not have a Health Care Directive or Living Will: Discussed advance care planning " with patient; however, patient declined at this time.    HPI              5/14/2024   General Health   How would you rate your overall physical health? Good   Feel stress (tense, anxious, or unable to sleep) To some extent   (!) STRESS CONCERN      5/14/2024   Nutrition   Three or more servings of calcium each day? Yes   Diet: Regular (no restrictions)   How many servings of fruit and vegetables per day? (!) 2-3   How many sweetened beverages each day? 0-1         5/14/2024   Exercise   Days per week of moderate/strenous exercise 2 days   Average minutes spent exercising at this level 60 min   (!) EXERCISE CONCERN      5/14/2024   Social Factors   Frequency of gathering with friends or relatives Twice a week   Worry food won't last until get money to buy more No   Food not last or not have enough money for food? No   Do you have housing?  Yes   Are you worried about losing your housing? No   Lack of transportation? No   Unable to get utilities (heat,electricity)? Yes   Want help with housing or utility concern? No   (!) FINANCIAL RESOURCE STRAIN CONCERN      5/14/2024   Dental   Dentist two times every year? Yes         5/14/2024   TB Screening   Were you born outside of the [place]? No               5/14/2024   Substance Use   Alcohol more than 3/day or more than 7/wk Not Applicable   Do you use any other substances recreationally? No     Social History     Tobacco Use    Smoking status: Former     Current packs/day: 0.50     Average packs/day: 0.5 packs/day for 5.0 years (2.5 ttl pk-yrs)     Types: Cigarettes, Other    Smokeless tobacco: Never   Vaping Use    Vaping status: Every Day    Substances: Nicotine, THC, CBD    Devices: Pre-filled or refillable cartridge, Refillable tank   Substance Use Topics    Alcohol use: Not Currently     Comment: over 6 months    Drug use: Yes     Types: Marijuana     Comment: bipolar-concentrates 1-2 x day             5/14/2024   One time HIV Screening   Previous HIV test? No          "5/14/2024   STI Screening   New sexual partner(s) since last STI/HIV test? No         5/14/2024   Contraception/Family Planning   Questions about contraception or family planning No        Reviewed and updated as needed this visit by Provider    Allergies  Meds                      Review of Systems  Constitutional, HEENT, cardiovascular, pulmonary, gi and gu systems are negative, except as otherwise noted.     Objective    Exam  /82 (BP Location: Right arm, Patient Position: Sitting, Cuff Size: Adult Regular)   Pulse 89   Temp 97.6  F (36.4  C) (Tympanic)   Resp 18   Ht 1.88 m (6' 2\")   Wt 121.5 kg (267 lb 12.8 oz)   SpO2 99%   BMI 34.38 kg/m     Estimated body mass index is 34.38 kg/m  as calculated from the following:    Height as of this encounter: 1.88 m (6' 2\").    Weight as of this encounter: 121.5 kg (267 lb 12.8 oz).    Physical Exam  GENERAL: alert and no distress  EYES: Eyes grossly normal to inspection, PERRL and conjunctivae and sclerae normal  HENT: ear canals and TM's normal, nose and mouth without ulcers or lesions  RESP: lungs clear to auscultation - no rales, rhonchi or wheezes  CV: regular rate and rhythm, normal S1 S2  ABDOMEN: soft, nontender, no hepatosplenomegaly, no masses   MS: no gross musculoskeletal defects noted, no edema  NEURO: Normal strength and tone, mentation intact and speech normal  PSYCH: mentation appears normal, affect normal/bright        Signed Electronically by: Neela Smith MD    "

## 2024-05-15 LAB
ALBUMIN SERPL BCG-MCNC: 4.5 G/DL (ref 3.5–5.2)
ALP SERPL-CCNC: 84 U/L (ref 40–150)
ALT SERPL W P-5'-P-CCNC: 62 U/L (ref 0–70)
ANION GAP SERPL CALCULATED.3IONS-SCNC: 9 MMOL/L (ref 7–15)
AST SERPL W P-5'-P-CCNC: 36 U/L (ref 0–45)
BILIRUB SERPL-MCNC: 0.4 MG/DL
BUN SERPL-MCNC: 11.1 MG/DL (ref 6–20)
CALCIUM SERPL-MCNC: 9.3 MG/DL (ref 8.6–10)
CHLORIDE SERPL-SCNC: 103 MMOL/L (ref 98–107)
CHOLEST SERPL-MCNC: 164 MG/DL
CREAT SERPL-MCNC: 1 MG/DL (ref 0.67–1.17)
DEPRECATED HCO3 PLAS-SCNC: 28 MMOL/L (ref 22–29)
EGFRCR SERPLBLD CKD-EPI 2021: >90 ML/MIN/1.73M2
FASTING STATUS PATIENT QL REPORTED: YES
FASTING STATUS PATIENT QL REPORTED: YES
GLUCOSE SERPL-MCNC: 114 MG/DL (ref 70–99)
HCV AB SERPL QL IA: NONREACTIVE
HDLC SERPL-MCNC: 33 MG/DL
HIV 1+2 AB+HIV1 P24 AG SERPL QL IA: NONREACTIVE
LDLC SERPL CALC-MCNC: 109 MG/DL
NONHDLC SERPL-MCNC: 131 MG/DL
POTASSIUM SERPL-SCNC: 4.6 MMOL/L (ref 3.4–5.3)
PROT SERPL-MCNC: 7.4 G/DL (ref 6.4–8.3)
SODIUM SERPL-SCNC: 140 MMOL/L (ref 135–145)
TRIGL SERPL-MCNC: 112 MG/DL

## 2024-07-09 DIAGNOSIS — J45.20 MILD INTERMITTENT ASTHMA WITHOUT COMPLICATION: ICD-10-CM

## 2024-07-09 RX ORDER — ALBUTEROL SULFATE 90 UG/1
2 AEROSOL, METERED RESPIRATORY (INHALATION) EVERY 6 HOURS PRN
Qty: 18 G | Refills: 2 | Status: SHIPPED | OUTPATIENT
Start: 2024-07-09

## 2024-07-11 ENCOUNTER — MYC REFILL (OUTPATIENT)
Dept: PALLIATIVE MEDICINE | Facility: CLINIC | Age: 29
End: 2024-07-11
Payer: COMMERCIAL

## 2024-07-11 DIAGNOSIS — M54.16 LUMBAR RADICULOPATHY: ICD-10-CM

## 2024-07-11 RX ORDER — PREGABALIN 150 MG/1
150 CAPSULE ORAL 2 TIMES DAILY
Qty: 180 CAPSULE | Refills: 3 | Status: SHIPPED | OUTPATIENT
Start: 2024-07-11

## 2024-07-11 NOTE — TELEPHONE ENCOUNTER
Refills have been requested for the following medications:         pregabalin (LYRICA) 150 MG capsule [Marni Gray]     Preferred pharmacy: [id number] - [place], [place] - [address] [place] [address] [address] [facility] [address]

## 2024-07-11 NOTE — TELEPHONE ENCOUNTER
Patient request for a refill(s) of pregabalin (LYRICA) 150 MG capsule     Last dispensed from pharmacy on 4/26/24    Patient's last office/virtual visit by prescribing provider on 2/14/24  Next office/virtual appointment scheduled for None     [id number] - [place], [place]     Will route to nursing [place] for review and preparation of prescription(s).

## 2024-08-12 ENCOUNTER — ANCILLARY PROCEDURE (OUTPATIENT)
Dept: GENERAL RADIOLOGY | Facility: CLINIC | Age: 29
End: 2024-08-12
Attending: NURSE PRACTITIONER
Payer: COMMERCIAL

## 2024-08-12 ENCOUNTER — OFFICE VISIT (OUTPATIENT)
Dept: PALLIATIVE MEDICINE | Facility: CLINIC | Age: 29
End: 2024-08-12
Payer: COMMERCIAL

## 2024-08-12 ENCOUNTER — APPOINTMENT (OUTPATIENT)
Dept: PALLIATIVE MEDICINE | Facility: CLINIC | Age: 29
End: 2024-08-12
Payer: COMMERCIAL

## 2024-08-12 VITALS — SYSTOLIC BLOOD PRESSURE: 152 MMHG | DIASTOLIC BLOOD PRESSURE: 91 MMHG | OXYGEN SATURATION: 100 % | HEART RATE: 87 BPM

## 2024-08-12 DIAGNOSIS — M96.1 FAILED BACK SYNDROME: ICD-10-CM

## 2024-08-12 DIAGNOSIS — M54.16 LUMBAR RADICULOPATHY: ICD-10-CM

## 2024-08-12 DIAGNOSIS — Z96.89 SPINAL CORD STIMULATOR STATUS: ICD-10-CM

## 2024-08-12 DIAGNOSIS — M54.16 LUMBAR RADICULOPATHY: Primary | ICD-10-CM

## 2024-08-12 PROCEDURE — 72072 X-RAY EXAM THORAC SPINE 3VWS: CPT | Mod: TC | Performed by: PREVENTIVE MEDICINE

## 2024-08-12 PROCEDURE — 99213 OFFICE O/P EST LOW 20 MIN: CPT | Performed by: NURSE PRACTITIONER

## 2024-08-12 PROCEDURE — 72100 X-RAY EXAM L-S SPINE 2/3 VWS: CPT | Mod: TC | Performed by: PREVENTIVE MEDICINE

## 2024-08-12 ASSESSMENT — PAIN SCALES - PAIN ENJOYMENT GENERAL ACTIVITY SCALE (PEG)
INTERFERED_GENERAL_ACTIVITY: 10
PEG_TOTALSCORE: 9
AVG_PAIN_PASTWEEK: 7
INTERFERED_GENERAL_ACTIVITY: 10 - COMPLETELY INTERFERES
INTERFERED_ENJOYMENT_LIFE: 10
AVG_PAIN_PASTWEEK: 7
PEG_TOTALSCORE: 9
INTERFERED_ENJOYMENT_LIFE: 10 - COMPLETELY INTERFERES

## 2024-08-12 ASSESSMENT — PAIN SCALES - GENERAL: PAINLEVEL: SEVERE PAIN (7)

## 2024-08-12 NOTE — PROGRESS NOTES
[facility] Pain Management     Date of Visit: 8/12/2024  Last visit: 2/14/24  Original Consult: 2/2/2023    Dipak Fuentes is a 29 year old male with PMH significant for lumbar ddd with radiculopathy, s/p microdiscectomy x2, bipolar depression, substance use disorder in prolonged remission who is seen today for follow-up on chronic pain treatment.     History:  Lumbar microdiscectomy surgery 9/2022 with persistent pain. Treated with PT (pelvic, pool and land). Symptom management with cannabis, pregabalin and tizanidine; underwent successful SCS trial 12/12/23-12/19/23, implant on 1/30/24.    Recommendations from last visit:  Will follow-up as needed to check in.   ____________________________________________________________    Interval History   - More pain in the past month. Then in the past 2 weeks he had significant increased pain in his lower back and right leg.   - Has not yet met with [employer] representative, has messaged with López. None of his current settings are helpful. Turning the stim off made his pain worse.   - increased urinary frequency, sometimes urinates and is not aware (this was a long standing issue prior to SCS implant.)  -Prior to this pain episode, he was able to walk without a cane and tolerate weight bearing on his right leg.   - Lyrica at 150 BID, tizanidine prn and THC are helpful.  Overall is focused on gaining and maintaining function.    - Promoted at work since last seen. Still working on [employer].  Restored some mopeds, this has been fun.    Current pain medications:  Tizanidine 4 mg Tprn  Pregabalin 150 mg BID    Pain description:  Low back with radiation into right side numbness from the knee down  Current pain rating Severe Pain (7)  Aggravating factors include: Sitting on hard chairs, lying down makes back pain worse, sitting and standing makes right leg pain worse  Relieving factors include: icing helps some, tends to provide more immediate relief, delta 9 THC helps  "reduce pain, SCS    Pain Intensity and Interference in the past week  Average Pain 7  Pain interference with your ENJOYMENT OF LIFE? 10 - Completely interferes  Pain interference with your GENERAL ACTIVITY? 10 - Completely interferes  PEG Total Score: 9    PAIN MANAGEMENT TREATMENT HISTORY  1. MEDICATIONS:  Opiates: Oxycodone after surgery- Helped  NSAIDS: Ibuprofen (Advil, Motrin).  Medication was not helpful , only takes it when his wife and mom \"bug me to take it because I am in pain\" Diclofenac -not helpful  Muscle Relaxants: Cyclobenzaprine (Flexeril).  Medication was helpful  Methocarbamol (Robaxin).  Medication was helpful  Tizanidine (Zanaflex).  Medication was helpful  Helps with spasms, tizanidine most helpful  Anti-depressants: Remeron most recently - helpful. Mood stabilizers have been helpful in the past; tried on many anti-psychotics: had hallucinations, lithium: made him hard to be around, lamotrigine - skin rash. Now uses THC to manage polo with success  Anxiolytics: tried hydroxyzine in the past  Neuropathics: Gabapentin (Neurontin).  Medication was not helpful, trialed once at 300 mg TID, then again at 600 mg TID. Lyrica -helpful  Topicals: CBD spray - not helpful, lidocaine patches - not helpful  Adjuvant pain medications: THC helps his neuropathic pain sleep  2. PHYSICAL THERAPY:   Pool PT at [facility] with Sara Ro, PT was helpful, continues HEP  Pelvic floor therapy helpful for urinary symptoms after back surgery   Traditional PT after surgery not helpful.  3. PAIN PSYCHOLOGY:   Therapy in the past for biopolar management, not in several years  4. SURGERY:   1/30/24 spinal cord stimulator implant and surgery with Dr. Alfonso  9/21/2022 Right L5-S1 minimally invasive microdiscectomy with Dr. Oquendo  10/7/2020  Right L5-S1 minimally invasive microdiscectomy with Dr. Oquendo  5. INJECTIONS:   8/25/22: right S1-S2 transforaminal epidural corticosteroid injection with Dr. Shay - worsened " pain  3/10/20: Lumbar L5-S1 interlaminar epidural steroid injection with Dr. Esteves- had 2 weeks of nothing, then 2 weeks of relief and then back to painful  6. COMPLEMENTARY THERAPY:  Chiropractic  not helpful  Acupuncture/acupressure/ not tried  TENS unit  not helpful made pain worse  cold/cold packs: helpful  distraction/mindfulness: somewhat helpful   meditation/guided imagery: somewhat helpful    Imagin2024 Thoracic MRI  IMPRESSION:  1.  Multilevel thoracic spondylosis with multiple small disc herniations as detailed above. The largest is an inferiorly dissecting disc extrusion at T4-T5 with disc material indenting the ventral cord and contributing to mild spinal canal stenosis.  2.  No greater than mild spinal canal stenosis in the thoracic spine.  3.  No substantial neural foraminal stenosis.  4.  No cord signal abnormality.    10/7/2022 Lumbar MRI at [facility]          Social History   Social History     Tobacco Use    Smoking status: Former     Current packs/day: 0.50     Average packs/day: 0.5 packs/day for 5.0 years (2.5 ttl pk-yrs)     Types: Cigarettes, Other    Smokeless tobacco: Never   Vaping Use    Vaping status: Every Day    Substances: Nicotine, THC, CBD    Devices: Pre-filled or refillable cartridge, Refillable tank   Substance Use Topics    Alcohol use: Not Currently     Comment: over 6 months    Drug use: Yes     Types: Marijuana     Comment: bipolar-concentrates 1-2 x day      Social History     Social History Narrative    .     Works in IT    Likes to bake for fun, mostly bread. Likes to play video games with his daughter.    8 y.o. daughter       Medications and Allergies reviewed.  Ceclor [cefaclor monohydrate], Lamictal [lamotrigine], Latex, and Cefaclor  Medications   has a current medication list which includes the following prescription(s): acetaminophen, albuterol, pregabalin, and tizanidine.    Objective   Vitals:    24 0900   BP: (!) 152/91   BP Location: Right arm    Patient Position: Chair   Cuff Size: Adult Regular   Pulse: 87   SpO2: 100%     Constitutional: Well developed, well nourished, appears stated age. No acute distress.  Gait is steady.  HEENT: Head atraumatic, normocephalic. Eyes without conjunctival injection or jaundice.   Skin: No obvious lesions, or petechiae of exposed skin. Mulitple tattoos present  Extremities: Peripheral pulses intact. No clubbing, cyanosis, or edema. Moves all extremities.  Psychiatric/mental status: Alert, without lethargy or stupor. Speech fluent. Appropriate affect. Mood normal. Able to follow commands without difficulty.   Musculoskeletal exam: Normal bulk and tone. Unremarkable spinal curvature.   lower extremities strength intact. Exam elicits pain response due to right leg pain. Sensation diminished in right foot.    Assessment & Plan   1.  Lumbar radiculopathy  2.  Failed back syndrome  3.  Spinal cord stimulator status    Lumbar and thoracic XR images ordered. Will have him meet with a [employer] representative for stimulator evaluation and potential reprogramming.     Marni Gray, CNP-BC, PMGT-BC, AP-PMN  [facility]

## 2024-08-15 ENCOUNTER — TELEPHONE (OUTPATIENT)
Dept: PALLIATIVE MEDICINE | Facility: CLINIC | Age: 29
End: 2024-08-15
Payer: COMMERCIAL

## 2024-08-15 NOTE — TELEPHONE ENCOUNTER
[facility] Call Center    Phone Message    May a detailed message be left on voicemail: yes     Reason for Call: Other: Patient calling wanting to speak with the [employer] King vilchis for an appt on Monday.  Please call him back to advise.      Action Taken: Message routed to:  Other: [facility]     Travel Screening: Not Applicable     Date of Service:

## 2024-08-15 NOTE — TELEPHONE ENCOUNTER
Appointment confirmed by Medkimmy BARRAGAN, RN Care Coordinator  [facility]  Pain [facility]

## 2024-08-15 NOTE — TELEPHONE ENCOUNTER
Called pt. Advised that we can coordinate with [employer] for appointment on Monday 08/19/24 at 830am.   Email sent to rep to confirm time/date.     Pt aware that nursing will call back if rep not available.      Gabriela BARRAGAN, RN Care Coordinator  [facility]  Pain [facility]

## 2024-08-19 ENCOUNTER — ALLIED HEALTH/NURSE VISIT (OUTPATIENT)
Dept: PALLIATIVE MEDICINE | Facility: CLINIC | Age: 29
End: 2024-08-19
Payer: COMMERCIAL

## 2024-08-19 DIAGNOSIS — Z96.89 SPINAL CORD STIMULATOR STATUS: Primary | ICD-10-CM

## 2024-08-19 PROCEDURE — 99207 PR NO CHARGE LOS: CPT

## 2024-08-19 NOTE — TELEPHONE ENCOUNTER
Appt completed 8/19    Zaynab JETER RN Care Coordinator  [facility] Pain [facility]

## 2024-08-27 ENCOUNTER — TELEPHONE (OUTPATIENT)
Dept: PALLIATIVE MEDICINE | Facility: CLINIC | Age: 29
End: 2024-08-27
Payer: COMMERCIAL

## 2024-08-27 NOTE — TELEPHONE ENCOUNTER
Called pt. Scheduled for nurse only to coordinate with [employer]. Email sent to rep.  Scheduled for follow up with provider as well.         Gabriela BARRAGAN, RN Care Coordinator  [facility]  Pain Management

## 2024-08-27 NOTE — TELEPHONE ENCOUNTER
[facility] Call Center    Phone Message    May a detailed message be left on voicemail: yes     Reason for Call: Other: Please see patient's mychart request below:      Appointment Request From: Dipak Fuentes    With Provider: Marni Gray [[facility] [place]]    Preferred Date Range: Any    Preferred Times: Any Time    Reason for visit: Request an Appointment    Comments:  Reprogrammed twice and not very helpful having issues getting ahold of [employer] rep not sure what to do next    Writer is unable to schedule with the nurse for [employer] devices    Action Taken: Message routed to:  Other: Pain Nurse    Travel Screening: Not Applicable     Date of Service:

## 2024-08-30 ENCOUNTER — ALLIED HEALTH/NURSE VISIT (OUTPATIENT)
Dept: PALLIATIVE MEDICINE | Facility: CLINIC | Age: 29
End: 2024-08-30
Payer: COMMERCIAL

## 2024-08-30 DIAGNOSIS — Z96.89 SPINAL CORD STIMULATOR STATUS: Primary | ICD-10-CM

## 2024-09-03 ENCOUNTER — OFFICE VISIT (OUTPATIENT)
Dept: PALLIATIVE MEDICINE | Facility: CLINIC | Age: 29
End: 2024-09-03
Payer: COMMERCIAL

## 2024-09-03 VITALS — DIASTOLIC BLOOD PRESSURE: 87 MMHG | SYSTOLIC BLOOD PRESSURE: 130 MMHG | HEART RATE: 74 BPM | OXYGEN SATURATION: 98 %

## 2024-09-03 DIAGNOSIS — M54.16 LUMBAR RADICULOPATHY: ICD-10-CM

## 2024-09-03 DIAGNOSIS — Z96.89 SPINAL CORD STIMULATOR STATUS: ICD-10-CM

## 2024-09-03 DIAGNOSIS — G89.4 CHRONIC PAIN SYNDROME: Primary | ICD-10-CM

## 2024-09-03 DIAGNOSIS — M96.1 FAILED BACK SYNDROME: ICD-10-CM

## 2024-09-03 PROCEDURE — 99214 OFFICE O/P EST MOD 30 MIN: CPT | Performed by: NURSE PRACTITIONER

## 2024-09-03 ASSESSMENT — PAIN SCALES - GENERAL: PAINLEVEL: SEVERE PAIN (6)

## 2024-09-03 NOTE — PATIENT INSTRUCTIONS
Options to Consider  - Consider acupuncture. You could consider acupuncture, check with your insurance to see if it is covered and where it is covered. To find a licensed Acupuncturist that may be a fit for you, try starting with this website: www.nccaom.org and use the provider finder feature. You may need to try different providers until you find one that is a fit for you.   - Consider caudal epidural steroid injection  - Amitriptyline or Nortriptyline to decrease nerve pain. (Old antidepressants)  - Give new stimulator pattern time to set in and become effective.   _____________________________________________________  Pain Clinic telephone number: [phone]. Messages are returned Monday - Friday between 8 AM and 4:00 PM.  If you need a medication refilled, call the clinic or send a [employer] message with the medication name and pharmacy 7 days in advance as it may take 3-4 days to process.  We do not refill medications on evenings or weekends.

## 2024-09-03 NOTE — PROGRESS NOTES
"[facility] Pain Management     Date of Visit: 9/3/2024  Last visit: 8/12/2024  Original Consult: 2/2/2023    Dipak Fuentes is a 29 year old male with PMH significant for lumbar ddd with radiculopathy, s/p microdiscectomy x2, bipolar depression, substance use disorder in prolonged remission who is seen today for follow-up on chronic pain treatment.     History:  Lumbar microdiscectomy surgery 9/2022 with persistent pain. Treated with PT (pelvic, pool and land). Symptom management with cannabis, pregabalin and tizanidine; underwent successful SCS trial 12/12/23-12/19/23, implant on 1/30/24.    Recommendations from last visit:  Lumbar and thoracic XR images ordered. Will have him meet with a [employer] representative for stimulator evaluation and potential reprogramming.   ____________________________________________________________    Interval History   - He has met with [employer] representative x 3 in the past 3 weeks for reprogramming, last on 8/30/24.   - Right leg pain is the most significant pain he is experiencing. Last reprogramming is \"the best\" so far. Trying to reduce stress helps too.  - When his right leg is painful, it radiates up to his back. Is only able to walk about 2 steps before his leg feels weak.  - increased urinary frequency, sometimes urinates and is not aware - seems to be related to pain level.   -Prior to this recent pain episode, he was able to walk without a cane and tolerate weight bearing on his right leg.   - Lyrica at 150 BID, tizanidine prn and THC are helpful. .    - Promoted at work since last seen. Still working on [employer] projects.   Thinks that his change in employment probably contributed to his stress level.    Current pain medications:  Tizanidine 4 mg Tprn  Pregabalin 150 mg BID    Pain description:  Low back with radiation into right side numbness from the knee down  Current pain rating Severe Pain (6)  Aggravating factors include: Sitting on hard chairs, lying down " "makes back pain worse, sitting and standing makes right leg pain worse  Relieving factors include: icing helps some, tends to provide more immediate relief, delta 9 THC helps reduce pain, SCS  Pain Intensity and Interference in the past week 7.67    PAIN MANAGEMENT TREATMENT HISTORY  1. MEDICATIONS:  Opiates: Oxycodone after surgery- Helped  NSAIDS: Ibuprofen.  Medication was not helpful , only takes it when his wife and mom \"bug me to take it because I am in pain\" Diclofenac -not helpful  Muscle Relaxants: Cyclobenzaprine - Medication was helpful  Methocarbamol -Medication was helpful; Tizanidine -Medication was helpful  Helps with spasms, tizanidine most helpful  Anti-depressants: Remeron most recently - helpful. Mood stabilizers have been helpful in the past; tried on many anti-psychotics: had hallucinations, lithium: made him hard to be around, lamotrigine - skin rash. Now uses THC to manage polo with success  Anxiolytics: tried hydroxyzine in the past  Neuropathics: Gabapentin -was not helpful, trialed once at 300 mg TID, then again at 600 mg TID. Lyrica -helpful  Topicals: CBD spray - not helpful, lidocaine patches - not helpful  Adjuvant pain medications: THC helps his neuropathic pain sleep  2. PHYSICAL THERAPY:   Pool PT at [facility] with Sara Ro, PT was helpful, continues HEP  Pelvic floor therapy helpful for urinary symptoms after back surgery   Traditional PT after surgery not helpful.  3. PAIN PSYCHOLOGY:   Therapy in the past for biopolar management, not in several years  4. SURGERY:   1/30/24 spinal cord stimulator implant and surgery with Dr. Alfonso  9/21/2022 Right L5-S1 minimally invasive microdiscectomy with Dr. Oquendo  10/7/2020  Right L5-S1 minimally invasive microdiscectomy with Dr. Oquendo  5. INJECTIONS:   8/25/22: right S1-S2 transforaminal epidural corticosteroid injection with Dr. Shay - worsened pain  3/10/20: Lumbar L5-S1 interlaminar epidural steroid injection with Dr." Linn- had 2 weeks of nothing, then 2 weeks of relief and then back to painful  6. COMPLEMENTARY THERAPY:  Chiropractic  not helpful  Acupuncture/acupressure/ not tried  TENS unit  not helpful made pain worse  cold/cold packs: helpful  distraction/mindfulness: somewhat helpful   meditation/guided imagery: somewhat helpful    Imagin2024 Thoracic MRI  IMPRESSION:  1.  Multilevel thoracic spondylosis with multiple small disc herniations as detailed above. The largest is an inferiorly dissecting disc extrusion at T4-T5 with disc material indenting the ventral cord and contributing to mild spinal canal stenosis.  2.  No greater than mild spinal canal stenosis in the thoracic spine.  3.  No substantial neural foraminal stenosis.  4.  No cord signal abnormality.    10/7/2022 Lumbar MRI at [facility]          Social History   Social History     Tobacco Use    Smoking status: Former     Current packs/day: 0.50     Average packs/day: 0.5 packs/day for 5.0 years (2.5 ttl pk-yrs)     Types: Cigarettes, Other    Smokeless tobacco: Never   Vaping Use    Vaping status: Every Day    Substances: Nicotine, THC, CBD    Devices: Pre-filled or refillable cartridge, Refillable tank   Substance Use Topics    Alcohol use: Not Currently     Comment: over 6 months    Drug use: Yes     Types: Marijuana     Comment: bipolar-concentrates 1-2 x day      Social History     Social History Narrative    .     Works in IT    Likes to bake for fun, mostly bread. Likes to play video games with his daughter.    8 y.o. daughter       Medications and Allergies reviewed.  Ceclor [cefaclor monohydrate], Lamictal [lamotrigine], Latex, and Cefaclor  Medications   has a current medication list which includes the following prescription(s): acetaminophen, albuterol, pregabalin, and tizanidine.    Objective   Vitals:    24 0912   BP: 130/87   Pulse: 74   SpO2: 98%   Constitutional: Well developed, well nourished, appears stated age. No acute  distress.  Gait is steady.  HEENT: Head atraumatic, normocephalic. Eyes without conjunctival injection or jaundice.   Skin: No obvious lesions, or petechiae of exposed skin. Mulitple tattoos present  Extremities: Peripheral pulses intact. No clubbing, cyanosis, or edema. Moves all extremities.  Psychiatric/mental status: Alert, without lethargy or stupor. Speech fluent. Appropriate affect. Mood normal. Able to follow commands without difficulty.   Musculoskeletal exam: Normal bulk and tone. Unremarkable spinal curvature. Lower extremities strength intact. Exam elicits pain response due to right leg pain. Sensation diminished in right foot.    Assessment & Plan   Chronic pain Syndrome  2.  Lumbar radiculopathy  3.  Failed back syndrome  4.  Spinal cord stimulator status    - Consider acupuncture  - Consider caudal epidural steroid injection, order placed for caudal  - Referral placed to meet with Homa Isabel again as an additional layer of support.  - Give new stimulator pattern time to set in and become effective.     Marni Gray, CNP-BC, PMGT-BC, AP-PMN  [facility]

## 2024-09-17 NOTE — PROGRESS NOTES
[facility] - Procedure Note    Date of Visit: 9/18/2024    Procedure performed: Lumbar L5-S1 interlaminar epidural steroid injection  Diagnosis: Lumbar spondylosis; Lumbar radiculitis/radiculopathy  : Payal Schwarz MD  Anesthesia: none    Indications: Dipak Fuentes is a 29 year old male who is seen at the request of Marni Gray CNP for a lumbar epidural steroid injection. The patient describes right sided leg pain. The patient has been exhibiting symptoms consistent with lumbar intraspinal inflammation and radiculopathy. Symptoms have been persistent, disabling, and intermittently severe. The patient reports minimal improvement with conservative treatment, including previous injections, surgeries, PT and medications.    INJECTIONS:   8/25/22: right S1-S2 transforaminal epidural corticosteroid injection with Dr. Shay - worsened pain  3/10/20: Lumbar L5-S1 interlaminar epidural steroid injection with Dr. Delgado- had 2 weeks of nothing, then 2 weeks of relief and then back to painful    SURGICAL HISTORY:   1/30/24 spinal cord stimulator implant Dr. Alfonso  9/21/2022 Right L5-S1 minimally invasive microdiscectomy Dr. Oquendo  10/7/2020  Right L5-S1 minimally invasive microdiscectomy Dr. Oquendo    MRI LUMBAR SPINE was done on 8/11/2022 which showed:   FINDINGS:   Nomenclature is based on 5 lumbar type vertebral bodies.  Normal vertebral body heights.  No concerning bone marrow lesions.  Normal distal spinal cord and cauda equina with conus medullaris at L1-L2. No intradural pathologic enhancement. Unremarkable   visualized bony pelvis.  No significant subluxations.     Right L5-S1 hemilaminectomy. Right L5-S1 subarticular zone demonstrates a prominent perineural sleeve. Right L5-S1 subarticular zone demonstrates a round area of nonenhancement measuring 8 mm with surrounding enhancement. This likely reflects enhancement   around a right perineural sleeve as well as recurrent disc  herniation with surrounding epidural fibrosis. This may be affecting the descending right S1 nerve root.     L5-S1 degenerative disc disease manifested by disc desiccation, mild disc space loss of height, endplate osteophytes, and degenerative type I and type II Modic changes.     Remaining levels demonstrate normal disc space signal and disc space height. Lower lumbar spine mild facet arthropathy.     T12-L1: No significant bulge or posterior disc protrusion. No significant thecal sac stenosis. No significant neural foraminal stenosis.     L1-L2: No significant bulge or posterior disc protrusion. No significant thecal sac stenosis. No significant neural foraminal stenosis.     L2-L3: No significant bulge or posterior disc protrusion. No significant thecal sac stenosis. No significant neural foraminal stenosis.     L3-L4: No significant bulge or posterior disc protrusion. No significant thecal sac stenosis. No significant neural foraminal stenosis.     L4-L5: Slight bulge with mild left foraminal extension. No significant thecal sac stenosis. Minimal left foraminal stenosis. No significant right foraminal stenosis.     L5-S1: Prominent bulge with superimposed right greater than left broad-based posterior disc protrusion. No significant thecal sac stenosis. Mild left foraminal stenosis. Moderate right foraminal stenosis.     EXTRASPINAL FINDINGS:  No significant findings.                                                                     IMPRESSION:  1.  Right L5-S1 hemilaminectomy. Right L5-S1 subarticular zone demonstrates a prominent perineural sleeve. Right L5-S1 subarticular zone demonstrates a round area of nonenhancement measuring 8 mm with surrounding enhancement. This likely reflects   enhancement around a right perineural sleeve as well as recurrent disc herniation with surrounding epidural fibrosis. This may be affecting the descending right S1 nerve root.     2.  L5-S1 degenerative disc disease described  "above.     3.  Additional mild degenerative changes described above.    Allergies:      Allergies   Allergen Reactions    Ceclor [Cefaclor Monohydrate]     Lamictal [Lamotrigine]      Had known side effect of\"skin eating rash\"  Jaycob lauren syndrome    Latex      PN: Converted from LW Latex Sensitivity Flag    Cefaclor Rash        Vitals:  /72 (BP Location: Left arm, Patient Position: Chair, Cuff Size: Adult Regular)   Pulse 70   SpO2 98%     Review of Systems: The patient denies recent fever, chills, illness, use of antibiotics or anticoagulants. All other 10-point review of systems negative.     Procedure: The procedure and risks were explained, and informed written consent was obtained from the patient. Risks include but are not limited to: infection, bleeding, increased pain, and damage to soft tissue, nerve, muscle, and vasculature structures. After getting informed consent, patient was brought into the procedure suite and was placed in a prone position on the procedure table. A Pause for the Cause was performed. Patient was prepped and draped in sterile fashion.     The L5-S1 interspace was identified with use of fluoroscopy in AP view. A 25-gauge, 1.5 inch needle was used to anesthetize the skin and subcutaneous tissue entry site with a total of 2 ml of 1% lidocaine. Under fluoroscopic visualization, a 22-gauge, 3.5 inch Tuohy epidural needle was slowly advanced towards the epidural space a few millimeters left of midline. The latter part of the needle advancement was guided with fluoroscopy in the lateral view. The epidural space was identified using loss of resistance technique. After negative aspiration for heme and cerebrospinal fluid, a total of 1 mL of non-ionic contrast was injected to confirm needle placement with 0 mL of contrast wasted. Epidurogram confirmed spread within the posterior epidural space. 2 ml of 40mg/ml of triamcinolone, 2 ml of 1% lidocaine, and 1 ml of preservative free " saline was injected. The needle was removed.  Images were saved to PACS.    The patient tolerated the procedure well, and there was no evidence of procedural complications. No new sensory or motor deficits were noted following the procedure. The patient was stable and able to ambulate on discharge home. Post-procedure instructions were provided.     Pre-procedure pain score: 5/10 in the back, 5/10 in the leg  Post-procedure pain score: 1/10 in the back, 6/10 in the leg    Assessment/Plan: Dipak Fuentes is a 29 year old male s/p lumbar interlaminar epidural steroid injection today for lumbar spondylosis and radiculitis/radiculopathy.     1. Following today's procedure, the patient was advised to contact the Pain Management Center for any of the following:   Fever, chills, or night sweats   New onset of pain, numbness, or weakness   Any questions/concerns regarding the procedure  If unable to contact the Pain Center, the patient was instructed to go to a local Emergency Room for any complications.   2. Follow-up with the referring provider in 2 weeks for post-procedure evaluation.    LETICIA SANCHEZ MD   Pain Management

## 2024-09-18 ENCOUNTER — RADIOLOGY INJECTION OFFICE VISIT (OUTPATIENT)
Dept: PALLIATIVE MEDICINE | Facility: CLINIC | Age: 29
End: 2024-09-18
Attending: NURSE PRACTITIONER
Payer: COMMERCIAL

## 2024-09-18 VITALS — SYSTOLIC BLOOD PRESSURE: 135 MMHG | DIASTOLIC BLOOD PRESSURE: 91 MMHG | HEART RATE: 80 BPM | OXYGEN SATURATION: 97 %

## 2024-09-18 DIAGNOSIS — M54.16 LUMBAR RADICULOPATHY: Primary | ICD-10-CM

## 2024-09-18 PROCEDURE — 62323 NJX INTERLAMINAR LMBR/SAC: CPT | Performed by: ANESTHESIOLOGY

## 2024-09-18 RX ORDER — TRIAMCINOLONE ACETONIDE 40 MG/ML
40 INJECTION, SUSPENSION INTRA-ARTICULAR; INTRAMUSCULAR ONCE
Status: COMPLETED | OUTPATIENT
Start: 2024-09-18 | End: 2024-09-18

## 2024-09-18 RX ADMIN — TRIAMCINOLONE ACETONIDE 40 MG: 40 INJECTION, SUSPENSION INTRA-ARTICULAR; INTRAMUSCULAR at 09:34

## 2024-09-18 ASSESSMENT — PAIN SCALES - GENERAL
PAINLEVEL: MODERATE PAIN (4)
PAINLEVEL: NO PAIN (1)

## 2024-09-18 NOTE — PATIENT INSTRUCTIONS
[facility] Procedure Discharge Instructions    Today you saw:   Dr. Payal Schwarz     Your procedure:  L5-S1 Epidural steroid injection       Medications used:  Lidocaine (anesthetic)   Kenalog (steroid)  Omnipaque (contrast)      If you were holding your blood thinning medication, please restart taking it: N/A        Be cautious when walking as numbness and/or weakness in the legs may occur up to 6-8 hours after the procedure due to effect of the local anesthetic  Do not drive for 6 hours. The effect of the local anesthetic could slow your reflexes.   Avoid strenuous activity for the first 24 hours. You may resume your regular activities after that.   You may shower, however avoid swimming, tub baths or hot tubs for 24 hours following your procedure  You may have a mild to moderate increase in pain for several days following the injection.    You may use ice packs for 10-15 minutes, 3 to 4 times a day at the injection site for comfort  Do not use heat to painful areas for 6 to 8 hours. This will give the local anesthetic time to wear off and prevent you from accidentally burning your skin.  Unless you have been directed to avoid the use of anti-inflammatory medications (NSAIDS-ibuprofen, Aleve, Motrin), you may use these medications or Tylenol for pain control if needed.   With diabetes, check your blood sugar more frequently than usual as your blood sugar may be higher than normal for 10-14 days following a steroid injection. Contact your doctor who manages your diabetes if your blood sugar is higher than usual  Possible side effects of steroids that you may experience include flushing, elevated blood pressure, increased appetite, mild headaches and restlessness.  All of these symptoms will get better with time.  It may take up to 14 days for the steroid medication to start working although you may feel the effect as early as a few days after the procedure.   Follow up with your referring  provider in 2-3 weeks    If you experience any of the following, call the pain center line during work hours at [phone] or on-call physician after hours at [phone]:  -Fever over 100 degree F  -Swelling, bleeding, redness, drainage, warmth at the injection site  -Progressive weakness or numbness in your legs or arms  -Loss of bowel or bladder function  -Unusual headache that is not relieved by Tylenol or your regular headache medication  -Unusual new onset of pain that is not improving

## 2024-10-03 ENCOUNTER — OFFICE VISIT (OUTPATIENT)
Dept: PALLIATIVE MEDICINE | Facility: CLINIC | Age: 29
End: 2024-10-03
Payer: COMMERCIAL

## 2024-10-03 VITALS — HEART RATE: 70 BPM | DIASTOLIC BLOOD PRESSURE: 88 MMHG | OXYGEN SATURATION: 100 % | SYSTOLIC BLOOD PRESSURE: 150 MMHG

## 2024-10-03 DIAGNOSIS — Z98.890 S/P LUMBAR MICRODISCECTOMY: Primary | ICD-10-CM

## 2024-10-03 DIAGNOSIS — M54.16 LUMBAR RADICULOPATHY: ICD-10-CM

## 2024-10-03 DIAGNOSIS — M96.1 FAILED BACK SYNDROME: ICD-10-CM

## 2024-10-03 DIAGNOSIS — Z96.89 SPINAL CORD STIMULATOR STATUS: ICD-10-CM

## 2024-10-03 PROCEDURE — 99213 OFFICE O/P EST LOW 20 MIN: CPT | Performed by: NURSE PRACTITIONER

## 2024-10-03 RX ORDER — DIAZEPAM 5 MG/1
5 TABLET ORAL SEE ADMIN INSTRUCTIONS
Qty: 2 TABLET | Refills: 0 | Status: SHIPPED | OUTPATIENT
Start: 2024-10-03 | End: 2024-10-29

## 2024-10-03 ASSESSMENT — PAIN SCALES - PAIN ENJOYMENT GENERAL ACTIVITY SCALE (PEG)
INTERFERED_GENERAL_ACTIVITY: 8
AVG_PAIN_PASTWEEK: 7
INTERFERED_ENJOYMENT_LIFE: 8
INTERFERED_ENJOYMENT_LIFE: 8
PEG_TOTALSCORE: 7.67
INTERFERED_GENERAL_ACTIVITY: 8
AVG_PAIN_PASTWEEK: 7
PEG_TOTALSCORE: 7.67

## 2024-10-03 ASSESSMENT — PAIN SCALES - GENERAL: PAINLEVEL: MODERATE PAIN (4)

## 2024-10-03 NOTE — PATIENT INSTRUCTIONS
Lumbar MRI ordered.   Valium 5 mg for MRI ordered - may only need 1 tablet, but I ordered 2 tablets so you may repeat in 30 minutes if needed.     Pain Clinic telephone number: [phone]. Messages are returned Monday - Friday between 8 AM and 4:00 PM.  If you need a medication refilled, call the clinic or send a [employer] message with the medication name and pharmacy 7 days in advance as it may take 3-4 days to process.  We do not refill medications on evenings or weekends.

## 2024-10-03 NOTE — PROGRESS NOTES
[facility]     Date of Visit: 9/3/2024  Last visit: 8/12/2024  Original Consult: 2/2/2023    Dipak Fuentes is a 29 year old male with PMH significant for lumbar ddd with radiculopathy, s/p microdiscectomy x2, bipolar depression, substance use disorder in prolonged remission who is seen today for follow-up on chronic pain treatment.     History:  Lumbar microdiscectomy surgery 9/2022 with persistent pain. Treated with PT (pelvic, pool and land). Symptom management with cannabis, pregabalin and tizanidine; underwent successful SCS trial 12/12/23-12/19/23, implant on 1/30/24.    Recommendations from last visit:  - Consider acupuncture  - Consider caudal epidural steroid injection, order placed for caudal  - Referral placed to meet with Homa Isabel again as an additional layer of support.  - Give new stimulator pattern time to set in and become effective.   ____________________________________________________________    Interval History   - L5-S1 ILESI with Dr. Schwarz on 9/18/24  Not sleeping well due to increased pain with lying flat. 2 days of less pain, but also felt significant energy/insomnia. Then wore off quickly.   - Pain is better with standing. Still feels weak with walking. After 5 steps, he has nausea, urge to urinate, weakness.  - Primarily leg pain. Back pain is from occasional irritation around the battery.   - Difficulty with work from his perception, but no complaints from his employer.  - Has now had 2 months duration of worsening pain.  - Tried AQ, not helpful (in the lower back and right leg) Felt worse.  - He has met with [employer] representative x 3 in August for reprogramming, last on 8/30/24.   -Prior to this recent pain episode, he was able to walk without a cane and tolerate weight bearing on his right leg.   - Lyrica at 150 BID, tizanidine prn and THC are helpful. .    - Promoted at work since last seen. Still working on [employer] projects.   Thinks that his change in  "employment probably contributed to his stress level.    Current pain medications:  Tizanidine 4 mg Tprn  Pregabalin 150 mg BID    Pain description:  Low back with radiation into right side numbness from the knee down  Current pain rating Moderate Pain (4)  Aggravating factors include: Sitting on hard chairs, lying down makes back pain worse, sitting and standing makes right leg pain worse  Relieving factors include: icing helps some, tends to provide more immediate relief, delta 9 THC helps reduce pain, SCS  Pain Intensity and Interference in the past week 7.67    PAIN MANAGEMENT TREATMENT HISTORY  1. MEDICATIONS:  Opiates: Oxycodone after surgery- Helped  NSAIDS: Ibuprofen.  Medication was not helpful , only takes it when his wife and mom \"bug me to take it because I am in pain\" Diclofenac -not helpful  Muscle Relaxants: Cyclobenzaprine - Medication was helpful  Methocarbamol -Medication was helpful; Tizanidine -Medication was helpful  Helps with spasms, tizanidine most helpful  Anti-depressants: Remeron most recently - helpful. Mood stabilizers have been helpful in the past; tried on many anti-psychotics: had hallucinations, lithium: made him hard to be around, lamotrigine - skin rash. Now uses THC to manage polo with success  Anxiolytics: tried hydroxyzine in the past  Neuropathics: Gabapentin -was not helpful, trialed once at 300 mg TID, then again at 600 mg TID. Lyrica -helpful  Topicals: CBD spray - not helpful, lidocaine patches - not helpful  Adjuvant pain medications: THC helps his neuropathic pain sleep  2. PHYSICAL THERAPY:   Pool PT at [facility] with Sara Ro, PT was helpful, continues HEP  Pelvic floor therapy helpful for urinary symptoms after back surgery   Traditional PT after surgery not helpful.  3. PAIN PSYCHOLOGY:   Therapy in the past for biopolar management, not in several years  4. SURGERY:   1/30/24 spinal cord stimulator implant and surgery with Dr. Alfonso  9/21/2022 Right L5-S1 " minimally invasive microdiscectomy with Dr. Oquendo  10/7/2020  Right L5-S1 minimally invasive microdiscectomy with Dr. Oquendo  5. INJECTIONS:   L5-S1 ILESI with Dr. Schwarz on 24: right S1-S2 transforaminal epidural corticosteroid injection with Dr. Shay - worsened pain  3/10/20: Lumbar L5-S1 interlaminar epidural steroid injection with Dr. Esteves- had 2 weeks of nothing, then 2 weeks of relief and then back to painful  6. COMPLEMENTARY THERAPY:  Chiropractic  not helpful  Acupuncture/acupressure: tried, not helpful and made pain worse  TENS unit  not helpful made pain worse  cold/cold packs: helpful  distraction/mindfulness: somewhat helpful   meditation/guided imagery: somewhat helpful    [id number] Thoracic MRI  IMPRESSION:  1.  Multilevel thoracic spondylosis with multiple small disc herniations as detailed above. The largest is an inferiorly dissecting disc extrusion at T4-T5 with disc material indenting the ventral cord and contributing to mild spinal canal stenosis.  2.  No greater than mild spinal canal stenosis in the thoracic spine.  3.  No substantial neural foraminal stenosis.  4.  No cord signal abnormality.    10/7/2022 Lumbar MRI at [facility]          Social History   Social History     Tobacco Use    Smoking status: Former     Current packs/day: 0.50     Average packs/day: 0.5 packs/day for 5.0 years (2.5 ttl pk-yrs)     Types: Cigarettes, Other    Smokeless tobacco: Never   Vaping Use    Vaping status: Every Day    Substances: Nicotine, THC, CBD    Devices: Pre-filled or refillable cartridge, Refillable tank   Substance Use Topics    Alcohol use: Not Currently     Comment: over 6 months    Drug use: Yes     Types: Marijuana     Comment: bipolar-concentrates 1-2 x day      Social History     Social History Narrative    .     Works in IT    Likes to bake for fun, mostly bread. Likes to play video games with his daughter.    8 y.o. daughter       Medications and Allergies  reviewed.  Ceclor [cefaclor monohydrate], Lamictal [lamotrigine], Latex, and Cefaclor  Medications   has a current medication list which includes the following prescription(s): acetaminophen, albuterol, pregabalin, and tizanidine.    Objective   Vitals:    10/03/24 0919   BP: (!) 150/88   Pulse: 70   SpO2: 100%   Constitutional: Well developed, well nourished, appears stated age. No acute distress.  Gait is steady.  HEENT: Head atraumatic, normocephalic. Eyes without conjunctival injection or jaundice.   Skin: No obvious lesions, or petechiae of exposed skin. Mulitple tattoos present  Extremities: Peripheral pulses intact. No clubbing, cyanosis, or edema. Moves all extremities.  Psychiatric/mental status: Alert, without lethargy or stupor. Speech fluent. Appropriate affect. Mood normal. Able to follow commands without difficulty.   Musculoskeletal exam: Normal bulk and tone. Unremarkable spinal curvature. Lower extremities strength intact. Exam elicits pain response due to right leg pain. Sensation diminished in right foot.    Assessment & Plan   S/P lumbar microdiscectomy  2.  Lumbar radiculopathy  3.  Failed back syndrome  4.  Spinal cord stimulator status    - Lumbar MRI ordered. Valium 5 mg X 2 tablets prescribed.   - Continue plan to meet with Homa Isabel again as an additional layer of support.  - Wait to consider reprogramming again until after MRI  - Will contact with results when available    Marni Gray, CNP-BC, PMGT-BC, AP-PMN  [facility]

## 2024-10-11 ENCOUNTER — OFFICE VISIT (OUTPATIENT)
Dept: PALLIATIVE MEDICINE | Facility: OTHER | Age: 29
End: 2024-10-11
Attending: NURSE PRACTITIONER
Payer: COMMERCIAL

## 2024-10-11 DIAGNOSIS — F31.81 BIPOLAR 2 DISORDER (H): Primary | ICD-10-CM

## 2024-10-11 DIAGNOSIS — G89.4 CHRONIC PAIN SYNDROME: ICD-10-CM

## 2024-10-11 PROCEDURE — 90791 PSYCH DIAGNOSTIC EVALUATION: CPT | Performed by: COUNSELOR

## 2024-10-11 ASSESSMENT — COLUMBIA-SUICIDE SEVERITY RATING SCALE - C-SSRS
4. HAVE YOU HAD THESE THOUGHTS AND HAD SOME INTENTION OF ACTING ON THEM?: NO
2. HAVE YOU ACTUALLY HAD ANY THOUGHTS OF KILLING YOURSELF?: YES
3. HAVE YOU BEEN THINKING ABOUT HOW YOU MIGHT KILL YOURSELF?: YES
5. HAVE YOU STARTED TO WORK OUT OR WORKED OUT THE DETAILS OF HOW TO KILL YOURSELF? DO YOU INTEND TO CARRY OUT THIS PLAN?: NO
1. IN THE PAST MONTH, HAVE YOU WISHED YOU WERE DEAD OR WISHED YOU COULD GO TO SLEEP AND NOT WAKE UP?: YES
6. IN YOUR LIFETIME, HAVE YOU EVER DONE ANYTHING, STARTED TO DO ANYTHING, OR PREPARED TO DO ANYTHING TO END YOUR LIFE?: YES

## 2024-10-11 ASSESSMENT — PATIENT HEALTH QUESTIONNAIRE - PHQ9
10. IF YOU CHECKED OFF ANY PROBLEMS, HOW DIFFICULT HAVE THESE PROBLEMS MADE IT FOR YOU TO DO YOUR WORK, TAKE CARE OF THINGS AT HOME, OR GET ALONG WITH OTHER PEOPLE: EXTREMELY DIFFICULT
SUM OF ALL RESPONSES TO PHQ QUESTIONS 1-9: 17
SUM OF ALL RESPONSES TO PHQ QUESTIONS 1-9: 17

## 2024-10-11 ASSESSMENT — ANXIETY QUESTIONNAIRES
GAD7 TOTAL SCORE: 14
IF YOU CHECKED OFF ANY PROBLEMS ON THIS QUESTIONNAIRE, HOW DIFFICULT HAVE THESE PROBLEMS MADE IT FOR YOU TO DO YOUR WORK, TAKE CARE OF THINGS AT HOME, OR GET ALONG WITH OTHER PEOPLE: EXTREMELY DIFFICULT
6. BECOMING EASILY ANNOYED OR IRRITABLE: NEARLY EVERY DAY
2. NOT BEING ABLE TO STOP OR CONTROL WORRYING: MORE THAN HALF THE DAYS
5. BEING SO RESTLESS THAT IT IS HARD TO SIT STILL: MORE THAN HALF THE DAYS
1. FEELING NERVOUS, ANXIOUS, OR ON EDGE: MORE THAN HALF THE DAYS
3. WORRYING TOO MUCH ABOUT DIFFERENT THINGS: MORE THAN HALF THE DAYS
7. FEELING AFRAID AS IF SOMETHING AWFUL MIGHT HAPPEN: SEVERAL DAYS
4. TROUBLE RELAXING: MORE THAN HALF THE DAYS
8. IF YOU CHECKED OFF ANY PROBLEMS, HOW DIFFICULT HAVE THESE MADE IT FOR YOU TO DO YOUR WORK, TAKE CARE OF THINGS AT HOME, OR GET ALONG WITH OTHER PEOPLE?: EXTREMELY DIFFICULT
GAD7 TOTAL SCORE: 14
7. FEELING AFRAID AS IF SOMETHING AWFUL MIGHT HAPPEN: SEVERAL DAYS
GAD7 TOTAL SCORE: 14
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
## 2024-10-23 ENCOUNTER — HOSPITAL ENCOUNTER (OUTPATIENT)
Dept: MRI IMAGING | Facility: CLINIC | Age: 29
Discharge: HOME | End: 2024-10-23
Attending: NURSE PRACTITIONER | Admitting: NURSE PRACTITIONER
Payer: COMMERCIAL

## 2024-10-23 DIAGNOSIS — M54.16 LUMBAR RADICULOPATHY: ICD-10-CM

## 2024-10-23 DIAGNOSIS — Z96.89 SPINAL CORD STIMULATOR STATUS: ICD-10-CM

## 2024-10-23 DIAGNOSIS — M96.1 FAILED BACK SYNDROME: ICD-10-CM

## 2024-10-23 DIAGNOSIS — Z98.890 S/P LUMBAR MICRODISCECTOMY: ICD-10-CM

## 2024-10-23 PROCEDURE — 72148 MRI LUMBAR SPINE W/O DYE: CPT

## 2024-10-29 ENCOUNTER — OFFICE VISIT (OUTPATIENT)
Dept: PALLIATIVE MEDICINE | Facility: CLINIC | Age: 29
End: 2024-10-29
Payer: COMMERCIAL

## 2024-10-29 VITALS — HEART RATE: 89 BPM | OXYGEN SATURATION: 96 % | SYSTOLIC BLOOD PRESSURE: 135 MMHG | DIASTOLIC BLOOD PRESSURE: 92 MMHG

## 2024-10-29 DIAGNOSIS — M96.1 FAILED BACK SYNDROME: ICD-10-CM

## 2024-10-29 DIAGNOSIS — M54.16 LUMBAR RADICULOPATHY: ICD-10-CM

## 2024-10-29 DIAGNOSIS — M51.27 HERNIATED NUCLEUS PULPOSUS OF LUMBOSACRAL REGION: ICD-10-CM

## 2024-10-29 DIAGNOSIS — Z96.89 SPINAL CORD STIMULATOR STATUS: ICD-10-CM

## 2024-10-29 DIAGNOSIS — Z98.890 S/P LUMBAR MICRODISCECTOMY: Primary | ICD-10-CM

## 2024-10-29 PROCEDURE — 99214 OFFICE O/P EST MOD 30 MIN: CPT | Performed by: NURSE PRACTITIONER

## 2024-10-29 ASSESSMENT — PAIN SCALES - PAIN ENJOYMENT GENERAL ACTIVITY SCALE (PEG)
INTERFERED_ENJOYMENT_LIFE: 0
PEG_TOTALSCORE: 2.33
INTERFERED_GENERAL_ACTIVITY: 1
INTERFERED_GENERAL_ACTIVITY: 1
PEG_TOTALSCORE: 2.33
AVG_PAIN_PASTWEEK: 6
INTERFERED_ENJOYMENT_LIFE: 0 - DOES NOT INTERFERE
AVG_PAIN_PASTWEEK: 6

## 2024-10-29 ASSESSMENT — PAIN SCALES - GENERAL: PAINLEVEL_OUTOF10: SEVERE PAIN (6)

## 2024-10-29 NOTE — PROGRESS NOTES
[facility]   Date of Visit: 10/29/2024  Last visit: 10/3/2024  Original Consult: 2/2/2023    Dipak Fuentes is a 29 year old male with PMH significant for lumbar ddd with radiculopathy, s/p microdiscectomy x2, bipolar depression, substance use disorder in prolonged remission who is seen today for follow-up on chronic pain treatment.     History:  Lumbar microdiscectomy surgery 9/2022 with persistent pain. Treated with PT (pelvic, pool and land). Symptom management with cannabis, pregabalin and tizanidine; underwent successful SCS trial 12/12/23-12/19/23, implant on 1/30/24. Worsening right leg pain since August 2024. MRI updated 10/2024.    Recommendations from last visit:  - Lumbar MRI ordered. Valium 5 mg X 2 tablets prescribed.   - Continue plan to meet with Homa Isabel again as an additional layer of support.  - Wait to consider reprogramming again until after MRI   ____________________________________________________________    Interval History     - Pain is better with standing. His right leg feels weak with walking. After walking for a period of time, he has nausea, urge to urinate, weakness.  - Primarily right leg pain. Back pain is from occasional irritation around the battery implant site only.   - Left his job due to pressure to return to office, this has reduced his stress level somwhat.   - Has now had worsening pain for a duration of 3 months.  - Tried AQ, not helpful (in the lower back and right leg) Felt worse.  - He has met with [employer] representative x 3 in August for reprogramming, last on 8/30/24.   -Prior to this recent pain episode, he was able to walk without a cane and tolerate weight bearing on his right leg.   - Lyrica at 150 BID, tizanidine prn and THC are helpful. .    - Not sleeping well due to increased pain with lying flat. He notes spasms and severe pain in his right leg when lying flat.      Current pain medications:  Tizanidine 4 mg TID prn  Pregabalin 150  "mg BID    Pain description:  Low back with radiation into right side numbness from the knee down  Current pain rating Severe Pain (6)  Aggravating factors include: Sitting on hard chairs, lying down makes back pain worse, sitting and standing makes right leg pain worse  Relieving factors include: icing helps some, tends to provide more immediate relief, delta 9 THC helps reduce pain, SCS  Pain Intensity and Interference in the past week 7.67    PAIN MANAGEMENT TREATMENT HISTORY  1. MEDICATIONS:  Opiates: Oxycodone after surgery- Helped  NSAIDS: Ibuprofen.  Medication was not helpful , only takes it when his wife and mom \"bug me to take it because I am in pain\" Diclofenac -not helpful  Muscle Relaxants: Cyclobenzaprine - Medication was helpful  Methocarbamol -Medication was helpful; Tizanidine -Medication was helpful  Helps with spasms, tizanidine most helpful  Anti-depressants: Remeron most recently - helpful. Mood stabilizers have been helpful in the past; tried on many anti-psychotics: had hallucinations, lithium: made him hard to be around, lamotrigine - skin rash. Now uses THC to manage polo with success  Anxiolytics: tried hydroxyzine in the past  Neuropathics: Gabapentin -was not helpful, trialed once at 300 mg TID, then again at 600 mg TID. Lyrica -helpful  Topicals: CBD spray - not helpful, lidocaine patches - not helpful  Adjuvant pain medications: THC helps his neuropathic pain sleep  2. PHYSICAL THERAPY:   Pool PT at [facility] with Sara Ro, PT was helpful, continues HEP  Pelvic floor therapy helpful for urinary symptoms after back surgery   Traditional PT after surgery not helpful.  3. PAIN PSYCHOLOGY:   Therapy in the past for biopolar management, not in several years  4. SURGERY:   1/30/24 spinal cord stimulator implant and surgery with Dr. Alfonso  9/21/2022 Right L5-S1 minimally invasive microdiscectomy with Dr. Oquendo  10/7/2020  Right L5-S1 minimally invasive microdiscectomy with Dr." Oquendo  5. INJECTIONS:   L5-S1 ILESI with Dr. Schwarz on 24- 2 days of significant relief, then pain returned  22: right S1-S2 transforaminal epidural corticosteroid injection with Dr. Shay - worsened pain  3/10/20: Lumbar L5-S1 interlaminar epidural steroid injection with Dr. Esteves- had 2 weeks of nothing, then 2 weeks of relief and then back to painful  6. COMPLEMENTARY THERAPY:  Chiropractic  not helpful  Acupuncture/acupressure: tried, not helpful and made pain worse  TENS unit  not helpful made pain worse  cold/cold packs: helpful  distraction/mindfulness: somewhat helpful   meditation/guided imagery: somewhat helpful    Imagin2024 Thoracic MRI  IMPRESSION:  1.  Multilevel thoracic spondylosis with multiple small disc herniations as detailed above. The largest is an inferiorly dissecting disc extrusion at T4-T5 with disc material indenting the ventral cord and contributing to mild spinal canal stenosis.  2.  No greater than mild spinal canal stenosis in the thoracic spine.  3.  No substantial neural foraminal stenosis.  4.  No cord signal abnormality.    10/7/2022 Lumbar MRI at [facility]          Lumbar MRI on 10/23/24  Reviewed with patient today. Report notes no change since prior MRI, but was compared to his pre-operative MRI in 2022, not his postoperative MRI in 10/2022.  Social History   Social History     Tobacco Use    Smoking status: Former     Current packs/day: 0.50     Average packs/day: 0.5 packs/day for 5.0 years (2.5 ttl pk-yrs)     Types: Cigarettes, Other    Smokeless tobacco: Never   Vaping Use    Vaping status: Every Day    Substances: Nicotine, THC, CBD    Devices: Pre-filled or refillable cartridge, Refillable tank   Substance Use Topics    Alcohol use: Not Currently     Comment: over 6 months    Drug use: Yes     Types: Marijuana     Comment: bipolar-concentrates 1-2 x day      Social History     Social History Narrative    .     Works in IT    Likes to bake for fun,  mostly bread. Likes to play video games with his daughter.    8 y.o. daughter       Medications and Allergies reviewed.  Ceclor [cefaclor monohydrate], Lamictal [lamotrigine], Latex, and Cefaclor  Medications   has a current medication list which includes the following prescription(s): acetaminophen, albuterol, diazepam, pregabalin, and tizanidine.    Objective   Vitals:    10/29/24 1030   BP: (!) 135/92   BP Location: Right arm   Patient Position: Chair   Cuff Size: Adult Regular   Pulse: 89   SpO2: 96%     Constitutional: Well developed, well nourished, appears stated age. No acute distress.  Gait is steady.  HEENT: Head atraumatic, normocephalic. Eyes without conjunctival injection or jaundice.   Skin: No obvious lesions, or petechiae of exposed skin. Mulitple tattoos present  Extremities: Peripheral pulses intact. No clubbing, cyanosis, or edema. Moves all extremities.  Psychiatric/mental status: Alert, without lethargy or stupor. Speech fluent. Appropriate affect. Mood normal. Able to follow commands without difficulty.   Musculoskeletal exam: Unable to sitm stands for entire visit. Strength grossly intact in Les. Exam elicits pain response due to right leg pain. Sensation diminished in right foot.    Assessment & Plan   S/P lumbar microdiscectomy  2.  Lumbar radiculopathy  3.  HNP of Lumbar spine  4.  Failed back syndrome  5.  Spinal cord stimulator status    - Lumbar MRI reviewed. It does appear that he has a recurrent right L5-S1 disc herniation. Recommended evaluation with Dr. Oquendo.   - Continue  to meet with Homa Isabel as an additional layer of support.  - Okay to increase tizanidine to 4-8 mg TID prn.    Marni Gray, CNP-BC, PMGT-BC, AP-PMN  [facility]

## 2024-10-29 NOTE — PATIENT INSTRUCTIONS
Increase tizanidine to 4-8 mg 3 times daily as needed.  Referral back to Dr. Oquendo - your MRI shows no change compared to your PRE operative MRI in September 20222. When compared to your last MRI AFTER surgery in October 2022, there is a return of the herniated disk on there right.    ----------------------------------------------------------------  [facility] Number:  [phone]   Call with any questions about your care and for scheduling assistance.   Calls are returned Monday through Friday between 8 AM and 4:30 PM. We usually get back to you within 2 business days depending on the issue/request.

## 2024-10-30 ENCOUNTER — DOCUMENTATION ONLY (OUTPATIENT)
Dept: PALLIATIVE MEDICINE | Facility: OTHER | Age: 29
End: 2024-10-30
Payer: COMMERCIAL

## 2024-10-30 ENCOUNTER — VIRTUAL VISIT (OUTPATIENT)
Dept: PALLIATIVE MEDICINE | Facility: OTHER | Age: 29
End: 2024-10-30
Attending: NURSE PRACTITIONER
Payer: COMMERCIAL

## 2024-10-30 DIAGNOSIS — G89.4 CHRONIC PAIN SYNDROME: ICD-10-CM

## 2024-10-30 DIAGNOSIS — F31.81 BIPOLAR 2 DISORDER (H): Primary | ICD-10-CM

## 2024-10-30 PROCEDURE — 90837 PSYTX W PT 60 MINUTES: CPT | Mod: 95 | Performed by: COUNSELOR

## 2024-10-30 NOTE — PROGRESS NOTES
D) Sent copies of the grounding and affirmation resources to patient this date, as discussed in session, to help with home practice.

## 2024-11-04 NOTE — PROGRESS NOTES
[facility]- Psychotherapy  (Provider Oversight- Dr. Daquan Patel)                                    Progress Note    Patient Name: Dipak Fuentes  Date: 10/30/24         Service Type: Individual      Session Start Time: 2:11 PM  Session End Time: 3:10 PM     Session Length: 59 minutes    Session #: 1    Attendees: Client attended alone    Service Modality:  Video Visit:      Provider verified identity through the following two step process.  Patient provided:  Patient photo and Patient is known previously to provider    Telemedicine Visit: The patient's condition can be safely assessed and treated via synchronous audio and visual telemedicine encounter.      Reason for Telemedicine Visit: Patient has requested telehealth visit, Patient unable to travel, and Patient convenience (e.g. access to timely appointments / distance to available provider)    Originating Site (Patient Location): Patient's home    Distant Site (Provider Location): [facility]    Consent:  The patient/guardian has verbally consented to: the potential risks and benefits of telemedicine (video visit) versus in person care; bill my insurance or make self-payment for services provided; and responsibility for payment of non-covered services.     Patient would like the video invitation sent by:  My Chart    Mode of Communication:  [facility]    Distant Location (Provider):  On-site    As the provider I attest to compliance with applicable laws and regulations related to telemedicine.    DATA  Extended Session (53+ minutes):   - Patient's presenting concerns require more intensive intervention than could be completed within the usual service  Interactive Complexity: No  Crisis: No          Progress Since Last Session (Related to Symptoms / Goals / Homework):   Symptoms: No change Patient is addressing chronic pain, medical and mental health concerns. Patient is working on acceptance of help  related to his pain and health issues. Patient is working on setting healthy boundaries in daily living. He is working on increasing coping skills to manage stressors/symptoms.    Homework:  This is patient's first follow up session.      Episode of Care Goals:  This is patient's first follow up session.     Current / Ongoing Stressors and Concerns:   Patient continues to address chronic pain, mental health and medical concerns.  Patient discussed current stressors and concerns. He is working on accepting help related to his medical/pain concerns. He reports that he is working on addressing negative thought patterns. He did state he is working on sleep issues with some improvement. He discussed thoughts and feelings about future surgery and will meet with his medical provider in 2 weeks.      Treatment Objective(s) Addressed in This Session:   identify 2 strategies to more effectively address stressors  use relaxation strategies 2 times per day to reduce the physical symptoms of anxiety  identify 2 signs or signals of emerging mood instability  identify 2 strategies for managing pain  Increase coping skills to manage symptoms     Intervention:   CBT: discussed thoughts and feelings related to behavior change. Worked on ways to challenge negative thoughts patterns  EMDR: will work on resourcing  Presented grounding exercise and patient seemed to benefit  Discussed affirmations and encouraged continued practice  Discussed 4 square breathing technique      Assessments completed prior to visit:  The following assessments were completed by patient for this visit:  None today       ASSESSMENT: Current Emotional / Mental Status (status of significant symptoms):   Risk status (Self / Other harm or suicidal ideation)   Patient denies current fears or concerns for personal safety.   Patient denies current or recent suicidal ideation or behaviors.   Patient denies current or recent homicidal ideation or behaviors.   Patient  denies current or recent self injurious behavior or ideation.   Patient denies other safety concerns.   Patient reports there has been no change in risk factors since their last session.     Patient reports there has been no change in protective factors since their last session.     Recommended that patient call 911 or go to the local ED should there be a change in any of these risk factors     Appearance:   Appropriate    Eye Contact:   Good    Psychomotor Behavior: Normal    Attitude:   Cooperative    Orientation:   All   Speech    Rate / Production: Normal/ Responsive    Volume:  Normal    Mood:    Anxious  Irritable  Normal   Affect:    Appropriate  Worrisome    Thought Content:  Clear  Rumination    Thought Form:  Coherent  Logical    Insight:    Good      Medication Review:   No changes to current psychiatric medication(s)     Medication Compliance:   Yes     Changes in Health Issues:   None reported Patient continues to address chronic pain, mental health and medical concerns. He will meet with his surgeon in 2 weeks.     Chemical Use Review:   Substance Use: Chemical use reviewed, no active concerns identified      Tobacco Use: No current tobacco use.      Diagnosis:  1. Bipolar 2 disorder (H)    2. Chronic pain syndrome        Collateral Reports Completed:   Not Applicable    PLAN: (Patient Tasks / Therapist Tasks / Other)  Patient to attend psychotherapy every 2-3 weeks. Follow care plan as discussed. Continue medical care as needed. Practice resourcing as discussed.           YAMIL MACHADO                                                         ______________________________________________________________________    Individual Treatment Plan    Patient's Name: Dpiak Fuentes  YOB: 1995    Date of Creation: 10/30/24  Date Treatment Plan Last Reviewed/Revised: 10/30/24    DSM5 Diagnoses: 296.89 Bipolar II Disorder moderate Chronic pain syndrome  Psychosocial / Contextual Factors:  chronic pain, medical and mental health concerns, history of trauma  PROMIS (reviewed every 90 days): 10/11/24    Referral / Collaboration:  Referral to another professional/service is not indicated at this time..    Anticipated number of session for this episode of care: 9-12 sessions  Anticipation frequency of session:  Every 2-3 weeks  Anticipated Duration of each session: 38-52 minutes  Treatment plan will be reviewed in 90 days or when goals have been changed.       MeasurableTreatment Goal(s) related to diagnosis / functional impairment(s)  Goal 1: Patient will decrease chronic pain symptoms and work on acceptance of help in coping with symptoms.     I will know I've met my goal when symptoms are more manageable and I am accepting help with greater ease on a more consistent basis.      Objective #A (Patient Action)    Patient will identify 2 strategies for managing pain.  Status: New - Date: 10/30/24      Intervention(s)  Therapist will teach emotional regulation skills. Mindfulness/EMDR .    Objective #B  Patient will use relaxation strategies 2 times per day to reduce the physical symptoms of anxiety.  Status: New - Date: 10/30/24      Intervention(s)  Therapist will teach emotional regulation skills. Mindfulness/EMDR .    Objective #C  Patient will  continue to follow up with medical providers to address medical concerns .  Status: New - Date: 10/30/24      Intervention(s)  Therapist will  encourage patient to follow up with medical providers and follow recommendations .      Goal 2: Patient will increase coping skills to manage mood/emotions.    I will know I've met my goal when symptoms are more manageable in daily living.      Objective #A (Patient Action)    Status: New - Date: 10/30/24      Patient will identify 2 signs or signals of emerging mood instability.    Intervention(s)  Therapist will teach emotional regulation skills. Mindfulness/EMDR .    Objective #B  Patient will  increase coping skills  to manage pain, medical and mental health symptoms .    Status: New - Date: 10/30/24      Intervention(s)  Therapist will teach emotional regulation skills. Mindfulness/EMDR .        Patient has reviewed and agreed to the above plan.      YAMIL MACHADO  October 30, 2024

## 2024-11-12 ENCOUNTER — OFFICE VISIT (OUTPATIENT)
Dept: NEUROSURGERY | Facility: CLINIC | Age: 29
End: 2024-11-12
Attending: NEUROLOGICAL SURGERY
Payer: COMMERCIAL

## 2024-11-12 VITALS
BODY MASS INDEX: 32.73 KG/M2 | DIASTOLIC BLOOD PRESSURE: 85 MMHG | WEIGHT: 255 LBS | OXYGEN SATURATION: 99 % | SYSTOLIC BLOOD PRESSURE: 122 MMHG | HEIGHT: 74 IN | HEART RATE: 79 BPM

## 2024-11-12 DIAGNOSIS — Z98.890 S/P LUMBAR MICRODISCECTOMY: ICD-10-CM

## 2024-11-12 DIAGNOSIS — M54.16 LUMBAR RADICULOPATHY: ICD-10-CM

## 2024-11-12 DIAGNOSIS — M51.27 HERNIATED NUCLEUS PULPOSUS OF LUMBOSACRAL REGION: ICD-10-CM

## 2024-11-12 PROCEDURE — 99213 OFFICE O/P EST LOW 20 MIN: CPT | Performed by: NEUROLOGICAL SURGERY

## 2024-11-12 PROCEDURE — 99214 OFFICE O/P EST MOD 30 MIN: CPT | Performed by: NEUROLOGICAL SURGERY

## 2024-11-12 ASSESSMENT — PAIN SCALES - GENERAL: PAINLEVEL_OUTOF10: SEVERE PAIN (6)

## 2024-11-12 NOTE — PATIENT INSTRUCTIONS
Patient Instructions    Surgery scheduled at [facility] for Lumbar 5 to Sacral 1 stealth posterior instrumentation, bilateral decompression and transforaminal interbody fusion with Dr. Oquendo    Pre-Operative  Surgical risks: blood clots in the leg or lung, problems urinating, nerve damage, drainage from the incision, infection,stiffness  Pre-operative physical with primary care physician within 30 days of surgical date.   2-3 night hospitalization stay  Shower procedure  Please shower with antimicrobial soap the night before surgery and morning of surgery. Please refer to showering instruction sheet in folder.  Eating/Drinking  Stop all solid foods 8 hours before surgery.  Keep drinking clear liquids until 4 hours before surgery  Clear liquids include water, clear juice, black coffee, or clear tea without milk, Gatorade, clear soda.   Medications  Hold Aspirin, NSAIDs (Advil/Ibuprofen, Indocin, Naproxen,Nuprin,Relafen/Nabumetone, Diclofenac,Meloxicam, Aleve, Celebrex) x 7 days prior to surgical date  You can take Tylenol (Acetaminophen) for pain, 1000 mg  Do not exceed 3,000 mg per day   If you are on chronic pain medication (oxycodone, Percocet, hydrocodone, Vicodin, Norco, Dilaudid, morphine, MS Contin, naltrexone, Suboxone, etc) or have a pain contract we will reach out to your pain clinic to gather your most recent records. Continue obtaining your pain medications from your current provider until surgery. Our team will manage your acute post-op pain in the hospital and during the recovery period. Your pain team will continue to manage your chronic pain. Please contact your provider who manages your pain medications to inform them of your upcoming surgery.  Any other medications prescribed, please discuss with your primary care provider at your pre-operative physical   You may NOT receive the COVID-19 vaccine 72 hours before or after surgery.        Pain Management  Dealing with pain  As your  body heals, you might feel a stabbing, burning, or aching pain. You may also have some numbness.  Everyone feels pain differently, we may ask you to rate your pain using a pain scale. This will let us know how much pain you feel.   Keep in mind that medicine won't take away all of your pain. It helps to try other ways to relax and ease pain.   Things to help with pain  After surgery, we will give you medicine for your pain. These medications work well, but they can make you drowsy, itchy, or sick to your stomach. If we give you narcotics for pain, try to take the pills with food.   For mild to moderate pain, you can take medication such as Tylenol. These can be used with narcotics, but make sure that your narcotic does not contain Tylenol.   Do NOT drive while taking narcotic pain medication  Do NOT drink alcohol while using any pain medication  You can utilize ice as needed (no longer than 20 minutes at one time)  Avoid putting heat over the incision.  You may resume taking NSAIDs (ex. Ibuprofen, aleve, naproxen) 12 weeks after surgery as it may cause bleeding and interfere with bone healing     Incision Care  No submerging incision in water such as pools, hot tubs, baths for at least 8 weeks or until incision is healed  You may get your incision wet in the shower. Allow water to run over incision, and gently pat dry.   Remove dressing as instructed upon discharge  Watch for signs of infection  Redness, swelling, warmth, drainage, and fever of 101 degrees or higher  Notify clinic [phone].    Activity Restrictions  For the first 6-8 weeks, no lifting > 10 pounds, no bending, twisting, or overhead reaching.  Take stairs in moderation   Ok to walk as tolerated, take short frequent walks. You may gradually increase the distance as tolerated.   Avoid bed rest and prolonged sitting for longer than 30 minutes (change positions frequently while awake)  No contact sports until after follow up visit  No high impact  activities such as; running/jogging, snowmobile or 4 hogan riding or any other recreational vehicles  Please call the clinic if you develop any of the following symptoms:  Swelling and/or warmth in one or both legs  Pain or tenderness in your leg, ankle, foot, or arm   Red or discolored skin     Post-Op Follow Up Appointments  2 week staple/suture removal with RN  6 week post op with xray prior   3 months post op with xray prior   1 year post op with xray prior  Please call to schedule follow up and xray appointments: [phone]    Resources  If you are currently employed, you will need to be off work for 4-6 weeks for post op recovery and healing.  Please fax any FMLA/short term disability paperwork to [phone]  You may call our clinic when you'd like to return to work and we can provide a work letter  To allow staff adequate time to complete paperwork, please send as soon as possible     [facility]  Phone: [phone]  Fax: [phone]

## 2024-11-12 NOTE — LETTER
"11/12/2024      Dipak Fuentes  [address] [facility] [id number]      Dear Colleague,    Thank you for referring your patient, Dipak Fuentes, to the [facility] [place]. Please see a copy of my visit note below.    It was a pleasure to see Dipak Fuentes today in Neurosurgery Clinic. He is a 29 year old male who is well-known to me for 2 prior right L5-S1 microdiscectomies in 2020 and 2022.  After those microdiscectomies he had a spinal cord stimulator placed at the beginning of 2024 and was doing reasonably well, but had worsening back and right lower extremity symptoms in an S1 distribution beginning over the summer.    He has had an epidural steroid injection and has been working with pain management on his medications.  He has not been able to tolerate physical therapy because of his symptomatology.    Vitals:    11/12/24 0905   BP: 122/85   Pulse: 79   SpO2: 99%   Weight: 115.7 kg (255 lb)   Height: 1.88 m (6' 2\")     Estimated body mass index is 32.74 kg/m  as calculated from the following:    Height as of this encounter: 1.88 m (6' 2\").    Weight as of this encounter: 115.7 kg (255 lb).  Severe Pain (6)    Well-healed lumbar incision  Bilateral lower extremity strength is 5 out of 5 in all muscle groups  Reflexes symmetric and normal  Decrease sensation to light touch in right S1 distribution.  Positive straight leg raise on right.      Imaging: Review of his lumbar MRIs over the last several years show his prior disc herniations and postoperative imaging showing good decompression of the nerve and then recurrent disc herniation on the right at L5-S1 compressing the nerve root seen on his most recent MRI from October 2024.  The imaging was reviewed with the patient shown to the patient in clinic today.    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    Plan: The patient has had 2 prior surgeries in this area, given that this is his third operation I " have recommended an L5-S1 fusion. The risks benefits and alternatives to the procedure were discussed. Risks include, but are not limited to, bleeding, infection, neurologic injury such as nerve root injury, CSF leak, need for further surgery or revision of any implanted hardware, failure for symptoms to improve. Questions were solicited and answered, and the patient wishes to proceed with surgery.    We also discussed the risk that the spinal cord stimulator system could become dislodged or inactive for various reasons after surgery which might require revision.  We also discussed the risk that he may develop future problems above his fusion.  The patient understands and wishes to proceed with surgery.  Will work on scheduling this in the near future.           Again, thank you for allowing me to participate in the care of your patient.        Sincerely,        Juancho Oquendo MD

## 2024-11-12 NOTE — PROGRESS NOTES
"It was a pleasure to see Dipak Fuentes today in Neurosurgery Clinic. He is a 29 year old male who is well-known to me for 2 prior right L5-S1 microdiscectomies in 2020 and 2022.  After those microdiscectomies he had a spinal cord stimulator placed at the beginning of 2024 and was doing reasonably well, but had worsening back and right lower extremity symptoms in an S1 distribution beginning over the summer.    He has had an epidural steroid injection and has been working with pain management on his medications.  He has not been able to tolerate physical therapy because of his symptomatology.    Vitals:    11/12/24 0905   BP: 122/85   Pulse: 79   SpO2: 99%   Weight: 115.7 kg (255 lb)   Height: 1.88 m (6' 2\")     Estimated body mass index is 32.74 kg/m  as calculated from the following:    Height as of this encounter: 1.88 m (6' 2\").    Weight as of this encounter: 115.7 kg (255 lb).  Severe Pain (6)    Well-healed lumbar incision  Bilateral lower extremity strength is 5 out of 5 in all muscle groups  Reflexes symmetric and normal  Decrease sensation to light touch in right S1 distribution.  Positive straight leg raise on right.      Imaging: Review of his lumbar MRIs over the last several years show his prior disc herniations and postoperative imaging showing good decompression of the nerve and then recurrent disc herniation on the right at L5-S1 compressing the nerve root seen on his most recent MRI from October 2024.  The imaging was reviewed with the patient shown to the patient in clinic today.    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    Plan: The patient has had 2 prior surgeries in this area, given that this is his third operation I have recommended an L5-S1 fusion. The risks benefits and alternatives to the procedure were discussed. Risks include, but are not limited to, bleeding, infection, neurologic injury such as nerve root injury, CSF leak, need for further surgery or revision " of any implanted hardware, failure for symptoms to improve. Questions were solicited and answered, and the patient wishes to proceed with surgery.    We also discussed the risk that the spinal cord stimulator system could become dislodged or inactive for various reasons after surgery which might require revision.  We also discussed the risk that he may develop future problems above his fusion.  The patient understands and wishes to proceed with surgery.  Will work on scheduling this in the near future.

## 2024-11-12 NOTE — NURSING NOTE
"Dipak Fuentes is a 29 year old male who presents for:  Chief Complaint   Patient presents with    Back Pain        Vitals:    Vitals:    11/12/24 0905   BP: 122/85   Pulse: 79   SpO2: 99%   Weight: 255 lb (115.7 kg)   Height: 6' 2\" (1.88 m)       BMI:  Estimated body mass index is 32.74 kg/m  as calculated from the following:    Height as of this encounter: 6' 2\" (1.88 m).    Weight as of this encounter: 255 lb (115.7 kg).    Pain Score:  Severe Pain (6)        Amendo Phorn      "

## 2024-11-12 NOTE — NURSING NOTE
PRE-SURGERY EDUCATION  Reviewed pre- and post-operative instructions as outlined in the After Visit Summary/Patient Instructions with patient.   Surgery folder was given to patient    Patient Education Topic: Procedure with Dr. Oquendo   Learner(s): Patient  Knowledge Level: Basic  Readiness to Learn: Ready  Method:  Verbal explanation and Written material   Outcome: Able to verbalize instructions  Barriers to Learning: No barrier  STD/FMLA: patient is not working but may want fmla for wife to take off work to care for him post op. He will let us know.   Job Description: Not applicable   Time Off: Not applicable      Patient had the opportunity for questions to be answered. Advised Patient to call clinic with any questions/concerns. Gave patient antibacterial soap for pre-surgery skin preparation.     FUSION SPINE PRE-SURGICAL CHECKLIST   Intervention/Diagnostic Meets Criteria Details   NDI/SILVIANO  Completed within the last 6 months No NDI/SILVIANO not completed within last 6 months. Sent request for completion to support staff ([place]: [facility] CSS Pool ([id number]))   Nicotine Status  Former smoker   Currently using: Cigarettes and None     Yes Fusions  - Used nicotine within the last year? No  - If patient uses nicotine, RN orders a Nicotine lab test to be completed 2 weeks after smoking cessation. Surgery will need to be scheduled at least 3 weeks after the nicotine lab test   Physical Therapy   Completed within 6 months   Completed on the corresponding body part  Completed at least 4 weeks (unless provider documented that patient unable to tolerate PT) Yes  Per Dr. Oquendo's note patient has not been able to tolerate PT due to his symptomatology.    Injection  Completed within last 1 years  Completed on the corresponding body part   Yes  Records verified and located Internal   Imaging  MRI or CT completed within 6 months Yes Records verified and located Internal   Chronic Pain or Pain contract  Yes Yes If  yes: RN needs to collect Pain Provider name, organization, last office visit note, and connect with provider to share plan leading into surgery and planned post-op pain medication timeframe.  Patient's pain clinic- [facility]. Provider Marni Gray NP. Patient has SCS implanted on 1/30/24. Discussed with patient to bring remote and switch off for surgery. Patient is not prescribed Narcotics with pain clinic. On Lyrica and tizanidine from them. Patient also uses cannabis.  Sent SM to Marni BURRIS to update on recommendation of surgery and her recommendations for post op pain mgmt.

## 2024-11-13 ENCOUNTER — TELEPHONE (OUTPATIENT)
Dept: NEUROSURGERY | Facility: CLINIC | Age: 29
End: 2024-11-13
Payer: COMMERCIAL

## 2024-11-18 ENCOUNTER — VIRTUAL VISIT (OUTPATIENT)
Dept: PALLIATIVE MEDICINE | Facility: OTHER | Age: 29
End: 2024-11-18
Attending: NURSE PRACTITIONER
Payer: COMMERCIAL

## 2024-11-18 DIAGNOSIS — G89.4 CHRONIC PAIN SYNDROME: ICD-10-CM

## 2024-11-18 DIAGNOSIS — F31.81 BIPOLAR 2 DISORDER (H): Primary | ICD-10-CM

## 2024-11-18 PROCEDURE — 90837 PSYTX W PT 60 MINUTES: CPT | Mod: 95 | Performed by: COUNSELOR

## 2024-11-20 ENCOUNTER — TELEPHONE (OUTPATIENT)
Dept: NEUROSURGERY | Facility: CLINIC | Age: 29
End: 2024-11-20
Payer: COMMERCIAL

## 2024-11-20 DIAGNOSIS — F17.200 SMOKER: Primary | ICD-10-CM

## 2024-11-20 NOTE — TELEPHONE ENCOUNTER
Called patient to let him know that [employer] was requiring a nicotine test.  I let him know that the order was in and urged him to schedule it ASAP so we do not have to reschedule his 12/18 surgery.

## 2024-11-20 NOTE — PROGRESS NOTES
[facility]- Psychotherapy  (Provider Oversight- Dr. Daquan Patel)                                    Progress Note    Patient Name: Dipak Fuentes  Date: 11/18/24         Service Type: Individual      Session Start Time: 3:05 PM  Session End Time: 4:03 PM     Session Length: 58 minutes    Session #: 2    Attendees: Client attended alone    Service Modality:  Video Visit:      Provider verified identity through the following two step process.  Patient provided:  Patient photo and Patient is known previously to provider    Telemedicine Visit: The patient's condition can be safely assessed and treated via synchronous audio and visual telemedicine encounter.      Reason for Telemedicine Visit: Patient has requested telehealth visit, Patient unable to travel, and Patient convenience (e.g. access to timely appointments / distance to available provider)    Originating Site (Patient Location): Patient's home    Distant Site (Provider Location): [facility]    Consent:  The patient/guardian has verbally consented to: the potential risks and benefits of telemedicine (video visit) versus in person care; bill my insurance or make self-payment for services provided; and responsibility for payment of non-covered services.     Patient would like the video invitation sent by:  My Chart    Mode of Communication:  Video Conference via [facility]    Distant Location (Provider):  Off-site    As the provider I attest to compliance with applicable laws and regulations related to telemedicine.    DATA  Extended Session (53+ minutes):   - Patient's presenting concerns require more intensive intervention than could be completed within the usual service  Interactive Complexity: No  Crisis: No          Progress Since Last Session (Related to Symptoms / Goals / Homework):   Symptoms:  Mild improvement. Patient continues to address chronic pain, medical and mental health concerns. Patient is working on  "acceptance of help related to his pain, health issues and upcoming surgery. Patient is working on setting healthier boundaries in daily living. He is working on increasing coping skills to manage stressors/symptoms.     Homework: Achieved / completed to satisfaction    Continue self care/resourcing        Episode of Care Goals: Satisfactory progress - PREPARATION (Decided to change - considering how); Intervened by negotiating a change plan and determining options / strategies for behavior change, identifying triggers, exploring social supports, and working towards setting a date to begin behavior change     Current / Ongoing Stressors and Concerns:   Patient continues to address chronic pain, mental health and medical concerns.  Patient reports hypomania since September and this has increased lately. He states he has \"increased energy and feels wired\". He denies safety issues. Patient discussed current stressors and concerns. He is working on accepting help related to his medical/pain concerns. He reports that he is working on addressing negative thought patterns/behaviors, especially when stress increases. He discussed thoughts and feelings related to an upcoming surgery in December.      Treatment Objective(s) Addressed in This Session:   identify 2 strategies to more effectively address stressors  use relaxation strategies 2 times per day to reduce the physical symptoms of anxiety  identify 2 signs or signals of emerging mood instability  identify 2 strategies for managing pain  Increase coping skills to manage symptoms     Intervention:   CBT: discussed thoughts and feelings related to behavior change. Worked on ways to challenge negative thoughts patterns and ways to identify increased risk with hypomanic symptoms/ways to cope.   EMDR: will work on resourcing  Presented calm place exercise and patient seemed to benefit  Discussed affirmations and encouraged continued practice        Assessments completed prior " to visit:  The following assessments were completed by patient for this visit:  None today       ASSESSMENT: Current Emotional / Mental Status (status of significant symptoms):   Risk status (Self / Other harm or suicidal ideation)   Patient denies current fears or concerns for personal safety.   Patient denies current or recent suicidal ideation or behaviors.   Patient denies current or recent homicidal ideation or behaviors.   Patient denies current or recent self injurious behavior or ideation.   Patient denies other safety concerns.   Patient reports there has been no change in risk factors since their last session.     Patient reports there has been no change in protective factors since their last session.     Recommended that patient call 911 or go to the local ED should there be a change in any of these risk factors     Appearance:   Appropriate    Eye Contact:   Good    Psychomotor Behavior: Normal    Attitude:   Cooperative    Orientation:   All   Speech    Rate / Production: Normal/ Responsive    Volume:  Normal    Mood:    Anxious  Irritable  Normal   Affect:    Appropriate  Worrisome    Thought Content:  Clear  Rumination    Thought Form:  Coherent  Logical    Insight:    Good      Medication Review:   No changes to current psychiatric medication(s)     Medication Compliance:   Yes     Changes in Health Issues:   None reported Patient continues to address chronic pain, mental health and medical concerns. He will be having surgery in December.      Chemical Use Review:   Substance Use: Chemical use reviewed, no active concerns identified      Tobacco Use: No current tobacco use.      Diagnosis:  1. Bipolar 2 disorder (H)    2. Chronic pain syndrome        Collateral Reports Completed:   Not Applicable    PLAN: (Patient Tasks / Therapist Tasks / Other)   Patient to attend psychotherapy every 2-3 weeks. Follow care plan as discussed. Continue medical care as needed. Practice resourcing as discussed.  Maintain contact with support system on a regular basis.             AXEL MOTTA, [facility]                                                         ______________________________________________________________________    Individual Treatment Plan    Patient's Name: Dipak Fuentes  YOB: 1995    Date of Creation: 10/30/24  Date Treatment Plan Last Reviewed/Revised: 10/30/24    DSM5 Diagnoses: 296.89 Bipolar II Disorder moderate Chronic pain syndrome  Psychosocial / Contextual Factors: chronic pain, medical and mental health concerns, history of trauma  PROMIS (reviewed every 90 days): 10/11/24    Referral / Collaboration:  Referral to another professional/service is not indicated at this time..    Anticipated number of session for this episode of care: 9-12 sessions  Anticipation frequency of session:  Every 2-3 weeks  Anticipated Duration of each session: 38-52 minutes  Treatment plan will be reviewed in 90 days or when goals have been changed.       MeasurableTreatment Goal(s) related to diagnosis / functional impairment(s)  Goal 1: Patient will decrease chronic pain symptoms and work on acceptance of help in coping with symptoms.     I will know I've met my goal when symptoms are more manageable and I am accepting help with greater ease on a more consistent basis.      Objective #A (Patient Action)    Patient will identify 2 strategies for managing pain.  Status: New - Date: 10/30/24      Intervention(s)  Therapist will teach emotional regulation skills. Mindfulness/EMDR .    Objective #B  Patient will use relaxation strategies 2 times per day to reduce the physical symptoms of anxiety.  Status: New - Date: 10/30/24      Intervention(s)  Therapist will teach emotional regulation skills. Mindfulness/EMDR .    Objective #C  Patient will  continue to follow up with medical providers to address medical concerns .  Status: New - Date: 10/30/24      Intervention(s)  Therapist will  encourage patient to  follow up with medical providers and follow recommendations .      Goal 2: Patient will increase coping skills to manage mood/emotions.    I will know I've met my goal when symptoms are more manageable in daily living.      Objective #A (Patient Action)    Status: New - Date: 10/30/24      Patient will identify 2 signs or signals of emerging mood instability.    Intervention(s)  Therapist will teach emotional regulation skills. Mindfulness/EMDR .    Objective #B  Patient will  increase coping skills to manage pain, medical and mental health symptoms .    Status: New - Date: 10/30/24      Intervention(s)  Therapist will teach emotional regulation skills. Mindfulness/EMDR .        Patient has reviewed and agreed to the above plan.      AXEL MOTTA [facility]  October 30, 2024

## 2024-11-21 ENCOUNTER — LAB (OUTPATIENT)
Dept: LAB | Facility: CLINIC | Age: 29
End: 2024-11-21
Payer: COMMERCIAL

## 2024-11-21 DIAGNOSIS — F17.200 SMOKER: ICD-10-CM

## 2024-11-24 LAB
COTININE SERPL-MCNC: <5 NG/ML
NICOTINE SERPL-MCNC: <5 NG/ML

## 2024-12-02 ENCOUNTER — DOCUMENTATION ONLY (OUTPATIENT)
Dept: PALLIATIVE MEDICINE | Facility: OTHER | Age: 29
End: 2024-12-02
Payer: COMMERCIAL

## 2024-12-02 NOTE — PROGRESS NOTES
D) Patient same day cancelled his pain psychotherapy appointment. No reason was given for the cancellation. His next scheduled appointment is for 12/17/24 at 3 PM.

## 2024-12-04 ENCOUNTER — TELEPHONE (OUTPATIENT)
Dept: NEUROSURGERY | Facility: CLINIC | Age: 29
End: 2024-12-04
Payer: COMMERCIAL

## 2024-12-04 NOTE — TELEPHONE ENCOUNTER
[facility] Call Center    Phone Message    May a detailed message be left on voicemail: yes     Reason for Call: Other: Pt is calling and stating that his surgery got rescheduled and his pre-op is 31 day out from surgery and wanting to know if that is ok or will he have to reschedule that also. Please call Pt back to discuss.      Action Taken: Message routed to:  Other: [facility]    Travel Screening: Not Applicable     Date of Service:

## 2024-12-10 ENCOUNTER — HOSPITAL ENCOUNTER (INPATIENT)
Facility: CLINIC | Age: 29
LOS: 1 days | Discharge: HOME | End: 2024-12-11
Attending: EMERGENCY MEDICINE | Admitting: PSYCHIATRY & NEUROLOGY
Payer: COMMERCIAL

## 2024-12-10 ENCOUNTER — TELEPHONE (OUTPATIENT)
Dept: BEHAVIORAL HEALTH | Facility: CLINIC | Age: 29
End: 2024-12-10

## 2024-12-10 DIAGNOSIS — M54.9 DORSALGIA: ICD-10-CM

## 2024-12-10 DIAGNOSIS — F17.290 VAPING NICOTINE DEPENDENCE, TOBACCO PRODUCT: ICD-10-CM

## 2024-12-10 DIAGNOSIS — M79.604 RIGHT LEG PAIN: ICD-10-CM

## 2024-12-10 DIAGNOSIS — G89.29 OTHER CHRONIC PAIN: Primary | ICD-10-CM

## 2024-12-10 DIAGNOSIS — F32.A DEPRESSION, UNSPECIFIED DEPRESSION TYPE: ICD-10-CM

## 2024-12-10 LAB
AMPHETAMINES UR QL SCN: ABNORMAL
BARBITURATES UR QL SCN: ABNORMAL
BENZODIAZ UR QL SCN: ABNORMAL
BZE UR QL SCN: ABNORMAL
CANNABINOIDS UR QL SCN: ABNORMAL
FENTANYL UR QL: ABNORMAL
OPIATES UR QL SCN: ABNORMAL
PCP QUAL URINE (ROCHE): ABNORMAL

## 2024-12-10 PROCEDURE — 250N000013 HC RX MED GY IP 250 OP 250 PS 637: Performed by: NURSE PRACTITIONER

## 2024-12-10 PROCEDURE — 99285 EMERGENCY DEPT VISIT HI MDM: CPT | Performed by: EMERGENCY MEDICINE

## 2024-12-10 PROCEDURE — 80307 DRUG TEST PRSMV CHEM ANLYZR: CPT | Performed by: EMERGENCY MEDICINE

## 2024-12-10 PROCEDURE — 250N000013 HC RX MED GY IP 250 OP 250 PS 637: Performed by: EMERGENCY MEDICINE

## 2024-12-10 PROCEDURE — 124N000002 HC R&B MH UMMC

## 2024-12-10 PROCEDURE — 99255 IP/OBS CONSLTJ NEW/EST HI 80: CPT | Performed by: PSYCHIATRY & NEUROLOGY

## 2024-12-10 RX ORDER — MAGNESIUM HYDROXIDE/ALUMINUM HYDROXICE/SIMETHICONE 120; 1200; 1200 MG/30ML; MG/30ML; MG/30ML
30 SUSPENSION ORAL EVERY 4 HOURS PRN
Status: DISCONTINUED | OUTPATIENT
Start: 2024-12-10 | End: 2024-12-11 | Stop reason: HOSPADM

## 2024-12-10 RX ORDER — IBUPROFEN 600 MG/1
600 TABLET, FILM COATED ORAL EVERY 6 HOURS PRN
Status: DISCONTINUED | OUTPATIENT
Start: 2024-12-10 | End: 2024-12-11 | Stop reason: HOSPADM

## 2024-12-10 RX ORDER — PREGABALIN 75 MG/1
75 CAPSULE ORAL 2 TIMES DAILY
Status: DISCONTINUED | OUTPATIENT
Start: 2024-12-10 | End: 2024-12-11

## 2024-12-10 RX ORDER — ACETAMINOPHEN 325 MG/1
650 TABLET ORAL EVERY 4 HOURS PRN
Status: DISCONTINUED | OUTPATIENT
Start: 2024-12-10 | End: 2024-12-11 | Stop reason: HOSPADM

## 2024-12-10 RX ORDER — OLANZAPINE 10 MG/1
10 TABLET ORAL 3 TIMES DAILY PRN
Status: DISCONTINUED | OUTPATIENT
Start: 2024-12-10 | End: 2024-12-11 | Stop reason: HOSPADM

## 2024-12-10 RX ORDER — AMOXICILLIN 250 MG
1 CAPSULE ORAL 2 TIMES DAILY PRN
Status: DISCONTINUED | OUTPATIENT
Start: 2024-12-10 | End: 2024-12-11 | Stop reason: HOSPADM

## 2024-12-10 RX ORDER — ACETAMINOPHEN 500 MG
500-1000 TABLET ORAL EVERY 6 HOURS PRN
Status: DISCONTINUED | OUTPATIENT
Start: 2024-12-10 | End: 2024-12-10

## 2024-12-10 RX ORDER — PREGABALIN 75 MG/1
150 CAPSULE ORAL 2 TIMES DAILY
Status: DISCONTINUED | OUTPATIENT
Start: 2024-12-10 | End: 2024-12-10

## 2024-12-10 RX ORDER — OLANZAPINE 10 MG/2ML
10 INJECTION, POWDER, FOR SOLUTION INTRAMUSCULAR 3 TIMES DAILY PRN
Status: DISCONTINUED | OUTPATIENT
Start: 2024-12-10 | End: 2024-12-11 | Stop reason: HOSPADM

## 2024-12-10 RX ORDER — HYDROXYZINE HYDROCHLORIDE 25 MG/1
25 TABLET, FILM COATED ORAL EVERY 4 HOURS PRN
Status: DISCONTINUED | OUTPATIENT
Start: 2024-12-10 | End: 2024-12-11 | Stop reason: HOSPADM

## 2024-12-10 RX ADMIN — TIZANIDINE 8 MG: 4 TABLET ORAL at 14:30

## 2024-12-10 RX ADMIN — TIZANIDINE 8 MG: 4 TABLET ORAL at 08:47

## 2024-12-10 RX ADMIN — TIZANIDINE 4 MG: 4 TABLET ORAL at 20:06

## 2024-12-10 RX ADMIN — ACETAMINOPHEN 650 MG: 325 TABLET, FILM COATED ORAL at 20:06

## 2024-12-10 RX ADMIN — PREGABALIN 75 MG: 75 CAPSULE ORAL at 20:06

## 2024-12-10 RX ADMIN — ACETAMINOPHEN 1000 MG: 500 TABLET ORAL at 08:24

## 2024-12-10 RX ADMIN — PREGABALIN 75 MG: 75 CAPSULE ORAL at 08:19

## 2024-12-10 RX ADMIN — ACETAMINOPHEN 650 MG: 325 TABLET, FILM COATED ORAL at 14:29

## 2024-12-10 RX ADMIN — HYDROXYZINE HYDROCHLORIDE 25 MG: 25 TABLET, FILM COATED ORAL at 20:06

## 2024-12-10 ASSESSMENT — COLUMBIA-SUICIDE SEVERITY RATING SCALE - C-SSRS
1. IN THE PAST MONTH, HAVE YOU WISHED YOU WERE DEAD OR WISHED YOU COULD GO TO SLEEP AND NOT WAKE UP?: YES
6. HAVE YOU EVER DONE ANYTHING, STARTED TO DO ANYTHING, OR PREPARED TO DO ANYTHING TO END YOUR LIFE?: YES
4. HAVE YOU HAD THESE THOUGHTS AND HAD SOME INTENTION OF ACTING ON THEM?: NO
2. HAVE YOU ACTUALLY HAD ANY THOUGHTS OF KILLING YOURSELF IN THE PAST MONTH?: YES
5. HAVE YOU STARTED TO WORK OUT OR WORKED OUT THE DETAILS OF HOW TO KILL YOURSELF? DO YOU INTEND TO CARRY OUT THIS PLAN?: NO
3. HAVE YOU BEEN THINKING ABOUT HOW YOU MIGHT KILL YOURSELF?: NO

## 2024-12-10 ASSESSMENT — ACTIVITIES OF DAILY LIVING (ADL)
ADLS_ACUITY_SCORE: 51
ADLS_ACUITY_SCORE: 51
ADLS_ACUITY_SCORE: 54
ADLS_ACUITY_SCORE: 51
ADLS_ACUITY_SCORE: 51
ADLS_ACUITY_SCORE: 54
ADLS_ACUITY_SCORE: 54
HYGIENE/GROOMING: INDEPENDENT
DRESS: INDEPENDENT
LAUNDRY: WITH SUPERVISION
ADLS_ACUITY_SCORE: 51
ADLS_ACUITY_SCORE: 67
ADLS_ACUITY_SCORE: 51
ADLS_ACUITY_SCORE: 67
ADLS_ACUITY_SCORE: 54
ADLS_ACUITY_SCORE: 51
ADLS_ACUITY_SCORE: 54
ADLS_ACUITY_SCORE: 51
ORAL_HYGIENE: INDEPENDENT
ADLS_ACUITY_SCORE: 51
ADLS_ACUITY_SCORE: 54
ADLS_ACUITY_SCORE: 51
ADLS_ACUITY_SCORE: 51
ADLS_ACUITY_SCORE: 54
ADLS_ACUITY_SCORE: 54

## 2024-12-10 NOTE — CONSULTS
"[facility]  Department of Psychiatry  Consultation note    Dipak Fuentes MRN: [id number]   Age: 29 year old YOB: 1995     History     Chief Complaint   Patient presents with    Suicidal     Patient said his wife and friend made him come to ED. Patient said he was suicidal earlier because he's on a lot of pain. Patient said he was denied his 6th back surgery. Patient current denies SI. He has scheduled therapy appointment on Thursday at 09:00 am.     HPI  Dipak Fuentes is a 29 year old male to female transgender patient with history of bipolar disorder and chronic pain who came in with wife and a friend for SI with plan to overdose. Per collateral documented in DEC note patient tried to \"formulate a suicide pact with friend Ale\". Wanted her to go with them to [place] then they would both commit suicide. Patient's wife told the ED MD that \"the patient took all of their meds, told the wife that they were leaving tonight, turn off location finding on their phone, and would not answer\". Stressor was that their back surgery was postponed and they are in pain. Patient's wife and friend convinced patient to come to the ED. Shortly after getting to the ED patient wanted to leave so 72 hour hold was placed. Patient recently came out as transgender and feels the stress of that is contributing. Patient reports THC use, denies other drug or alcohol use. UDS + THC.    Patient now denies SI. Patient says that their mind \"always goes right to suicide\", even for \"minor inconveniences\" but that they usually are quickly able to negate those thoughts. Says this has been going on most of their life. Patient does not know why they were unable to negate the thoughts yesterday. Patient is irritated at being on the 72 hour hold but remains cooperative. They feel that they will have to stay no matter what they say because when they deny SI \"everyone just thinks I'm " "minimizing\".    History of SA 8 years ago, patient says they acted on the thoughts because alcohol was involved and they no longer drink. Patient is on no psychiatric medications. Has \"been on them all\" in the past. None have worked aside from lamotrigine which they had to stop because they developed a rash. Patient is not interested in any medications \"except maybe an antidepressant\". Does endorse symptoms of depression. Does not want to consider medication in the ED today but is open to it in general. Has participated in therapy, DBT as well.      Past Medical History  Past Medical History:   Diagnosis Date    Bipolar I disorder, single manic episode (H) 03/25/2013    Problem list name updated by automated process. Provider to review    Complication of anesthesia     panic after having anesthesia    History of substance use disorder     as a teenager, \"took extra adderall\"    Lumbar disc herniation with radiculopathy     Mild intermittent asthma without complication     Other chronic pain     low back and buttocks leg pain     Past Surgical History:   Procedure Laterality Date    DISCECTOMY LUMBAR MINIMALLY INVASIVE ONE LEVEL Right 10/7/2020    Procedure: Right L5-S1 minimally invasive microdiskectomy;  Surgeon: Juancho Oquendo MD;  Location:  OR    DISCECTOMY LUMBAR MINIMALLY INVASIVE ONE LEVEL Right 9/21/2022    Procedure: Right L5-S1 minimally invasive redo microdiskectomy;  Surgeon: Juancho Oquendo MD;  Location:  OR    INSERT STIMULATOR AND LEADS INTERNAL DORSAL COLUMN N/A 1/30/2024    Procedure: Spinal cord stimulator placement, phase I and II combined, placement of percutaneous type electrodes in the thoracolumbar spine and placement of generator/battery over the right buttock  **Latex Allergy**;  Surgeon: Giovanni Alfonso MD;  Location:  OR    [facility]       acetaminophen (TYLENOL) 500 MG tablet  albuterol (PROAIR HFA/PROVENTIL HFA/VENTOLIN HFA) 108 (90 Base) MCG/ACT " "inhaler  medical cannabis (Patient's own supply)  pregabalin (LYRICA) 150 MG capsule  tiZANidine (ZANAFLEX) 4 MG tablet      Allergies   Allergen Reactions    Ceclor [Cefaclor Monohydrate]     Lamictal [Lamotrigine]      Had known side effect of\"skin eating rash\"  Jaycob lauren syndrome    Latex      PN: Converted from LW Latex Sensitivity Flag    Cefaclor Rash     Family History  Family History   Problem Relation Age of Onset    Depression Mother     Depression Father     Substance Abuse Father     Depression Maternal Grandmother     Heart Disease Maternal Grandmother     Depression Paternal Grandmother     Anxiety Disorder Paternal Grandmother     Parkinsonism Paternal Grandmother     Depression Brother     Unknown/Adopted No family hx of     Schizophrenia No family hx of     Bipolar Disorder No family hx of     Suicide No family hx of     Dementia No family hx of     Webster Disease No family hx of     Autism Spectrum Disorder No family hx of     Intellectual Disability No family hx of     Mental Illness No family hx of      Social History   Social History     Tobacco Use    Smoking status: Former     Current packs/day: 0.50     Average packs/day: 0.5 packs/day for 5.0 years (2.5 ttl pk-yrs)     Types: Cigarettes, Other    Smokeless tobacco: Never   Vaping Use    Vaping status: Every Day    Substances: Nicotine, THC, CBD    Devices: Pre-filled or refillable cartridge, Refillable tank   Substance Use Topics    Alcohol use: Not Currently     Comment: over 6 months    Drug use: Yes     Types: Marijuana     Comment: bipolar-concentrates 1-2 x day          Review of Systems  A medically appropriate review of systems was performed with pertinent positives and negatives noted in the HPI, and all other systems negative.    Physical Examination   BP: (!) 149/94  Pulse: 103  Temp: 98.1  F (36.7  C)  Resp: 18  Height: 188 cm (6' 2\")  Weight: 114.4 kg (252 lb 4.8 oz)  SpO2: 97 %    Physical Exam  General: Appears stated " age.   Neuro: Alert and fully oriented. Extremities appear to demonstrate normal strength on visual inspection.   Integumentary/Skin: no rash visualized, normal color    Psychiatric Examination   Appearance: awake, alert, adequately groomed, and casually dressed  Attitude:  cooperative  Eye Contact:  good  Mood:  better  Affect:   irritated  Speech:  clear, coherent  Psychomotor Behavior:  no evidence of tardive dyskinesia, dystonia, or tics  Thought Process:  logical, linear, and goal oriented  Associations:  no loose associations  Thought Content:   denies SI/HI/AVH, does not respond to internal stimuli, no apparent delusions or paranoia  Insight:  fair  Judgement:  fair  Oriented to:  time, person, and place  Attention Span and Concentration:  intact  Recent and Remote Memory:  intact  Language: able to name/identify objects without impairment  Fund of Knowledge: intact with awareness of current and past events    ED Course        Labs Ordered and Resulted from Time of ED Arrival to Time of ED Departure   URINE DRUG SCREEN PANEL - Abnormal       Result Value    Amphetamines Urine Screen Negative      Barbituates Urine Screen Negative      Benzodiazepine Urine Screen Negative      Cannabinoids Urine Screen Positive (*)     Cocaine Urine Screen Negative      Fentanyl Qual Urine Screen Negative      Opiates Urine Screen Negative      PCP Urine Screen Negative         Assessments & Plan (with Medical Decision Making)   Patient presenting with SI in the context of chronic pain and issues related to gender dysphoria. Patient says they have chronic SI. However, yesterday wanted to make a suicide pact with a friend, gathered up their medications and was going to go to [place] which is a clear escalation. Patient does not see it that way and frustrated with the 72 hour hold though he remains calm and cooperative. Patient has a history of SA, chronic pain, acute stress from coming out and only using THC to control their  mood disorder at this time. Therefore they are at high risk and require acute inpatient hospitalization for stabilization.     I have reviewed the assessment completed by the [facility].     Preliminary diagnosis:    ICD-10-CM    1. Bipolar disorder, currently depressed             Treatment Plan:  -Patient is on the list for an inpatient bed.   -72 hour hold remains necessary  -Would not recommend starting medications in the ED for the reasons delineated above.     --  Monika Aceves MD   [facility] EMERGENCY DEPARTMENT

## 2024-12-10 NOTE — TELEPHONE ENCOUNTER
R: [place]  Access Inpatient Bed Call Log 12/10/2024 @ 0815 AM:  Intake has called facilities that have not updated the bed status within the last 12 hours.           [place]:  [facility] is posting 0 beds.  [facility] is posting 0 beds. [phone]  [facility] ([facility]) is posting 0 beds.   [facility] is posting 0 beds. No high school or nomi psych. [phone] Call after morning meeting  [facility]) is posting 0 beds.  [facility]) is posting 0 beds. [phone]  [facility] is posting 7 beds. Ages 18-35, labs required. [phone] 8:32 AM Per Med 7 YA beds, no bird beds.   [facility]) is posting 0 beds.  [facility] ([facility]) is posting 0 beds. [phone]    [place]:   [facility] () is posting 7 beds. Mixed unit (12+), low acuity. [phone]  [facility] ([facility]) is posting 2 beds. No current aggression, low acuity.  [facility] is posting 0 beds. [phone] 8:25 AM Per [place], ~5 beds open.  [facility] ([facility]) is posting 0 beds. No current aggression, low acuity.   [facility] is posting 0 beds. [phone] ext. [phone] 8:21 AM Per VM @ [facility]  [facility]) is posting 0 beds. [phone]  [facility] is posting 2 beds. Low acuity, 72HH preferred. [phone] 8:30 AM Per Veronica, in staffing meeting, left  for a call back.  [facility] ([facility]) is posting 1 beds. [phone] 8:25 AM 8:25 AM 8:25 AM Per [place], 1  bed open.  [facility] ([facility]) is posting 6 beds. VOL only, no hx of aggression/violence/assault. No sexual offenders, no 72HH. [phone]  [facility] is posting 0 beds. Must have cognitive ability to program. No aggression or violent hx in the last 2 years. [phone]  [facility] is posting 0 beds. Low acuity, violence and aggression capped. [phone]  [facility] is posting 2 beds. [phone] 8:24 AM  Vic Cheema, currently on waitlist.    [facility] is posting 2 beds. [phone]  [facility] is posting 0 beds. No lines, drains, tubes, oxygen, IV or CPAP. [phone] 8:23 AM Per call, 2 open beds.  [facility] is posting 2 beds. MIXED UNIT. No lines, drains, tubes, oxygen, IV or CPAP. [phone]    Pt remains on work list pending appropriate bed availability.       9:46 AM Paged [facility] to review pt for admission to  [facility]   10:00 AM Called [facility], discharge cancelled, no bed available. Review for [facility] cancelled.     10:44 AM Paged [facility] to review pt for admission to /[facility].  12:00 PM [facility] reviewing.   12:22 PM [facility] accept pt for admission to [facility].  12:25 PM Informed [facility] ALEXIA Cheema of pt in queue. She states she will review chart and call ED for report.   12:27 PM Provided ED- New with placement.       Final Disposition: [facility]
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
## 2024-12-10 NOTE — PLAN OF CARE
"Goal Outcome Evaluation:         At 1710 patient leaning over desk stating \"I just want to leave because of the sharp pain up my spine\".  Writer offered to get an additional egg crate mattress for his bed to which he declined stating \"it won't work\".  This writer also offered to get a donut for patient to sit on or extra pillows, patient declined.  RN offered to get an order for Ibuprofen, pain patches, topical creams, patient declined.  Offered hot and cold packs which were also refused.  Patient offered scheduled Lyrica early \"it does little\".   Provider consulted and placed a order for Ibuprofen.               "

## 2024-12-10 NOTE — ED NOTES
"Pt brought back to ED, during search pt wanted to keep shoes on. Writer informed that since he is SI, shoes need to be removed due to strings. Pt became upset and escalated stating, \"I was forced here!\" And \"I've been SI for the past 25 years, I don't think this visit is going to change anything!\". Pt informed he needs to see MD and DEC . Writer empathized with pt, verbally deescalated, and provided pt with water, ice pack, and warm blanket. Pt now calm in HW with wife and friend.     "

## 2024-12-10 NOTE — ED NOTES
IP MH Referral Acuity Rating Score (RARS)    LMHP complete at referral to IP MH, with [facility]; and, daily while awaiting IP MH placement. Call score to PPS.  CRITERIA SCORING   New 72 HH and Involuntary for IP MH (not adolescent) 1/1   Boarding over 24 hours 0/1   Vulnerable adult at least 55+ with multiple co morbidities; or, Patient age 11 or under 0/1   Suicide ideation without relief of precipitating factors 1/1   Current plan for suicide 1/1   Current plan for homicide 0/1   Imminent risk or actual attempt to seriously harm another without relief of factors precipitating the attempt 0/1   Severe dysfunction in daily living (ex: complete neglect for self care, extreme disruption in vegetative function, extreme deterioration in social interactions) 1/1   Recent (last 2 weeks) or current physical aggression in the ED 0/1   Restraints or seclusion episode in ED 0/1   Verbal aggression, agitation, yelling, etc., while in the ED 1/1   Active psychosis with psychomotor agitation or catatonia 0/1   Need for constant or near constant redirection (from leaving, from others, etc).  1/1   Intrusive or disruptive behaviors 0/1   TOTAL Acuity Total Score: 6

## 2024-12-10 NOTE — ED TRIAGE NOTES
Patient said his wife and friend made him come to ED. Patient said he was suicidal earlier because he's on a lot of pain. Patient said he was denied his 6th back surgery. Patient current denies SI. He has scheduled therapy appointment on Thursday at 09:00 am.     Triage Assessment (Adult)       Row Name 12/10/24 0241          Triage Assessment    Airway WDL WDL        Respiratory WDL    Respiratory WDL WDL        Skin Circulation/Temperature WDL    Skin Circulation/Temperature WDL WDL        Cardiac WDL    Cardiac WDL X;rhythm     Pulse Rate & Regularity tachycardic        Peripheral/Neurovascular WDL    Peripheral Neurovascular WDL X;neurovascular assessment lower        Cognitive/Neuro/Behavioral WDL    Cognitive/Neuro/Behavioral WDL WDL        LLE Neurovascular Assessment    Sensation LLE numbness present  foot        RLE Neurovascular Assessment    Sensation RLE numbness present  foot

## 2024-12-10 NOTE — PROGRESS NOTES
"Pt arrives on unit at approximately 1400. Pt has chronic back pain and has multiple surgeries. The most recent was approximately one year ago when spinal cord stimulator and leads were placed.     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 Pt states that he and his wife do not have financial concerns as she works full time in a good job. Patient, wife, and his nine year old daughter live with patient's mother.     When asked what may have triggered the crisis that brought him to the ED. Pt states: \"I just have so much going on.\" Pt states that his marriage is good. When writer asks about other stressors, pt states: \"Well, I'm dealing with some gender issues.\" Pt states that his wife is supportive regarding these concerns but indicates that he has not fully discussed this with his mother which creates anxiety.    Pt denies SI at this time. \"And I'm not saying that just to say it.\" Pt grins. Writer notes chronic SI in pt's history. Pt acknowledges this and states: \"Yes, I've had it since I was [age]. I've been through every kind of therapy and it's okay--it's just something that is there.\" When asked if he experiences auditory or visual hallucinations, pt states: \"Only when I take antipsychotics. I've tried every available antipsychotic. My bipolar medication is weed. Healthcare professionals don't like to hear that but it's true. Now I'll be off my medication while I am in here.\"     Pt's cane is replaced with a walker. Writer reported to evening shift and to manager regarding pt's battery charger for spine stimulator. Pt indicates that he may not need to use it but realizes that would need to be used under supervision.     Pt is in the lounge at this time and has been socializing intermittently with staff and peers.    Anette Hunter RN      "

## 2024-12-10 NOTE — PLAN OF CARE
12/10/24 1450   Patient Belongings   Did you bring any home meds/supplements to the hospital?  No   Patient Belongings none;locker   Patient Belongings Put in Hospital Secure Location (Security or Locker, etc.) other (see comments)  (see attached note for itemization)   Belongings Search Yes   Clothing Search Yes   Second Staff Blayne S   Comment Patient exchanged his cane for a hospital walker     No meds, cash or credit upon admission. (Nothing sent to security)    Unit locker items: Plastic bag with coat, sweats, shirt, underwear and shoes. Insurance card, medtronic pain unit with magnetic pad ( will be brought in later), cane. Also, patient has facial piercing intact.    ..A               Admission:  I am responsible for any personal items that are not sent to the safe or pharmacy.  [facility] is not responsible for loss, theft or damage of any property in my possession.    Signature:  _________________________________ Date: _______  Time: _____                                              Staff Signature:  ____________________________ Date: ________  Time: _____      2nd Staff person, if patient is unable/unwilling to sign:    Signature: ________________________________ Date: ________  Time: _____     Discharge:  [facility] has returned all of my personal belongings:    Signature: _________________________________ Date: ________  Time: _____                                          Staff Signature:  ____________________________ Date: ________  Time: _____

## 2024-12-10 NOTE — PLAN OF CARE
Dipak VU Fuentes  December 10, 2024  Plan of Care Hand-off Note     Patient Care Path: inpatient mental health    Plan for Care:   It is the recommendation of this clinician that pt admit to IP MH for safety and stabilization. Pt displays the following risk factors that support IP admission: Pt was suicidal with a plan to overdose, left home with her pills and it took a long time to find her, asked friend to go up to [place] to complete suicide together, and when she told wife they brought patient to the ED but she tried to jump out of the moving vehicle several times, and brainstormed other ways to commit suicide.  Pt appears to be experiencing polo with accompanying irritability and agitation.  Pt now says she's not suicidal and wants to leave, but it was agreed to put pt on a hold.. Pt is unable to engage in safety planning to mitigate risk level in a non-secure setting. Lower levels of care would not be sufficient in managing the level of risk pt is presenting with. Due to this IP is the least restrictive option of care for pt. Pt should remain in IP until deemed safe to return to the community and engage in OP MH supports. Pt will need assistance establishing OP MH services prior to discharge.    Identified Goals and Safety Issues: further assessment safety and stabilization.    Overview:  GinaBina (Spouse)  [phone]            Legal Status: Legal Status at Admission: 72 Hour Hold  72 Hour Hold - Date/Time Initiated: 12/10/24 0650  72 Hour Hold - Date/Time Ends: as MD decides    Psychiatry Consult: yes       Updated   regarding plan of care.           Caity Segura, [facility]

## 2024-12-10 NOTE — ED NOTES
Pt up to use the bathroom, pt asked to provide a urine sample, pt declined giving a urine sample at this time. Pt returned to bed with no further needs expressed.
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
## 2024-12-10 NOTE — ED NOTES
"Pt reluctant to cooperate with questions with writer. Pt agitated when notified of new 72HH status. Pt said \"it's pointless to keep me here when the people who brought me here think I am okay to leave\". Writer demonstrated empathy with pt, pt now calm.     1:1 remains at bedside  "

## 2024-12-10 NOTE — PLAN OF CARE
"Goal Outcome Evaluation:    Initial meeting note:    Therapist introduced self to patient and discussed psychotherapy service available to patient.     Pt response: Pt not interested currently in meeting 1:1; therapist will continue remaining available for pt     Plan: Pt was encouraged to attend groups and therapist will remain available for 1:1 sessions    Writer introduced self to pt. He was a bit irritable and said he has a safety plan with his therapist in the community and is irritated he would have to \" start over\". Writer asked if he would like to decline safety planning or check in again in a couple of days . He said \" might was well, there is nothing else to do.\"                         "

## 2024-12-10 NOTE — ED PROVIDER NOTES
Emergency Department I-PASS Sign-out      Illness Severity: Stable    Patient Summary:  29 year old male with pertinent PMH of bipolar, chronic pain who presented with SI with plan to overdose. Pt states secondary to chronic pain, but wife reports that pt came out 2 weeks ago and stated that they plan to transition. Pt just disclosed this to parents.     ED Course/treatment plan: Pt on 72 hour hold, very unhappy with plan to stay. Pt not honest about circumstances of the night and is displaying some manipulative behaviors. Pt told family that she would lie to be discharged.     Clinical Impression:  No diagnosis found.    Edited by: Gabriela Cordero MD at 12/10/2024 0632    Action List:  Tests to Follow-up:  Drug screen    Medications Reconciled/Ordered:  yes    ED Mental Health Boarding Order Set Used for Diet/PRNs/Other:  Yes    DEC, Extended Care, Psych Consult Orders:  Extended Care and Psychiatry consult ordered.    Situational Awareness & Contingency Plannin Hour Hold Status:  On 72 hour hold.  Active Orders  N/A    Disposition:  Admit/Transfer to Behavioral Health    Boarding subsequent shift/day updates:  Patient currently awaiting mental health admission.  No other acute events.        Melissa Noonan MD   Emergency Medicine       Saran, Melissa Sullivan MD  12/10/24 7440
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
## 2024-12-11 VITALS
SYSTOLIC BLOOD PRESSURE: 146 MMHG | HEIGHT: 74 IN | HEART RATE: 88 BPM | OXYGEN SATURATION: 98 % | RESPIRATION RATE: 16 BRPM | TEMPERATURE: 97.6 F | BODY MASS INDEX: 32.08 KG/M2 | WEIGHT: 250 LBS | DIASTOLIC BLOOD PRESSURE: 101 MMHG

## 2024-12-11 PROCEDURE — 250N000013 HC RX MED GY IP 250 OP 250 PS 637: Performed by: EMERGENCY MEDICINE

## 2024-12-11 PROCEDURE — 97150 GROUP THERAPEUTIC PROCEDURES: CPT | Mod: GO

## 2024-12-11 PROCEDURE — 99223 1ST HOSP IP/OBS HIGH 75: CPT | Mod: AI | Performed by: REGISTERED NURSE

## 2024-12-11 PROCEDURE — 250N000013 HC RX MED GY IP 250 OP 250 PS 637: Performed by: REGISTERED NURSE

## 2024-12-11 RX ORDER — ALBUTEROL SULFATE 90 UG/1
2 INHALANT RESPIRATORY (INHALATION) EVERY 6 HOURS PRN
Status: DISCONTINUED | OUTPATIENT
Start: 2024-12-11 | End: 2024-12-11 | Stop reason: HOSPADM

## 2024-12-11 RX ORDER — PREGABALIN 75 MG/1
75 CAPSULE ORAL DAILY
Status: COMPLETED | OUTPATIENT
Start: 2024-12-11 | End: 2024-12-11

## 2024-12-11 RX ORDER — PREGABALIN 75 MG/1
150 CAPSULE ORAL 2 TIMES DAILY
Status: DISCONTINUED | OUTPATIENT
Start: 2024-12-11 | End: 2024-12-11 | Stop reason: HOSPADM

## 2024-12-11 RX ADMIN — PREGABALIN 75 MG: 75 CAPSULE ORAL at 07:48

## 2024-12-11 RX ADMIN — PREGABALIN 75 MG: 75 CAPSULE ORAL at 08:51

## 2024-12-11 RX ADMIN — TIZANIDINE 8 MG: 4 TABLET ORAL at 07:48

## 2024-12-11 ASSESSMENT — ACTIVITIES OF DAILY LIVING (ADL)
ADLS_ACUITY_SCORE: 54
LAUNDRY: WITH SUPERVISION
ADLS_ACUITY_SCORE: 54
ORAL_HYGIENE: INDEPENDENT
ADLS_ACUITY_SCORE: 54
DRESS: INDEPENDENT;STREET CLOTHES
ADLS_ACUITY_SCORE: 54
HYGIENE/GROOMING: INDEPENDENT
ADLS_ACUITY_SCORE: 54
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
## 2024-12-11 NOTE — PLAN OF CARE
BEH IP Unit Acuity Rating Score (UARS)  Patient is given one point for every criteria they meet.    CRITERIA SCORING   On a 72 hour hold, court hold, committed, stay of commitment, or revocation. 1    Patient LOS on BEH unit exceeds 20 days. 0  LOS: 1   Patient under guardianship, 55+, otherwise medically complex, or under age 11. 0   Suicide ideation without relief of precipitating factors. 1   Current plan for suicide. 1   Current plan for homicide. 0   Imminent risk or actual attempt to seriously harm another without relief of factors precipitating the attempt. 0   Severe dysfunction in daily living (ex: complete neglect for self care, extreme disruption in vegetative function, extreme deterioration in social interactions). 1   Recent (last 7 days) or current physical aggression in the ED or on unit. 0   Restraints or seclusion episode in past 72 hours. 0   Recent (last 7 days) or current verbal aggression, agitation, yelling, etc., while in the ED or unit. 1   Active psychosis. 0   Need for constant or near constant redirection (from leaving, from others, etc).  1   Intrusive or disruptive behaviors. 0   Patient requires 3 or more hours of individualized nursing care per 8-hour shift (i.e. for ADLs, meds, therapeutic interventions). 0   TOTAL 6

## 2024-12-11 NOTE — PROGRESS NOTES
Rehab Group    Start time: 1015  End time: 1145  Patient time total: 90 minutes    attended full group    #5 attended   Group Type: OT Clinic   Group Topic Covered: balanced lifestyle, coping skills, healthy leisure time, self-care, and social skills     Group Session Detail:  Occupational Therapy Clinic group to facilitate coping skill exploration, use of cognitive skills and problem solving, creative expression, clinical observation and facilitation of social, cognitive, and kinesthetic performance skills.       Patient Response/Contribution:  cooperative with task, organized, expressed readiness to alter behaviors, socially appropriate, safe use of materials/supplies, listened actively, attentive, and actively engaged     Patient Detail:  Initiated group, was given and completed a written self assessment. Identified RFA as: pain, gender issues. Further noted experiencing: anger, helplessness, guilt, overwhelmed, negative thoughts, memory problems, self harm thoughts, suicidal gesture and withdrawal from others.  Identified 3 positive coping skills she actively uses. Goals selected included: identify and express feelings better and improve self esteem.  Identified supports listed as: family, friends and therapy. OT purpose was explained with a value of having involvement in tx plan, and provided options to meet self identified goals. Will assess further in the areas of organization, problem solving, and concentration.  Noted they needed to do some radical acceptance to come to the spot where group participation was acceptable. Bright affect, future focused in all conversations, focused and organized during task completion. Calm mood and able to identify benefits of familiar creative task work. Social with peers and identified positive activities and supports to support self management.      [id number] OT Group (2 or more in attendance)      Patient Active Problem List   Diagnosis    Decreased libido    Bipolar I  disorder, single manic episode (H)    Benign neoplasm of skin    Personal history of tobacco use, presenting hazards to health    Lumbar disc herniation with radiculopathy    Mild intermittent asthma without complication    Lumbar radiculopathy    History of substance use disorder in prolonged remission    Other chronic pain    Depression, unspecified depression type

## 2024-12-11 NOTE — PLAN OF CARE
"Goal Outcome Evaluation:    Plan of Care Reviewed With: patient          Patient is on a 72 Hour Hold and states \"I will be leaving early Friday morning, you can bet\".    Patient currently denies SI and states \"I was frustrated, had a bad day and reacted poorly.  I have Bipolar and have chronic pain.  I asked to be brought here by my friend\".      Patient reports are inconsistent with his story/situation.  Per ED notes patient's wife and friend made him come to the hospital after he took all of his medications and left his home stating he was going to overdose.  Patient attempted to jump out of the car multiple times on the way to the ER, tried to plan a suicide pact with a friend and was brainstorming other suicide ideas.  Patient has 1 prior suicide attempt.  Patient reported that he has an appointment with his outside therapist, advised this writer that he needs a new therapists.  Patient is  with a 9 year old daughter.  Apparently, patient recently told family that he is transgender.      At the beginning of the shift patient was edgy, stating he wanted to leave and stating the only thing that helps is smoking weed.  See previous note.  Patient insists this place is wrong for him and \"making me worse\".  Denies SI/SIB or AVH.    Wife and friend visited.  Brought charge cord and [employer] remote for back stimulator -still need order, provider note placed.     Compliant with scheduled Lyrica.  PRNs of Tylenol, Tizanidine and Hydroxyzine requested and given (see MAR).        "

## 2024-12-11 NOTE — PROGRESS NOTES
"\"I'm better. I'm don't have panicky racing thoughts. I don't want to die.\" Depression is 2/10, denies SI,anxiety, racing thoughts hallucinations and paranoia. Thoughts are clear and focus is good. Attends groups and is social with peers. Read over discharge instructions with patient. Was discharged at 1340. Wife picked patient up.   "
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
## 2024-12-11 NOTE — PLAN OF CARE
Pt appeared to sleep 7 hours. No concerns reported. Safety checks done at least every 15 minutes.

## 2024-12-11 NOTE — PLAN OF CARE
"Goal Outcome Evaluation:    Initial meeting note:    Therapist introduced self to patient and discussed psychotherapy service available to patient.     Pt response: Pt not interested currently in meeting 1:1; therapist will continue remaining available for pt     Plan: Pt was encouraged to attend groups and therapist will remain available for 1:1 sessions    Pt reviewed safety plan, no changes. He is discharging today. He said \" that is why I am here, I used my safety plan.\" He was cooperative and pleasant today.                         "

## 2024-12-11 NOTE — DISCHARGE INSTRUCTIONS
Behavioral Discharge Planning and Instructions    Summary: You were admitted on 12/10/2024 due to suicidal ideation.  You were treated by ZENA Panda CNP for ongoing psychiatric assessment and medication management.  You had opportunities to participate in therapeutic groups on the unit. You were discharged on 12/11/2024 from [facility] to your home located at [address] [place] [id number] .     Main Diagnosis:   Suicidal ideation   Bipolar Disorder, per history    Health Care Follow-up:   Psychotherapy Tuesday December 17th at 9:00 AM virtual visit  Anuradha Shukla, PhD, LP with  Psychological Services  [email or web address]   Phone: [phone] Fax: [phone]    Attend all scheduled appointments with your outpatient providers. Call at least 24 hours in advance if you need to reschedule an appointment to ensure continued access to your outpatient providers.     Major Treatments, Procedures and Findings:  You were provided with: a psychiatric assessment, assessed for medical stability, medication evaluation and/or management, group therapy, and milieu management    Symptoms to Report: feeling more aggressive, increased confusion, losing more sleep, mood getting worse, or thoughts of suicide    Early warning signs can include: increased depression or anxiety sleep disturbances increased thoughts or behaviors of suicide or self-harm  increased unusual thinking, such as paranoia or hearing voices    Safety and Wellness:  Take all medicines as directed.  Make no changes unless your doctor suggests them.      Follow treatment recommendations.  Refrain from alcohol and non-prescribed drugs.  Ask your support system to help you reduce your access to items that could harm yourself or others. If there is a concern for safety, call 911.    Resources:   Mental Health Crisis Resources  Throughout [place]: call **CRISIS (**783835)  Crisis Text Line: is  available for free, 24/7 by texting [place] to 998966  Suicide Awareness Voices of Education (SAVE) (www.save.org): 605-785-UDPE (8284)  The National Suicide Prevention Lifeline is now: 988 Suicide and Crisis Lifeline. Call 988 anytime.  National [place] on Mental Illness (www.[place].pallavi.org): 889.550.8198 or 828-447-4088.  Fuwx1bglg: text the word LIFE to 89045 for immediate support and crisis intervention  Mental Health Consumer/Survivor Network of [place] (www.mhcsn.net): 882.169.9156 or 658-782-8267  Mental Health Association of [place] (www.mentalhealth.org): 391.812.5556 or 453-847-8838  Peer Support Connection [place] Warmline (PSC) 1-299.107.5999 Available from 5pm - 9am (7 days a week/365 days a year)  [facility] 1-782.294.7454    General Medication Instructions:   See your medication sheet(s) for instructions.   Take all medicines as directed.  Make no changes unless your doctor suggests them.   Go to all your doctor visits.  Be sure to have all your required lab tests. This way, your medicines can be refilled on time.  Do not use any drugs not prescribed by your doctor.  Avoid alcohol.    Advance Directives:   Scanned document on file with [employer]? Minor-N/A  Is document scanned? Minor-N/A  Honoring Choices Your Rights Handout: Minor - N/A  Was more information offered? Minor-N/A    The Treatment team has appreciated the opportunity to work with you. If you have any questions or concerns about your recent admission, you can contact the unit which can receive your call 24 hours a day, 7 days a week. They will be able to get in touch with a Provider if needed. The unit number is [phone].

## 2024-12-11 NOTE — PLAN OF CARE
Assessment/Intervention/Current Symptoms and Care Coordination  Chart Review  Met with team to discuss patient care.  Spoke with pt wife at [phone]. Sounds better than yesterday. OK with having him come home. Scheduled tentative  for 2pm today.  Pt signed SHREYA for  Psychological Services.  Left voicemail message with  Psychological Services requested return call to verify next scheduled appointment. Received call back confirming appointment  Completed AVS.    Discharge Plan or Goal  Dispo Plan: Discharge today  Transportation: Wife will pickup  Safety plan complete?: 12/11/2024   Appointments:  On avs    Barriers to Discharge  no barriers identified     Referral Status  No new referral made    Legal Status  Came in on a hold, leaving today.

## 2025-01-06 ENCOUNTER — TELEPHONE (OUTPATIENT)
Dept: NEUROSURGERY | Facility: CLINIC | Age: 30
End: 2025-01-06
Payer: COMMERCIAL

## 2025-01-06 NOTE — TELEPHONE ENCOUNTER
Left message for patient stating that [facility] denied Dr. Oquendo's appeal, so Dr. Oquendo wrote a new appeal. [employer] states that the new decision could take up to 30 days, so 1/15 surgery will need to be rescheduled. I asked the patient to return my call to confirm he received the message.

## 2025-01-11 ENCOUNTER — MYC MEDICAL ADVICE (OUTPATIENT)
Dept: PALLIATIVE MEDICINE | Facility: CLINIC | Age: 30
End: 2025-01-11
Payer: COMMERCIAL

## 2025-01-11 DIAGNOSIS — Z98.890 S/P LUMBAR MICRODISCECTOMY: ICD-10-CM

## 2025-01-11 DIAGNOSIS — M54.16 LUMBAR RADICULOPATHY: ICD-10-CM

## 2025-01-13 NOTE — TELEPHONE ENCOUNTER
Will leave encounter open for patient response/chart review by nursing.     ----------------Mychart Below from pt----------------    I need to get a refill on my medications and I was wondering if there are any other options as my insurance keeps denying my surgeries.     --------------Mychart below response to pt----------------  Hello,     Just wanted to clarify a bit, are you looking for both the tizanidine and the Lyrica for refill? Are you still wanting your refills to go to [facility] [address]?  Also, I not sure if you received your letter yet, but unfortunately, Marni Isaac did leave our organization. I would recommend scheduling an appointment with a new provider to discuss further options.     [phone]    Gabriela BARRAGAN, RN Care Coordinator  [facility]  Pain Management    When responding to this message, please allow 24-48 business hours for a reply.  If you need sooner assistance please call: [facility] at [phone] between the hours of 8:00AM and 4:30PM Monday through Friday.    Note that MyChart is not intended for emergencies, detailed provider communication, or in lieu of an office visit. Medication is not typically adjusted over MyChart communication.

## 2025-01-14 RX ORDER — PREGABALIN 150 MG/1
150 CAPSULE ORAL 2 TIMES DAILY
Qty: 180 CAPSULE | Refills: 0 | Status: SHIPPED | OUTPATIENT
Start: 2025-01-14

## 2025-01-14 NOTE — TELEPHONE ENCOUNTER
Appears Dr Patel has kindly signed refills in the transfer period between providers. Refills will carry through until pt establishes care at now made appointment with Shanda Mora. Closing as no further action needed,     Gabriela BARRAGAN, RN Care Coordinator  [facility]  Pain [facility]

## 2025-01-14 NOTE — TELEPHONE ENCOUNTER
This patient was seeing Marni and now Scheduled with Shanda Mora 2/10/25  60 min appt.  Dr Patel it looks like you refilled meds in the past.

## 2025-01-14 NOTE — TELEPHONE ENCOUNTER
Called pt. LM to call back/respond to Joshua BARRAGAN, RN Care Coordinator  [facility]  Pain [facility]

## 2025-01-14 NOTE — TELEPHONE ENCOUNTER
Recvd call back from pt. States that refills on file from [facility]  and will need refills. Taking medication as prescribed. Pt aware BRITT Gray left and wishes to schedule follow up with new provider.     Routing to  to reach out for NP appt with LILLIAM Mora.    Routing to provider pool to review prepped refills per below.     Tizanidine 4mg, #180, refill:1  Lyrica 150mg, #180, Refill:no    Last appt:10/29/24  Next appt- to establish with new provider        Using sterile technique, the correct location was identified, and a needle was inserted into the artery (specify in FT)./Positive blood return was obtained via the catheter./Connected to a pressurized flush line./Line was sutured in place./Hemostasis was achieved with direct pressure, and a dry sterile dressing applied./Ultrasound guidance was used.

## 2025-01-27 ENCOUNTER — OFFICE VISIT (OUTPATIENT)
Dept: PEDIATRICS | Facility: CLINIC | Age: 30
End: 2025-01-27
Payer: COMMERCIAL

## 2025-01-27 VITALS
HEART RATE: 101 BPM | RESPIRATION RATE: 18 BRPM | BODY MASS INDEX: 33.07 KG/M2 | HEIGHT: 74 IN | SYSTOLIC BLOOD PRESSURE: 136 MMHG | WEIGHT: 257.7 LBS | OXYGEN SATURATION: 99 % | TEMPERATURE: 98.4 F | DIASTOLIC BLOOD PRESSURE: 95 MMHG

## 2025-01-27 DIAGNOSIS — F64.9 GENDER DYSPHORIA: Primary | ICD-10-CM

## 2025-01-27 PROCEDURE — 99215 OFFICE O/P EST HI 40 MIN: CPT | Performed by: STUDENT IN AN ORGANIZED HEALTH CARE EDUCATION/TRAINING PROGRAM

## 2025-01-27 PROCEDURE — G2211 COMPLEX E/M VISIT ADD ON: HCPCS | Performed by: STUDENT IN AN ORGANIZED HEALTH CARE EDUCATION/TRAINING PROGRAM

## 2025-01-27 ASSESSMENT — ASTHMA QUESTIONNAIRES
QUESTION_4 LAST FOUR WEEKS HOW OFTEN HAVE YOU USED YOUR RESCUE INHALER OR NEBULIZER MEDICATION (SUCH AS ALBUTEROL): ONCE A WEEK OR LESS
QUESTION_3 LAST FOUR WEEKS HOW OFTEN DID YOUR ASTHMA SYMPTOMS (WHEEZING, COUGHING, SHORTNESS OF BREATH, CHEST TIGHTNESS OR PAIN) WAKE YOU UP AT NIGHT OR EARLIER THAN USUAL IN THE MORNING: NOT AT ALL
QUESTION_1 LAST FOUR WEEKS HOW MUCH OF THE TIME DID YOUR ASTHMA KEEP YOU FROM GETTING AS MUCH DONE AT WORK, SCHOOL OR AT HOME: NONE OF THE TIME
QUESTION_2 LAST FOUR WEEKS HOW OFTEN HAVE YOU HAD SHORTNESS OF BREATH: ONCE OR TWICE A WEEK
QUESTION_5 LAST FOUR WEEKS HOW WOULD YOU RATE YOUR ASTHMA CONTROL: COMPLETELY CONTROLLED
ACT_TOTALSCORE: 23
ACT_TOTALSCORE: 23

## 2025-01-27 ASSESSMENT — PATIENT HEALTH QUESTIONNAIRE - PHQ9
SUM OF ALL RESPONSES TO PHQ QUESTIONS 1-9: 21
SUM OF ALL RESPONSES TO PHQ QUESTIONS 1-9: 21
10. IF YOU CHECKED OFF ANY PROBLEMS, HOW DIFFICULT HAVE THESE PROBLEMS MADE IT FOR YOU TO DO YOUR WORK, TAKE CARE OF THINGS AT HOME, OR GET ALONG WITH OTHER PEOPLE: SOMEWHAT DIFFICULT

## 2025-01-27 ASSESSMENT — PAIN SCALES - GENERAL: PAINLEVEL_OUTOF10: SEVERE PAIN (7)

## 2025-01-27 NOTE — PROGRESS NOTES
"  {PROVIDER CHARTING PREFERENCE:230606}    Subjective   Ada is a 29 year old, presenting for the following health issues:  Consult      1/27/2025     2:21 PM   Additional Questions   Roomed by CA         1/27/2025     2:21 PM   Patient Reported Additional Medications   Patient reports taking the following new medications None     HPI   {ALERT  Recent PHQ-9/EPDS score indicates suicidal ideations, document follow-up within the A/P section of the note :096123}{Provider Documentation  Link to C-SSRS ([facility]) Flowsheet :255815}  {MA/LPN/RN Pre-Provider Visit Orders- hCG/UA/Strep (Optional):900979}  {SUPERLIST (Optional):183455}  {additonal problems for provider to add (Optional):782315}    {ROS Picklists (Optional):992560}      Objective    BP (!) 144/98 (BP Location: Right arm, Patient Position: Sitting, Cuff Size: Adult Large)   Pulse 97   Temp 98.4  F (36.9  C) (Temporal)   Resp 18   Ht 1.88 m (6' 2.02\")   Wt 116.9 kg (257 lb 11.2 oz)   SpO2 99%   BMI 33.07 kg/m    Body mass index is 33.07 kg/m .  Physical Exam   {Exam List (Optional):886828}    {Diagnostic Test Results (Optional):250818}        Signed Electronically by: Deirdre E. Milligan, MD  {Email feedback regarding this note to [email or web address]   :309109}  Answers submitted by the patient for this visit:  Patient Health Questionnaire (Submitted on 1/27/2025)  If you checked off any problems, how difficult have these problems made it for you to do your work, take care of things at home, or get along with other people?: Somewhat difficult  PHQ9 TOTAL SCORE: 21    "
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
## 2025-01-27 NOTE — PATIENT INSTRUCTIONS
"      Informed Consent   Feminizing Medications      This form refers to the use of estrogen and/or androgen antagonists (sometimes called \"anti-androgens\" or \"androgen blockers\") by persons in the male-to-female spectrum who wish to become feminized to reduce gender dysphoria and facilitate a more feminine gender presentation. While there are risks associated with taking feminizing medications, when appropriately prescribed they can greatly improve mental health and quality of life.     Please seek another opinion if you want additional perspective on any aspect of your care.       Feminizing Effects     1. I understand that estrogen, androgen antagonists, or a combination of the two may be  prescribed to reduce male physical features and feminize my body.     2. I understand that the feminizing effects of estrogen and androgen antagonists can take several  months to a year to become noticeable, speed and degree of change is unpredictable.     3.  I understand that if I am taking estrogen I will probably develop breasts, and:        Breasts may take several years to develop to their full size.   Even if estrogen is stopped, the breast tissue that has developed will remain.   As soon as breasts start growing, it is recommended to have an annual breast exam.  There may be milky nipple discharge (galactorrhea). If you develop this, it is advised to check with a doctor to determine the cause.   It is not known if taking estrogen increases the risk of breast cancer.     4. I understand that the following changes are generally not permanent (that is, they will likely reverse if I stop taking feminizing medications):     Skin may become softer.   Muscle mass decreases and there may be a decrease in upper body strength.   Body hair growth may become less noticeable and grow more slowly,but won't stop.  Male pattern baldness may slow down, but will probably not stop completely, and hair that has already been lost will " "likely not grow back.   Fat may redistribute to a more feminine pattern (decreased in abdomen, increased on buttocks/hips/thighs - changing from \"apple shape\" to \"pear shape\").       5. I understand that taking feminizing medications will make my testicles produce less testosterone, which can affect my overall sexual function:    Fertility may be impaired, and I should consider sperm banking if I desire biological children.  Sperm may not mature, leading to reduced fertility. It is still possible to get someone pregnant however and contraception should be used if needed.  Testicles may shrink by 25-50%. Testicular examinations are still recommended.  The amount of fluid ejaculated may be reduced.   There is typically decrease in morning and spontaneous erections.   Erections may not be firm enough for penetrative sex.   Libido (sex drive) may decrease.     6. I understand that there are some aspects of my body that are not significantly changed by feminizing medications, though there may be other treatments that can be helpful.    Jay/facial hair may grow more slowly and be less noticeable, but will not go away.   Voice pitch will not rise and speech patterns will not become more feminine.   The laryngeal prominence (\"Dipak's apple\") will not shrink.      Risks of Feminizing Medications     7. I understand that the medical effects and safety of feminizing medications are not fully understood, and that there may be long-term risks that are not yet known.     8. I understand that I am strongly advised not to take more medication than I am prescribed, as this increases health risks. It won't help changes come faster.     9. I understand that feminizing medications can damage the liver. I have been advised that I should be monitored for possible liver damage as long as I am taking feminizing medications.     10. I understand that feminizing medications will result in changes that will be noticeable by other people, and " that some people in similar circumstances have experienced harassment, discrimination, and violence, while others have lost support of loved ones. I have been advised that referrals can be made for support/counselling if this would be helpful.     Medical Risks Associated with Estrogen     11. I understand that taking estrogen increases the risk of blood clots, which can result in:     Pulmonary embolism (blood clot to the lungs), which may cause death.  Stroke, which may cause permanent brain damage or death   Heart attack   Chronic leg vein problems     I understand that the risk of blood clots is much worse if I smoke cigarettes, especially over age 40. I understand that the danger is so high that I should stop smoking completely if I start taking estrogen. I can ask my doctor for advice on quitting.    12. I understand that taking estrogen can increase deposits of fat around my internal organs, which is associated with increased risk for diabetes and heart disease.     13. I understand that taking estrogen can cause increased blood pressure,  estrogen increases the risk of gallstones,  estrogen can cause headaches or migraines and can cause nausea and vomiting. I have been advised that if I develop these, it is recommended that I discuss this with my doctor.     14. I understand that it is not known if taking estrogen increases the risk of non-cancerous tumors of the pituitary gland. I have been informed that although this is typically not life-threatening, it can damage vision. I understand that this will be monitored.    15. I have been informed that I am more likely to have dangerous side effects from estrogen if I smoke, am overweight, am over 40 years old, or have a history of blood clots, high blood pressure, or a family history of breast cancer.     16. I have been informed that if I take too much estrogen, my body may convert it into testosterone, which may slow or stop feminization.     Medical Risks  Associated with Androgen Antagonists     17. I have been informed that spironolactone affects the balance of water and salts in the kidneys, and that this may:     Increase the amount of urine produced, and I may urinate more frequently   Reduce blood pressure   Rarely, cause high levels of potassium in the blood, and this will be monitored    18. I understand that some androgen antagonists make it more difficult to evaluate the results of PSA test. If I am over 50, I should have my prostate evaluated every year.     Prevention of Medical Complications     19. I agree to take feminizing medications as prescribed and to tell my care provider if I am not happy with the treatment or am experiencing any problems.     20. I understand that the right dose or type of medication prescribed for me may not be the same as for someone else.     21. I understand that physical examinations and blood tests are needed on a regular basis (monthly, at first) to check for negative side effects of feminizing medications.     22. I understand that feminization medications can interact with other medication (including other sources of hormones), dietary supplements, herbs, alcohol, and street drugs. I understand that being honest with my care provider about what else I am taking will help prevent medical complications that could be life-threatening. I have been informed that I will continue to get medical care no matter what information I share.     23. I understand that some medical conditions make it dangerous to take estrogen or androgen antagonists. I agree to be checked for risky conditions before the decision to start or continue feminizing medication is made.     24. I understand that I can choose to stop taking feminizing medication at any time, and that it is advised that I do this with the help of my doctor to make sure there are no negative reactions to stopping. I understand that my doctor may suggest I reduce, switch or stop  taking feminizing medication, if there are severe health risks that can't be controlled.    My signature below confirms that:     My doctor has talked with me about the benefits and risks of feminizing medication, the possible or likely consequences of hormone therapy, and potential treatment options.   I understand the risks that may be involved.   I understand that this form covers known effects and risks and that there may be long-term effects or risks that are not yet known.   I have had sufficient opportunity to discuss treatment options with my doctor. All of my questions have been answered to my satisfaction.   I believe I have adequate knowledge on which to base informed consent to the provision of feminizing medication.     Based on this:     _____ I wish to begin taking estrogen.     _____ I wish to begin taking androgen antagonists (e.g., Spironolactone).     _____ I do not wish to begin taking feminizing medication at this time.     Whatever your current decision is, please talk with your doctor any time you have questions, concerns, or want to re-evaluate your options.         Some online resources for transgender health    [place] [facility]   Home to the [facility] at [address] [place] [address], [phone]. Also has support groups.  http://www.[place]transhealth.org/    [facility] Excellence for Transgender Health  Increasing access to comprehensive, effective, and affirming healthcare services for trans and gender-variant communities.  http://www.transhealth.[facility].edu/trans?page=gucci-00-05    [place] [facility]   Community-based non-profit committed to advancing the health & wellness of LGBTQ communities through research, education and advocacy. http://www.[employer]Prohealth.org    [facility] Glossary of Transgender Terms  http://www."[employer]"wayhealth.org/site/DocServer/Handout_7-C_Glossary_of_Gender_and_Transgender_Terms__fi.pdf?dauJE=1920    Safe, gender-neutral public restrooms in the  [facility]   http://www.[place]transhealth.org//index.php?option=com_content&task=view&id=12&Itemid    Trans Youth Support Network  For people 26 and under who identify as a trans or gender non-conforming person and want to be a part of an activist organization. Offers peer support, education and community building opportunities.   http://www.transyouthsupportnetwork.org/    Reclaim:  RECLAIM offers mental and integrative health services for LGBTQ youth and their families.  http://www.reclaim-lgbtyouth.org/    Gender Spectrum, for Trans Youth  Gender Spectrum provides education, training and support to help create a gender sensitive and inclusive environment for all children and teens. http://www.genderspectrum.org/    [employer] FTM Resource Guide  Information on topics of interest to female-to-male (FTM, F2M) trans men, and their friends and loved ones.  http://ftPromoltade.org/    Also check out your local [employer] resource center!  LGBTQIA Services at [facility]: http://www.[facility]/lgbtqia/  LGBTQ@Mac at [facility]: http://www.[facility]/multiculturallife/lgbtq/  GLBTA Programs office at  of : https://diversity.[facility].edu/glbta/          FEMINIZING Medications, Labs and f/u for Transwomen (MTF)Updated 6/2016  Drug Dose s/p Orchiectomy Typical patient Miscellaneous Details   Estradiol po  (Estrace) Start: 2mg pills daily at same time  Typical: 4-6mg     Max: 8mg Typical 1-2mg daily <41 yo, nonsmoker  good w/ daily meds Erectile dysfunction that is bothersome ? typical ED treatments.    Estradiol patch (Vivelle-Dot) apply 2x per wk  Start:  mcg/24 hr patch,   Typical: 100-200mcg daily  Max: 2 patches 2x/wk (200mcg/d) Typical: 37.5- 100mcg daily, changed 2x/wk Hx VTE, >40, smoker, transaminitis Patch falling off? Clean w/ EtOH first, apply & press x30sec. Baby oil to remove adhesive. Rx for 8 -16 patches/month.   Estradiol valerate (Delestrogen) Start: 5 mg IM weekly  Typical: 5-10 mg IM weekly  Max: 15-20  mg weekly 5mg IM weekly or 10 IM q2wk Ok w/ needles, >40, already on it Needles: 23-25gauge 1inch (if thigh) to 1.5inch (if glut), 1cc syringe, 18ga to draw up. Comes in 5ml bottles.   Estradiol cream  compounded Start: 50-100mcg to thighs daily  Typical: 100-200mcg   Max: 200 37.5-100mcg daily Ok w/ topical, paying out of pocket [facility]: [facility] [address], [facility] [address], [facility] online, in PLO gel   Conj. Estrogens (premarin)  Typical: 5mg daily. Avoid if possible due to higher risk VTE Get SHREYA & ensure informed of risk Consult local expert before long term continuation.    Spironolactone Start: 50-100mg po daily  Max: 200 (MOST)-300mg  DC Bothersome hair, acne or erections AntiAndrogen. Check bmp when on max dose. Split dose if dizzy.   Finasteride Start: 2.5mg po daily  Max: 5-10mg daily DC unless for alopecia Bothersome hair, alopecia, or erections AntiAndrogen - ok to use in combo w/ che if poor feminizing effect   ?Progesterone? RARELY USE 200mg prometrium HS or q3mo depo shots IF ASKED ONLY DC Wants breast dev, no depression/obesity Causes wt gain and a lot of depression. Data inconclusive on breast developmt.     LABS Hgb Fasting Glu Fasting Lipid Total testosterone LFT s Prolactin Estrogen Ultrasens.   On shots? Draw labs  Midcycle! ( - way btw shots) -Baseline  -3 mo after   -6mo after  -then annual  After=after start or dose change Or A1c  -Baseline  -3 mo after   -6mo after  -then annual -Baseline  -3 mo after   -6mo after  -then annual -Baseline  -3 mo after   -6mo after  -q1yr.   Goal level <50 -Baseline  -3 mo after   -6mo after  -q1yr. Abnl?RUQ US, hep serology -if Sx only, rare.  If >100 needs f/u, MRI Check ONLY if on injectable or not clinically feminizing. Level should be 100-200.  >200 ?VTE risk and dose must be ?immediately, f/u in 1 month   On che -Baseline BUN/Cr/lytes, Again when MAX dose  -annually thereafter, careful with ACEi s Follow up lab abnormalities w/repeat labs  UDon t  panic ?may need small adjustment in dose.      -PPsychotherapy: not required! REC if: no consolidated gender, not gender dysphoric by criteria, nonconforming & may not need hormones  -E-Estrogen therapy is CONTRAINDICATED IF estrogen dependent cancer  -G-Gender related effects: Breast development, redistribution of body fat, softening of skin, ?mood, testes shrink, ?libido and ? fertility  -O-Other effects: ?VTE risk (shauna if >40, smoker, obese, hx clots, po meds), ?Weight, emotional lability, ?lipids, ?erections, ?gallstones  **Upcoming surgery? HOLD ESTROGEN for 2-4 weeks prior to surgical procedure Adapted by Yenifer Shankar MD from:  Dept Public Health, [facility] Trans* [facility] Guidelines, [facility] SOC-7.  Updated 6/2016  Trans Guidelines [facility]         -0Psychotherapy: not required! REC if: no consolidated gender, not gender dysphoric by criteria, nonconforming & may not need hormones      -Testosterone therapy is CONTRAINDICATED IF unstable psychosis, bipolar, or PREGNANCY. Need excellent contraception if sperm!  -   Gender related effects: stop menses, ?voice, ?facial/body hair, ?muscle mass, redistrib body fat, acne, ?libido, ?fertility, ?clitoris  -   Other effects: ?Weight, emotional lability, ?lipids, vaginal atrophy, ? BP, ?RBC, ?LFT s transiently, ?ALISA, ?endometrial hyperplasia shauna  since higher incidence of PCOS in transmen    -surveillance of periods is critical for long term T  -   Testosterone is a controlled substance. Educate about this. Follow for reasonable refills. Paper rx required.    Adapted by Yenifer Shankar MD from: [facility], [facility] Trans* Center of Excellence, [facility] Guidelines, WPATH SOC-7. Updated 6/2016 Trans Guidelines [facility]     Effects and Expected Time Course of Feminizing Hormones    Effect Expected Onset Expected Maximum Effect   Body Fat redistribution 3-6 months 2-5 years   Decreased Muscle mass 3-6 months 1-2 years    Softening of skin/decreased oiliness 3-6 months Unknown   Decreased Libido 1-3 months 1-2 years   Decreased Spontaneous Erections 1-3 months 3-6 months   Male Sexual Dysfunction Variable Variable   Breast Growth 3-6 months 2-3 years   Decreased Testicular volume 3-6 months 2-3 years   Decreased sperm production Variable Variable   Thinning and slowed growth of body and facial hair  + 6-12 months >3 years   Male pattern baldness No regrowth, loss stops 1-3 months 1-2 years   +complete removal of male facial hair and body hair requires electrolysis, laser treatment or both      Effects and Expected Time Course of Masculinizing  Hormones    Effect Expected Onset Expected Maximum Effect   Skin oiliness/Acne 1-6 months 1-2 years   Facial/body hair growth 3-6 months 3-5 years    Scalp hair loss >12 months+ Variable   Increased muscle mass/strength 6-12 months 2-5 years   Body fat redistribution 3-6 months 2-5 years   Cessation of menses 2-6 months n/a   Clitoral enlargement 3-6 months 1-2 years   Vaginal atrophy 3-6 months 1-2 years   Deepened voice 3-12 months 1-2 years

## 2025-01-28 ENCOUNTER — TELEPHONE (OUTPATIENT)
Dept: INTERNAL MEDICINE | Facility: CLINIC | Age: 30
End: 2025-01-28
Payer: COMMERCIAL

## 2025-01-28 NOTE — TELEPHONE ENCOUNTER
Patient Quality Outreach    Patient is due for the following:       Topic Date Due    Hepatitis B Vaccine (2 of 3 - 3-dose series) 08/02/2012    Flu Vaccine (1) 09/01/2024    COVID-19 Vaccine (4 - 2024-25 season) 09/01/2024       Action(s) Taken:   Schedule a nurse only visit for Immunization     Type of outreach:    Sent letter.    Questions for provider review:    None           Christine Suazo MA  Chart routed to none.

## 2025-01-28 NOTE — LETTER
January 28, 2025    To  Dipak Fuentes  [address] [facility] [address] [id number]    Your team at [facility] cares about your health. We have reviewed your chart and based on our findings; we are making the following recommendations to better manage your health.     You are in particular need of attention regarding the following:     Please schedule a Nurse Only Appointment with your primary care clinic to update your immunizations that are due.       Topic Date Due    Hepatitis B Vaccine (2 of 3 - 3-dose series) 08/02/2012    Flu Vaccine (1) 09/01/2024    COVID-19 Vaccine (4 - 2024-25 season) 09/01/2024       If you have already completed these items, please contact the clinic via phone or   [facility] so your care team can review and update your records. Thank you for   choosing [facility] for your healthcare needs. For any questions,   concerns, or to schedule an appointment please contact our clinic.    Healthy Regards,      Your [facility] Care Team            Electronically signed

## 2025-02-04 ENCOUNTER — OFFICE VISIT (OUTPATIENT)
Dept: INTERNAL MEDICINE | Facility: CLINIC | Age: 30
End: 2025-02-04
Payer: COMMERCIAL

## 2025-02-04 VITALS
WEIGHT: 254 LBS | BODY MASS INDEX: 33.66 KG/M2 | OXYGEN SATURATION: 96 % | TEMPERATURE: 96.9 F | DIASTOLIC BLOOD PRESSURE: 82 MMHG | RESPIRATION RATE: 16 BRPM | HEART RATE: 75 BPM | SYSTOLIC BLOOD PRESSURE: 115 MMHG | HEIGHT: 73 IN

## 2025-02-04 DIAGNOSIS — F33.1 MODERATE EPISODE OF RECURRENT MAJOR DEPRESSIVE DISORDER (H): ICD-10-CM

## 2025-02-04 DIAGNOSIS — J45.20 MILD INTERMITTENT ASTHMA WITHOUT COMPLICATION: ICD-10-CM

## 2025-02-04 DIAGNOSIS — M54.16 LUMBAR RADICULOPATHY: ICD-10-CM

## 2025-02-04 DIAGNOSIS — Z01.818 PREOP GENERAL PHYSICAL EXAM: Primary | ICD-10-CM

## 2025-02-04 LAB
ANION GAP SERPL CALCULATED.3IONS-SCNC: 15 MMOL/L (ref 7–15)
BUN SERPL-MCNC: 10.1 MG/DL (ref 6–20)
CALCIUM SERPL-MCNC: 9.3 MG/DL (ref 8.8–10.4)
CHLORIDE SERPL-SCNC: 103 MMOL/L (ref 98–107)
CREAT SERPL-MCNC: 0.82 MG/DL (ref 0.51–1.17)
EGFRCR SERPLBLD CKD-EPI 2021: >90 ML/MIN/1.73M2
ERYTHROCYTE [DISTWIDTH] IN BLOOD BY AUTOMATED COUNT: 12.8 % (ref 10–15)
GLUCOSE SERPL-MCNC: 108 MG/DL (ref 70–99)
HCO3 SERPL-SCNC: 20 MMOL/L (ref 22–29)
HCT VFR BLD AUTO: 43.6 % (ref 35–53)
HGB BLD-MCNC: 15.2 G/DL (ref 11.7–17.7)
MCH RBC QN AUTO: 30.2 PG (ref 26.5–33)
MCHC RBC AUTO-ENTMCNC: 34.9 G/DL (ref 31.5–36.5)
MCV RBC AUTO: 87 FL (ref 78–100)
PLATELET # BLD AUTO: 297 10E3/UL (ref 150–450)
POTASSIUM SERPL-SCNC: 4.1 MMOL/L (ref 3.4–5.3)
RBC # BLD AUTO: 5.04 10E6/UL (ref 3.8–5.9)
SODIUM SERPL-SCNC: 138 MMOL/L (ref 135–145)
WBC # BLD AUTO: 9 10E3/UL (ref 4–11)

## 2025-02-04 PROCEDURE — 85027 COMPLETE CBC AUTOMATED: CPT | Performed by: INTERNAL MEDICINE

## 2025-02-04 PROCEDURE — 96127 BRIEF EMOTIONAL/BEHAV ASSMT: CPT | Performed by: INTERNAL MEDICINE

## 2025-02-04 PROCEDURE — 80048 BASIC METABOLIC PNL TOTAL CA: CPT | Performed by: INTERNAL MEDICINE

## 2025-02-04 PROCEDURE — 36415 COLL VENOUS BLD VENIPUNCTURE: CPT | Performed by: INTERNAL MEDICINE

## 2025-02-04 PROCEDURE — 99214 OFFICE O/P EST MOD 30 MIN: CPT | Performed by: INTERNAL MEDICINE

## 2025-02-04 ASSESSMENT — ASTHMA QUESTIONNAIRES
QUESTION_3 LAST FOUR WEEKS HOW OFTEN DID YOUR ASTHMA SYMPTOMS (WHEEZING, COUGHING, SHORTNESS OF BREATH, CHEST TIGHTNESS OR PAIN) WAKE YOU UP AT NIGHT OR EARLIER THAN USUAL IN THE MORNING: NOT AT ALL
QUESTION_2 LAST FOUR WEEKS HOW OFTEN HAVE YOU HAD SHORTNESS OF BREATH: ONCE OR TWICE A WEEK
QUESTION_4 LAST FOUR WEEKS HOW OFTEN HAVE YOU USED YOUR RESCUE INHALER OR NEBULIZER MEDICATION (SUCH AS ALBUTEROL): NOT AT ALL
ACT_TOTALSCORE: 24
QUESTION_1 LAST FOUR WEEKS HOW MUCH OF THE TIME DID YOUR ASTHMA KEEP YOU FROM GETTING AS MUCH DONE AT WORK, SCHOOL OR AT HOME: NONE OF THE TIME
ACT_TOTALSCORE: 24
QUESTION_5 LAST FOUR WEEKS HOW WOULD YOU RATE YOUR ASTHMA CONTROL: COMPLETELY CONTROLLED

## 2025-02-04 ASSESSMENT — PATIENT HEALTH QUESTIONNAIRE - PHQ9
SUM OF ALL RESPONSES TO PHQ QUESTIONS 1-9: 19
10. IF YOU CHECKED OFF ANY PROBLEMS, HOW DIFFICULT HAVE THESE PROBLEMS MADE IT FOR YOU TO DO YOUR WORK, TAKE CARE OF THINGS AT HOME, OR GET ALONG WITH OTHER PEOPLE: SOMEWHAT DIFFICULT
SUM OF ALL RESPONSES TO PHQ QUESTIONS 1-9: 19

## 2025-02-04 NOTE — PATIENT INSTRUCTIONS
Please take your morning medications with a small sip of water on the morning of the procedure.  Avoid nonsteroidal anti-inflammatory pain medication like ibuprofen, Motrin, or Aleve 7 days before the surgery.  Tylenol can be used for pain.  Avoid any over the counter multivitamins or herbal supplement 7 days before surgery   You can resume these medications after surgery

## 2025-02-04 NOTE — PROGRESS NOTES
Preoperative Evaluation  [place] [address]  [address]  [facility] [address]  Phone: [phone]  Primary Provider: Neela Smith MD  Pre-op Performing Provider: Neela Smith MD  Feb 4, 2025 2/4/2025   Surgical Information   What procedure is being done? l5 s1 spinal fusion   Facility or Hospital where procedure/surgery will be performed: [facility]   Who is doing the procedure / surgery? carrol harris   Date of surgery / procedure: 2/19   Time of surgery / procedure: noon   Where do you plan to recover after surgery? at home with family     Fax number for surgical facility: Note does not need to be faxed, will be available electronically in Epic.    Assessment & Plan     The proposed surgical procedure is considered INTERMEDIATE risk.    Preop general physical exam  - Basic metabolic panel; Future  - CBC with platelets; Future  - Basic metabolic panel  - CBC with platelets    Lumbar radiculopathy  Has had chronic pain symptoms of the lower back area and will be undergoing L5-S1 decompression and interbody fusion.  Patient will follow-up with the pain clinic after the procedure for any further pain medication management.  We discussed that further orders including physical therapy orders will also be provided by the procedure team.    Mild intermittent asthma without complication  Overall, symptoms have been stable.  Continue use of albuterol inhaler as needed for symptoms of shortness of breath/wheezing.    Moderate episode of recurrent major depressive disorder (H)  Follows up with therapist once weekly.  Currently does not follow with psychiatry team.  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  Has felt that the depression symptoms have improved and while patient endorses suicidal  ideation, he has no intent  or plan.  Will benefit from a referral to psychiatry for further consideration of medications if appropriate and patient would like to look into it after the procedure.    At this time, blood pressure is noted to be at an acceptable level. Patient has previously tolerated anesthesia without issue. Patient is also able to tolerate greater than 4 METS of physical activity. Patient does have a CBC and BMP that are pending. I would consider patient to be acceptable candidate to proceed with a noncardiac related surgery. Assuming no unexpected abnormalities on the outstanding labs, patient should be able to proceed with  surgery as scheduled. patient should follow any additional instructions as provided by performing surgeon.     Depression Screening Follow Up        2/4/2025     9:17 AM   PHQ   PHQ-9 Total Score 19    Q9: Thoughts of better off dead/self-harm past 2 weeks More than half the days   F/U: Thoughts of suicide or self-harm Yes   F/U: Self harm-plan No   F/U: Self-harm action No   F/U: Safety concerns No       Patient-reported            - No identified additional risk factors other than previously addressed         Recommendation  Approval given to proceed with proposed procedure, without further diagnostic evaluation.    Subjective   Ada is a 29 year old, presenting for the following:  Pre-Op Exam          2/4/2025     9:25 AM   Additional Questions   Roomed by Arcelia OLIVER related to upcoming procedure: Chronic lumbar radiculopathy symptoms.  Patient went to the L5-S1 fusion procedure        2/4/2025   Pre-Op Questionnaire   Have you ever had a heart attack or stroke? No   Have you ever had surgery on your heart or blood vessels, such as a stent placement, a coronary artery bypass, or surgery on an artery in your head, neck, heart, or legs? No   Do you have chest pain with activity? No   Do you have a history of heart failure? No   Do you currently have a cold, bronchitis or  "symptoms of other infection? No   Do you have a cough, shortness of breath, or wheezing? No   Do you or anyone in your family have previous history of blood clots? No   Do you or does anyone in your family have a serious bleeding problem such as prolonged bleeding following surgeries or cuts? No   Have you ever had problems with anemia or been told to take iron pills? No   Have you had any abnormal blood loss such as black, tarry or bloody stools? No   Have you had any abnormal blood loss such as black, tarry or bloody stools, or abnormal vaginal bleeding? No   Have you ever had a blood transfusion? No   Are you willing to have a blood transfusion if it is medically needed before, during, or after your surgery? Yes   Have you or any of your relatives ever had problems with anesthesia? No   Do you have sleep apnea, excessive snoring or daytime drowsiness? No   Do you have any artifical heart valves or other implanted medical devices like a pacemaker, defibrillator, or continuous glucose monitor? (!) YES   What type of device do you have? spinal cord stimulator   Name of the clinic that manages your device [facility]   Do you have artificial joints? No   Are you allergic to latex? (!) YES     Health Care Directive  Patient does not have a Health Care Directive: Discussed advance care planning with patient; however, patient declined at this time.    Preoperative Review of    reviewed - controlled substances reflected in medication list.          Patient Active Problem List    Diagnosis Date Noted    Gender dysphoria 01/27/2025     Priority: Medium    Depression, unspecified depression type 12/10/2024     Priority: Medium    History of substance use disorder in prolonged remission 02/02/2023     Priority: Medium     as a teenager, \"took extra adderall\"      Other chronic pain 02/02/2023     Priority: Medium     low back and buttocks leg pain      Lumbar radiculopathy 11/01/2022     Priority: Medium    Mild " "intermittent asthma without complication      Priority: Medium    Lumbar disc herniation with radiculopathy 03/27/2020     Priority: Medium    Bipolar I disorder, single manic episode (H) 03/25/2013     Priority: Medium     Problem list name updated by automated process. Provider to review      Benign neoplasm of skin 03/25/2013     Priority: Medium     Problem list name updated by automated process. Provider to review      Personal history of tobacco use, presenting hazards to health 03/25/2013     Priority: Medium    Decreased libido 08/29/2012     Priority: Medium      Past Medical History:   Diagnosis Date    Bipolar I disorder, single manic episode (H) 03/25/2013    Problem list name updated by automated process. Provider to review    Complication of anesthesia     panic after having anesthesia    History of substance use disorder     as a teenager, \"took extra adderall\"    Lumbar disc herniation with radiculopathy     Mild intermittent asthma without complication     Other chronic pain     low back and buttocks leg pain     Past Surgical History:   Procedure Laterality Date    DISCECTOMY LUMBAR MINIMALLY INVASIVE ONE LEVEL Right 10/7/2020    Procedure: Right L5-S1 minimally invasive microdiskectomy;  Surgeon: Juancho Oquendo MD;  Location:  OR    DISCECTOMY LUMBAR MINIMALLY INVASIVE ONE LEVEL Right 9/21/2022    Procedure: Right L5-S1 minimally invasive redo microdiskectomy;  Surgeon: Juancho Oquendo MD;  Location:  OR    INSERT STIMULATOR AND LEADS INTERNAL DORSAL COLUMN N/A 1/30/2024    Procedure: Spinal cord stimulator placement, phase I and II combined, placement of percutaneous type electrodes in the thoracolumbar spine and placement of generator/battery over the right buttock  **Latex Allergy**;  Surgeon: Giovanni Alfonso MD;  Location:  OR    [facility]       Current Outpatient Medications   Medication Sig Dispense Refill    acetaminophen (TYLENOL) 500 MG tablet Take " "500-1,000 mg by mouth every 6 hours as needed      albuterol (PROAIR HFA/PROVENTIL HFA/VENTOLIN HFA) 108 (90 Base) MCG/ACT inhaler Inhale 2 puffs into the lungs every 6 hours as needed for shortness of breath, wheezing or cough 18 g 2    medical cannabis (Patient's own supply) See Admin Instructions. (The purpose of this order is to document that the patient reports taking medical cannabis.  This is not a prescription, and is not used to certify that the patient has a qualifying medical condition.)      pregabalin (LYRICA) 150 MG capsule Take 1 capsule (150 mg) by mouth 2 times daily. 180 capsule 0    tiZANidine (ZANAFLEX) 4 MG tablet Take 1-2 tablets (4-8 mg) by mouth 3 times daily as needed for muscle spasms. 180 tablet 1       Allergies   Allergen Reactions    Ceclor [Cefaclor Monohydrate]     Lamotrigine Other (See Comments)     Had known side effect of\"skin eating rash\"  Jaycob lauren syndrome    Latex      PN: Converted from LW Latex Sensitivity Flag    Cefaclor Rash        Social History     Tobacco Use    Smoking status: Former     Current packs/day: 0.50     Average packs/day: 0.5 packs/day for 5.0 years (2.5 ttl pk-yrs)     Types: Cigarettes, Other     Passive exposure: Past    Smokeless tobacco: Current    Tobacco comments:     vaping   Substance Use Topics    Alcohol use: Not Currently     Comment: over 6 months       History   Drug Use    Types: Marijuana     Comment: bipolar-concentrates 1-2 x day             Review of Systems  Constitutional, HEENT, cardiovascular, pulmonary, gi and gu systems are negative, except as otherwise noted.    Objective    /82   Pulse 75   Temp 97.3  F (36.3  C) (Tympanic)   Resp 16   Ht 1.854 m (6' 1\")   Wt 115.2 kg (254 lb)   SpO2 96%   BMI 33.51 kg/m     Estimated body mass index is 33.51 kg/m  as calculated from the following:    Height as of this encounter: 1.854 m (6' 1\").    Weight as of this encounter: 115.2 kg (254 lb).  Physical Exam  GENERAL: alert " and no distress  RESP: lungs clear to auscultation - no rales, rhonchi or wheezes  CV: regular rate and rhythm, normal S1 S2  MS: no gross musculoskeletal defects noted, no edema  NEURO: Normal strength and tone, mentation intact and speech normal  PSYCH: mentation appears normal, affect normal.    Recent Labs   Lab Test 05/14/24  0940   HGB 15.3         POTASSIUM 4.6   CR 1.00        Diagnostics  Labs pending at this time.  Results will be reviewed when available.   No EKG required, no history of coronary heart disease, significant arrhythmia, peripheral arterial disease or other structural heart disease.    Revised Cardiac Risk Index (RCRI)  The patient has the following serious cardiovascular risks for perioperative complications:   - No serious cardiac risks = 0 points     RCRI Interpretation: 0 points: Class I (very low risk - 0.4% complication rate)         Signed Electronically by: Neela Smith MD  A copy of this evaluation report is provided to the requesting physician.        Answers submitted by the patient for this visit:  Patient Health Questionnaire (Submitted on 2/4/2025)  If you checked off any problems, how difficult have these problems made it for you to do your work, take care of things at home, or get along with other people?: Somewhat difficult  PHQ9 TOTAL SCORE: 19

## 2025-02-08 NOTE — PROGRESS NOTES
Date:  2/10/2025      [facility] Pain Management     Dipak Fuentes is a 29 year old male with PMH significant for lumbar ddd with radiculopathy, s/p microdiscectomy x2, bipolar depression, substance use disorder in prolonged remission who is seen today for follow-up on chronic pain treatment.  Original Consult: 2/2/2023.      Updates since last appointment on 10/29/2025 with ZENA Mendenhall, CNP.  He presents alone using a single point cane.    SCS implanted by Dr. Alfonso, Neurosurgeon - no issues    2/19/2025 lumbar surgery with Dr. Juancho Oquendo L5-S1 fusion.  Dr. Oquendo did his other 2 lumbar surgeries.    History:  Lumbar microdiscectomy surgery 9/2022 with persistent pain. Treated with PT (pelvic, pool and land). Symptom management with cannabis, pregabalin and tizanidine; underwent successful SCS trial 12/12/23-12/19/23, implant on 1/30/24.       Interventions/Injections:   L5-S1 ILESI with Dr. Schwarz on 9/18/24- 2 days of significant relief, then pain returned  8/25/22: right S1-S2 transforaminal epidural corticosteroid injection with Dr. Shay - worsened pain  3/10/20: Lumbar L5-S1 interlaminar epidural steroid injection with Dr. Esteves- had 2 weeks of nothing, then 2 weeks of relief and then back to painful      Progress Notes Reviewed:  1/27/2025 Dr. Jennifer Johnson - gender dysphoria  12/10/2024 ED to Hospital Admission  11/18/2024 Maria Guadalupe Isabel, Pain Psychology - has a different therapist now.  11/12/2024 Dr. Juancho Oquendo, Neurosurgeon - h/o 2 prior right L5-S1 microdiscectomies in 2020 and 2022. After those microdiscectomies he had a spinal cord stimulator placed at the beginning of 2024 and was doing reasonably well, but had worsening back and right lower extremity symptoms in an S1 distribution beginning over the summer.  Plan: The patient has had 2 prior surgeries in this area, given that this is his third operation I have recommended an L5-S1 fusion.   10/29/2024 Marni Gray,  APRN, CNP    Any hospitalizations/ER/UC visits since last appointment:  no  Any falls/accidents since last appointment:  no      Primary Pain :  Low back with radiation into right side numbness from the knee down    Characteristics:  Any changes in pain characteristics since last appointment?  no    Aggravating factors include: Sitting on hard chairs, lying down makes back pain worse, sitting and standing makes right leg pain worse  Relieving factors include: icing helps some, tends to provide more immediate relief, delta 9 THC helps reduce pain, SCS        2/10/2025 current pain on 0/10 VAS:  6    Worst pain:   8    Best pain:  3       Current pain rating Moderate Pain (4)     PE.67      Current Pain Related Medications:  Any medications changes since last appointment: no    Tizanidine 4 - 8mg  TID - usually 8mg  Pregabalin 150 mg BID -   Medical Cannabis P#  last certified 2024 with ZENA Mendenhall CNP  APAP  2 tabs TID  Ibuprofen will stop next Wednesday.    Anticoagulation:  none  Implanted Devices:  [employer] SCS per Dr. Alfonso        Therapies discuss on initial consult:   Physical therapy, Pain Psychology, TENs unit, Grounding Mat, Frequency Specific Micro Current, Anti-inflammatory Lifestyle    Employment:  He works in [employer].    Exercise:  Enjoys hiking,    Hobbies:  Programming on computers, gardening.    Mental Health:    Endorses bipolar/anxiety/depression.  He follows with a therapist once a week.      Recommendations from last visit 10/29/2025 with ZENA Mendenahll CNP:  - Lumbar MRI ordered. Valium 5 mg X 2 tablets prescribed.   - Continue plan to meet with Homa Isabel again as an additional layer of support.  - Wait to consider reprogramming again until after MRI   ____________________________________________________________    Interval History     - Pain is better with standing. His right leg feels weak with walking. After walking for a period of time, he has nausea, urge to  "urinate, weakness.  - Primarily right leg pain. Back pain is from occasional irritation around the battery implant site only.   - Left his job due to pressure to return to office, this has reduced his stress level somwhat.   - Has now had worsening pain for a duration of 3 months.  - Tried AQ, not helpful (in the lower back and right leg) Felt worse.  - He has met with [employer]  x 3 in August for reprogramming, last on 8/30/24.   -Prior to this recent pain episode, he was able to walk without a cane and tolerate weight bearing on his right leg.   - Lyrica at 150 BID, tizanidine prn and THC are helpful. .    - Not sleeping well due to increased pain with lying flat. He notes spasms and severe pain in his right leg when lying flat.      Current pain medications:  Tizanidine 4 mg TID prn  Pregabalin 150 mg BID    Pain description:  Low back with radiation into right side numbness from the knee down  Current pain rating Data Unavailable  Aggravating factors include: Sitting on hard chairs, lying down makes back pain worse, sitting and standing makes right leg pain worse  Relieving factors include: icing helps some, tends to provide more immediate relief, delta 9 THC helps reduce pain, SCS  Pain Intensity and Interference in the past week 7.67    PAIN MANAGEMENT TREATMENT HISTORY  1. MEDICATIONS:  Opiates: Oxycodone after surgery- Helped  NSAIDS: Ibuprofen.  Medication was not helpful , only takes it when his wife and mom \"bug me to take it because I am in pain\" Diclofenac -not helpful  Muscle Relaxants: Cyclobenzaprine - Medication was helpful  Methocarbamol -Medication was helpful; Tizanidine -Medication was helpful  Helps with spasms, tizanidine most helpful  Anti-depressants: Remeron most recently - helpful. Mood stabilizers have been helpful in the past; tried on many anti-psychotics: had hallucinations, lithium: made him hard to be around, lamotrigine - skin rash. Now uses THC to manage polo with " success  Anxiolytics: tried hydroxyzine in the past  Neuropathics: Gabapentin -was not helpful, trialed once at 300 mg TID, then again at 600 mg TID. Lyrica -helpful  Topicals: CBD spray - not helpful, lidocaine patches - not helpful  Adjuvant pain medications: THC helps his neuropathic pain sleep  2. PHYSICAL THERAPY:   Pool PT at [facility] with Sara Ro, PT was helpful, continues HEP  Pelvic floor therapy helpful for urinary symptoms after back surgery   Traditional PT after surgery not helpful.  3. PAIN PSYCHOLOGY:   Therapy in the past for biopolar management, not in several years  4. SURGERY:   24 spinal cord stimulator implant and surgery with Dr. Alfonso  2022 Right L5-S1 minimally invasive microdiscectomy with Dr. Oquendo  10/7/2020  Right L5-S1 minimally invasive microdiscectomy with Dr. Oquendo  5. INJECTIONS:   L5-S1 ILESI with Dr. Schwarz on 24- 2 days of significant relief, then pain returned  22: right S1-S2 transforaminal epidural corticosteroid injection with Dr. Shay - worsened pain  3/10/20: Lumbar L5-S1 interlaminar epidural steroid injection with Dr. Esteves- had 2 weeks of nothing, then 2 weeks of relief and then back to painful  6. COMPLEMENTARY THERAPY:  Chiropractic  not helpful  Acupuncture/acupressure: tried, not helpful and made pain worse  TENS unit  not helpful made pain worse  cold/cold packs: helpful  distraction/mindfulness: somewhat helpful   meditation/guided imagery: somewhat helpful    Imagin2024 Thoracic MRI  IMPRESSION:  1.  Multilevel thoracic spondylosis with multiple small disc herniations as detailed above. The largest is an inferiorly dissecting disc extrusion at T4-T5 with disc material indenting the ventral cord and contributing to mild spinal canal stenosis.  2.  No greater than mild spinal canal stenosis in the thoracic spine.  3.  No substantial neural foraminal stenosis.  4.  No cord signal abnormality.    10/7/2022 Lumbar MRI at  [facility]          Lumbar MRI on 10/23/24  Reviewed with patient today. Report notes no change since prior MRI, but was compared to his pre-operative MRI in 9/2022, not his postoperative MRI in 10/2022.  Social History   Social History     Tobacco Use    Smoking status: Former     Current packs/day: 0.50     Average packs/day: 0.5 packs/day for 5.0 years (2.5 ttl pk-yrs)     Types: Cigarettes, Other     Passive exposure: Past    Smokeless tobacco: Current    Tobacco comments:     vaping   Vaping Use    Vaping status: Every Day    Substances: Nicotine, THC, CBD    Devices: Pre-filled or refillable cartridge, Refillable tank   Substance Use Topics    Alcohol use: Not Currently     Comment: over 6 months    Drug use: Yes     Types: Marijuana     Comment: bipolar-concentrates 1-2 x day      Social History     Social History Narrative    .     Works in IT    Likes to bake for fun, mostly bread. Likes to play video games with his daughter.    8 y.o. daughter       Medications and Allergies reviewed.  Ceclor [cefaclor monohydrate], Lamotrigine, Latex, and Cefaclor  Medications   has a current medication list which includes the following prescription(s): acetaminophen, albuterol, medical cannabis, pregabalin, and tizanidine.    Objective   BP (!) 156/99   Pulse 84   SpO2 100%       Constitutional: Well developed, well nourished, appears stated age. No acute distress.  Gait is steady.  HEENT: Head atraumatic, normocephalic. Eyes without conjunctival injection or jaundice.   Skin: No obvious lesions, or petechiae of exposed skin. Mulitple tattoos present.  Extremities: Peripheral pulses intact. No clubbing, cyanosis, or edema. Moves all extremities.  Psychiatric/mental status: Alert, without lethargy or stupor. Speech fluent. Appropriate affect. Mood normal. Able to follow commands without difficulty.   Musculoskeletal exam: Frequent position changes.        Diagnosis:  (Z98.890) S/P lumbar microdiscectomy  (primary  encounter diagnosis)  Comment:   Plan:     (M54.16) Lumbar radiculopathy  Comment:   Plan:     (Z96.89) Spinal cord stimulator status  Comment:   Plan:     (M51.27) Herniated nucleus pulposus of lumbosacral region  Comment:   Plan:     (G89.29) Chronic intractable pain  Comment:   Plan:         Plan on 2/10/2025:  No refills needed today.     Surgery with Dr. Oquendo 2/19/2025.     Follow-up in 3 months.          ZENA Watson, [facility]            BILLING TIME DOCUMENTATION:   The total TIME spent on this patient on the date of the encounter/appointment was 32 minutes.

## 2025-02-10 ENCOUNTER — TELEPHONE (OUTPATIENT)
Dept: NEUROSURGERY | Facility: CLINIC | Age: 30
End: 2025-02-10

## 2025-02-10 ENCOUNTER — OFFICE VISIT (OUTPATIENT)
Dept: PALLIATIVE MEDICINE | Facility: CLINIC | Age: 30
End: 2025-02-10
Payer: COMMERCIAL

## 2025-02-10 VITALS — HEART RATE: 84 BPM | SYSTOLIC BLOOD PRESSURE: 156 MMHG | OXYGEN SATURATION: 100 % | DIASTOLIC BLOOD PRESSURE: 99 MMHG

## 2025-02-10 DIAGNOSIS — Z96.89 SPINAL CORD STIMULATOR STATUS: ICD-10-CM

## 2025-02-10 DIAGNOSIS — M54.16 LUMBAR RADICULOPATHY: ICD-10-CM

## 2025-02-10 DIAGNOSIS — Z98.890 S/P LUMBAR MICRODISCECTOMY: Primary | ICD-10-CM

## 2025-02-10 DIAGNOSIS — M51.27 HERNIATED NUCLEUS PULPOSUS OF LUMBOSACRAL REGION: ICD-10-CM

## 2025-02-10 DIAGNOSIS — G89.29 CHRONIC INTRACTABLE PAIN: ICD-10-CM

## 2025-02-10 PROCEDURE — G2211 COMPLEX E/M VISIT ADD ON: HCPCS | Performed by: NURSE PRACTITIONER

## 2025-02-10 PROCEDURE — 99214 OFFICE O/P EST MOD 30 MIN: CPT | Performed by: NURSE PRACTITIONER

## 2025-02-10 ASSESSMENT — PAIN SCALES - PAIN ENJOYMENT GENERAL ACTIVITY SCALE (PEG)
AVG_PAIN_PASTWEEK: 9
INTERFERED_ENJOYMENT_LIFE: 9
INTERFERED_GENERAL_ACTIVITY: 9
PEG_TOTALSCORE: 9
INTERFERED_ENJOYMENT_LIFE: 9
AVG_PAIN_PASTWEEK: 9
INTERFERED_GENERAL_ACTIVITY: 9
PEG_TOTALSCORE: 9

## 2025-02-10 ASSESSMENT — PAIN SCALES - GENERAL: PAINLEVEL_OUTOF10: MODERATE PAIN (6)

## 2025-02-10 NOTE — TELEPHONE ENCOUNTER
[facility] Call Center    Phone Message    May a detailed message be left on voicemail: yes     Reason for Call: Other: Patient is requesting a call back to go over some questions she has regarding surgery. Please call back to discuss further.      Action Taken: Message routed to:  Other: [facility]    Travel Screening: Not Applicable

## 2025-02-10 NOTE — TELEPHONE ENCOUNTER
Called patient as requested. Patient has questions regarding fusion surgery and wonders if that is the best option.     Reviewed this would need to be discusses with Dr. Oquendo, offered a telephone visit tomorrow. Patient in agreement. Routed to scheduling team to coordinate.

## 2025-02-10 NOTE — PATIENT INSTRUCTIONS
Plan on 2/10/2025:  No refills needed today.   Surgery with Dr. Oquendo 2/29/2025.   Follow-up in 3 months.      ZENA Watson, CNP  [facility]          ----------------------------------------------------------------  Clinic Number:  [phone]   Call with any questions about your care and for scheduling assistance.   Calls are returned Monday through Friday between 8 AM and 4:30 PM. We usually get back to you within 2 business days depending on the issue/request.    If we are prescribing your medications:  For opioid medication refills, call the clinic or send a [employer] message 7 days in advance.  Please include:  Name of requested medication  Name of the pharmacy.  For non-opioid medications, call your pharmacy directly to request a refill. Please allow 3-4 days to be processed.   Per [place] State Law:  All controlled substance prescriptions must be filled within 30 days of being written.    For those controlled substances allowing refills, pickup must occur within 30 days of last fill.      We believe regular attendance is key to your success in our program!    Any time you are unable to keep your appointment we ask that you call us at least 24 hours in advance to cancel.This will allow us to offer the appointment time to another patient.   Multiple missed appointments may lead to dismissal from the clinic.

## 2025-02-11 ENCOUNTER — VIRTUAL VISIT (OUTPATIENT)
Dept: NEUROSURGERY | Facility: CLINIC | Age: 30
End: 2025-02-11
Attending: NEUROLOGICAL SURGERY
Payer: COMMERCIAL

## 2025-02-11 DIAGNOSIS — M51.16 LUMBAR DISC HERNIATION WITH RADICULOPATHY: ICD-10-CM

## 2025-02-11 DIAGNOSIS — Z98.890 S/P LUMBAR MICRODISCECTOMY: Primary | ICD-10-CM

## 2025-02-11 PROCEDURE — 98016 BRIEF COMUNICAJ TECH-BSD SVC: CPT | Performed by: NEUROLOGICAL SURGERY

## 2025-02-11 NOTE — NURSING NOTE
"Dipak Fuentes is a 29 year old adult who presents for:  Chief Complaint   Patient presents with    RECHECK     Discuss upcoming surgery      Pt location: [place]    Vitals:    There were no vitals filed for this visit.    BMI:  Estimated body mass index is 33.51 kg/m  as calculated from the following:    Height as of 2/4/25: 6' 1\" (1.854 m).    Weight as of 2/4/25: 254 lb (115.2 kg).          Amendo Phorn      "

## 2025-02-11 NOTE — LETTER
"2/11/2025      Dipak Fuentes  [address]  [facility] [address]      Dear Colleague,    Thank you for referring your patient, Dipak Fuentes, to the [facility] [place]. Please see a copy of my visit note below.    It was a pleasure to see Dipak Fuentes today in Neurosurgery Clinic. She is a 29 year old adult who is scheduled for lumbar fusion next week for multiple recurrent disc herniation.  Today's visit is to answer any upcoming questions.    There were no vitals filed for this visit.  Estimated body mass index is 33.51 kg/m  as calculated from the following:    Height as of 2/4/25: 1.854 m (6' 1\").    Weight as of 2/4/25: 115.2 kg (254 lb).  Data Unavailable    Phone visit    Imaging: No new imaging    Assessment: Lumbar disc herniation with radiculopathy, status post multiple previous discectomies.    Plan: We discussed options such as the TOPS procedure, which is not indicated in this setting but is indicated for degenerative spondylolisthesis.  We also talked about disc replacement which she is less interested in.  After our conversation she wishes to proceed with surgery as scheduled which appears to have also been approved by insurance at this point.     This phone visit took 7 minutes.  MD Location: [place], [place]  Patient Location: [place], [place]       Again, thank you for allowing me to participate in the care of your patient.        Sincerely,        Juancho Oquendo MD    Electronically signed"

## 2025-02-11 NOTE — PROGRESS NOTES
"It was a pleasure to see Dipak Fuentes today in Neurosurgery Clinic. She is a 29 year old adult who is scheduled for lumbar fusion next week for multiple recurrent disc herniation.  Today's visit is to answer any upcoming questions.    There were no vitals filed for this visit.  Estimated body mass index is 33.51 kg/m  as calculated from the following:    Height as of 2/4/25: 1.854 m (6' 1\").    Weight as of 2/4/25: 115.2 kg (254 lb).  Data Unavailable    Phone visit    Imaging: No new imaging    Assessment: Lumbar disc herniation with radiculopathy, status post multiple previous discectomies.    Plan: We discussed options such as the TOPS procedure, which is not indicated in this setting but is indicated for degenerative spondylolisthesis.  We also talked about disc replacement which she is less interested in.  After our conversation she wishes to proceed with surgery as scheduled which appears to have also been approved by insurance at this point.     This phone visit took 7 minutes.  MD Location: [place], [place]  Patient Location: [place], [place]   "

## 2025-02-17 NOTE — PHARMACY-ADMISSION MEDICATION HISTORY
Medication reconciliation interview completed by pre-admitting nurse, reviewed by pharmacy.   No further clarifications needed.     Prior to Admission medications    Medication Sig Last Dose Taking? Auth Provider Long Term End Date   acetaminophen (TYLENOL) 500 MG tablet Take 500-1,000 mg by mouth every 6 hours as needed  Yes Reported, Patient     albuterol (PROAIR HFA/PROVENTIL HFA/VENTOLIN HFA) 108 (90 Base) MCG/ACT inhaler Inhale 2 puffs into the lungs every 6 hours as needed for shortness of breath, wheezing or cough  Yes Neela Smith MD Yes    medical cannabis (Patient's own supply) See Admin Instructions. (The purpose of this order is to document that the patient reports taking medical cannabis.  This is not a prescription, and is not used to certify that the patient has a qualifying medical condition.)  Yes Reported, Patient     pregabalin (LYRICA) 150 MG capsule Take 1 capsule (150 mg) by mouth 2 times daily.   Daquan Patel MD Yes    tiZANidine (ZANAFLEX) 4 MG tablet Take 1-2 tablets (4-8 mg) by mouth 3 times daily as needed for muscle spasms.   Daquan Patel MD No

## 2025-02-19 ENCOUNTER — APPOINTMENT (OUTPATIENT)
Dept: GENERAL RADIOLOGY | Facility: CLINIC | Age: 30
End: 2025-02-19
Attending: NEUROLOGICAL SURGERY
Payer: COMMERCIAL

## 2025-02-19 ENCOUNTER — ANESTHESIA EVENT (OUTPATIENT)
Dept: SURGERY | Facility: CLINIC | Age: 30
End: 2025-02-19
Payer: COMMERCIAL

## 2025-02-19 ENCOUNTER — ANESTHESIA (OUTPATIENT)
Dept: SURGERY | Facility: CLINIC | Age: 30
End: 2025-02-19
Payer: COMMERCIAL

## 2025-02-19 ENCOUNTER — HOSPITAL ENCOUNTER (INPATIENT)
Facility: CLINIC | Age: 30
LOS: 1 days | Discharge: HOME | End: 2025-02-20
Attending: NEUROLOGICAL SURGERY | Admitting: NEUROLOGICAL SURGERY
Payer: COMMERCIAL

## 2025-02-19 DIAGNOSIS — Z98.890 S/P LUMBAR MICRODISCECTOMY: ICD-10-CM

## 2025-02-19 DIAGNOSIS — M54.16 LUMBAR RADICULOPATHY: ICD-10-CM

## 2025-02-19 DIAGNOSIS — Z98.1 S/P LUMBAR FUSION: Primary | ICD-10-CM

## 2025-02-19 LAB
ABO + RH BLD: NORMAL
ANION GAP SERPL CALCULATED.3IONS-SCNC: 13 MMOL/L (ref 7–15)
APTT PPP: 32 SECONDS (ref 22–38)
BLD GP AB SCN SERPL QL: NEGATIVE
BUN SERPL-MCNC: 11.1 MG/DL (ref 6–20)
CALCIUM SERPL-MCNC: 9.1 MG/DL (ref 8.8–10.4)
CHLORIDE SERPL-SCNC: 100 MMOL/L (ref 98–107)
CREAT SERPL-MCNC: 0.98 MG/DL (ref 0.51–1.17)
EGFRCR SERPLBLD CKD-EPI 2021: >90 ML/MIN/1.73M2
ERYTHROCYTE [DISTWIDTH] IN BLOOD BY AUTOMATED COUNT: 12.9 % (ref 10–15)
GLUCOSE BLDC GLUCOMTR-MCNC: 119 MG/DL (ref 70–99)
GLUCOSE SERPL-MCNC: 98 MG/DL (ref 70–99)
HCO3 SERPL-SCNC: 24 MMOL/L (ref 22–29)
HCT VFR BLD AUTO: 44.6 % (ref 35–53)
HGB BLD-MCNC: 15.4 G/DL (ref 11.7–17.7)
INR PPP: 1 (ref 0.85–1.15)
MCH RBC QN AUTO: 29.7 PG (ref 26.5–33)
MCHC RBC AUTO-ENTMCNC: 34.5 G/DL (ref 31.5–36.5)
MCV RBC AUTO: 86 FL (ref 78–100)
PLATELET # BLD AUTO: 298 10E3/UL (ref 150–450)
POTASSIUM SERPL-SCNC: 4.2 MMOL/L (ref 3.4–5.3)
RBC # BLD AUTO: 5.18 10E6/UL (ref 3.8–5.9)
SODIUM SERPL-SCNC: 137 MMOL/L (ref 135–145)
SPECIMEN EXP DATE BLD: NORMAL
WBC # BLD AUTO: 7 10E3/UL (ref 4–11)

## 2025-02-19 PROCEDURE — 0SG30AJ FUSION OF LUMBOSACRAL JOINT WITH INTERBODY FUSION DEVICE, POSTERIOR APPROACH, ANTERIOR COLUMN, OPEN APPROACH: ICD-10-PCS | Performed by: NEUROLOGICAL SURGERY

## 2025-02-19 PROCEDURE — 01NB0ZZ RELEASE LUMBAR NERVE, OPEN APPROACH: ICD-10-PCS | Performed by: NEUROLOGICAL SURGERY

## 2025-02-19 PROCEDURE — 22633 ARTHRD CMBN 1NTRSPC LUMBAR: CPT | Mod: AS | Performed by: PHYSICIAN ASSISTANT

## 2025-02-19 PROCEDURE — 250N000013 HC RX MED GY IP 250 OP 250 PS 637: Performed by: PHYSICIAN ASSISTANT

## 2025-02-19 PROCEDURE — 01NR0ZZ RELEASE SACRAL NERVE, OPEN APPROACH: ICD-10-PCS | Performed by: NEUROLOGICAL SURGERY

## 2025-02-19 PROCEDURE — 0ST40ZZ RESECTION OF LUMBOSACRAL DISC, OPEN APPROACH: ICD-10-PCS | Performed by: NEUROLOGICAL SURGERY

## 2025-02-19 PROCEDURE — 250N000011 HC RX IP 250 OP 636: Mod: JZ | Performed by: ANESTHESIOLOGY

## 2025-02-19 PROCEDURE — C1713 ANCHOR/SCREW BN/BN,TIS/BN: HCPCS | Performed by: NEUROLOGICAL SURGERY

## 2025-02-19 PROCEDURE — 86900 BLOOD TYPING SEROLOGIC ABO: CPT

## 2025-02-19 PROCEDURE — 22840 INSERT SPINE FIXATION DEVICE: CPT | Mod: AS | Performed by: PHYSICIAN ASSISTANT

## 2025-02-19 PROCEDURE — 258N000003 HC RX IP 258 OP 636: Performed by: NURSE ANESTHETIST, CERTIFIED REGISTERED

## 2025-02-19 PROCEDURE — 36415 COLL VENOUS BLD VENIPUNCTURE: CPT

## 2025-02-19 PROCEDURE — 250N000011 HC RX IP 250 OP 636: Performed by: NURSE ANESTHETIST, CERTIFIED REGISTERED

## 2025-02-19 PROCEDURE — 0SG3071 FUSION OF LUMBOSACRAL JOINT WITH AUTOLOGOUS TISSUE SUBSTITUTE, POSTERIOR APPROACH, POSTERIOR COLUMN, OPEN APPROACH: ICD-10-PCS | Performed by: NEUROLOGICAL SURGERY

## 2025-02-19 PROCEDURE — 250N000009 HC RX 250: Performed by: NEUROLOGICAL SURGERY

## 2025-02-19 PROCEDURE — 22840 INSERT SPINE FIXATION DEVICE: CPT | Performed by: NEUROLOGICAL SURGERY

## 2025-02-19 PROCEDURE — 250N000009 HC RX 250: Performed by: NURSE ANESTHETIST, CERTIFIED REGISTERED

## 2025-02-19 PROCEDURE — 82435 ASSAY OF BLOOD CHLORIDE: CPT

## 2025-02-19 PROCEDURE — 63052 LAM FACETC/FRMT ARTHRD LUM 1: CPT | Performed by: NEUROLOGICAL SURGERY

## 2025-02-19 PROCEDURE — 20930 SP BONE ALGRFT MORSEL ADD-ON: CPT | Performed by: NEUROLOGICAL SURGERY

## 2025-02-19 PROCEDURE — 22633 ARTHRD CMBN 1NTRSPC LUMBAR: CPT | Performed by: NEUROLOGICAL SURGERY

## 2025-02-19 PROCEDURE — 86850 RBC ANTIBODY SCREEN: CPT

## 2025-02-19 PROCEDURE — 370N000017 HC ANESTHESIA TECHNICAL FEE, PER MIN: Performed by: NEUROLOGICAL SURGERY

## 2025-02-19 PROCEDURE — 250N000011 HC RX IP 250 OP 636: Mod: JZ | Performed by: PHYSICIAN ASSISTANT

## 2025-02-19 PROCEDURE — 20936 SP BONE AGRFT LOCAL ADD-ON: CPT | Performed by: NEUROLOGICAL SURGERY

## 2025-02-19 PROCEDURE — 250N000025 HC SEVOFLURANE, PER MIN: Performed by: NEUROLOGICAL SURGERY

## 2025-02-19 PROCEDURE — 272N000001 HC OR GENERAL SUPPLY STERILE: Performed by: NEUROLOGICAL SURGERY

## 2025-02-19 PROCEDURE — 61783 SCAN PROC SPINAL: CPT | Mod: 59 | Performed by: NEUROLOGICAL SURGERY

## 2025-02-19 PROCEDURE — 999N000179 XR SURGERY CARM FLUORO LESS THAN 5 MIN W STILLS

## 2025-02-19 PROCEDURE — 360N000086 HC SURGERY LEVEL 6 W/ FLUORO, PER MIN: Performed by: NEUROLOGICAL SURGERY

## 2025-02-19 PROCEDURE — 63052 LAM FACETC/FRMT ARTHRD LUM 1: CPT | Mod: AS | Performed by: PHYSICIAN ASSISTANT

## 2025-02-19 PROCEDURE — 710N000009 HC RECOVERY PHASE 1, LEVEL 1, PER MIN: Performed by: NEUROLOGICAL SURGERY

## 2025-02-19 PROCEDURE — 999N000182 XR SURGERY OARM

## 2025-02-19 PROCEDURE — 258N000003 HC RX IP 258 OP 636: Performed by: PHYSICIAN ASSISTANT

## 2025-02-19 PROCEDURE — 120N000001 HC R&B MED SURG/OB

## 2025-02-19 PROCEDURE — 80048 BASIC METABOLIC PNL TOTAL CA: CPT

## 2025-02-19 PROCEDURE — C1762 CONN TISS, HUMAN(INC FASCIA): HCPCS | Performed by: NEUROLOGICAL SURGERY

## 2025-02-19 PROCEDURE — 22853 INSJ BIOMECHANICAL DEVICE: CPT | Mod: AS | Performed by: PHYSICIAN ASSISTANT

## 2025-02-19 PROCEDURE — 250N000005 HC OR RX SURGIFLO HEMOSTATIC MATRIX 10ML 199102S OPNP: Performed by: NEUROLOGICAL SURGERY

## 2025-02-19 PROCEDURE — 999N000141 HC STATISTIC PRE-PROCEDURE NURSING ASSESSMENT: Performed by: NEUROLOGICAL SURGERY

## 2025-02-19 PROCEDURE — 250N000011 HC RX IP 250 OP 636: Performed by: NEUROLOGICAL SURGERY

## 2025-02-19 PROCEDURE — 61783 SCAN PROC SPINAL: CPT | Mod: AS | Performed by: PHYSICIAN ASSISTANT

## 2025-02-19 PROCEDURE — 22853 INSJ BIOMECHANICAL DEVICE: CPT | Performed by: NEUROLOGICAL SURGERY

## 2025-02-19 PROCEDURE — 85027 COMPLETE CBC AUTOMATED: CPT

## 2025-02-19 PROCEDURE — 8E0WXBF COMPUTER ASSISTED PROCEDURE OF TRUNK REGION, WITH FLUOROSCOPY: ICD-10-PCS | Performed by: NEUROLOGICAL SURGERY

## 2025-02-19 PROCEDURE — 85730 THROMBOPLASTIN TIME PARTIAL: CPT

## 2025-02-19 PROCEDURE — 85610 PROTHROMBIN TIME: CPT

## 2025-02-19 DEVICE — IMP ROD MEDT SOLERA CVD 5.5X45MM CHR 1555501045: Type: IMPLANTABLE DEVICE | Site: SPINE LUMBAR | Status: FUNCTIONAL

## 2025-02-19 DEVICE — IMP SCR SET MEDT SOLERA BREAK OFF 5.5MM TI 5540030: Type: IMPLANTABLE DEVICE | Site: SPINE LUMBAR | Status: FUNCTIONAL

## 2025-02-19 DEVICE — IMP SCR MEDT 5.5/6.0MM SOLERA 6.5X45MM MA 55840006545: Type: IMPLANTABLE DEVICE | Site: SPINE LUMBAR | Status: FUNCTIONAL

## 2025-02-19 DEVICE — DBM 7509215 MAGNIFUSE 1 X 5CM
Type: IMPLANTABLE DEVICE | Site: SPINE LUMBAR | Status: FUNCTIONAL
Brand: MAGNIFUSE® BONE GRAFT

## 2025-02-19 DEVICE — SPACER 4021322 18 DEG 13X22
Type: IMPLANTABLE DEVICE | Site: SPINE LUMBAR | Status: FUNCTIONAL
Brand: CAPSTONE CONTROL™ SPINAL SYSTEM

## 2025-02-19 DEVICE — IMP ROD MEDT SOLERA CVD 5.5X40MM CHR 1555501040: Type: IMPLANTABLE DEVICE | Site: SPINE LUMBAR | Status: FUNCTIONAL

## 2025-02-19 DEVICE — IMP SCR MEDT 5.5/6.0MM SOLERA 6.5X40MM MA 55840006540: Type: IMPLANTABLE DEVICE | Site: SPINE LUMBAR | Status: FUNCTIONAL

## 2025-02-19 RX ORDER — PREGABALIN 75 MG/1
150 CAPSULE ORAL 2 TIMES DAILY
Status: DISCONTINUED | OUTPATIENT
Start: 2025-02-19 | End: 2025-02-20 | Stop reason: HOSPADM

## 2025-02-19 RX ORDER — HYDROXYZINE HYDROCHLORIDE 25 MG/1
25 TABLET, FILM COATED ORAL EVERY 6 HOURS PRN
Status: DISCONTINUED | OUTPATIENT
Start: 2025-02-19 | End: 2025-02-20 | Stop reason: HOSPADM

## 2025-02-19 RX ORDER — ALBUTEROL SULFATE 90 UG/1
2 INHALANT RESPIRATORY (INHALATION) EVERY 6 HOURS PRN
Status: DISCONTINUED | OUTPATIENT
Start: 2025-02-19 | End: 2025-02-20 | Stop reason: HOSPADM

## 2025-02-19 RX ORDER — HYDROMORPHONE HCL IN WATER/PF 6 MG/30 ML
0.4 PATIENT CONTROLLED ANALGESIA SYRINGE INTRAVENOUS EVERY 5 MIN PRN
Status: DISCONTINUED | OUTPATIENT
Start: 2025-02-19 | End: 2025-02-19 | Stop reason: HOSPADM

## 2025-02-19 RX ORDER — SODIUM CHLORIDE 9 MG/ML
INJECTION, SOLUTION INTRAVENOUS CONTINUOUS
Status: DISCONTINUED | OUTPATIENT
Start: 2025-02-19 | End: 2025-02-20 | Stop reason: HOSPADM

## 2025-02-19 RX ORDER — HYDROMORPHONE HCL IN WATER/PF 6 MG/30 ML
0.4 PATIENT CONTROLLED ANALGESIA SYRINGE INTRAVENOUS
Status: DISCONTINUED | OUTPATIENT
Start: 2025-02-19 | End: 2025-02-20 | Stop reason: HOSPADM

## 2025-02-19 RX ORDER — ONDANSETRON 2 MG/ML
INJECTION INTRAMUSCULAR; INTRAVENOUS PRN
Status: DISCONTINUED | OUTPATIENT
Start: 2025-02-19 | End: 2025-02-19

## 2025-02-19 RX ORDER — HYDROMORPHONE HCL IN WATER/PF 6 MG/30 ML
0.2 PATIENT CONTROLLED ANALGESIA SYRINGE INTRAVENOUS EVERY 5 MIN PRN
Status: DISCONTINUED | OUTPATIENT
Start: 2025-02-19 | End: 2025-02-19 | Stop reason: HOSPADM

## 2025-02-19 RX ORDER — FENTANYL CITRATE 50 UG/ML
INJECTION, SOLUTION INTRAMUSCULAR; INTRAVENOUS PRN
Status: DISCONTINUED | OUTPATIENT
Start: 2025-02-19 | End: 2025-02-19

## 2025-02-19 RX ORDER — ONDANSETRON 2 MG/ML
4 INJECTION INTRAMUSCULAR; INTRAVENOUS EVERY 30 MIN PRN
Status: DISCONTINUED | OUTPATIENT
Start: 2025-02-19 | End: 2025-02-19 | Stop reason: HOSPADM

## 2025-02-19 RX ORDER — NALOXONE HYDROCHLORIDE 0.4 MG/ML
0.2 INJECTION, SOLUTION INTRAMUSCULAR; INTRAVENOUS; SUBCUTANEOUS
Status: DISCONTINUED | OUTPATIENT
Start: 2025-02-19 | End: 2025-02-20 | Stop reason: HOSPADM

## 2025-02-19 RX ORDER — GLYCOPYRROLATE 0.2 MG/ML
INJECTION, SOLUTION INTRAMUSCULAR; INTRAVENOUS PRN
Status: DISCONTINUED | OUTPATIENT
Start: 2025-02-19 | End: 2025-02-19

## 2025-02-19 RX ORDER — OXYCODONE HYDROCHLORIDE 5 MG/1
10 TABLET ORAL EVERY 4 HOURS PRN
Status: DISCONTINUED | OUTPATIENT
Start: 2025-02-19 | End: 2025-02-20 | Stop reason: HOSPADM

## 2025-02-19 RX ORDER — LIDOCAINE HYDROCHLORIDE 20 MG/ML
INJECTION, SOLUTION INFILTRATION; PERINEURAL PRN
Status: DISCONTINUED | OUTPATIENT
Start: 2025-02-19 | End: 2025-02-19

## 2025-02-19 RX ORDER — SODIUM CHLORIDE, SODIUM LACTATE, POTASSIUM CHLORIDE, CALCIUM CHLORIDE 600; 310; 30; 20 MG/100ML; MG/100ML; MG/100ML; MG/100ML
INJECTION, SOLUTION INTRAVENOUS CONTINUOUS
Status: DISCONTINUED | OUTPATIENT
Start: 2025-02-19 | End: 2025-02-19 | Stop reason: HOSPADM

## 2025-02-19 RX ORDER — ONDANSETRON 4 MG/1
4 TABLET, ORALLY DISINTEGRATING ORAL EVERY 6 HOURS PRN
Status: DISCONTINUED | OUTPATIENT
Start: 2025-02-19 | End: 2025-02-20 | Stop reason: HOSPADM

## 2025-02-19 RX ORDER — LABETALOL HYDROCHLORIDE 5 MG/ML
10 INJECTION, SOLUTION INTRAVENOUS
Status: DISCONTINUED | OUTPATIENT
Start: 2025-02-19 | End: 2025-02-19 | Stop reason: HOSPADM

## 2025-02-19 RX ORDER — CLINDAMYCIN PHOSPHATE 900 MG/50ML
900 INJECTION, SOLUTION INTRAVENOUS
Status: DISCONTINUED | OUTPATIENT
Start: 2025-02-19 | End: 2025-02-19

## 2025-02-19 RX ORDER — CALCIUM CARBONATE 500 MG/1
500 TABLET, CHEWABLE ORAL 4 TIMES DAILY PRN
Status: DISCONTINUED | OUTPATIENT
Start: 2025-02-19 | End: 2025-02-20 | Stop reason: HOSPADM

## 2025-02-19 RX ORDER — HYDROMORPHONE HCL IN WATER/PF 6 MG/30 ML
0.2 PATIENT CONTROLLED ANALGESIA SYRINGE INTRAVENOUS
Status: DISCONTINUED | OUTPATIENT
Start: 2025-02-19 | End: 2025-02-20 | Stop reason: HOSPADM

## 2025-02-19 RX ORDER — ONDANSETRON 4 MG/1
4 TABLET, ORALLY DISINTEGRATING ORAL EVERY 30 MIN PRN
Status: DISCONTINUED | OUTPATIENT
Start: 2025-02-19 | End: 2025-02-19 | Stop reason: HOSPADM

## 2025-02-19 RX ORDER — NALOXONE HYDROCHLORIDE 0.4 MG/ML
0.4 INJECTION, SOLUTION INTRAMUSCULAR; INTRAVENOUS; SUBCUTANEOUS
Status: DISCONTINUED | OUTPATIENT
Start: 2025-02-19 | End: 2025-02-20 | Stop reason: HOSPADM

## 2025-02-19 RX ORDER — ALBUTEROL SULFATE 0.83 MG/ML
2.5 SOLUTION RESPIRATORY (INHALATION) EVERY 4 HOURS PRN
Status: DISCONTINUED | OUTPATIENT
Start: 2025-02-19 | End: 2025-02-19 | Stop reason: HOSPADM

## 2025-02-19 RX ORDER — ACETAMINOPHEN 325 MG/1
975 TABLET ORAL EVERY 8 HOURS
Status: DISCONTINUED | OUTPATIENT
Start: 2025-02-19 | End: 2025-02-20 | Stop reason: HOSPADM

## 2025-02-19 RX ORDER — DEXAMETHASONE SODIUM PHOSPHATE 4 MG/ML
4 INJECTION, SOLUTION INTRA-ARTICULAR; INTRALESIONAL; INTRAMUSCULAR; INTRAVENOUS; SOFT TISSUE
Status: DISCONTINUED | OUTPATIENT
Start: 2025-02-19 | End: 2025-02-19 | Stop reason: HOSPADM

## 2025-02-19 RX ORDER — CALCIUM CARBONATE 500 MG/1
500 TABLET, CHEWABLE ORAL 4 TIMES DAILY PRN
Status: DISCONTINUED | OUTPATIENT
Start: 2025-02-19 | End: 2025-02-19

## 2025-02-19 RX ORDER — HYDRALAZINE HYDROCHLORIDE 20 MG/ML
2.5-5 INJECTION INTRAMUSCULAR; INTRAVENOUS EVERY 10 MIN PRN
Status: DISCONTINUED | OUTPATIENT
Start: 2025-02-19 | End: 2025-02-19 | Stop reason: HOSPADM

## 2025-02-19 RX ORDER — BISACODYL 10 MG
10 SUPPOSITORY, RECTAL RECTAL DAILY PRN
Status: DISCONTINUED | OUTPATIENT
Start: 2025-02-22 | End: 2025-02-20 | Stop reason: HOSPADM

## 2025-02-19 RX ORDER — PROCHLORPERAZINE MALEATE 5 MG/1
10 TABLET ORAL EVERY 6 HOURS PRN
Status: DISCONTINUED | OUTPATIENT
Start: 2025-02-19 | End: 2025-02-20 | Stop reason: HOSPADM

## 2025-02-19 RX ORDER — CEFAZOLIN SODIUM/WATER 2 G/20 ML
2 SYRINGE (ML) INTRAVENOUS
Status: COMPLETED | OUTPATIENT
Start: 2025-02-19 | End: 2025-02-19

## 2025-02-19 RX ORDER — POLYETHYLENE GLYCOL 3350 17 G/17G
17 POWDER, FOR SOLUTION ORAL DAILY
Status: DISCONTINUED | OUTPATIENT
Start: 2025-02-20 | End: 2025-02-20 | Stop reason: HOSPADM

## 2025-02-19 RX ORDER — ONDANSETRON 2 MG/ML
4 INJECTION INTRAMUSCULAR; INTRAVENOUS EVERY 6 HOURS PRN
Status: DISCONTINUED | OUTPATIENT
Start: 2025-02-19 | End: 2025-02-20 | Stop reason: HOSPADM

## 2025-02-19 RX ORDER — SODIUM CHLORIDE, SODIUM LACTATE, POTASSIUM CHLORIDE, CALCIUM CHLORIDE 600; 310; 30; 20 MG/100ML; MG/100ML; MG/100ML; MG/100ML
INJECTION, SOLUTION INTRAVENOUS CONTINUOUS PRN
Status: DISCONTINUED | OUTPATIENT
Start: 2025-02-19 | End: 2025-02-19

## 2025-02-19 RX ORDER — CLINDAMYCIN PHOSPHATE 900 MG/50ML
900 INJECTION, SOLUTION INTRAVENOUS SEE ADMIN INSTRUCTIONS
Status: DISCONTINUED | OUTPATIENT
Start: 2025-02-19 | End: 2025-02-19

## 2025-02-19 RX ORDER — MAGNESIUM HYDROXIDE 1200 MG/15ML
LIQUID ORAL PRN
Status: DISCONTINUED | OUTPATIENT
Start: 2025-02-19 | End: 2025-02-19 | Stop reason: HOSPADM

## 2025-02-19 RX ORDER — AMOXICILLIN 250 MG
1 CAPSULE ORAL 2 TIMES DAILY
Status: DISCONTINUED | OUTPATIENT
Start: 2025-02-19 | End: 2025-02-20 | Stop reason: HOSPADM

## 2025-02-19 RX ORDER — MEPERIDINE HYDROCHLORIDE 25 MG/ML
12.5 INJECTION INTRAMUSCULAR; INTRAVENOUS; SUBCUTANEOUS EVERY 5 MIN PRN
Status: DISCONTINUED | OUTPATIENT
Start: 2025-02-19 | End: 2025-02-19 | Stop reason: HOSPADM

## 2025-02-19 RX ORDER — KETAMINE HYDROCHLORIDE 10 MG/ML
INJECTION INTRAMUSCULAR; INTRAVENOUS PRN
Status: DISCONTINUED | OUTPATIENT
Start: 2025-02-19 | End: 2025-02-19

## 2025-02-19 RX ORDER — LIDOCAINE 40 MG/G
CREAM TOPICAL
Status: DISCONTINUED | OUTPATIENT
Start: 2025-02-19 | End: 2025-02-19 | Stop reason: HOSPADM

## 2025-02-19 RX ORDER — FENTANYL CITRATE 50 UG/ML
50 INJECTION, SOLUTION INTRAMUSCULAR; INTRAVENOUS EVERY 5 MIN PRN
Status: DISCONTINUED | OUTPATIENT
Start: 2025-02-19 | End: 2025-02-19 | Stop reason: HOSPADM

## 2025-02-19 RX ORDER — DEXAMETHASONE SODIUM PHOSPHATE 4 MG/ML
INJECTION, SOLUTION INTRA-ARTICULAR; INTRALESIONAL; INTRAMUSCULAR; INTRAVENOUS; SOFT TISSUE PRN
Status: DISCONTINUED | OUTPATIENT
Start: 2025-02-19 | End: 2025-02-19

## 2025-02-19 RX ORDER — METHOCARBAMOL 500 MG/1
500 TABLET, FILM COATED ORAL 4 TIMES DAILY
Status: DISCONTINUED | OUTPATIENT
Start: 2025-02-19 | End: 2025-02-20 | Stop reason: HOSPADM

## 2025-02-19 RX ORDER — BUPIVACAINE HYDROCHLORIDE AND EPINEPHRINE 2.5; 5 MG/ML; UG/ML
INJECTION, SOLUTION INFILTRATION; PERINEURAL PRN
Status: DISCONTINUED | OUTPATIENT
Start: 2025-02-19 | End: 2025-02-19 | Stop reason: HOSPADM

## 2025-02-19 RX ORDER — OXYCODONE HYDROCHLORIDE 5 MG/1
5 TABLET ORAL EVERY 4 HOURS PRN
Status: DISCONTINUED | OUTPATIENT
Start: 2025-02-19 | End: 2025-02-20 | Stop reason: HOSPADM

## 2025-02-19 RX ORDER — FENTANYL CITRATE 50 UG/ML
25 INJECTION, SOLUTION INTRAMUSCULAR; INTRAVENOUS EVERY 5 MIN PRN
Status: DISCONTINUED | OUTPATIENT
Start: 2025-02-19 | End: 2025-02-19 | Stop reason: HOSPADM

## 2025-02-19 RX ORDER — LIDOCAINE 40 MG/G
CREAM TOPICAL
Status: DISCONTINUED | OUTPATIENT
Start: 2025-02-19 | End: 2025-02-20 | Stop reason: HOSPADM

## 2025-02-19 RX ORDER — PROPOFOL 10 MG/ML
INJECTION, EMULSION INTRAVENOUS PRN
Status: DISCONTINUED | OUTPATIENT
Start: 2025-02-19 | End: 2025-02-19

## 2025-02-19 RX ORDER — NALOXONE HYDROCHLORIDE 0.4 MG/ML
0.1 INJECTION, SOLUTION INTRAMUSCULAR; INTRAVENOUS; SUBCUTANEOUS
Status: DISCONTINUED | OUTPATIENT
Start: 2025-02-19 | End: 2025-02-19 | Stop reason: HOSPADM

## 2025-02-19 RX ADMIN — METHOCARBAMOL 500 MG: 500 TABLET ORAL at 15:48

## 2025-02-19 RX ADMIN — SODIUM CHLORIDE, POTASSIUM CHLORIDE, SODIUM LACTATE AND CALCIUM CHLORIDE: 600; 310; 30; 20 INJECTION, SOLUTION INTRAVENOUS at 12:02

## 2025-02-19 RX ADMIN — HYDROMORPHONE HYDROCHLORIDE 0.4 MG: 0.2 INJECTION, SOLUTION INTRAMUSCULAR; INTRAVENOUS; SUBCUTANEOUS at 15:35

## 2025-02-19 RX ADMIN — SODIUM CHLORIDE, POTASSIUM CHLORIDE, SODIUM LACTATE AND CALCIUM CHLORIDE: 600; 310; 30; 20 INJECTION, SOLUTION INTRAVENOUS at 13:49

## 2025-02-19 RX ADMIN — DEXMEDETOMIDINE HYDROCHLORIDE 0.5 MCG/KG/HR: 100 INJECTION, SOLUTION INTRAVENOUS at 12:17

## 2025-02-19 RX ADMIN — OXYCODONE HYDROCHLORIDE 10 MG: 5 TABLET ORAL at 21:05

## 2025-02-19 RX ADMIN — ROCURONIUM BROMIDE 20 MG: 50 INJECTION, SOLUTION INTRAVENOUS at 13:51

## 2025-02-19 RX ADMIN — HYDROMORPHONE HYDROCHLORIDE 0.5 MG: 1 INJECTION, SOLUTION INTRAMUSCULAR; INTRAVENOUS; SUBCUTANEOUS at 12:41

## 2025-02-19 RX ADMIN — SUGAMMADEX 200 MG: 100 INJECTION, SOLUTION INTRAVENOUS at 14:16

## 2025-02-19 RX ADMIN — HYDROMORPHONE HYDROCHLORIDE 0.4 MG: 0.2 INJECTION, SOLUTION INTRAMUSCULAR; INTRAVENOUS; SUBCUTANEOUS at 15:28

## 2025-02-19 RX ADMIN — GLYCOPYRROLATE 0.1 MG: 0.2 INJECTION, SOLUTION INTRAMUSCULAR; INTRAVENOUS at 12:14

## 2025-02-19 RX ADMIN — TIZANIDINE 8 MG: 4 TABLET ORAL at 22:16

## 2025-02-19 RX ADMIN — HYDROMORPHONE HYDROCHLORIDE 1 MG: 1 INJECTION, SOLUTION INTRAMUSCULAR; INTRAVENOUS; SUBCUTANEOUS at 12:18

## 2025-02-19 RX ADMIN — Medication 10 MG: at 13:33

## 2025-02-19 RX ADMIN — HYDROMORPHONE HYDROCHLORIDE 0.4 MG: 0.2 INJECTION, SOLUTION INTRAMUSCULAR; INTRAVENOUS; SUBCUTANEOUS at 18:47

## 2025-02-19 RX ADMIN — PHENYLEPHRINE HYDROCHLORIDE 50 MCG: 10 INJECTION INTRAVENOUS at 13:42

## 2025-02-19 RX ADMIN — PHENYLEPHRINE HYDROCHLORIDE 100 MCG: 10 INJECTION INTRAVENOUS at 13:23

## 2025-02-19 RX ADMIN — Medication 2 G: at 12:11

## 2025-02-19 RX ADMIN — ONDANSETRON 4 MG: 2 INJECTION INTRAMUSCULAR; INTRAVENOUS at 14:16

## 2025-02-19 RX ADMIN — METHOCARBAMOL 500 MG: 500 TABLET ORAL at 21:05

## 2025-02-19 RX ADMIN — DEXMEDETOMIDINE HYDROCHLORIDE 0.3 MCG/KG/HR: 100 INJECTION, SOLUTION INTRAVENOUS at 13:53

## 2025-02-19 RX ADMIN — ACETAMINOPHEN 975 MG: 325 TABLET, FILM COATED ORAL at 15:48

## 2025-02-19 RX ADMIN — FENTANYL CITRATE 100 MCG: 50 INJECTION INTRAMUSCULAR; INTRAVENOUS at 12:13

## 2025-02-19 RX ADMIN — HYDROMORPHONE HYDROCHLORIDE 0.2 MG: 0.2 INJECTION, SOLUTION INTRAMUSCULAR; INTRAVENOUS; SUBCUTANEOUS at 15:48

## 2025-02-19 RX ADMIN — ROCURONIUM BROMIDE 50 MG: 50 INJECTION, SOLUTION INTRAVENOUS at 12:14

## 2025-02-19 RX ADMIN — Medication 10 MG: at 12:37

## 2025-02-19 RX ADMIN — FENTANYL CITRATE 50 MCG: 50 INJECTION, SOLUTION INTRAMUSCULAR; INTRAVENOUS at 15:13

## 2025-02-19 RX ADMIN — PROPOFOL 200 MG: 10 INJECTION, EMULSION INTRAVENOUS at 12:13

## 2025-02-19 RX ADMIN — Medication 10 MG: at 12:16

## 2025-02-19 RX ADMIN — SENNOSIDES AND DOCUSATE SODIUM 1 TABLET: 50; 8.6 TABLET ORAL at 21:05

## 2025-02-19 RX ADMIN — ROCURONIUM BROMIDE 20 MG: 50 INJECTION, SOLUTION INTRAVENOUS at 12:38

## 2025-02-19 RX ADMIN — DEXAMETHASONE SODIUM PHOSPHATE 4 MG: 4 INJECTION, SOLUTION INTRA-ARTICULAR; INTRALESIONAL; INTRAMUSCULAR; INTRAVENOUS; SOFT TISSUE at 12:18

## 2025-02-19 RX ADMIN — PREGABALIN 150 MG: 75 CAPSULE ORAL at 22:16

## 2025-02-19 RX ADMIN — LIDOCAINE HYDROCHLORIDE 50 MG: 20 INJECTION, SOLUTION INFILTRATION; PERINEURAL at 12:13

## 2025-02-19 RX ADMIN — SODIUM CHLORIDE: 9 INJECTION, SOLUTION INTRAVENOUS at 21:07

## 2025-02-19 RX ADMIN — MIDAZOLAM 2 MG: 1 INJECTION INTRAMUSCULAR; INTRAVENOUS at 12:05

## 2025-02-19 RX ADMIN — FENTANYL CITRATE 50 MCG: 50 INJECTION, SOLUTION INTRAMUSCULAR; INTRAVENOUS at 15:07

## 2025-02-19 RX ADMIN — OXYCODONE HYDROCHLORIDE 5 MG: 5 TABLET ORAL at 15:48

## 2025-02-19 ASSESSMENT — ACTIVITIES OF DAILY LIVING (ADL)
ADLS_ACUITY_SCORE: 41
ADLS_ACUITY_SCORE: 44
ADLS_ACUITY_SCORE: 41
ADLS_ACUITY_SCORE: 55
ADLS_ACUITY_SCORE: 41
ADLS_ACUITY_SCORE: 45
ADLS_ACUITY_SCORE: 41
ADLS_ACUITY_SCORE: 55

## 2025-02-19 NOTE — ANESTHESIA CARE TRANSFER NOTE
Patient: Dipak Fuentes    Procedure: Procedure(s):  Lumbar 5 to Sacral 1 stealth posterior instrumentation, bilateral decompression and transforaminal interbody fusion with local autograft, posterior arthrodesis using local autograft and allograft cancellous chips       Diagnosis: Lumbar radiculopathy [M54.16]  Herniated nucleus pulposus of lumbosacral region [M51.27]  S/P lumbar microdiscectomy [Z98.890]  Diagnosis Additional Information: No value filed.    Anesthesia Type:   General     Note:    Oropharynx: spontaneously breathing and oral airway in place  Level of Consciousness: drowsy  Oxygen Supplementation: face mask  Level of Supplemental Oxygen (L/min / FiO2): 6  Independent Airway: airway patency satisfactory and stable  Dentition: dentition unchanged  Vital Signs Stable: post-procedure vital signs reviewed and stable  Report to RN Given: handoff report given  Patient transferred to: PACU    Handoff Report: Identifed the Patient, Identified the Reponsible Provider, Reviewed the pertinent medical history, Discussed the surgical course, Reviewed Intra-OP anesthesia mangement and issues during anesthesia, Set expectations for post-procedure period and Allowed opportunity for questions and acknowledgement of understanding      Vitals:  Vitals Value Taken Time   /63 02/19/25 1445   Temp     Pulse 76 02/19/25 1448   Resp 12 02/19/25 1448   SpO2 98 % 02/19/25 1448   Vitals shown include unfiled device data.    Electronically Signed By: ZENA Prajapati CRNA  February 19, 2025  2:49 PM

## 2025-02-19 NOTE — PROVIDER NOTIFICATION
"Patient reports she tried to commit suicide 2 months ago after transgender transition.  Patient states she was hospitalized in the [facility] [place] for it--\"I had a breakdown when I transitioned.\"    Patient states she is \"good\" right now and doesn't not have any suicide ideation, but \"with my bipolar, I have thoughts that come and go all the time.\"    Surgeon and anesthesiologist were notified regarding above--both stated it was ok to proceed with planned procedure.    Per protocol, 1:1 at bedside and all cords and potential threats removed from room.  "

## 2025-02-19 NOTE — OP NOTE
February 19, 2025    [facility]   Operative Note    Pre-operative diagnosis: Lumbar radiculopathy [M54.16]  Herniated nucleus pulposus of lumbosacral region [M51.27]  S/P lumbar microdiscectomy [Z98.890]   Post-operative diagnosis same   Procedure: Procedure(s):  Lumbar 5 to Sacral 1 stealth posterior instrumentation, bilateral decompression and transforaminal interbody fusion with local autograft, posterior arthrodesis using local autograft and allograft cancellous chips    Surgeon(s): Surgeons and Role:     * Juancho Oquendo MD - Primary     * Richy Schumacher PA-C   Estimated blood loss:  100ml   Specimens: * No specimens in log *   Findings: L5-S1 fusion performed as noted above.  Good decompression bilaterally.         Indications: Patient is a 29-year-old female with 2 prior right sided L5-S1 microdiscectomies who is brought for fusion after third herniated disc. Please note that Richy Schumacher PA-C's assistance was needed for positioning, retraction, suctioning, and closure.     Description of procedure: Patient was brought to the operating room.  General endotracheal anesthesia was induced.  The patient [facility]  The back was prepped and draped in sterile fashion.  A midline exposure was performed with care taken to avoid the spinal cord stimulator leads.  These were not seen during the operation.  Exposure to L5-S1 was performed and then the Stealth reference frame was attached to the sacral spinous process.  The O-arm was sterilely draped and brought in the field and three-dimensional imaging was obtained for Stealth neuronavigation.  Using neuronavigation, bilateral pedicle screws were placed at L5 and S1.  Repeat three-dimensional imaging showed good position of the screws.  At this point then bilateral facetectomies were performed at L5-S1 and the ligamentum flavum was elevated and resected, decompressing  the lateral recess bilaterally.  In the area of the previous microdiscectomy scar tissue was carefully dissected off the nerve root along with what appeared to be several herniated disc fragments.  The disc base was opened bilaterally and the disc material debrided.  Eventually bilateral capstone control cages were placed packed with local autograft and local autograft packed between the cages.  Once this was done then rods were sized and seated.  Compression was applied and the setscrews were final tightened.  Final x-rays were obtained.  Posterior elements were decorticated and a 1 x 5 Magnifuse bag with allograft cancellous chips was placed bilaterally along with the remainder of the local autograft from the facets.  Fascia was closed with 0 Vicryl.  2-0 Vicryl was used in the subcutaneous layer.  4-0 subcuticular Monocryl was used for skin with a superglue dressing.

## 2025-02-19 NOTE — PROVIDER NOTIFICATION
Both Dr. Staton and Dr. Oquendo aware of patient's recent ( 2 months ago) suicide attempt.  Both are comfortable proceeding with surgery today.

## 2025-02-19 NOTE — ANESTHESIA POSTPROCEDURE EVALUATION
Patient: Dipak Fuentes    Procedure: Procedure(s):  Lumbar 5 to Sacral 1 stealth posterior instrumentation, bilateral decompression and transforaminal interbody fusion with local autograft, posterior arthrodesis using local autograft and allograft cancellous chips       Anesthesia Type:  General    Note:  Disposition: Outpatient   Postop Pain Control: Uneventful            Sign Out: Well controlled pain   PONV: No   Neuro/Psych: Uneventful            Sign Out: Acceptable/Baseline neuro status   Airway/Respiratory: Uneventful            Sign Out: Acceptable/Baseline resp. status   CV/Hemodynamics: Uneventful            Sign Out: Acceptable CV status; No obvious hypovolemia; No obvious fluid overload   Other NRE: NONE   DID A NON-ROUTINE EVENT OCCUR?            Last vitals:  Vitals Value Taken Time   /67 02/19/25 1500   Temp     Pulse 112 02/19/25 1500   Resp 14 02/19/25 1501   SpO2 98 % 02/19/25 1501   Vitals shown include unfiled device data.    Electronically Signed By: Lewis Staton MD  February 19, 2025  3:03 PM

## 2025-02-19 NOTE — ANESTHESIA PREPROCEDURE EVALUATION
"Anesthesia Pre-Procedure Evaluation    Patient: Dipak Fuentes   MRN: [id number] : 1995        Procedure : Procedure(s):  Lumbar 5 to Sacral 1 stealth posterior instrumentation, bilateral decompression and transforaminal interbody fusion with local autograft, posterior arthrodesis using local autograft and allograft cancellous chips          Past Medical History:   Diagnosis Date    Bipolar I disorder, single manic episode (H) 2013    Problem list name updated by automated process. Provider to review    Complication of anesthesia     panic after having anesthesia    History of substance use disorder     as a teenager, \"took extra adderall\"    Lumbar disc herniation with radiculopathy     Mild intermittent asthma without complication     Other chronic pain     low back and buttocks leg pain      Past Surgical History:   Procedure Laterality Date    DISCECTOMY LUMBAR MINIMALLY INVASIVE ONE LEVEL Right 10/7/2020    Procedure: Right L5-S1 minimally invasive microdiskectomy;  Surgeon: Juancho Oquendo MD;  Location:  OR    DISCECTOMY LUMBAR MINIMALLY INVASIVE ONE LEVEL Right 2022    Procedure: Right L5-S1 minimally invasive redo microdiskectomy;  Surgeon: Juancho Oquendo MD;  Location:  OR    INSERT STIMULATOR AND LEADS INTERNAL DORSAL COLUMN N/A 2024    Procedure: Spinal cord stimulator placement, phase I and II combined, placement of percutaneous type electrodes in the thoracolumbar spine and placement of generator/battery over the right buttock  **Latex Allergy**;  Surgeon: Giovanni Alfonso MD;  Location:  OR    [address] [facility]        Allergies   Allergen Reactions    Lamotrigine Other (See Comments)     Had known side effect of\"skin eating rash\"  Jaycob lauren syndrome    Latex      PN: Converted from LW Latex Sensitivity Flag    Cefaclor Rash     Tolerated cefazolin pre-op 2024      Social History     Tobacco Use    Smoking status: Former     Current packs/day: 0.50 "     Average packs/day: 0.5 packs/day for 5.0 years (2.5 ttl pk-yrs)     Types: Cigarettes, Other     Passive exposure: Past    Smokeless tobacco: Current    Tobacco comments:     vaping   Substance Use Topics    Alcohol use: Not Currently     Comment: over 6 months      Wt Readings from Last 1 Encounters:   02/19/25 114.9 kg (253 lb 6.4 oz)        Anesthesia Evaluation            ROS/MED HX  ENT/Pulmonary:     (+)                     Mild Persistent, asthma                  Neurologic:       Cardiovascular:       METS/Exercise Tolerance:     Hematologic:       Musculoskeletal:       GI/Hepatic:       Renal/Genitourinary:       Endo:     (+)               Obesity,       Psychiatric/Substance Use:     (+) psychiatric history bipolar   Recreational drug usage: Cannabis (high dose, pt thinks 3-5gm vaping, 50-100mg edibles daily.  d/w post-op analgesia challenges and plan.).    Infectious Disease:       Malignancy:       Other:      (+)  , H/O Chronic Pain,         Physical Exam    Airway        Mallampati: II   TM distance: > 3 FB   Neck ROM: full   Mouth opening: > 3 cm    Respiratory Devices and Support         Dental       (+) Minor Abnormalities - some fillings, tiny chips      Cardiovascular          Rhythm and rate: regular     Pulmonary           breath sounds clear to auscultation           OUTSIDE LABS:  CBC:   Lab Results   Component Value Date    WBC 9.0 02/04/2025    WBC 5.5 05/14/2024    HGB 15.2 02/04/2025    HGB 15.3 05/14/2024    HCT 43.6 02/04/2025    HCT 45.4 05/14/2024     02/04/2025     05/14/2024     BMP:   Lab Results   Component Value Date     02/04/2025     05/14/2024    POTASSIUM 4.1 02/04/2025    POTASSIUM 4.6 05/14/2024    CHLORIDE 103 02/04/2025    CHLORIDE 103 05/14/2024    CO2 20 (L) 02/04/2025    CO2 28 05/14/2024    BUN 10.1 02/04/2025    BUN 11.1 05/14/2024    CR 0.82 02/04/2025    CR 1.00 05/14/2024     (H) 02/19/2025     (H) 02/04/2025     COAGS:  "  Lab Results   Component Value Date    PTT 33 01/08/2024    INR 1.09 01/08/2024     POC: No results found for: \"BGM\", \"HCG\", \"HCGS\"  HEPATIC:   Lab Results   Component Value Date    ALBUMIN 4.5 05/14/2024    PROTTOTAL 7.4 05/14/2024    ALT 62 05/14/2024    AST 36 05/14/2024    ALKPHOS 84 05/14/2024    BILITOTAL 0.4 05/14/2024     OTHER:   Lab Results   Component Value Date    MARIA ESTHER 9.3 02/04/2025    LIPASE 76 02/24/2020    TSH 3.36 07/20/2023       Anesthesia Plan    ASA Status:  3    NPO Status:  NPO Appropriate    Anesthesia Type: General.     - Airway: ETT   Induction: Propofol.   Maintenance: Balanced.   Techniques and Equipment:       - Drips/Meds: Dexmed. infusion, Ketamine     Consents    Anesthesia Plan(s) and associated risks, benefits, and realistic alternatives discussed. Questions answered and patient/representative(s) expressed understanding.     - Discussed:     - Discussed with:  Patient            Postoperative Care    Pain management: IV analgesics.   PONV prophylaxis: Ondansetron (or other 5HT-3), Dexamethasone or Solumedrol     Comments:               Lewis Staton MD        Clinically Significant Risk Factors Present on Admission                             # Obesity: Estimated body mass index is 33.43 kg/m  as calculated from the following:    Height as of 2/4/25: 1.854 m (6' 1\").    Weight as of this encounter: 114.9 kg (253 lb 6.4 oz).       # Financial/Environmental Concerns:    # Asthma: noted on problem list          "

## 2025-02-19 NOTE — PROVIDER NOTIFICATION
Message sent to Mango Schumacher requesting patient's home medication, Tizanidine to be reordered. Order received-see MAR.

## 2025-02-19 NOTE — ANESTHESIA PROCEDURE NOTES
Airway       Patient location during procedure: OR       Procedure Start/Stop Times: 2/19/2025 12:15 PM  Staff -        Anesthesiologist:  Lewis Staton MD       CRNA: Susanna Chavez APRN CRNA       Performed By: anesthesiologist and CRNA  Consent for Airway        Urgency: elective  Indications and Patient Condition       Indications for airway management: justin-procedural       Induction type:intravenous       Mask difficulty assessment: 1 - vent by mask    Final Airway Details       Final airway type: endotracheal airway       Successful airway: ETT - single and Oral  Endotracheal Airway Details        ETT size (mm): 7.0       Cuffed: yes       Successful intubation technique: direct laryngoscopy       DL Blade Type: Moreau 2       Grade View of Cords: 1       Adjucts: stylet       Position: Center       Measured from: gums/teeth       Secured at (cm): 23       Bite block used: None    Post intubation assessment        Placement verified by: capnometry, equal breath sounds and chest rise        Number of attempts at approach: 1       Number of other approaches attempted: 0       Secured with: tape       Ease of procedure: easy       Dentition: Unchanged    Medication(s) Administered   Medication Administration Time: 2/19/2025 12:15 PM

## 2025-02-20 ENCOUNTER — APPOINTMENT (OUTPATIENT)
Dept: GENERAL RADIOLOGY | Facility: CLINIC | Age: 30
End: 2025-02-20
Attending: PHYSICIAN ASSISTANT
Payer: COMMERCIAL

## 2025-02-20 ENCOUNTER — HOSPITAL ENCOUNTER (OUTPATIENT)
Facility: CLINIC | Age: 30
Setting detail: OBSERVATION
End: 2025-02-20
Attending: EMERGENCY MEDICINE | Admitting: HOSPITALIST
Payer: COMMERCIAL

## 2025-02-20 ENCOUNTER — APPOINTMENT (OUTPATIENT)
Dept: PHYSICAL THERAPY | Facility: CLINIC | Age: 30
End: 2025-02-20
Attending: NEUROLOGICAL SURGERY
Payer: COMMERCIAL

## 2025-02-20 VITALS
OXYGEN SATURATION: 99 % | RESPIRATION RATE: 14 BRPM | TEMPERATURE: 98.7 F | DIASTOLIC BLOOD PRESSURE: 80 MMHG | SYSTOLIC BLOOD PRESSURE: 122 MMHG | HEART RATE: 75 BPM

## 2025-02-20 VITALS
OXYGEN SATURATION: 99 % | BODY MASS INDEX: 33.43 KG/M2 | WEIGHT: 253.4 LBS | DIASTOLIC BLOOD PRESSURE: 89 MMHG | HEART RATE: 101 BPM | TEMPERATURE: 99 F | SYSTOLIC BLOOD PRESSURE: 146 MMHG | RESPIRATION RATE: 17 BRPM

## 2025-02-20 DIAGNOSIS — Z98.1 S/P LUMBAR FUSION: ICD-10-CM

## 2025-02-20 DIAGNOSIS — G89.18 ACUTE POST-OPERATIVE PAIN: ICD-10-CM

## 2025-02-20 LAB
ANION GAP SERPL CALCULATED.3IONS-SCNC: 12 MMOL/L (ref 7–15)
ATRIAL RATE - MUSE: 105 BPM
BASOPHILS # BLD AUTO: 0 10E3/UL (ref 0–0.2)
BASOPHILS NFR BLD AUTO: 0 %
BUN SERPL-MCNC: 11.8 MG/DL (ref 6–20)
CALCIUM SERPL-MCNC: 8.3 MG/DL (ref 8.8–10.4)
CHLORIDE SERPL-SCNC: 99 MMOL/L (ref 98–107)
CREAT SERPL-MCNC: 0.84 MG/DL (ref 0.51–1.17)
DIASTOLIC BLOOD PRESSURE - MUSE: NORMAL MMHG
EGFRCR SERPLBLD CKD-EPI 2021: >90 ML/MIN/1.73M2
EOSINOPHIL # BLD AUTO: 0.1 10E3/UL (ref 0–0.7)
EOSINOPHIL NFR BLD AUTO: 1 %
ERYTHROCYTE [DISTWIDTH] IN BLOOD BY AUTOMATED COUNT: 13 % (ref 10–15)
GLUCOSE SERPL-MCNC: 125 MG/DL (ref 70–99)
GLUCOSE SERPL-MCNC: 144 MG/DL (ref 70–99)
HCO3 SERPL-SCNC: 24 MMOL/L (ref 22–29)
HCT VFR BLD AUTO: 38.5 % (ref 35–53)
HGB BLD-MCNC: 13.2 G/DL (ref 11.7–17.7)
HGB BLD-MCNC: 14.4 G/DL (ref 11.7–17.7)
IMM GRANULOCYTES # BLD: 0.1 10E3/UL
IMM GRANULOCYTES NFR BLD: 0 %
INTERPRETATION ECG - MUSE: NORMAL
LYMPHOCYTES # BLD AUTO: 2.4 10E3/UL (ref 0.8–5.3)
LYMPHOCYTES NFR BLD AUTO: 20 %
MCH RBC QN AUTO: 29.7 PG (ref 26.5–33)
MCHC RBC AUTO-ENTMCNC: 34.3 G/DL (ref 31.5–36.5)
MCV RBC AUTO: 87 FL (ref 78–100)
MONOCYTES # BLD AUTO: 1.8 10E3/UL (ref 0–1.3)
MONOCYTES NFR BLD AUTO: 15 %
NEUTROPHILS # BLD AUTO: 7.8 10E3/UL (ref 1.6–8.3)
NEUTROPHILS NFR BLD AUTO: 64 %
NRBC # BLD AUTO: 0 10E3/UL
NRBC BLD AUTO-RTO: 0 /100
P AXIS - MUSE: 36 DEGREES
PLAT MORPH BLD: NORMAL
PLATELET # BLD AUTO: 263 10E3/UL (ref 150–450)
POTASSIUM SERPL-SCNC: 3.4 MMOL/L (ref 3.4–5.3)
PR INTERVAL - MUSE: 128 MS
QRS DURATION - MUSE: 92 MS
QT - MUSE: 328 MS
QTC - MUSE: 433 MS
R AXIS - MUSE: 36 DEGREES
RBC # BLD AUTO: 4.44 10E6/UL (ref 3.8–5.9)
RBC MORPH BLD: NORMAL
SODIUM SERPL-SCNC: 135 MMOL/L (ref 135–145)
SYSTOLIC BLOOD PRESSURE - MUSE: NORMAL MMHG
T AXIS - MUSE: 29 DEGREES
VENTRICULAR RATE- MUSE: 105 BPM
WBC # BLD AUTO: 12.1 10E3/UL (ref 4–11)

## 2025-02-20 PROCEDURE — 97161 PT EVAL LOW COMPLEX 20 MIN: CPT | Mod: GP | Performed by: PHYSICAL THERAPIST

## 2025-02-20 PROCEDURE — 85004 AUTOMATED DIFF WBC COUNT: CPT | Performed by: EMERGENCY MEDICINE

## 2025-02-20 PROCEDURE — 999N000065 XR LUMBAR SPINE 2/3 VIEWS

## 2025-02-20 PROCEDURE — 36415 COLL VENOUS BLD VENIPUNCTURE: CPT | Performed by: EMERGENCY MEDICINE

## 2025-02-20 PROCEDURE — 82947 ASSAY GLUCOSE BLOOD QUANT: CPT | Performed by: EMERGENCY MEDICINE

## 2025-02-20 PROCEDURE — 80048 BASIC METABOLIC PNL TOTAL CA: CPT | Performed by: NEUROLOGICAL SURGERY

## 2025-02-20 PROCEDURE — 82310 ASSAY OF CALCIUM: CPT | Performed by: EMERGENCY MEDICINE

## 2025-02-20 PROCEDURE — 96375 TX/PRO/DX INJ NEW DRUG ADDON: CPT

## 2025-02-20 PROCEDURE — 250N000011 HC RX IP 250 OP 636: Performed by: EMERGENCY MEDICINE

## 2025-02-20 PROCEDURE — 85025 COMPLETE CBC W/AUTO DIFF WBC: CPT | Performed by: PHYSICIAN ASSISTANT

## 2025-02-20 PROCEDURE — 250N000013 HC RX MED GY IP 250 OP 250 PS 637: Performed by: PHYSICIAN ASSISTANT

## 2025-02-20 PROCEDURE — 99285 EMERGENCY DEPT VISIT HI MDM: CPT | Mod: 25

## 2025-02-20 PROCEDURE — 999N000111 HC STATISTIC OT IP EVAL DEFER: Performed by: REHABILITATION PRACTITIONER

## 2025-02-20 PROCEDURE — 99222 1ST HOSP IP/OBS MODERATE 55: CPT | Performed by: PHYSICIAN ASSISTANT

## 2025-02-20 PROCEDURE — 93005 ELECTROCARDIOGRAM TRACING: CPT

## 2025-02-20 PROCEDURE — G0378 HOSPITAL OBSERVATION PER HR: HCPCS

## 2025-02-20 PROCEDURE — 97530 THERAPEUTIC ACTIVITIES: CPT | Mod: GP | Performed by: PHYSICAL THERAPIST

## 2025-02-20 PROCEDURE — 97116 GAIT TRAINING THERAPY: CPT | Mod: GP | Performed by: PHYSICAL THERAPIST

## 2025-02-20 PROCEDURE — 96374 THER/PROPH/DIAG INJ IV PUSH: CPT

## 2025-02-20 PROCEDURE — 250N000013 HC RX MED GY IP 250 OP 250 PS 637: Performed by: EMERGENCY MEDICINE

## 2025-02-20 PROCEDURE — 36415 COLL VENOUS BLD VENIPUNCTURE: CPT | Performed by: PHYSICIAN ASSISTANT

## 2025-02-20 PROCEDURE — 96376 TX/PRO/DX INJ SAME DRUG ADON: CPT

## 2025-02-20 RX ORDER — ACETAMINOPHEN 500 MG
1000 TABLET ORAL ONCE
Status: COMPLETED | OUTPATIENT
Start: 2025-02-20 | End: 2025-02-21

## 2025-02-20 RX ORDER — OXYCODONE HYDROCHLORIDE 5 MG/1
10 TABLET ORAL ONCE
Status: COMPLETED | OUTPATIENT
Start: 2025-02-20 | End: 2025-02-20

## 2025-02-20 RX ORDER — DIAZEPAM 10 MG/2ML
5 INJECTION, SOLUTION INTRAMUSCULAR; INTRAVENOUS ONCE
Status: COMPLETED | OUTPATIENT
Start: 2025-02-20 | End: 2025-02-20

## 2025-02-20 RX ORDER — METHOCARBAMOL 500 MG/1
500 TABLET, FILM COATED ORAL 4 TIMES DAILY
Qty: 40 TABLET | Refills: 0 | Status: SHIPPED | OUTPATIENT
Start: 2025-02-20 | End: 2025-02-21

## 2025-02-20 RX ORDER — OXYCODONE HYDROCHLORIDE 5 MG/1
5 TABLET ORAL EVERY 6 HOURS PRN
Qty: 40 TABLET | Refills: 0 | Status: ON HOLD | OUTPATIENT
Start: 2025-02-20 | End: 2025-02-22

## 2025-02-20 RX ORDER — AMOXICILLIN 250 MG
1 CAPSULE ORAL 2 TIMES DAILY PRN
Qty: 28 TABLET | Refills: 1 | Status: SHIPPED | OUTPATIENT
Start: 2025-02-20

## 2025-02-20 RX ORDER — HYDROMORPHONE HYDROCHLORIDE 1 MG/ML
0.5 INJECTION, SOLUTION INTRAMUSCULAR; INTRAVENOUS; SUBCUTANEOUS
Status: COMPLETED | OUTPATIENT
Start: 2025-02-20 | End: 2025-02-20

## 2025-02-20 RX ORDER — KETOROLAC TROMETHAMINE 15 MG/ML
15 INJECTION, SOLUTION INTRAMUSCULAR; INTRAVENOUS ONCE
Status: COMPLETED | OUTPATIENT
Start: 2025-02-20 | End: 2025-02-20

## 2025-02-20 RX ADMIN — OXYCODONE HYDROCHLORIDE 10 MG: 5 TABLET ORAL at 02:50

## 2025-02-20 RX ADMIN — PREGABALIN 150 MG: 75 CAPSULE ORAL at 08:29

## 2025-02-20 RX ADMIN — HYDROMORPHONE HYDROCHLORIDE 0.5 MG: 1 INJECTION, SOLUTION INTRAMUSCULAR; INTRAVENOUS; SUBCUTANEOUS at 19:34

## 2025-02-20 RX ADMIN — ACETAMINOPHEN 975 MG: 325 TABLET, FILM COATED ORAL at 08:29

## 2025-02-20 RX ADMIN — OXYCODONE HYDROCHLORIDE 10 MG: 5 TABLET ORAL at 17:37

## 2025-02-20 RX ADMIN — DIAZEPAM 5 MG: 5 INJECTION INTRAMUSCULAR; INTRAVENOUS at 17:37

## 2025-02-20 RX ADMIN — OXYCODONE HYDROCHLORIDE 10 MG: 5 TABLET ORAL at 08:29

## 2025-02-20 RX ADMIN — METHOCARBAMOL 500 MG: 500 TABLET ORAL at 11:03

## 2025-02-20 RX ADMIN — KETOROLAC TROMETHAMINE 15 MG: 15 INJECTION, SOLUTION INTRAMUSCULAR; INTRAVENOUS at 21:22

## 2025-02-20 RX ADMIN — SENNOSIDES AND DOCUSATE SODIUM 1 TABLET: 50; 8.6 TABLET ORAL at 09:49

## 2025-02-20 RX ADMIN — DIAZEPAM 5 MG: 5 INJECTION INTRAMUSCULAR; INTRAVENOUS at 21:20

## 2025-02-20 RX ADMIN — OXYCODONE HYDROCHLORIDE 10 MG: 5 TABLET ORAL at 13:10

## 2025-02-20 RX ADMIN — HYDROXYZINE HYDROCHLORIDE 25 MG: 25 TABLET, FILM COATED ORAL at 02:50

## 2025-02-20 RX ADMIN — HYDROMORPHONE HYDROCHLORIDE 0.5 MG: 1 INJECTION, SOLUTION INTRAMUSCULAR; INTRAVENOUS; SUBCUTANEOUS at 20:18

## 2025-02-20 RX ADMIN — HYDROMORPHONE HYDROCHLORIDE 0.5 MG: 1 INJECTION, SOLUTION INTRAMUSCULAR; INTRAVENOUS; SUBCUTANEOUS at 18:54

## 2025-02-20 RX ADMIN — TIZANIDINE 8 MG: 4 TABLET ORAL at 09:49

## 2025-02-20 ASSESSMENT — ACTIVITIES OF DAILY LIVING (ADL)
ADLS_ACUITY_SCORE: 57
ADLS_ACUITY_SCORE: 52
ADLS_ACUITY_SCORE: 62
ADLS_ACUITY_SCORE: 57
ADLS_ACUITY_SCORE: 62
ADLS_ACUITY_SCORE: 57
ADLS_ACUITY_SCORE: 52
ADLS_ACUITY_SCORE: 57
ADLS_ACUITY_SCORE: 55
ADLS_ACUITY_SCORE: 57
ADLS_ACUITY_SCORE: 57
ADLS_ACUITY_SCORE: 62
ADLS_ACUITY_SCORE: 66
ADLS_ACUITY_SCORE: 57
ADLS_ACUITY_SCORE: 57
ADLS_ACUITY_SCORE: 62
ADLS_ACUITY_SCORE: 62
ADLS_ACUITY_SCORE: 55
ADLS_ACUITY_SCORE: 57

## 2025-02-20 ASSESSMENT — COLUMBIA-SUICIDE SEVERITY RATING SCALE - C-SSRS
2. HAVE YOU ACTUALLY HAD ANY THOUGHTS OF KILLING YOURSELF IN THE PAST MONTH?: NO
6. HAVE YOU EVER DONE ANYTHING, STARTED TO DO ANYTHING, OR PREPARED TO DO ANYTHING TO END YOUR LIFE?: NO
1. IN THE PAST MONTH, HAVE YOU WISHED YOU WERE DEAD OR WISHED YOU COULD GO TO SLEEP AND NOT WAKE UP?: NO

## 2025-02-20 NOTE — PROGRESS NOTES
Discharge instructions reviewed with pt and spouse by virtual nurse Daisha.  Verbalize understanding.  Prescriptions for Senna, oxycodone, and robaxin sent with pt. Personal belongings sent with pt including her new walker. Leaves for home with spouse-escorted via w/c accompanied by NA to front entrance.

## 2025-02-20 NOTE — ED TRIAGE NOTES
Pt arrives via EMS from home d/t back pain. Per EMS, pt was discharged around 1400/1430 today with pain meds, but still uncomfortable. Hx spinal fusion L5-S1 with neuro-stimulator. Had near syncope at home, but able to sit down in chair. No fall or re-injury. ABC intact. A&Ox4.      Triage Assessment (Adult)       Row Name 02/20/25 1715          Triage Assessment    Airway WDL WDL        Respiratory WDL    Respiratory WDL WDL

## 2025-02-20 NOTE — PROGRESS NOTES
Neurosurgery Progress     Dx:     POD #1 s/p lumbar 5 to sacral 1 stealth posterior instrumentation, bilateral decompression and transforaminal interbody fusion.      XR LUMBAR SPINE 2/3 VIEWS - 2/20/2025                                              IMPRESSION: New fusion hardware at L5-S1. No other change.    Neuro stable.     TODAY'S PLAN:     -Advance activity as tolerated.  -Continue supportive and symptomatic treatment.  -Continue physical and occupational therapy.  -Pain control measures.  -Advance diet as tolerated.  -Routine wound care.    Ms. Fuentes would like to discharge to home despite encouragement to stay another night. She has been cleared by both physical and occupational therapy to discharge home. We will complete her navigator. Should she change her mind she is certainly able to stay here at [facility].      In the event that patient's symptoms worsen or change we would appreciate being contacted. We did discuss signs of a worsening problem that she should seek being evaluated.     We did review the above information with the patient whom agrees with the plan and did verbalize understanding.   ________________________________________________________________     Ms. Fuentes overall feels well and denies any significant discomfort. Does have some discomfort with lower extremity strength and range of motion testing. Tolerating regular diet without n/v. Up ambulating. Voiding without difficulty.     BP (!) 146/89 (BP Location: Right arm)   Pulse 101   Temp 99  F (37.2  C) (Temporal)   Resp 17   Wt 114.9 kg (253 lb 6.4 oz)   SpO2 99%   BMI 33.43 kg/m       Patient examined in room 628 with her physical therapist and nurse present. She appears comfortable and in no apparent distress. She is moving all of her extremities well.   CN II-XII intact, alert and appropriate with conversation. Follows all commands.   Bilateral upper and lower extremities with appropriate strength. Deep tendon reflexes within  normal limits throughout. Sensation is also intact throughout. She does have an acceptable post-operative range of motion.   Her lumbar incision is absent for concerning edema, erythema or drainage.   Calves soft and non-tender.     All pertinent labs and updated imaging reviewed in EPIC.      Latest Reference Range & Units Most Recent   Hemoglobin 11.7 - 17.7 g/dL 14.4  2/20/25 08:00       Mango Schumacher PA-C   Neurosurgery

## 2025-02-20 NOTE — PROGRESS NOTES
02/20/25 0939   Appointment Info   Signing Clinician's Name / Credentials (PT) Mary Jovel, PT   Living Environment   People in Home spouse   Current Living Arrangements house   Home Accessibility stairs to enter home;stairs within home   Number of Stairs, Main Entrance 2   Stair Railings, Main Entrance none   Number of Stairs, Within Home, Primary greater than 10 stairs   Stair Railings, Within Home, Primary railings safe and in good condition   Transportation Anticipated family or friend will provide   Living Environment Comments 2 story home   Self-Care   Usual Activity Tolerance moderate   Current Activity Tolerance moderate   Equipment Currently Used at Home cane, straight   Fall history within last six months yes   Number of times patient has fallen within last six months 2  (buckling R knee)   Activity/Exercise/Self-Care Comment reports independence with mobility and cares at baseline   General Information   Onset of Illness/Injury or Date of Surgery 02/19/25   Referring Physician Richy Schumacher PA-C   Patient/Family Therapy Goals Statement (PT) return home   Pertinent History of Current Problem (include personal factors and/or comorbidities that impact the POC) POD #1 s/p lumbar 5 to sacral 1 stealth posterior instrumentation, bilateral decompression and transforaminal interbody fusion   Existing Precautions/Restrictions fall;spinal;lifting   General Observations Patient up in recliner chair; spouse present for session   Cognition   Cognitive Status Comments appears intact during session   Pain Assessment   Patient Currently in Pain Yes, see Vital Sign flowsheet  (8/10; received pain meds about 30 minutes prior)   Integumentary/Edema   Integumentary/Edema Comments posterior spinal incision; covered; see nursing notes for further information   Posture    Posture Comments WFL   Range of Motion (ROM)   ROM Comment trunk limited by spinal precautions; others appear WFL   Strength (Manual Muscle  Testing)   Strength Comments trunk limited by recent surgery and pain; R LE weaker than L prior to surgery; further limited by pain; no buckling during session   Bed Mobility   Comment, (Bed Mobility) Min A for R LE into bed during logroll   Transfers   Comment, (Transfers) sit>stand CGA; use of walker for support   Gait/Stairs (Locomotion)   Comment, (Gait/Stairs) CGA with use of walker   Balance   Balance Comments current need for UE support/assistive device   Sensory Examination   Sensory Perception Comments denies numbness or tingling   Clinical Impression   Criteria for Skilled Therapeutic Intervention Yes, treatment indicated   PT Diagnosis (PT) impaired functional mobility   Influenced by the following impairments decreased activity tolerance; pain; spinal precautions; lifting restrictions; functional weakness ( R LE > L LE)   Functional limitations due to impairments impaired independence with mobility and cares secondary to above deficits   Clinical Presentation (PT Evaluation Complexity) stable   Clinical Presentation Rationale clinical judgement; level of assist   Clinical Decision Making (Complexity) low complexity   Planned Therapy Interventions (PT) bed mobility training;cryotherapy;gait training;home exercise program;patient/family education;stair training;transfer training;progressive activity/exercise   Risk & Benefits of therapy have been explained evaluation/treatment results reviewed;care plan/treatment goals reviewed;risks/benefits reviewed;current/potential barriers reviewed;participants voiced agreement with care plan;patient   Clinical Impression Comments below reported baseline   PT Total Evaluation Time   PT Eval, Low Complexity Minutes (97318) 10   Physical Therapy Goals   PT Frequency One time eval and treatment only   PT Predicted Duration/Target Date for Goal Attainment 02/20/25   PT Goals Bed Mobility;Transfers;Gait;Stairs   PT: Bed Mobility Independent;Supine to/from sit;Rolling   PT:  Transfers Supervision/stand-by assist;Sit to/from stand;Bed to/from chair;Assistive device;Within precautions   PT: Gait Supervision/stand-by assist;Rolling walker;100 feet   PT: Stairs Supervision/stand-by assist;Rail on left;Assistive device  (use of cane)   PT Discharge Planning   PT Plan anticipate discharge to home with spouse assist; use of walker   PT Discharge Recommendation (DC Rec) home with assist   PT Rationale for DC Rec Anticipate patient will be safe to return home with assist of family for mobility and cares as needed; recommend continued use of walker and walking program   PT Brief overview of current status A x 1 with FWW   PT Total Distance Amb During Session (feet) 130   PT Equipment Needed at Discharge walker, rolling   Physical Therapy Time and Intention   Total Session Time (sum of timed and untimed services) 10

## 2025-02-20 NOTE — PLAN OF CARE
"Goal Outcome Evaluation:      Plan of Care Reviewed With: patient    Overall Patient Progress: improvingOverall Patient Progress: improving    Outcome Evaluation: Pt arrived from PACU, A/Ox4. Pt does have past history of suicide attempt 2 months ago, however currently denies any suicidal thoughts.  Pt asked \" Do I really have to have someone in here with me?  I promise I'm doing a lot better now.\" Pt and wife both educated that it was policy due to recent attempt, and both verbalized understanding of need for sitter at bedside. IV Dilaudid given x1 for pain. Tolerating clear liquids. Denies nausea. Back incision clean and dry-open to air. Stood and ambulated to bathroom with Ax1. DTV. Right leg a bit weaker than right, and has some N/T to RLE at baseline. Able to bend both knees bilaterally, but very weak straight leg lifts bilaterally. Plans to discharge home with spouse when medically ready.       Problem: Suicide Risk  Goal: Absence of Self-Harm  Outcome: Progressing  Intervention: Assess Risk to Self and Maintain Safety  Recent Flowsheet Documentation  Taken 2/19/2025 1825 by Kelsey Thompson RN  Self-Harm Prevention:   environmental self-harm risks assessed   observed one-to-one   environment modified for self-harm risk  Enhanced Safety Measures:   family to remain at bedside   pain management   Pre/Post Op education on fall prevention    at bedside  Intervention: Promote Psychosocial Wellbeing  Recent Flowsheet Documentation  Taken 2/19/2025 1825 by Kelsey Thompson, RN  Family/Support System Care: support provided     Problem: Adult Inpatient Plan of Care  Goal: Plan of Care Review  Description: The Plan of Care Review/Shift note should be completed every shift.  The Outcome Evaluation is a brief statement about your assessment that the patient is improving, declining, or no change.  This information will be displayed automatically on your shift  note.  Outcome: Progressing  Flowsheets " "(Taken 2/19/2025 1913)  Outcome Evaluation: Pt arrived from PACU, A/Ox4.  Plan of Care Reviewed With: patient  Overall Patient Progress: improving  Goal: Patient-Specific Goal (Individualized)  Description: You can add care plan individualizations to a care plan. Examples of Individualization might be:  \"Parent requests to be called daily at 9am for status\", \"I have a hard time hearing out of my right ear\", or \"Do not touch me to wake me up as it startles  me\".  Outcome: Progressing  Goal: Absence of Hospital-Acquired Illness or Injury  Outcome: Progressing  Intervention: Identify and Manage Fall Risk  Recent Flowsheet Documentation  Taken 2/19/2025 1825 by Kelsey Thompson RN  Safety Promotion/Fall Prevention:   assistive device/personal items within reach   activity supervised   clutter free environment maintained   increase visualization of patient   increased rounding and observation   nonskid shoes/slippers when out of bed   patient and family education   room organization consistent   safety round/check completed  Intervention: Prevent Skin Injury  Recent Flowsheet Documentation  Taken 2/19/2025 1825 by Kelsey Thompson RN  Body Position:   supine, head elevated   supine, legs elevated  Intervention: Prevent and Manage VTE (Venous Thromboembolism) Risk  Recent Flowsheet Documentation  Taken 2/19/2025 1743 by Kelsey Thompson RN  VTE Prevention/Management: SCDs on (sequential compression devices)  Goal: Optimal Comfort and Wellbeing  Outcome: Progressing  Intervention: Monitor Pain and Promote Comfort  Recent Flowsheet Documentation  Taken 2/19/2025 1847 by Kelsey Thompson RN  Pain Management Interventions:   medication (see MAR)   repositioned  Taken 2/19/2025 1731 by Kelsey Thompson RN  Pain Management Interventions:   cold applied   repositioned  Goal: Readiness for Transition of Care  Outcome: Progressing  Intervention: Mutually Develop Transition Plan  Recent Flowsheet Documentation  Taken " 2/19/2025 1800 by Kelsey Thompson RN  Equipment Currently Used at Home: cane, straight     Problem: Spinal Surgery  Goal: Optimal Coping with Surgery  Outcome: Progressing  Intervention: Support Psychosocial Response to Surgery  Recent Flowsheet Documentation  Taken 2/19/2025 1825 by Kelsey Thompson RN  Family/Support System Care: support provided  Goal: Absence of Bleeding  Outcome: Progressing  Goal: Effective Bowel Elimination  Outcome: Progressing  Goal: Fluid and Electrolyte Balance  Outcome: Progressing  Goal: Optimal Functional Ability  Outcome: Progressing  Intervention: Optimize Functional Status  Recent Flowsheet Documentation  Taken 2/19/2025 1825 by Kelsey Thompson RN  Activity Management:   activity encouraged   activity adjusted per tolerance  Goal: Absence of Infection Signs and Symptoms  Outcome: Progressing  Goal: Optimal Neurologic Function  Outcome: Progressing  Intervention: Optimize Neurologic Function  Recent Flowsheet Documentation  Taken 2/19/2025 1825 by Kelsey Thompson RN  Body Position:   supine, head elevated   supine, legs elevated  Goal: Anesthesia/Sedation Recovery  Outcome: Progressing  Intervention: Optimize Anesthesia Recovery  Recent Flowsheet Documentation  Taken 2/19/2025 1825 by Kelsey Thompson RN  Safety Promotion/Fall Prevention:   assistive device/personal items within reach   activity supervised   clutter free environment maintained   increase visualization of patient   increased rounding and observation   nonskid shoes/slippers when out of bed   patient and family education   room organization consistent   safety round/check completed  Taken 2/19/2025 1743 by Kelsey Thompson RN  Administration (IS): instruction provided, initial  Goal: Optimal Pain Control and Function  Outcome: Progressing  Intervention: Prevent or Manage Pain  Recent Flowsheet Documentation  Taken 2/19/2025 1847 by Kelsey Thompson RN  Pain Management Interventions:   medication (see  MAR)   repositioned  Taken 2/19/2025 1731 by Kelsey Thompson, RN  Pain Management Interventions:   cold applied   repositioned  Goal: Nausea and Vomiting Relief  Outcome: Progressing  Goal: Effective Urinary Elimination  Outcome: Progressing  Goal: Effective Oxygenation and Ventilation  Outcome: Progressing  Intervention: Optimize Oxygenation and Ventilation  Recent Flowsheet Documentation  Taken 2/19/2025 1825 by Kelsey Thompson, RN  Head of Bed (HOB) Positioning: HOB at 30 degrees

## 2025-02-20 NOTE — ED TRIAGE NOTES
Triage Assessment (Adult)       Row Name 02/20/25 1723 02/20/25 7850       Triage Assessment    Airway WDL WDL WDL       Respiratory WDL    Respiratory WDL WDL WDL       Skin Circulation/Temperature WDL    Skin Circulation/Temperature WDL WDL --       Cardiac WDL    Cardiac WDL WDL --       Peripheral/Neurovascular WDL    Peripheral Neurovascular WDL WDL --       Cognitive/Neuro/Behavioral WDL    Cognitive/Neuro/Behavioral WDL WDL --

## 2025-02-20 NOTE — ED PROVIDER NOTES
Emergency Department Note      History of Present Illness     Chief Complaint   Back Pain     HPI     Dipak Fuentes is a 29 year old adult with a history of lumbar fusion and lumbar disc herniation presenting with back pain. The patient states that they had a L5-S1 spinal fusion with a neuro-stimulator yesterday at [facility] with Dr. Oquendo. After the surgery, their pain was tolerable and they were discharged today at 1430. When Ada got home they showered and laid down with an ice pack. Their pain began to worsen and they took a muscle relaxer. The patient sat down in a chair and lost consciousness after feeling lightheaded. Their wife states that they were unconscious for less then a minutes and did not have a seizure or lose control of their bladder or bowel. The patient did not fall or hit their head. The pain is the same pain they had before the surgery, but now feels worse. The patient took Tylenol and medical cannabis at home. Denies fever.     Independent Historian   Wife as detailed above.    Review of External Notes   I reviewed discharge summary from earlier today in which patient had a  L5-S1 still with posterior instrumentation, bilateral decompression and transforaminal interbody fusion with local allograft, posterior arthrodesis using local autograft and allograft cancellous chips    Past Medical History     Medical History and Problem List   Bipolar 1  Substance use disorder  Lumbar disc herniation with radiculopathy   Asthma   Benign neoplasm skin   Tobacco use   Substance use disorder  Gender dysphoria   Lumbar fusion   LUIS E  ADD    Medications   Albuterol   Medical cannabis   Methocarbamol   Oxycodone   Pregabalin   Senna docusate  Tizanidine     Surgical History   Discectomy lumbar right 2x   Insert stimulator and leads dorsal column   Optical tracking system fusion posterior spine bilateral   [place] [facility]    Physical Exam     Patient Vitals for the past 24 hrs:   BP Temp Temp src Pulse  Resp SpO2   02/20/25 2137 -- -- -- 88 18 96 %   02/20/25 2136 -- -- -- 99 15 98 %   02/20/25 2128 -- -- -- -- -- (!) 88 %   02/20/25 2127 -- -- -- -- -- (!) 86 %   02/20/25 1849 -- -- -- 69 21 99 %   02/20/25 1848 -- -- -- -- -- 100 %   02/20/25 1847 122/80 -- -- 74 -- --   02/20/25 1721 123/86 98.7  F (37.1  C) Temporal 105 20 99 %     Physical Exam      HEENT:   Conjunctiva are normal and without erythema.    NECK:   Supple, no meningismus.     CV:    Regular rate and rhythm     No murmurs, rubs or gallops.      2+ dorsalis pedis pulses bilateral.  PULM:   Clear to auscultation bilateral.      No respiratory distress.  No stridor.  ABD:   Soft, non-tender, non-distendend.      No rebound or guarding.  MSK:   Patient is mildly tender over the lumbar spine.      Non-tender at the lumbar paraspinal musculature.      Well-approximated midline surgical incision without dehiscence, erythema, discharge or warmth  LYMPH:  No cervical lymphadenopathy.  NEURO:  Strength is 5/5 and sensation is intact to the lower extremities bilateral.      2+ patellar tendon reflexes bilateral.      No clonus and down-going Babinski bilateral.  SKIN:   Warm, dry and intact.    PYSCH:   Mood is good and affect is appropriate.    Diagnostics     Lab Results   Labs Ordered and Resulted from Time of ED Arrival to Time of ED Departure   BASIC METABOLIC PANEL - Abnormal       Result Value    Sodium 135      Potassium 3.4      Chloride 99      Carbon Dioxide (CO2) 24      Anion Gap 12      Urea Nitrogen 11.8      Creatinine 0.84      GFR Estimate >90      Calcium 8.3 (*)     Glucose 144 (*)    CBC WITH PLATELETS AND DIFFERENTIAL - Abnormal    WBC Count 12.1 (*)     RBC Count 4.44      Hemoglobin 13.2      Hematocrit 38.5      MCV 87      MCH 29.7      MCHC 34.3      RDW 13.0      Platelet Count 263      % Neutrophils 64      % Lymphocytes 20      % Monocytes 15      % Eosinophils 1      % Basophils 0      % Immature Granulocytes 0      NRBCs per  100 WBC 0      Absolute Neutrophils 7.8      Absolute Lymphocytes 2.4      Absolute Monocytes 1.8 (*)     Absolute Eosinophils 0.1      Absolute Basophils 0.0      Absolute Immature Granulocytes 0.1      Absolute NRBCs 0.0     RBC AND PLATELET MORPHOLOGY    RBC Morphology Confirmed RBC Indices      Platelet Assessment        Value: Automated Count Confirmed. Platelet morphology is normal.       Imaging   No orders to display       EKG     ECG results from 02/20/25   EKG 12-lead, tracing only     Value    Systolic Blood Pressure     Diastolic Blood Pressure     Ventricular Rate 105    Atrial Rate 105    DE Interval 128    QRS Duration 92        QTc 433    P Axis 36    R AXIS 36    T Axis 29    Interpretation ECG      Sinus tachycardia  Nonspecific T wave abnormality  Abnormal ECG  No previous ECGs available  No STEMI  EKG was interupted by me at 1731      Independent Interpretation   None    ED Course      Medications Administered   Medications   oxyCODONE (ROXICODONE) tablet 10 mg (10 mg Oral $Given 2/20/25 1737)   diazepam (VALIUM) injection 5 mg (5 mg Intravenous $Given 2/20/25 1737)   HYDROmorphone (PF) (DILAUDID) injection 0.5 mg (0.5 mg Intravenous $Given 2/20/25 2018)   ketorolac (TORADOL) injection 15 mg (15 mg Intravenous $Given 2/20/25 2122)   diazepam (VALIUM) injection 5 mg (5 mg Intravenous $Given 2/20/25 2120)       Procedures   Procedures     Discussion of Management   Admitting Hospitalist, Leon for Dr. Carvalho  Neurosurgery, Lucia Carlos PA-C    ED Course   ED Course as of 02/20/25 2242   Thu Feb 20, 2025 1715 I obtained the history and examined the patient as noted above.      2135 I disscussed the patient's history and presentation with Lucia Carlos PA-C, neurosurgery.    2141 I spoke with Leon on behalf of Dr. Carvalho, hospitalist, who accepted the patient for admission.        Additional Documentation  None    Medical Decision Making / Diagnosis     CMS Diagnoses: None    MIPS        None    [facility]     Dipak Fuentes is a 29 year old adult patient presents with postoperative pain after discharge earlier today from an L5-S1 fusion.  Surgical site appears well.  There are no features concerning for developing epidural abscess, epidural hematoma, discitis or cauda equina syndrome.  Despite multiple rounds of analgesics, patient remains in pain and unfit to discharge home.  Patient will be sent to the observation unit for pain control.    Disposition   The patient was admitted to the hospital.     Diagnosis     ICD-10-CM    1. Acute post-operative pain  G89.18            Discharge Medications   New Prescriptions    No medications on file         Scribe Disclosure:  Kathy JETER, am serving as a scribe at 5:21 PM on 2/20/2025 to document services personally performed by Darrick Gray MD based on my observations and the provider's statements to me.        Darrick Gray MD  02/20/25 3887

## 2025-02-20 NOTE — DISCHARGE SUMMARY
DISCHARGE SUMMARY   Patient ID:   Dipak Fuentes   [id number]   29 year old   1995     Admit date: 2/19/2025     Discharge date: 02/20/2025    Admission Diagnoses: Lumbar radiculopathy [M54.16]  Herniated nucleus pulposus of lumbosacral region [M51.27]  S/P lumbar microdiscectomy [Z98.890]  S/P lumbar fusion [Z98.1]     Procedure:     Lumbar 5 to Sacral 1 stealth posterior instrumentation, bilateral decompression and transforaminal interbody fusion with local autograft, posterior arthrodesis using local autograft and allograft cancellous chips     Post op Diagnosis:     Lumbar radiculopathy [M54.16]  Herniated nucleus pulposus of lumbosacral region [M51.27]  S/P lumbar microdiscectomy [Z98.890]     Surgeon: Dr. Oquendo    Disposition: Home     Dipak Fuentes verbalizes understanding and is in agreement with discharge.     Done while pt still in hospital bed      Review of your medicines        START taking        Dose / Directions   methocarbamol 500 MG tablet  Commonly known as: ROBAXIN  Used for: S/P lumbar fusion      Dose: 500 mg  Take 1 tablet (500 mg) by mouth 4 times daily.  Quantity: 40 tablet  Refills: 0     oxyCODONE 5 MG tablet  Commonly known as: ROXICODONE  Used for: S/P lumbar fusion      Dose: 5 mg  Take 1 tablet (5 mg) by mouth every 6 hours as needed for moderate pain.  Quantity: 40 tablet  Refills: 0     senna-docusate 8.6-50 MG tablet  Commonly known as: SENOKOT-S/PERICOLACE  Used for: S/P lumbar fusion      Dose: 1 tablet  Take 1 tablet by mouth 2 times daily as needed for constipation.  Quantity: 28 tablet  Refills: 1            CONTINUE these medicines which have NOT CHANGED        Dose / Directions   acetaminophen 500 MG tablet  Commonly known as: TYLENOL      Dose: 500-1,000 mg  Take 500-1,000 mg by mouth every 6 hours as needed  Refills: 0     albuterol 108 (90 Base) MCG/ACT inhaler  Commonly known as: PROAIR HFA/PROVENTIL HFA/VENTOLIN HFA  Used for: Mild intermittent asthma without  complication      Dose: 2 puff  Inhale 2 puffs into the lungs every 6 hours as needed for shortness of breath, wheezing or cough  Quantity: 18 g  Refills: 2     medical cannabis (Patient's own supply)      See Admin Instructions. (The purpose of this order is to document that the patient reports taking medical cannabis.  This is not a prescription, and is not used to certify that the patient has a qualifying medical condition.)  Refills: 0     pregabalin 150 MG capsule  Commonly known as: LYRICA  Used for: Lumbar radiculopathy      Dose: 150 mg  Take 1 capsule (150 mg) by mouth 2 times daily.  Quantity: 180 capsule  Refills: 0     tiZANidine 4 MG tablet  Commonly known as: ZANAFLEX  Used for: Lumbar radiculopathy, S/P lumbar microdiscectomy      Dose: 4-8 mg  Take 1-2 tablets (4-8 mg) by mouth 3 times daily as needed for muscle spasms.  Quantity: 180 tablet  Refills: 1               Where to get your medicines        These medications were sent to [facility] [address] [id number] [facility] [id number]      Phone: [phone]   methocarbamol 500 MG tablet  oxyCODONE 5 MG tablet  senna-docusate 8.6-50 MG tablet          Discharge Procedure Orders   Reason for your hospital stay   Order Comments: Lumbar 5 to sacral 1 stealth posterior instrumentation, bilateral decompression and transforaminal interbody fusion.     Follow-up and recommended labs and tests    Order Comments: Your Neurosurgical follow-up appointments have been scheduled. You may call [phone] to make, confirm or change your appointment dates and/or times.     Activity   Order Comments: Please limit your lifting to no more that ten pounds and avoid excessive bending, twisting and turning at the lumbar spine. You should also avoid excessive jostling and jarring activities.     Order Specific Question Answer Comments   Is discharge order? Yes      Wound care and dressings   Order Comments:  Instructions to care for your wound at home: ice to area for comfort, keep wound clean and dry but you may get the incision wet in the shower. Do not soak or scrub the incision.      Please avoid lotions / creams and leave your incision open to air.     Walker Order for DME - ONLY FOR DME     Order Specific Question Answer Comments   Medical Equipment (DME) Supplier: [email or web address] [facility]    PATIENT INSTRUCTIONS: If you did not receive this ordered item today, please contact [email or web address] [facility] [employer] for availability ([place] Locations: [phone], [place]: [phone]).    Start Date: 2025    Walker Type: Standard (2 Wheel)    Diagnosis: R26.89 - Impaired Gait and Mobility      Diet   Order Comments: Follow this diet upon discharge: Advance to a regular diet as tolerated.     Order Specific Question Answer Comments   Is discharge order? Yes           Respectfully,   JANET Peña, PAHiteshC

## 2025-02-20 NOTE — PLAN OF CARE
OT: Orders received. Chart reviewed and discussed with care team.  Patient admitted for s/p lumbar 5 to sacral 1 stealth posterior instrumentation, bilateral decompression and transforaminal interbody fusion.  Per  PT, this is patients 3rd fusion, pt resides with spouse who has taken 4 weeks off, they have needed AE and fully understanding of ADl techniques, is ambulating well, pt and spouse declined OT services. Defer discharge recommendations to care team.  Will complete orders.

## 2025-02-20 NOTE — PLAN OF CARE
Physical Therapy Discharge Summary    Reason for therapy discharge:    Discharged to home.    Progress towards therapy goal(s). See goals on Care Plan in [facility] electronic health record for goal details.  Goals partially met.  Barriers to achieving goals:   discharge from facility.    Therapy recommendation(s):    Continue walking program, use of walker, and assist from spouse or mom as needed for mobility and cares.

## 2025-02-20 NOTE — PLAN OF CARE
"Goal Outcome Evaluation:      Plan of Care Reviewed With: patient, spouse    Overall Patient Progress: improvingOverall Patient Progress: improving    Outcome Evaluation: Plan to discharge home with wife when medically ready.      O2 2L placed for hs-desating to high 80s when sleeping. Voiding. Ivf infusing. Pain managed with Tylenol/Atarax/Robaxin/Zanaflex/Oxycodone. Tolerating some ice. Repositioning self. Up to br with A1 GB/Walker.         Problem: Suicide Risk  Goal: Absence of Self-Harm  Outcome: Progressing  Intervention: Assess Risk to Self and Maintain Safety  Recent Flowsheet Documentation  Taken 2/19/2025 2100 by Aura Mccarthy, RN  Self-Harm Prevention:   environmental self-harm risks assessed   environment modified for self-harm risk   observed one-to-one  Enhanced Safety Measures:   pain management    at bedside     Problem: Adult Inpatient Plan of Care  Goal: Plan of Care Review  Description: The Plan of Care Review/Shift note should be completed every shift.  The Outcome Evaluation is a brief statement about your assessment that the patient is improving, declining, or no change.  This information will be displayed automatically on your shift  note.  Outcome: Progressing  Flowsheets (Taken 2/20/2025 0818)  Outcome Evaluation: Plan to discharge home with wife when medically ready.  Plan of Care Reviewed With:   patient   spouse  Overall Patient Progress: improving  Goal: Patient-Specific Goal (Individualized)  Description: You can add care plan individualizations to a care plan. Examples of Individualization might be:  \"Parent requests to be called daily at 9am for status\", \"I have a hard time hearing out of my right ear\", or \"Do not touch me to wake me up as it startles  me\".  Outcome: Progressing  Goal: Absence of Hospital-Acquired Illness or Injury  Outcome: Progressing  Intervention: Identify and Manage Fall Risk  Recent Flowsheet Documentation  Taken 2/19/2025 2100 by Fabio" Aura MARTIN RN  Safety Promotion/Fall Prevention:   activity supervised   assistive device/personal items within reach   bedside attendant   clutter free environment maintained   nonskid shoes/slippers when out of bed   safety round/check completed  Intervention: Prevent Skin Injury  Recent Flowsheet Documentation  Taken 2/19/2025 2100 by Aura Mccarthy RN  Body Position: position changed independently  Intervention: Prevent and Manage VTE (Venous Thromboembolism) Risk  Recent Flowsheet Documentation  Taken 2/19/2025 2100 by Aura Mccarthy RN  VTE Prevention/Management: SCDs on (sequential compression devices)  Intervention: Prevent Infection  Recent Flowsheet Documentation  Taken 2/19/2025 2100 by Aura Mccarthy RN  Infection Prevention:   rest/sleep promoted   single patient room provided  Goal: Optimal Comfort and Wellbeing  Outcome: Progressing  Intervention: Monitor Pain and Promote Comfort  Recent Flowsheet Documentation  Taken 2/19/2025 2105 by Aura Mccarthy RN  Pain Management Interventions:   medication (see MAR)   cold applied   repositioned  Goal: Readiness for Transition of Care  Outcome: Progressing     Problem: Spinal Surgery  Goal: Optimal Coping with Surgery  Outcome: Progressing  Goal: Absence of Bleeding  Outcome: Progressing  Intervention: Monitor and Manage Bleeding  Recent Flowsheet Documentation  Taken 2/19/2025 2100 by Aura Mccarthy RN  Bleeding Management: dressing monitored  Goal: Effective Bowel Elimination  Outcome: Progressing  Intervention: Enhance Bowel Motility and Elimination  Recent Flowsheet Documentation  Taken 2/19/2025 2100 by Aura Mccarthy RN  Bowel Motility Enhancement:   ambulation promoted   fluid intake encouraged  Goal: Fluid and Electrolyte Balance  Outcome: Progressing  Goal: Optimal Functional Ability  Outcome: Progressing  Intervention: Optimize Functional Status  Recent Flowsheet Documentation  Taken 2/19/2025 2100 by Aura Mccarthy RN  Activity  Management: activity encouraged  Positioning/Transfer Devices: pillows  Goal: Absence of Infection Signs and Symptoms  Outcome: Progressing  Intervention: Prevent or Manage Infection  Recent Flowsheet Documentation  Taken 2/19/2025 2100 by Aura Mccarthy RN  Infection Prevention:   rest/sleep promoted   single patient room provided  Goal: Optimal Neurologic Function  Outcome: Progressing  Intervention: Optimize Neurologic Function  Recent Flowsheet Documentation  Taken 2/19/2025 2100 by Auar Mccarthy RN  Body Position: position changed independently  Goal: Anesthesia/Sedation Recovery  Outcome: Progressing  Intervention: Optimize Anesthesia Recovery  Recent Flowsheet Documentation  Taken 2/19/2025 2100 by Aura Mccarthy RN  Safety Promotion/Fall Prevention:   activity supervised   assistive device/personal items within reach   bedside attendant   clutter free environment maintained   nonskid shoes/slippers when out of bed   safety round/check completed  Administration (IS): instruction provided, follow-up  Level Incentive Spirometer (mL): 1800  Number of Repetitions (IS): 5  Patient Tolerance (IS): good  Goal: Optimal Pain Control and Function  Outcome: Progressing  Intervention: Prevent or Manage Pain  Recent Flowsheet Documentation  Taken 2/19/2025 2105 by Aura Mccarthy RN  Pain Management Interventions:   medication (see MAR)   cold applied   repositioned  Goal: Nausea and Vomiting Relief  Outcome: Progressing  Goal: Effective Urinary Elimination  Outcome: Progressing  Intervention: Monitor and Manage Urinary Retention  Recent Flowsheet Documentation  Taken 2/19/2025 2100 by Aura Mccarthy RN  Urinary Elimination Promotion:   bladder volume assessed by ultrasound   frequent voiding encouraged   toileting offered  Goal: Effective Oxygenation and Ventilation  Outcome: Progressing  Intervention: Optimize Oxygenation and Ventilation  Recent Flowsheet Documentation  Taken 2/19/2025 2100 by Fabio  Aura MARTIN RN  Head of Bed (HOB) Positioning: HOB at 20-30 degrees

## 2025-02-21 LAB
ATRIAL RATE - MUSE: 105 BPM
DIASTOLIC BLOOD PRESSURE - MUSE: NORMAL MMHG
INTERPRETATION ECG - MUSE: NORMAL
P AXIS - MUSE: 36 DEGREES
PR INTERVAL - MUSE: 128 MS
QRS DURATION - MUSE: 92 MS
QT - MUSE: 328 MS
QTC - MUSE: 433 MS
R AXIS - MUSE: 36 DEGREES
SYSTOLIC BLOOD PRESSURE - MUSE: NORMAL MMHG
T AXIS - MUSE: 29 DEGREES
VENTRICULAR RATE- MUSE: 105 BPM

## 2025-02-21 PROCEDURE — 99232 SBSQ HOSP IP/OBS MODERATE 35: CPT | Mod: 24 | Performed by: NURSE PRACTITIONER

## 2025-02-21 PROCEDURE — G0378 HOSPITAL OBSERVATION PER HR: HCPCS

## 2025-02-21 PROCEDURE — 250N000013 HC RX MED GY IP 250 OP 250 PS 637: Performed by: EMERGENCY MEDICINE

## 2025-02-21 PROCEDURE — 999N000147 HC STATISTIC PT IP EVAL DEFER: Performed by: PHYSICAL THERAPIST

## 2025-02-21 PROCEDURE — 250N000013 HC RX MED GY IP 250 OP 250 PS 637: Performed by: NURSE PRACTITIONER

## 2025-02-21 PROCEDURE — 250N000013 HC RX MED GY IP 250 OP 250 PS 637: Performed by: PHYSICIAN ASSISTANT

## 2025-02-21 PROCEDURE — 99223 1ST HOSP IP/OBS HIGH 75: CPT | Mod: AI | Performed by: PHYSICIAN ASSISTANT

## 2025-02-21 PROCEDURE — 250N000011 HC RX IP 250 OP 636: Mod: JZ | Performed by: PHYSICIAN ASSISTANT

## 2025-02-21 PROCEDURE — 96376 TX/PRO/DX INJ SAME DRUG ADON: CPT

## 2025-02-21 PROCEDURE — 99238 HOSP IP/OBS DSCHRG MGMT 30/<: CPT | Performed by: INTERNAL MEDICINE

## 2025-02-21 PROCEDURE — 99232 SBSQ HOSP IP/OBS MODERATE 35: CPT | Performed by: INTERNAL MEDICINE

## 2025-02-21 RX ORDER — NALOXONE HYDROCHLORIDE 0.4 MG/ML
0.4 INJECTION, SOLUTION INTRAMUSCULAR; INTRAVENOUS; SUBCUTANEOUS
Status: DISCONTINUED | OUTPATIENT
Start: 2025-02-21 | End: 2025-02-22

## 2025-02-21 RX ORDER — PROCHLORPERAZINE MALEATE 5 MG/1
10 TABLET ORAL EVERY 6 HOURS PRN
Status: DISCONTINUED | OUTPATIENT
Start: 2025-02-21 | End: 2025-02-22 | Stop reason: HOSPADM

## 2025-02-21 RX ORDER — HYDROMORPHONE HCL IN WATER/PF 6 MG/30 ML
0.2 PATIENT CONTROLLED ANALGESIA SYRINGE INTRAVENOUS
Status: DISCONTINUED | OUTPATIENT
Start: 2025-02-21 | End: 2025-02-21

## 2025-02-21 RX ORDER — NALOXONE HYDROCHLORIDE 0.4 MG/ML
0.2 INJECTION, SOLUTION INTRAMUSCULAR; INTRAVENOUS; SUBCUTANEOUS
Status: DISCONTINUED | OUTPATIENT
Start: 2025-02-21 | End: 2025-02-22

## 2025-02-21 RX ORDER — LIDOCAINE 4 G/G
2 PATCH TOPICAL
Status: DISCONTINUED | OUTPATIENT
Start: 2025-02-21 | End: 2025-02-22

## 2025-02-21 RX ORDER — ACETAMINOPHEN 650 MG/1
650 SUPPOSITORY RECTAL 3 TIMES DAILY
Status: DISCONTINUED | OUTPATIENT
Start: 2025-02-21 | End: 2025-02-22 | Stop reason: HOSPADM

## 2025-02-21 RX ORDER — OXYCODONE HYDROCHLORIDE 5 MG/1
10 TABLET ORAL EVERY 4 HOURS PRN
Status: DISCONTINUED | OUTPATIENT
Start: 2025-02-21 | End: 2025-02-21

## 2025-02-21 RX ORDER — OXYCODONE HYDROCHLORIDE 5 MG/1
5 TABLET ORAL
Status: DISCONTINUED | OUTPATIENT
Start: 2025-02-21 | End: 2025-02-22

## 2025-02-21 RX ORDER — PREGABALIN 75 MG/1
150 CAPSULE ORAL ONCE
Status: COMPLETED | OUTPATIENT
Start: 2025-02-21 | End: 2025-02-21

## 2025-02-21 RX ORDER — ACETAMINOPHEN 325 MG/1
975 TABLET ORAL 3 TIMES DAILY
Status: DISCONTINUED | OUTPATIENT
Start: 2025-02-21 | End: 2025-02-22 | Stop reason: HOSPADM

## 2025-02-21 RX ORDER — HYDROMORPHONE HCL IN WATER/PF 6 MG/30 ML
0.2 PATIENT CONTROLLED ANALGESIA SYRINGE INTRAVENOUS EVERY 8 HOURS PRN
Status: DISCONTINUED | OUTPATIENT
Start: 2025-02-21 | End: 2025-02-22

## 2025-02-21 RX ORDER — METHOCARBAMOL 500 MG/1
500 TABLET, FILM COATED ORAL 4 TIMES DAILY
Status: DISCONTINUED | OUTPATIENT
Start: 2025-02-21 | End: 2025-02-21

## 2025-02-21 RX ORDER — AMOXICILLIN 250 MG
2 CAPSULE ORAL 2 TIMES DAILY PRN
Status: DISCONTINUED | OUTPATIENT
Start: 2025-02-21 | End: 2025-02-21

## 2025-02-21 RX ORDER — HYDROMORPHONE HCL IN WATER/PF 6 MG/30 ML
0.4 PATIENT CONTROLLED ANALGESIA SYRINGE INTRAVENOUS
Status: DISCONTINUED | OUTPATIENT
Start: 2025-02-21 | End: 2025-02-21

## 2025-02-21 RX ORDER — ONDANSETRON 4 MG/1
4 TABLET, ORALLY DISINTEGRATING ORAL EVERY 6 HOURS PRN
Status: DISCONTINUED | OUTPATIENT
Start: 2025-02-21 | End: 2025-02-22 | Stop reason: HOSPADM

## 2025-02-21 RX ORDER — OXYCODONE HYDROCHLORIDE 10 MG/1
10 TABLET ORAL
Status: DISCONTINUED | OUTPATIENT
Start: 2025-02-21 | End: 2025-02-22

## 2025-02-21 RX ORDER — PREGABALIN 75 MG/1
150 CAPSULE ORAL 2 TIMES DAILY
Status: DISCONTINUED | OUTPATIENT
Start: 2025-02-21 | End: 2025-02-22 | Stop reason: HOSPADM

## 2025-02-21 RX ORDER — ONDANSETRON 2 MG/ML
4 INJECTION INTRAMUSCULAR; INTRAVENOUS EVERY 6 HOURS PRN
Status: DISCONTINUED | OUTPATIENT
Start: 2025-02-21 | End: 2025-02-22 | Stop reason: HOSPADM

## 2025-02-21 RX ORDER — AMOXICILLIN 250 MG
1 CAPSULE ORAL 2 TIMES DAILY PRN
Status: DISCONTINUED | OUTPATIENT
Start: 2025-02-21 | End: 2025-02-21

## 2025-02-21 RX ORDER — METHOCARBAMOL 500 MG/1
500 TABLET, FILM COATED ORAL ONCE
Status: COMPLETED | OUTPATIENT
Start: 2025-02-21 | End: 2025-02-21

## 2025-02-21 RX ORDER — AMOXICILLIN 250 MG
2 CAPSULE ORAL 2 TIMES DAILY
Status: DISCONTINUED | OUTPATIENT
Start: 2025-02-21 | End: 2025-02-22

## 2025-02-21 RX ORDER — OXYCODONE HYDROCHLORIDE 5 MG/1
5 TABLET ORAL EVERY 4 HOURS PRN
Status: DISCONTINUED | OUTPATIENT
Start: 2025-02-21 | End: 2025-02-21

## 2025-02-21 RX ORDER — POLYETHYLENE GLYCOL 3350 17 G/17G
17 POWDER, FOR SOLUTION ORAL DAILY
Status: DISCONTINUED | OUTPATIENT
Start: 2025-02-21 | End: 2025-02-22 | Stop reason: HOSPADM

## 2025-02-21 RX ORDER — LIDOCAINE 40 MG/G
CREAM TOPICAL
Status: DISCONTINUED | OUTPATIENT
Start: 2025-02-21 | End: 2025-02-22 | Stop reason: HOSPADM

## 2025-02-21 RX ADMIN — ACETAMINOPHEN 975 MG: 325 TABLET, FILM COATED ORAL at 14:02

## 2025-02-21 RX ADMIN — SENNOSIDES AND DOCUSATE SODIUM 2 TABLET: 50; 8.6 TABLET ORAL at 20:29

## 2025-02-21 RX ADMIN — TIZANIDINE 4 MG: 4 TABLET ORAL at 10:05

## 2025-02-21 RX ADMIN — ACETAMINOPHEN 1000 MG: 500 TABLET ORAL at 00:24

## 2025-02-21 RX ADMIN — OXYCODONE HYDROCHLORIDE 10 MG: 5 TABLET ORAL at 07:24

## 2025-02-21 RX ADMIN — OXYCODONE HYDROCHLORIDE 10 MG: 10 TABLET ORAL at 14:02

## 2025-02-21 RX ADMIN — SENNOSIDES AND DOCUSATE SODIUM 2 TABLET: 50; 8.6 TABLET ORAL at 10:06

## 2025-02-21 RX ADMIN — ACETAMINOPHEN 975 MG: 325 TABLET, FILM COATED ORAL at 08:27

## 2025-02-21 RX ADMIN — METHOCARBAMOL 500 MG: 500 TABLET ORAL at 08:27

## 2025-02-21 RX ADMIN — TIZANIDINE 4 MG: 4 TABLET ORAL at 14:02

## 2025-02-21 RX ADMIN — OXYCODONE HYDROCHLORIDE 10 MG: 10 TABLET ORAL at 10:54

## 2025-02-21 RX ADMIN — OXYCODONE HYDROCHLORIDE 10 MG: 5 TABLET ORAL at 03:17

## 2025-02-21 RX ADMIN — POLYETHYLENE GLYCOL 3350 17 G: 17 POWDER, FOR SOLUTION ORAL at 08:27

## 2025-02-21 RX ADMIN — HYDROMORPHONE HYDROCHLORIDE 0.4 MG: 0.2 INJECTION, SOLUTION INTRAMUSCULAR; INTRAVENOUS; SUBCUTANEOUS at 02:33

## 2025-02-21 RX ADMIN — METHOCARBAMOL 500 MG: 500 TABLET ORAL at 04:44

## 2025-02-21 RX ADMIN — LIDOCAINE 2 PATCH: 4 PATCH TOPICAL at 10:06

## 2025-02-21 RX ADMIN — PREGABALIN 150 MG: 75 CAPSULE ORAL at 22:21

## 2025-02-21 RX ADMIN — TIZANIDINE 4 MG: 4 TABLET ORAL at 22:22

## 2025-02-21 RX ADMIN — OXYCODONE HYDROCHLORIDE 10 MG: 10 TABLET ORAL at 17:02

## 2025-02-21 RX ADMIN — OXYCODONE HYDROCHLORIDE 10 MG: 10 TABLET ORAL at 20:28

## 2025-02-21 RX ADMIN — PREGABALIN 150 MG: 75 CAPSULE ORAL at 06:27

## 2025-02-21 RX ADMIN — ACETAMINOPHEN 975 MG: 325 TABLET, FILM COATED ORAL at 20:28

## 2025-02-21 ASSESSMENT — ACTIVITIES OF DAILY LIVING (ADL)
ADLS_ACUITY_SCORE: 66
ADLS_ACUITY_SCORE: 66
ADLS_ACUITY_SCORE: 59
ADLS_ACUITY_SCORE: 59
ADLS_ACUITY_SCORE: 50
ADLS_ACUITY_SCORE: 66
ADLS_ACUITY_SCORE: 59
ADLS_ACUITY_SCORE: 66
ADLS_ACUITY_SCORE: 59
ADLS_ACUITY_SCORE: 59
ADLS_ACUITY_SCORE: 66
ADLS_ACUITY_SCORE: 50
ADLS_ACUITY_SCORE: 59
ADLS_ACUITY_SCORE: 59
ADLS_ACUITY_SCORE: 66
ADLS_ACUITY_SCORE: 59
ADLS_ACUITY_SCORE: 59
ADLS_ACUITY_SCORE: 66
ADLS_ACUITY_SCORE: 59

## 2025-02-21 NOTE — H&P
[facility]    History and Physical - Hospitalist Service       Date of Admission:  2/20/2025    Assessment & Plan      Elizabeth Fuentes is a 29 year old adult who was just discharged this afternoon following a L5-S1 decompression/fusion being re admitted on 2/20/2025 for intractable pain and syncopal episode.  She has a history of bipolar disorder, asthma, substance use disorder and previous suicide attempt.    In the ED she is afebrile with heart rate of 105, pressure 123/86 and breathing comfortably on room air without hypoxia.  Lab shows normal BMP with the exception of mildly elevated glucose of 144, mildly elevated WBC of 12.1 and hemoglobin 13.2.  Neurosurgery was called from the emergency room and does not recommend any further imaging at this time but admission for pain control.    # Intractable back pain s/p L5-S1 decompression with fusion  -Procedure was reported as uncomplicated and per note patient had adequate pain control this a.m. but provider did encourage her to stay 1 more night in the hospital but she insisted on going home  -Patient went home with increased pain that did not improve with muscle relaxer.  She did not try any oxycodone because she had had some before leaving the hospital.  -Plan for scheduled Tylenol, scheduled Robaxin scheduled Lyrica (home med)  -As needed oxycodone and Dilaudid with bowel regimen  -Neurosurgery consult  -Please pain service consult given patient's history of substance use disorder with history of suicidal ideation  -PT consult    # Syncope  -Suspect due to pain and possibly medication  -No further workup at this time    # History of bipolar depression  # History of substance use disorder  # History of suicidal ideation  -Follows regularly with therapy but not psychiatry and not on any medications  -History of suicidal ideation without any current plan  -Primary care provider did put a referral in for psychiatry    # Asthma history  -Lungs sound  "clear        Diet:  Regular diet  DVT Prophylaxis: PCD's  Sheets Catheter: Not present  Lines: None     Cardiac Monitoring: None  Code Status:  Full code    Clinically Significant Risk Factors Present on Admission                             # Obesity: Estimated body mass index is 33.43 kg/m  as calculated from the following:    Height as of 2/4/25: 1.854 m (6' 1\").    Weight as of 2/19/25: 114.9 kg (253 lb 6.4 oz).       # Financial/Environmental Concerns:    # Asthma: noted on problem list        Disposition Plan     Medically Ready for Discharge: Anticipated Tomorrow         The patient's care was discussed with the Patient and ED Consultant(s).    aCro Carvalho PA-C  Hospitalist Service  [facility]  Securely message with [x+1] (more info)  Text page via "[employer] Paging/Directory     ______________________________________________________________________    Chief Complaint   Postoperative back pain    History is obtained from the patient    History of Present Illness   Dipak Fuentes is a 29 year old adult who presents with intractable back pain.  She states that she was feeling relatively well when she left the hospital today with adequate pain control but as soon as she got home she laid in bed and developed significant/severe back pain.  They called the neurosurgery nurse line and were told they could not take 2 muscle relaxers and it was too soon to take any oxycodone.  She got up and sat in a chair and her wife notes that she lost consciousness but did not fall or hurt anything.  Her wife held her head steady and called EMS.  The patient reports severe back pain without significant radiculopathy.  She denies fever, chills, nausea, vomiting, abdominal pain, diarrhea and urinary symptoms.  She has not had a bowel movement yet.    Past Medical History    Past Medical History:   Diagnosis Date    Bipolar I disorder, single manic episode (H) 03/25/2013    Problem list name updated by " "automated process. Provider to review    Complication of anesthesia     panic after having anesthesia    History of substance use disorder     as a teenager, \"took extra adderall\"    Lumbar disc herniation with radiculopathy     Mild intermittent asthma without complication     Other chronic pain     low back and buttocks leg pain       Past Surgical History   Past Surgical History:   Procedure Laterality Date    DISCECTOMY LUMBAR MINIMALLY INVASIVE ONE LEVEL Right 10/7/2020    Procedure: Right L5-S1 minimally invasive microdiskectomy;  Surgeon: Juancho Oquendo MD;  Location: [facility] OR    DISCECTOMY LUMBAR MINIMALLY INVASIVE ONE LEVEL Right 9/21/2022    Procedure: Right L5-S1 minimally invasive redo microdiskectomy;  Surgeon: Juancho Oquendo MD;  Location:  OR    INSERT STIMULATOR AND LEADS INTERNAL DORSAL COLUMN N/A 1/30/2024    Procedure: Spinal cord stimulator placement, phase I and II combined, placement of percutaneous type electrodes in the thoracolumbar spine and placement of generator/battery over the right buttock  **Latex Allergy**;  Surgeon: Giovanni Alfonso MD;  Location: [facility] OR    OPTICAL TRACKING SYSTEM FUSION POSTERIOR SPINE LUMBAR Bilateral 2/19/2025    Procedure: Lumbar 5 to Sacral 1 stealth posterior instrumentation, bilateral decompression and transforaminal interbody fusion with local autograft, posterior arthrodesis using local autograft and allograft cancellous chips;  Surgeon: Juancho Oquendo MD;  Location:  OR    [place] [facility]         Prior to Admission Medications   Prior to Admission Medications   Prescriptions Last Dose Informant Patient Reported? Taking?   acetaminophen (TYLENOL) 500 MG tablet   Yes No   Sig: Take 500-1,000 mg by mouth every 6 hours as needed   albuterol (PROAIR HFA/PROVENTIL HFA/VENTOLIN HFA) 108 (90 Base) MCG/ACT inhaler   No No   Sig: Inhale 2 puffs into the lungs every 6 hours as needed for shortness of breath, wheezing or cough   medical " cannabis (Patient's own supply)   Yes No   Sig: See Admin Instructions. (The purpose of this order is to document that the patient reports taking medical cannabis.  This is not a prescription, and is not used to certify that the patient has a qualifying medical condition.)   methocarbamol (ROBAXIN) 500 MG tablet   No No   Sig: Take 1 tablet (500 mg) by mouth 4 times daily.   oxyCODONE (ROXICODONE) 5 MG tablet   No No   Sig: Take 1 tablet (5 mg) by mouth every 6 hours as needed for moderate pain.   pregabalin (LYRICA) 150 MG capsule   No No   Sig: Take 1 capsule (150 mg) by mouth 2 times daily.   senna-docusate (SENOKOT-S/PERICOLACE) 8.6-50 MG tablet   No No   Sig: Take 1 tablet by mouth 2 times daily as needed for constipation.   tiZANidine (ZANAFLEX) 4 MG tablet   No No   Sig: Take 1-2 tablets (4-8 mg) by mouth 3 times daily as needed for muscle spasms.      Facility-Administered Medications: None          Physical Exam   Vital Signs: Temp: 98.7  F (37.1  C) Temp src: Temporal BP: 122/80 Pulse: 88   Resp: 18 SpO2: 96 % O2 Device: Nasal cannula (Pt placed onto 2 liters NC due to low O2 sats after meds) Oxygen Delivery: 2 LPM  Weight: 0 lbs 0 oz    General Appearance: Alert and oriented x 3   Respiratory: clear to auscultation bilaterally  Cardiovascular: RRR without murmur  GI: Bowel sounds are present without tenderness   Skin: no rashes or open sores are noted   Other: no lower extremity swelling noted    Medical Decision Making       55 MINUTES SPENT BY ME on the date of service doing chart review, history, exam, documentation & further activities per the note.      Data     I have personally reviewed the following data over the past 24 hrs:    12.1 (H)  \   13.2   / 263     135 99 11.8 /  144 (H)   3.4 24 0.84 \       Imaging results reviewed over the past 24 hrs:   Recent Results (from the past 24 hours)   XR Lumbar Spine 2/3 Views    Narrative    EXAM: XR LUMBAR SPINE 2/3 VIEWS  LOCATION: [facility]  DATE: 2/20/2025    INDICATION: Post op Thoracic Lumbar Surgery (L5 S1)  COMPARISON: 8/12/2024      Impression    IMPRESSION: New fusion hardware at L5-S1. No other change.

## 2025-02-21 NOTE — PROGRESS NOTES
Contacted regarding 30 yo female s/p L5-S1 TLIF yesterday with Dr. Oquendo presenting to ER with back pain.  Contacted our office today with request for brace and c/o back pain and syncopal episode while sitting-no fall)  referred to ER for evaluation.  Per ER at neurologic baseline but will need to be admitted for pain control.    PLAN:  Admitting for pain control.  NS will see in consultation tomorrow.    Discussed with Dr. Cole.    JANET Haynes  [facility] Neurosurgery  [place] [id number]    Tel [phone]  Pager [phone]

## 2025-02-21 NOTE — PHARMACY-ADMISSION MEDICATION HISTORY
Pharmacist Admission Medication History    Patient was discharged earlier today and re-admitted tonight       Medication History Completed By: Khang Junior [facility] 2/20/2025 10:22 PM    PTA Med List   Medication Sig Last Dose/Taking    acetaminophen (TYLENOL) 500 MG tablet Take 500-1,000 mg by mouth every 6 hours as needed Taking As Needed    albuterol (PROAIR HFA/PROVENTIL HFA/VENTOLIN HFA) 108 (90 Base) MCG/ACT inhaler Inhale 2 puffs into the lungs every 6 hours as needed for shortness of breath, wheezing or cough Taking As Needed    medical cannabis (Patient's own supply) See Admin Instructions. (The purpose of this order is to document that the patient reports taking medical cannabis.  This is not a prescription, and is not used to certify that the patient has a qualifying medical condition.) Taking    methocarbamol (ROBAXIN) 500 MG tablet Take 1 tablet (500 mg) by mouth 4 times daily. Taking    oxyCODONE (ROXICODONE) 5 MG tablet Take 1 tablet (5 mg) by mouth every 6 hours as needed for moderate pain. Taking As Needed    pregabalin (LYRICA) 150 MG capsule Take 1 capsule (150 mg) by mouth 2 times daily. Taking    senna-docusate (SENOKOT-S/PERICOLACE) 8.6-50 MG tablet Take 1 tablet by mouth 2 times daily as needed for constipation. Taking As Needed    tiZANidine (ZANAFLEX) 4 MG tablet Take 1-2 tablets (4-8 mg) by mouth 3 times daily as needed for muscle spasms. Taking As Needed

## 2025-02-21 NOTE — ED NOTES
"[facility]  ED Nurse Handoff Report    ED Chief complaint: Back Pain  . ED Diagnosis:   Final diagnoses:   Acute post-operative pain       Allergies:   Allergies   Allergen Reactions    Lamotrigine Other (See Comments)     Had known side effect of\"skin eating rash\"  Jaycob lauren syndrome    Latex      PN: Converted from LW Latex Sensitivity Flag    Cefaclor Rash     Tolerated cefazolin pre-op 01/2024       Code Status: Full Code    Activity level - Baseline/Home:  independent.  Activity Level - Current:   in bed.   Lift room needed: No.   Bariatric: No   Needed: No   Isolation: No.   Infection: Not Applicable.     Respiratory status: Nasal cannula    Vital Signs (within 30 minutes):   Vitals:    02/20/25 2127 02/20/25 2128 02/20/25 2136 02/20/25 2137   BP:       Pulse:   99 88   Resp:   15 18   Temp:       TempSrc:       SpO2: (!) 86% (!) 88% 98% 96%       Cardiac Rhythm:  ,      Pain level:    Patient confused: No.   Patient Falls Risk: bed/chair alarm on, arm band in place, activity supervised, and room door open.   Elimination Status: Has voided     Patient Report - Initial Complaint: back pain.   Focused Assessment: Dipak Fuentes is a 29 year old adult with a history of lumbar fusion and lumbar disc herniation presenting with back pain. The patient states that they had a L5-S1 spinal fusion with a neuro-stimulator yesterday at [facility] with Dr. Oquendo. After the surgery, their pain was tolerable and they were discharged today at 1430. When Ada got home they showered and laid down with an ice pack. Their pain began to worsen and they took a muscle relaxer. The patient sat down in a chair and lost consciousness after feeling lightheaded. Their wife states that they were unconscious for less then a minutes and did not have a seizure or lose control of their bladder or bowel. The patient did not fall or hit their head. The pain is the same pain they had before the surgery, but " now feels worse. The patient took Tylenol and medical cannabis at home. Denies fever.      Abnormal Results:   Labs Ordered and Resulted from Time of ED Arrival to Time of ED Departure   BASIC METABOLIC PANEL - Abnormal       Result Value    Sodium 135      Potassium 3.4      Chloride 99      Carbon Dioxide (CO2) 24      Anion Gap 12      Urea Nitrogen 11.8      Creatinine 0.84      GFR Estimate >90      Calcium 8.3 (*)     Glucose 144 (*)    CBC WITH PLATELETS AND DIFFERENTIAL - Abnormal    WBC Count 12.1 (*)     RBC Count 4.44      Hemoglobin 13.2      Hematocrit 38.5      MCV 87      MCH 29.7      MCHC 34.3      RDW 13.0      Platelet Count 263      % Neutrophils 64      % Lymphocytes 20      % Monocytes 15      % Eosinophils 1      % Basophils 0      % Immature Granulocytes 0      NRBCs per 100 WBC 0      Absolute Neutrophils 7.8      Absolute Lymphocytes 2.4      Absolute Monocytes 1.8 (*)     Absolute Eosinophils 0.1      Absolute Basophils 0.0      Absolute Immature Granulocytes 0.1      Absolute NRBCs 0.0     RBC AND PLATELET MORPHOLOGY    RBC Morphology Confirmed RBC Indices      Platelet Assessment        Value: Automated Count Confirmed. Platelet morphology is normal.        No orders to display       Treatments provided: see MAR  Family Comments: visiting  OBS brochure/video discussed/provided to patient:  N/A  ED Medications:   Medications   oxyCODONE (ROXICODONE) tablet 10 mg (10 mg Oral $Given 2/20/25 1737)   diazepam (VALIUM) injection 5 mg (5 mg Intravenous $Given 2/20/25 1737)   HYDROmorphone (PF) (DILAUDID) injection 0.5 mg (0.5 mg Intravenous $Given 2/20/25 2018)   ketorolac (TORADOL) injection 15 mg (15 mg Intravenous $Given 2/20/25 2122)   diazepam (VALIUM) injection 5 mg (5 mg Intravenous $Given 2/20/25 2120)       Drips infusing:  No  For the majority of the shift this patient was Green.   Interventions performed were see MAR.    Sepsis treatment initiated:  No    Cares/treatment/interventions/medications to be completed following ED care: see notes    ED Nurse Name: Mariela Delatorre RN  9:47 PM

## 2025-02-21 NOTE — PLAN OF CARE
Physical Therapy: New orders received; Patient seen by this therapist for evaluation and treatment on 2/20/25 and was discharged to home with assist of spouse and use of a walker; had a syncopal episode at home and presented back to the ED; Patient reassessed in the ED today and was mobilizing with spouse (prior to therapist arrival) to and from the bathroom; able to demonstrate ability to ambulate with walker and SBA > 150 feet; no loss of balance or dizziness noted; Pain 6/10; Currently appears to be mobilizing better than at discharge yesterday; still requiring minor assist at shoulders during transition movements out of bed; Reports no difficulty doing stairs. Patient and spouse have no new concerns. No new PT needs identified at this time; recommend continued use of walker, assist from spouse as needed, and to continue walking program. Order completed.

## 2025-02-21 NOTE — PROGRESS NOTES
"[facility]    Medicine Progress Note - Hospitalist Service    Date of Admission:  2/20/2025    Assessment & Plan   Dipak Fuentes is a 29 year old adult who was admitted on 2/20/2025 uncontrolled back pain after having undergone surgical decompression of L5-S1 with Dr. Oquendo.     PMH is notable for Bipolar Disorder, Asthma, LOLLY and past suicide attempt.     In the ED she is afebrile with heart rate of 105, pressure 123/86 and breathing comfortably on room air without hypoxia. Lab shows normal BMP with the exception of mildly elevated glucose of 144, mildly elevated WBC of 12.1 and hemoglobin 13.2. Neurosurgery was called from the emergency room and does not recommend any further imaging at this time but admission for pain control.     DX:  Intractable pain s/p L5-S1 decompression with fusion on 2/19/25.  Syncopal event thought due to excessive medications.  Hx Asthma.  LOLLY.   Hx Bipolar depression.  Hx suicidal ideation, not currently suicidal.   PLAN:  Cont Oxycodone and muscle relaxants as per Neurosurgical team.        Observation Goals: -diagnostic tests and consults completed and resulted, -vital signs normal or at patient baseline, -adequate pain control on oral analgesics, -returns to baseline functional status, Nurse to notify provider when observation goals have been met and patient is ready for discharge.  Diet: Regular Diet Adult    DVT Prophylaxis: Pneumatic Compression Devices  Sheets Catheter: Not present  Lines: None     Cardiac Monitoring: None  Code Status: Full Code      Clinically Significant Risk Factors Present on Admission                             # Obesity: Estimated body mass index is 33.43 kg/m  as calculated from the following:    Height as of 2/4/25: 1.854 m (6' 1\").    Weight as of 2/19/25: 114.9 kg (253 lb 6.4 oz).       # Financial/Environmental Concerns:    # Asthma: noted on problem list        Social Drivers of Health    Depression: At risk (2/4/2025)    PHQ-2 "     PHQ-2 Score: 3   Tobacco Use: High Risk (2/19/2025)    Patient History     Smoking Tobacco Use: Former     Smokeless Tobacco Use: Current     Passive Exposure: Past   Financial Resource Strain: High Risk (1/27/2025)    Financial Resource Strain     Within the past 12 months, have you or your family members you live with been unable to get utilities (heat, electricity) when it was really needed?: Yes   Physical Activity: Insufficiently Active (5/14/2024)    Exercise Vital Sign     Days of Exercise per Week: 2 days     Minutes of Exercise per Session: 60 min   Interpersonal Safety: Low Risk  (2/19/2025)    Interpersonal Safety     Do you feel physically and emotionally safe where you currently live?: Yes     Within the past 12 months, have you been hit, slapped, kicked or otherwise physically hurt by someone?: No     Within the past 12 months, have you been humiliated or emotionally abused in other ways by your partner or ex-partner?: No   Recent Concern: Interpersonal Safety - High Risk (12/10/2024)    Interpersonal Safety     Do you feel physically and emotionally safe where you currently live?: Yes     Within the past 12 months, have you been hit, slapped, kicked or otherwise physically hurt by someone?: No     Within the past 12 months, have you been humiliated or emotionally abused in other ways by your partner or ex-partner?: Yes   Stress: Stress Concern Present (5/14/2024)    [employer] [place] [employer] - Occupational Stress Questionnaire     Feeling of Stress : To some extent   Social Connections: Unknown (5/14/2024)    Social Connection and Isolation Panel [NHANES]     Frequency of Social Gatherings with Friends and Family: Twice a week          Disposition Plan     Medically Ready for Discharge: Anticipated Tomorrow             Fausto Washington MD  Hospitalist Service  [facility]  Securely message with [employer] (more info)  Text page via [employer] Paging/Directory    ______________________________________________________________________    Interval History   Chart reviewed, pt interviewed.    Pt was ready to discharge home, but NS team called me and asked that we keep him again until he is clearly comfortable with oral medications.     Physical Exam   Vital Signs: Temp: 99  F (37.2  C) Temp src: Oral BP: (!) 159/87 Pulse: 89   Resp: 20 SpO2: 97 % O2 Device: None (Room air) Oxygen Delivery: 2 LPM  Weight: 0 lbs 0 oz    General Appearance: Alert and coherent in NAD.  Respiratory: CTA, normal WOB  Cardiovascular: RRR without murmur  GI: soft, NTND  Skin: no lesions  Other:      Medical Decision Making       35 MINUTES SPENT BY ME on the date of service doing chart review, history, exam, documentation & further activities per the note.      Data   No results found for this or any previous visit (from the past 24 hours).

## 2025-02-21 NOTE — PROGRESS NOTES
[facility]       NEUROSURGERY CONSULTATION NOTE    This consultation was requested by the Emergency Medicine service.         Reason for Consultation  Ongoing surgical pain     HPI:  Elizabeth Fuentes is 29 year old who underwent L5-S1 TLIF with Dr Juancho Oquendo on 2/19/2025.  The patient was discharged home yesterday after being cleared by both physical and occupational therapies.  It is documented that the patient was asked to stay at least an extra day to ensure she had adequate pain control but was eager to discharge home.  When she returned home she had an episode of acute pain and it was not time for her narcotic pain medication therefore she decided to take the Robaxin medication which she was discharged on along with the tizanidine 4 mg tablet that she had at home prior to surgery.  She states after she took the medication she became quite dizzy and lightheaded and intermittently lost consciousness.  The patient's wife then called EMS who transported her to the hospital.  Currently the patient describes pain in the incisional area as well as ongoing right posterior thigh pain which was present preoperatively.  The patient states that her pain is sharp in nature with intermittent muscle spasm.  The patient denies falling or trauma when she returned home yesterday.  Patient denies change in bladder function however states she has not had a bowel movement since surgery however is passing flatus.  Since arriving to the emergency department and receiving IV narcotics she states that her pain has improved.  Patient denies recent fever, chills, or additional new symptoms since discharge yesterday afternoon.    PAST MEDICAL HISTORY:   Past Medical History:   Diagnosis Date    Bipolar I disorder, single manic episode (H) 03/25/2013    Problem list name updated by automated process. Provider to review    Complication of anesthesia     panic after having anesthesia    History of  "substance use disorder     as a teenager, \"took extra adderall\"    Lumbar disc herniation with radiculopathy     Mild intermittent asthma without complication     Other chronic pain     low back and buttocks leg pain       PAST SURGICAL HISTORY:   Past Surgical History:   Procedure Laterality Date    DISCECTOMY LUMBAR MINIMALLY INVASIVE ONE LEVEL Right 10/7/2020    Procedure: Right L5-S1 minimally invasive microdiskectomy;  Surgeon: Juancho Oquendo MD;  Location:  OR    DISCECTOMY LUMBAR MINIMALLY INVASIVE ONE LEVEL Right 9/21/2022    Procedure: Right L5-S1 minimally invasive redo microdiskectomy;  Surgeon: Juancho Oquendo MD;  Location:  OR    INSERT STIMULATOR AND LEADS INTERNAL DORSAL COLUMN N/A 1/30/2024    Procedure: Spinal cord stimulator placement, phase I and II combined, placement of percutaneous type electrodes in the thoracolumbar spine and placement of generator/battery over the right buttock  **Latex Allergy**;  Surgeon: Giovanni Alfonso MD;  Location: [facility] OR    OPTICAL TRACKING SYSTEM FUSION POSTERIOR SPINE LUMBAR Bilateral 2/19/2025    Procedure: Lumbar 5 to Sacral 1 stealth posterior instrumentation, bilateral decompression and transforaminal interbody fusion with local autograft, posterior arthrodesis using local autograft and allograft cancellous chips;  Surgeon: Juancho Oquendo MD;  Location:  OR    [facility]         FAMILY HISTORY:   Family History   Problem Relation Age of Onset    Depression Mother     Depression Father     Substance Abuse Father     Depression Maternal Grandmother     Heart Disease Maternal Grandmother     Depression Paternal Grandmother     Anxiety Disorder Paternal Grandmother     Parkinsonism Paternal Grandmother     Depression Brother     Unknown/Adopted No family hx of     Schizophrenia No family hx of     Bipolar Disorder No family hx of     Suicide No family hx of     Dementia No family hx of     [place] Disease No family hx of  " "   Autism Spectrum Disorder No family hx of     Intellectual Disability No family hx of     Mental Illness No family hx of        SOCIAL HISTORY:   Social History     Tobacco Use    Smoking status: Former     Current packs/day: 0.50     Average packs/day: 0.5 packs/day for 5.0 years (2.5 ttl pk-yrs)     Types: Cigarettes, Other     Passive exposure: Past    Smokeless tobacco: Current    Tobacco comments:     vaping   Substance Use Topics    Alcohol use: Not Currently     Comment: over 6 months       MEDICATIONS:  Current Outpatient Medications   Medication Sig Dispense Refill    acetaminophen (TYLENOL) 500 MG tablet Take 500-1,000 mg by mouth every 6 hours as needed      albuterol (PROAIR HFA/PROVENTIL HFA/VENTOLIN HFA) 108 (90 Base) MCG/ACT inhaler Inhale 2 puffs into the lungs every 6 hours as needed for shortness of breath, wheezing or cough 18 g 2    medical cannabis (Patient's own supply) See Admin Instructions. (The purpose of this order is to document that the patient reports taking medical cannabis.  This is not a prescription, and is not used to certify that the patient has a qualifying medical condition.)      oxyCODONE (ROXICODONE) 5 MG tablet Take 1 tablet (5 mg) by mouth every 6 hours as needed for moderate pain. 40 tablet 0    pregabalin (LYRICA) 150 MG capsule Take 1 capsule (150 mg) by mouth 2 times daily. 180 capsule 0    senna-docusate (SENOKOT-S/PERICOLACE) 8.6-50 MG tablet Take 1 tablet by mouth 2 times daily as needed for constipation. 28 tablet 1    tiZANidine (ZANAFLEX) 4 MG tablet Take 1-2 tablets (4-8 mg) by mouth 3 times daily as needed for muscle spasms. 180 tablet 1       Allergies:  Allergies   Allergen Reactions    Lamotrigine Other (See Comments)     Had known side effect of\"skin eating rash\"  Jaycob lauren syndrome    Latex      PN: Converted from LW Latex Sensitivity Flag    Cefaclor Rash     Tolerated cefazolin pre-op 01/2024       ROS: 10 point ROS of systems including " Constitutional, Eyes, Respiratory, Cardiovascular, Gastroenterology, Genitourinary, Integumentary, Muscularskeletal, Psychiatric were all negative except for pertinent positives noted in my HPI.    Physical exam:   Blood pressure (!) 159/87, pulse 89, temperature 99  F (37.2  C), temperature source Oral, resp. rate 20, SpO2 97%.  General: awake and alert  HEENT: atraumatic   PULM: breathing comfortably on room air  MSK: normal bulk and tone   NEUROLOGIC:  -- Awake; Alert; oriented x 3  -- Follows commands briskly  -- +repetition, calculation, and naming  -- Speech fluent, spontaneous. No aphasia or dysarthria.  -- no gaze preference. No apparent hemineglect.  Cranial Nerves:  -- visual fields full to confrontation, PERRL 3-2mm bilat and brisk, extraocular movements intact  -- face symmetrical, tongue midline  -- sensory V1-V3 intact bilaterally  -- palate elevates symmetrically, uvula midline  -- hearing grossly intact bilat  -- Trapezii 5/5 strength bilat symmetric  -- Cerebellar: Finger nose finger without dysmetria, intact rapid alternating motions bilaterally    Wound:  clean, dry, intact    Motor:  Normal bulk / tone; no tremor, rigidity, or bradykinesia.  No muscle wasting or fasciculations  No Pronator Drift     Delt Bi Tri Hand Flexion/  Extension Iliopsoas Quadriceps Hamstrings Tibialis Anterior Gastroc    C5 C6 C7 C8/T1 L2 L3 L4-S1 L4 S1   R 5 5 5 5 5 5 5 5 5   L 5 5 5 5 5 5 5 5 5     Sensory:  intact to LT x 4 extremities       Reflexes: no clonus       Bi Tri BR Paty Pat Ach Bab     C5-6 C7-8 C6 UMN L2-4 S1 UMN   R 2+ 2+ 2+ Norm 2+ 2+ Norm   L 2+ 2+ 2+ Norm 2+ 2+ Norm             LABS:   Last Comprehensive Metabolic Panel:  Lab Results   Component Value Date     02/20/2025    POTASSIUM 3.4 02/20/2025    CHLORIDE 99 02/20/2025    CO2 24 02/20/2025    ANIONGAP 12 02/20/2025     (H) 02/20/2025    BUN 11.8 02/20/2025    CR 0.84 02/20/2025    GFRESTIMATED >90 02/20/2025    MARIA ESTHER 8.3 (L) 02/20/2025          Lab Results   Component Value Date    WBC 12.1 02/20/2025    WBC 8.2 10/05/2020     Lab Results   Component Value Date    RBC 4.44 02/20/2025    RBC 5.04 10/05/2020     Lab Results   Component Value Date    HGB 13.2 02/20/2025    HGB 15.2 10/05/2020     Lab Results   Component Value Date    HCT 38.5 02/20/2025    HCT 44.3 10/05/2020     Lab Results   Component Value Date    MCV 87 02/20/2025    MCV 88 10/05/2020     Lab Results   Component Value Date    MCH 29.7 02/20/2025    MCH 30.2 10/05/2020     Lab Results   Component Value Date    MCHC 34.3 02/20/2025    MCHC 34.3 10/05/2020     Lab Results   Component Value Date    RDW 13.0 02/20/2025    RDW 12.9 10/05/2020     Lab Results   Component Value Date     02/20/2025     10/05/2020     INR   Date Value Ref Range Status   02/19/2025 1.00 0.85 - 1.15 Final      aPTT   Date Value Ref Range Status   02/19/2025 32 22 - 38 Seconds Final           ASSESSMENT:  Elizabeth Fuentes is a 29 year old who is s/p L5-S1 decompression and transforaminal interbody fusion with Dr. Juancho Oquendo on 2/19/2025 recently discharged, who returns to the ED with uncontrolled back pain and spasm       RECOMMENDATIONS  - Recommend admission for pain control and PTevaluation   - Recommend Q4 hour neuro checks  - Recommend the following medication changes:     - increase freqency of Oxycodone to every 3 hours (changed for you).     - Per patient request, change muscle relaxer from Robaxin to Zanaflex (changed for you) .    - Start lidoderm patches 1-2 patches every 24 hours (ordered)  - Please ensure patient's pain is adequately controlled and she is able to mobilize adequately prior to discharge.  Recommend PT and OT evaluations.  If needed, consider pain management consultation.    - Ok for LSO brace for comfort when out of bed if patient wishes   - Recommend aggressive bowel regimen   - Please avoid NSAID medications unless given permission from Neurosurgery   - Neurosurgery will  continue to follow             The patient was discussed with Dr. Juancho Oquendo, neurosurgery staff.    ZENA Wells, CNP  Department of Neurosurgery  Pager: [phone]       Keystone Flap Text: The defect edges were debeveled with a #15 scalpel blade. Given the location of the defect, shape of the defect a keystone flap was deemed most appropriate. Using a sterile surgical marker, an appropriate keystone flap was drawn incorporating the defect, outlining the appropriate donor tissue and placing the expected incisions within the relaxed skin tension lines where possible. The area thus outlined was incised deep to adipose tissue with a #15 scalpel blade. The skin margins were undermined to an appropriate distance in all directions around the primary defect and laterally outward around the flap utilizing iris scissors. Following this, the designed flap was carried into the primary defect and sutured into place.

## 2025-02-21 NOTE — DISCHARGE SUMMARY
[facility] Discharge Summary    Dipak Fuentes MRN# [id number]   Age: 29 year old YOB: 1995     Date of Admission:  2/20/2025  Date of Discharge::  2/21/2025  Admitting Physician:  Jaime Quintero MD  Discharge Physician:  Fausto Washington MD     Home clinic: [facility]          Admission Diagnoses:   Acute post-operative pain          Discharge Diagnosis:     Active Problems:    Acute post-operative pain           Procedures:   none          Medications Prior to Admission:   {                  :[id number]}          Discharge Medications:   {                  :2634856}          Consultations:   {                  :9675855}          Brief History of Illness:   {                  :3075492}          Hospital Course:   {                  :9304639}          Discharge Instructions and Follow-Up:     Discharge diet: {:9828276}   Discharge activity: {:5523932}   Discharge follow-up: {:1458143}           Discharge Disposition:     {:[id number]}      Attestation:  {   Physician attestation:[id number]}    Fausto Washington MD      "Disp-180 tablet, R-1, E-Prescribe           STOP taking these medications       methocarbamol (ROBAXIN) 500 MG tablet Comments:   Reason for Stopping:                     Consultations:   Consultation during this admission received from Neurosurgery.          Brief History of Illness:   Dipak (\"Ada\") MIRELLA Fuentes is a 29 year old adult who was admitted on 2/20/2025 uncontrolled back pain after having undergone surgical decompression of L5-S1 with Dr. Oquendo.      PMH is notable for Bipolar Disorder, Asthma, LOLLY and past suicide attempt.      In the ED she is afebrile with heart rate of 105, pressure 123/86 and breathing comfortably on room air without hypoxia. Lab shows normal BMP with the exception of mildly elevated glucose of 144, mildly elevated WBC of 12.1 and hemoglobin 13.2. Neurosurgery was called from the emergency room and does not recommend any further imaging at this time but admission for pain control.           Hospital Course:   Elizabeth Fuentes was admitted to a medical bed and remained hemodynamically stable. She was able to achieve adequate pain control sing oral narcotics. At that point, she was comfortable to discharge.     BP (!) 142/78 (BP Location: Right arm)   Pulse 88   Temp 98.7  F (37.1  C) (Temporal)   Resp 18   Wt 117.9 kg (260 lb)   SpO2 98%   BMI 34.30 kg/m    I examined her at the time of discharge.           Discharge Instructions and Follow-Up:     Discharge diet: Regular   Discharge activity: Restrictions per Neurosurgery   Discharge follow-up: As planned with NSG           Discharge Disposition:     Discharged to home      Attestation:  I have reviewed today's vital signs, notes, medications, labs and imaging.    Fausto Washington MD     "

## 2025-02-21 NOTE — CONSULTS
[facility] ACUTE INPATIENT PAIN SERVICE    [facility], [facility], [facility], [facility], [place]   PAIN CONSULT      Assessment/Plan:  Dipak Fuentes is a 29 year old adult who was admitted on 2/20/2025.  I was asked by Caro SHAH to see the patient for her post-operative pain. POD#2 L5-S1 fusion. She has a history of bipolar disorder, asthma, substance use disorder and previous suicide attempt. [facility] and [facility] regular refills of Lyrica 150mg.    Was discharged home on 2/20. Reports increased pain and took a muscle relaxant. When she sat in her chair shortly after she felt lightheaded and had a syncopal event.    Opioids Received in the past 24h:  *(4) 10mg Oxycodone tablets  *(3) 0.5mg IV hydromorphone   *(1) 0.4mg IV hydromorphone     PLAN:    Pain is consistent with acute post-operative pain. Opioid naive    Multimodal Medication Therapy:   Adjuvants:   - APAP 975mg tid  - Topical Lidocaine  - Pregabalin 150mg bid  - Tizanidine 4mg tid  - May medical cannabis gummies once admitted. RN to confirm.  Opioids:   - Oxycodone 5-10mg q3h prn - first line  - IV hydromorphone 0.2-0.4mg q2h prn decreased to 0.2mg q8h prn - second line  Non-medication interventions- Ice  Constipation Prophylaxis- Senna-S  Follow up /Discharge Recommendations - We recommend prescribing the following at the time of discharge:  No opiates. Was given #40 tablets of Oxycodone 5mg by NSGY on 2/20.        Subjective:  Ada is seen today in her bed alert and oriented in no acute distress. Reports her pain is currently a 7/10 located in her lower back. Has minimal radiating pain in her buttocks and thighs.     Has a [employer] lumbar spinal cord stimulator. Was reporting approximately 30% relief prior to her fusion. Discussed with [employer] and they have recommended an outpatient SCS reprogramming 2-4 weeks if her axial low back pain does not improve.      No bm since surgery but is passing gas.     Reviewed continuing with  current plan, all questions answered.            History   Drug Use    Types: Marijuana     Comment: bipolar-concentrates 1-2 x day         Tobacco Use      Smoking status: Former        Packs/day: 0.50        Years: 0.5 packs/day for 5.0 years (2.5 ttl pk-yrs)        Types: Cigarettes, Other        Passive exposure: Past      Smokeless tobacco: Current      Tobacco comments: vaping        Objective:  Vital signs in last 24 hours:  B/P: 159/87, T: 99, P: 89, R: 18   Blood pressure (!) 159/87, pulse 89, temperature 99  F (37.2  C), temperature source Oral, resp. rate 18, SpO2 97%.        Review of Systems:   As per subjective, all others negative.    Physical Exam    General: in no apparent distress and non-toxic   HEENT: Head normocephalic atraumatic, oral mucosa moist. Sclerae anicteric  CV: Regular rhythm, normal rate, no murmurs  Resp: No wheezes, no rales or rhonchi, no focal consolidations  GI: Non tender; +BS  Skin: Surgical site CDI  Psych: Normal affect, mood euthymic  Neuro: Grossly normal; strength intact          Imaging:  Personally Reviewed.    No results found for this visit on 02/20/25.     Lab Results:  Personally Reviewed.   Last Comprehensive Metabolic Panel:  Sodium   Date Value Ref Range Status   02/20/2025 135 135 - 145 mmol/L Final   10/05/2020 137 133 - 144 mmol/L Final     Potassium   Date Value Ref Range Status   02/20/2025 3.4 3.4 - 5.3 mmol/L Final   09/13/2022 4.0 3.4 - 5.3 mmol/L Final   10/05/2020 4.1 3.4 - 5.3 mmol/L Final     Chloride   Date Value Ref Range Status   02/20/2025 99 98 - 107 mmol/L Final   09/13/2022 106 94 - 109 mmol/L Final   10/05/2020 104 94 - 109 mmol/L Final     Carbon Dioxide   Date Value Ref Range Status   10/05/2020 29 20 - 32 mmol/L Final     Carbon Dioxide (CO2)   Date Value Ref Range Status   02/20/2025 24 22 - 29 mmol/L Final   09/13/2022 28 20 - 32 mmol/L Final     Anion Gap   Date Value Ref Range Status   02/20/2025 12 7 - 15 mmol/L Final   09/13/2022 4 3  "- 14 mmol/L Final   10/05/2020 4 3 - 14 mmol/L Final     Glucose   Date Value Ref Range Status   02/20/2025 144 (H) 70 - 99 mg/dL Final   09/13/2022 104 (H) 70 - 99 mg/dL Final   10/05/2020 103 (H) 70 - 99 mg/dL Final     Comment:     Fasting specimen     GLUCOSE BY METER POCT   Date Value Ref Range Status   02/19/2025 119 (H) 70 - 99 mg/dL Final     Comment:     Dr/RN Notified     Urea Nitrogen   Date Value Ref Range Status   02/20/2025 11.8 6.0 - 20.0 mg/dL Final   09/13/2022 19 7 - 30 mg/dL Final   10/05/2020 11 7 - 30 mg/dL Final     Creatinine   Date Value Ref Range Status   02/20/2025 0.84 0.51 - 1.17 mg/dL Final     Comment:     Male and Female  0-2 Months    0.31-0.88 mg/dL  2-12 Months   0.16-0.39 mg/dL  1-2 Years     0.18-0.35 mg/dL  3-4 Years     0.26-0.42 mg/dL  5-6 Years     0.29-0.47 mg/dL  7-8 Years     0.34-0.53 mg/dL  9-10 Years    0.33-0.64 mg/dL  11-12 Years   0.44-0.68 mg/dL  13-14 Years   0.46-0.77 mg/dL    Female  15 Years and older  0.51-0.95 mg/dL    Male  15 Years and older  0.67-1.17 mg/dL       10/05/2020 0.97 0.66 - 1.25 mg/dL Final     GFR Estimate   Date Value Ref Range Status   02/20/2025 >90 >60 mL/min/1.73m2 Final     Comment:     The generation of the estimated GFR is currently based on binary male or female sex. If the electronic health record information indicates another gender identity or if Legal Sex is recorded as \"Unknown\", GFR estimates are not automatically calculated, and application of GFR equations or a direct GFR measurement should be considered according to the individual's appropriate clinical context.  eGFR calculated using 2021 CKD-EPI equation.   10/05/2020 >90 >60 mL/min/[1.73_m2] Final     Comment:     Non  GFR Calc  Starting 12/18/2018, serum creatinine based estimated GFR (eGFR) will be   calculated using the Chronic Kidney Disease Epidemiology Collaboration   (CKD-EPI) equation.       Calcium   Date Value Ref Range Status   02/20/2025 8.3 (L) " 8.8 - 10.4 mg/dL Final   10/05/2020 8.9 8.5 - 10.1 mg/dL Final        UA:   Amphetamine Qual Urine   Date Value Ref Range Status   03/31/2009 Positive (A) NEG Final     Comment:      Cutoff for a positive amphetamine is greater than 500 ng/mL. This is an   unconfirmed screening result to be used for medical purposes only.     Amphetamines Urine   Date Value Ref Range Status   12/10/2024 Screen Negative Screen Negative Final     Comment:     Cutoff for a negative amphetamine is less than 500 ng/mL.     Barbiturates Qual Urine   Date Value Ref Range Status   03/31/2009 Negative NEG Final     Comment:      Cutoff for a negative barbiturate is 200 ng/mL or less.     Barbituates Urine   Date Value Ref Range Status   12/10/2024 Screen Negative Screen Negative Final     Comment:     Cutoff for a negative barbiturate is less than 200 ng/mL.     Benzodiazepine Qual Urine   Date Value Ref Range Status   03/31/2009 Negative NEG Final     Comment:      Cutoff for a negative benzodiazepine is 200 ng/mL or less.     Cannabinoids Qual Urine   Date Value Ref Range Status   03/31/2009 Negative NEG Final     Comment:      Cutoff for a negative cannabinoid is 50 ng/mL or less.     Cannabinoids Urine   Date Value Ref Range Status   12/10/2024 Screen Positive (A) Screen Negative Final     Comment:     Cutoff for a positive cannabinoid is 50 ng/mL or greater.   This is an unconfirmed screening result to be used for medical purposes only.     Cocaine Qual Urine   Date Value Ref Range Status   03/31/2009 Negative NEG Final     Comment:      Cutoff for a negative cocaine is 300 ng/mL or less.     Cocaine Urine   Date Value Ref Range Status   12/10/2024 Screen Negative Screen Negative Final     Comment:     Cutoff for a negative cocaine is less than 300 ng/mL.     Opiates Qualitative Urine   Date Value Ref Range Status   03/31/2009 Negative NEG Final     Comment:      Cutoff for a negative opiate is 300 ng/mL or less.     Opiates Urine    Date Value Ref Range Status   12/10/2024 Screen Negative Screen Negative Final     Comment:     Cutoff for a negative opiate is less than 300 ng/mL.     PCP Qual Urine   Date Value Ref Range Status   03/31/2009 Negative NEG Final     Comment:      Cutoff for a negative PCP is 25 ng/mL or less.     PCP Urine   Date Value Ref Range Status   12/10/2024 Screen Negative Screen Negative Final     Comment:     Cutoff for a negative PCP is less than 25 ng/mL.              Please see A&P for additional details of medical decision making.         José Barnes PA-C  Acute Care Pain Management  Team  Hours of pain coverage Mon-Fri 8-1600, afterhours please call the primary team   [facility] (JAYLA, Natanael, SD, RH)   Page via [employer] web console -Click for [employer]

## 2025-02-22 ENCOUNTER — MYC REFILL (OUTPATIENT)
Dept: INTERNAL MEDICINE | Facility: CLINIC | Age: 30
End: 2025-02-22
Payer: COMMERCIAL

## 2025-02-22 VITALS
HEART RATE: 88 BPM | TEMPERATURE: 98.7 F | DIASTOLIC BLOOD PRESSURE: 78 MMHG | BODY MASS INDEX: 34.3 KG/M2 | OXYGEN SATURATION: 98 % | SYSTOLIC BLOOD PRESSURE: 142 MMHG | WEIGHT: 260 LBS | RESPIRATION RATE: 18 BRPM

## 2025-02-22 DIAGNOSIS — Z98.1 S/P LUMBAR FUSION: ICD-10-CM

## 2025-02-22 PROCEDURE — 250N000013 HC RX MED GY IP 250 OP 250 PS 637: Performed by: INTERNAL MEDICINE

## 2025-02-22 PROCEDURE — 250N000013 HC RX MED GY IP 250 OP 250 PS 637: Performed by: PHYSICIAN ASSISTANT

## 2025-02-22 PROCEDURE — 250N000013 HC RX MED GY IP 250 OP 250 PS 637: Performed by: NURSE PRACTITIONER

## 2025-02-22 PROCEDURE — G0378 HOSPITAL OBSERVATION PER HR: HCPCS

## 2025-02-22 RX ORDER — OXYCODONE HYDROCHLORIDE 5 MG/1
5-10 TABLET ORAL EVERY 4 HOURS PRN
Status: SHIPPED | DISCHARGE
Start: 2025-02-22 | End: 2025-02-22

## 2025-02-22 RX ORDER — OXYCODONE HYDROCHLORIDE 5 MG/1
5 TABLET ORAL EVERY 6 HOURS PRN
Status: SHIPPED
Start: 2025-02-22 | End: 2025-02-22

## 2025-02-22 RX ORDER — OXYCODONE HYDROCHLORIDE 5 MG/1
5 TABLET ORAL EVERY 4 HOURS PRN
Status: SHIPPED | DISCHARGE
Start: 2025-02-22 | End: 2025-02-22

## 2025-02-22 RX ORDER — OXYCODONE HYDROCHLORIDE 10 MG/1
10 TABLET ORAL ONCE
Status: COMPLETED | OUTPATIENT
Start: 2025-02-22 | End: 2025-02-22

## 2025-02-22 RX ADMIN — OXYCODONE HYDROCHLORIDE 10 MG: 10 TABLET ORAL at 14:04

## 2025-02-22 RX ADMIN — OXYCODONE HYDROCHLORIDE 10 MG: 10 TABLET ORAL at 07:00

## 2025-02-22 RX ADMIN — OXYCODONE HYDROCHLORIDE 10 MG: 10 TABLET ORAL at 03:31

## 2025-02-22 RX ADMIN — POLYETHYLENE GLYCOL 3350 17 G: 17 POWDER, FOR SOLUTION ORAL at 08:47

## 2025-02-22 RX ADMIN — OXYCODONE HYDROCHLORIDE 10 MG: 10 TABLET ORAL at 10:19

## 2025-02-22 RX ADMIN — TIZANIDINE 4 MG: 4 TABLET ORAL at 08:46

## 2025-02-22 RX ADMIN — ACETAMINOPHEN 975 MG: 325 TABLET, FILM COATED ORAL at 14:05

## 2025-02-22 RX ADMIN — ACETAMINOPHEN 975 MG: 325 TABLET, FILM COATED ORAL at 08:47

## 2025-02-22 RX ADMIN — SENNOSIDES AND DOCUSATE SODIUM 2 TABLET: 50; 8.6 TABLET ORAL at 08:47

## 2025-02-22 RX ADMIN — PREGABALIN 150 MG: 75 CAPSULE ORAL at 08:47

## 2025-02-22 RX ADMIN — LIDOCAINE 2 PATCH: 4 PATCH TOPICAL at 08:46

## 2025-02-22 ASSESSMENT — ACTIVITIES OF DAILY LIVING (ADL)
ADLS_ACUITY_SCORE: 50

## 2025-02-22 NOTE — PROGRESS NOTES
[facility]    Neurosurgery Progress Note    Date of Service (when I saw the patient): 02/22/2025     Assessment & Plan   Elizabeth Fuentes is 29 year old who underwent L5-S1 TLIF with Dr Juancho Oquendo on 2/19/2025.  The patient was discharged home after being cleared by both physical and occupational therapies.  It is documented that the patient was asked to stay at least an extra day to ensure she had adequate pain control but was eager to discharge home.  When she returned home she had an episode of acute pain and it was not time for her narcotic pain medication therefore she decided to take the Robaxin medication which she was discharged on along with the tizanidine 4 mg tablet that she had at home prior to surgery.  She states after she took the medication she became quite dizzy and lightheaded and intermittently lost consciousness.  The patient's wife then called EMS who transported her to the hospital.     AM Rounds: Reports improvement in back and leg pain since admission. Has ongoing incisional back and right posterior thigh pain. Has been working on bowels today as hasn't had bowel movement since surgery. Passing flatus. No new or worsening symptoms.     Plan:  -Pain management as needed  -Bowel regimen  -Neuro checks as ordered  -Activity as tolerated, appreciate therapies  -Ok for LSO brace for comfort if patient wishes  -Avoid NSAIDs  -Ok for discharge from NSGY perspective once cleared by therapies and pain managed on oral regimen     I have discussed the following assessment and plan Dr. Cole who is in agreement with initial plan and will follow up with further consultation recommendations.    Caity Esposito, PEPE  [facility] Neurosurgery  [address] [place] [id number]  Tel [phone]  Fax [phone]  Securely message with [employer] (more info)  Text page via [employer] Paging/Directory     Interval History   Stable, improving slowly.    Physical Exam    Temp: 98.7  F (37.1  C) Temp src: Temporal BP: (!) 142/78 Pulse: 88   Resp: 18 SpO2: 98 % O2 Device: None (Room air)    Vitals:    02/21/25 2055   Weight: 117.9 kg (260 lb)     Vital Signs with Ranges  Temp:  [97.9  F (36.6  C)-100  F (37.8  C)] 98.7  F (37.1  C)  Pulse:  [] 88  Resp:  [16-20] 18  BP: (126-169)/(69-97) 142/78  SpO2:  [98 %-100 %] 98 %  I/O last 3 completed shifts:  In: -   Out: 400 [Urine:400]     , Blood pressure (!) 142/78, pulse 88, temperature 98.7  F (37.1  C), temperature source Temporal, resp. rate 18, weight 117.9 kg (260 lb), SpO2 98%.  260 lbs 0 oz    NEUROLOGICAL EXAMINATION:   Mental status:  Alert and Oriented x 3, speech is fluent.  Motor:    Hip Flexor:                Right: 5/5  Left:  5/5  Hip Adductor:             Right:  5/5  Left:  5/5  Hip Abductor:             Right:  5/5  Left:  5/5  Gastroc Soleus:        Right:  5/5  Left:  5/5  Tib/Ant:                      Right:  5/5  Left:  5/5  EHL:                     Right:  5/5  Left:  5/5  Sensation:  intact    Medications   Current Facility-Administered Medications   Medication Dose Route Frequency Provider Last Rate Last Admin     Current Facility-Administered Medications   Medication Dose Route Frequency Provider Last Rate Last Admin    acetaminophen (TYLENOL) tablet 975 mg  975 mg Oral TID Caro Carvalho PA-C   975 mg at 02/22/25 0847    Or    acetaminophen (TYLENOL) Suppository 650 mg  650 mg Rectal TID Caro Carvalho PA-C        Lidocaine (LIDOCARE) 4 % Patch 2 patch  2 patch Transdermal Q24H Tika Nelson APRN CNP   2 patch at 02/22/25 0846    polyethylene glycol (MIRALAX) Packet 17 g  17 g Oral Daily Caro Carvalho PA-C   17 g at 02/22/25 0847    pregabalin (LYRICA) capsule 150 mg  150 mg Oral BID Caro Carvalho PA-C   150 mg at 02/22/25 0847    senna-docusate (SENOKOT-S/PERICOLACE) 8.6-50 MG per tablet 2 tablet  2 tablet Oral BID Tika Nelson, ZENA CNP   2 tablet  at 02/22/25 0847    sodium chloride (PF) 0.9% PF flush 3 mL  3 mL Intracatheter Q8H Caro Carvalho PA-C   3 mL at 02/21/25 1703    tiZANidine (ZANAFLEX) tablet 4 mg  4 mg Oral TID Tika Nelson APRN CNP   4 mg at 02/22/25 0846       Data     CBC RESULTS:   Recent Labs   Lab Test 02/20/25  1728   WBC 12.1*   RBC 4.44   HGB 13.2   HCT 38.5   MCV 87   MCH 29.7   MCHC 34.3   RDW 13.0        Basic Metabolic Panel:  Lab Results   Component Value Date     02/20/2025     10/05/2020      Lab Results   Component Value Date    POTASSIUM 3.4 02/20/2025    POTASSIUM 4.0 09/13/2022    POTASSIUM 4.1 10/05/2020     Lab Results   Component Value Date    CHLORIDE 99 02/20/2025    CHLORIDE 106 09/13/2022    CHLORIDE 104 10/05/2020     Lab Results   Component Value Date    MARIA ESTHER 8.3 02/20/2025    MARIA ESTHER 8.9 10/05/2020     Lab Results   Component Value Date    CO2 24 02/20/2025    CO2 28 09/13/2022    CO2 29 10/05/2020     Lab Results   Component Value Date    BUN 11.8 02/20/2025    BUN 19 09/13/2022    BUN 11 10/05/2020     Lab Results   Component Value Date    CR 0.84 02/20/2025    CR 0.97 10/05/2020     Lab Results   Component Value Date     02/20/2025     02/19/2025     09/13/2022     10/05/2020     INR:  Lab Results   Component Value Date    INR 1.00 02/19/2025    INR 1.09 01/08/2024

## 2025-02-22 NOTE — PLAN OF CARE
Patient vital signs are at baseline: Yes  Patient able to ambulate as they were prior to admission or with assist devices provided by therapies during their stay:  Yes  Patient MUST void prior to discharge:  Yes  Patient able to tolerate oral intake:  Yes  Pain has adequate pain control using Oral analgesics:  Yes  Does patient have an identified :  Yes  Has goal D/C date and time been discussed with patient:  Yes, waiting for PT.

## 2025-02-22 NOTE — ED NOTES
[facility]    ED Boarding Nurse Handoff Addendum Report:    Date/time: 2/21/2025, 7:25 PM    Neuro: Alert and Oriented x4. She/her pronouns  Activity: are SBA with Gait Belt and Walker  Telemetry Monitoring: No  Pain: complaining of 7/10 pain in their back.  Tylenol and Oxycodone given for pain. Muscle relaxer due, med not arrived from pharmacy.  Labs / Tests:   GI: constipation, medication given.  O2: RA  LDA's: Peripheral  Fluids: is Saline locked.  Diet: Regular  Surgical incision: open to air. No drainage present.   Consults: Neurosurgery  Discharge Disposition:  TBD    Plan of Care:    Pain management    Vital signs (within last 30 minutes):    Vitals:    02/21/25 1444 02/21/25 1645 02/21/25 1702 02/21/25 1744   BP:  126/69     BP Location:  Right arm     Patient Position:       Cuff Size:       Pulse:  99     Resp: 16 18 18 16   Temp:  98.2  F (36.8  C)     TempSrc:  Oral     SpO2:           ED Boarding Nurse name: Yenifer Denson RN

## 2025-02-22 NOTE — PROGRESS NOTES
"[facility]    Medicine Progress Note - Hospitalist Service    Date of Admission:  2/20/2025    Assessment & Plan   Dipak Fuentes is a 29 year old adult who was admitted on 2/20/2025 uncontrolled back pain after having undergone surgical decompression of L5-S1 with Dr. Oquendo.     PMH is notable for Bipolar Disorder, Asthma, LOLLY and past suicide attempt.     In the ED she is afebrile with heart rate of 105, pressure 123/86 and breathing comfortably on room air without hypoxia. Lab shows normal BMP with the exception of mildly elevated glucose of 144, mildly elevated WBC of 12.1 and hemoglobin 13.2. Neurosurgery was called from the emergency room and does not recommend any further imaging at this time but admission for pain control.     DX:  Intractable pain s/p L5-S1 decompression with fusion on 2/19/25.  Syncopal event thought due to excessive medications.  Hx Asthma.  LOLLY.   Hx Bipolar depression.  Hx suicidal ideation, not currently suicidal.   PLAN:  Cont Oxycodone and muscle relaxants as per Neurosurgical team.        Observation Goals: -diagnostic tests and consults completed and resulted, -vital signs normal or at patient baseline, -adequate pain control on oral analgesics, -returns to baseline functional status, Nurse to notify provider when observation goals have been met and patient is ready for discharge.  Diet: Regular Diet Adult    DVT Prophylaxis: Pneumatic Compression Devices  Sheets Catheter: Not present  Lines: None     Cardiac Monitoring: None  Code Status: Full Code      Clinically Significant Risk Factors Present on Admission                             # Obesity: Estimated body mass index is 34.3 kg/m  as calculated from the following:    Height as of 2/4/25: 1.854 m (6' 1\").    Weight as of this encounter: 117.9 kg (260 lb).         # Financial/Environmental Concerns:    # Asthma: noted on problem list        Social Drivers of Health    Depression: At risk (2/4/2025)    PHQ-2 "     PHQ-2 Score: 3   Tobacco Use: High Risk (2/19/2025)    Patient History     Smoking Tobacco Use: Former     Smokeless Tobacco Use: Current     Passive Exposure: Past   Financial Resource Strain: Low Risk  (2/21/2025)    Financial Resource Strain     Within the past 12 months, have you or your family members you live with been unable to get utilities (heat, electricity) when it was really needed?: No   Recent Concern: Financial Resource Strain - High Risk (1/27/2025)    Financial Resource Strain     Within the past 12 months, have you or your family members you live with been unable to get utilities (heat, electricity) when it was really needed?: Yes   Physical Activity: Insufficiently Active (5/14/2024)    Exercise Vital Sign     Days of Exercise per Week: 2 days     Minutes of Exercise per Session: 60 min   Interpersonal Safety: Low Risk  (2/19/2025)    Interpersonal Safety     Do you feel physically and emotionally safe where you currently live?: Yes     Within the past 12 months, have you been hit, slapped, kicked or otherwise physically hurt by someone?: No     Within the past 12 months, have you been humiliated or emotionally abused in other ways by your partner or ex-partner?: No   Recent Concern: Interpersonal Safety - High Risk (12/10/2024)    Interpersonal Safety     Do you feel physically and emotionally safe where you currently live?: Yes     Within the past 12 months, have you been hit, slapped, kicked or otherwise physically hurt by someone?: No     Within the past 12 months, have you been humiliated or emotionally abused in other ways by your partner or ex-partner?: Yes   Stress: Stress Concern Present (5/14/2024)    [employer] - Occupational Stress Questionnaire     Feeling of Stress : To some extent   Social Connections: Unknown (5/14/2024)    Social Connection and Isolation Panel [NHANES]     Frequency of Social Gatherings with Friends and Family: Twice a week           Disposition Plan     Medically Ready for Discharge: Anticipated Tomorrow             Fausto Washington MD  Hospitalist Service  [facility]  Securely message with [employer] (more info)  Text page via [employer] Paging/Directory   ______________________________________________________________________    Interval History   All pain meds are given orally. Pt is comfortable and willing to go home. No BM yet. But no problems with severe constipation in the past.    Physical Exam   Vital Signs: Temp: 98.7  F (37.1  C) Temp src: Temporal BP: (!) 142/78 Pulse: 88   Resp: 18 SpO2: 98 % O2 Device: None (Room air)    Weight: 260 lbs 0 oz    General Appearance: Alert and coherent in NAD.  Respiratory: CTA, normal WOB  Cardiovascular: RRR without murmur  GI: soft, NTND  Skin: no lesions  Other:      Medical Decision Making       35 MINUTES SPENT BY ME on the date of service doing chart review, history, exam, documentation & further activities per the note.      Data   No results found for this or any previous visit (from the past 24 hours).

## 2025-02-22 NOTE — PLAN OF CARE
Patient vital signs are at baseline: Yes  Patient able to ambulate as they were prior to admission or with assist devices provided by therapies during their stay:  Yes, ambulating to the bathroom independently with partner.   Patient MUST void prior to discharge:  Yes  Patient able to tolerate oral intake:  Yes  Pain has adequate pain control using Oral analgesics:  Yes  Does patient have an identified :  Yes  Has goal D/C date and time been discussed with patient:  Yes, preferred to stay tonight for pain management.

## 2025-02-22 NOTE — PROGRESS NOTES

## 2025-02-22 NOTE — PLAN OF CARE
"Goal Outcome Evaluation:           Overall Patient Progress: improvingOverall Patient Progress: improving    Outcome Evaluation: Pt AOx4. RA. SBA walker/GB. Pain managed with zanaflex, oxycodone and tylenol. Discharge TBD.      Problem: Adult Inpatient Plan of Care  Goal: Plan of Care Review  Description: The Plan of Care Review/Shift note should be completed every shift.  The Outcome Evaluation is a brief statement about your assessment that the patient is improving, declining, or no change.  This information will be displayed automatically on your shift  note.  Outcome: Progressing  Flowsheets (Taken 2/22/2025 4183)  Outcome Evaluation: Pt AOx4. RA. SBA walker/GB. Pain managed with zanaflex, oxycodone and tylenol. Discharge TBD.  Overall Patient Progress: improving  Goal: Patient-Specific Goal (Individualized)  Description: You can add care plan individualizations to a care plan. Examples of Individualization might be:  \"Parent requests to be called daily at 9am for status\", \"I have a hard time hearing out of my right ear\", or \"Do not touch me to wake me up as it startles  me\".  Outcome: Progressing  Goal: Absence of Hospital-Acquired Illness or Injury  Outcome: Progressing  Intervention: Identify and Manage Fall Risk  Recent Flowsheet Documentation  Taken 2/22/2025 0106 by Mis Rogers RN  Safety Promotion/Fall Prevention:   activity supervised   assistive device/personal items within reach   clutter free environment maintained   mobility aid in reach   nonskid shoes/slippers when out of bed   room near nurse's station   safety round/check completed  Intervention: Prevent Skin Injury  Recent Flowsheet Documentation  Taken 2/22/2025 0106 by Mis Rogers, RN  Body Position: position changed independently  Skin Protection:   adhesive use limited   mittens applied to hands  Taken 2/21/2025 2231 by Mis Rogers, RN  Body Position:   position changed independently   " log-rolled  Intervention: Prevent Infection  Recent Flowsheet Documentation  Taken 2/22/2025 0106 by Mis Rogers RN  Infection Prevention:   single patient room provided   rest/sleep promoted   hand hygiene promoted  Goal: Optimal Comfort and Wellbeing  Outcome: Progressing  Goal: Readiness for Transition of Care  Outcome: Progressing

## 2025-02-24 RX ORDER — OXYCODONE HYDROCHLORIDE 5 MG/1
5-10 TABLET ORAL EVERY 4 HOURS PRN
Qty: 40 TABLET | Refills: 0 | Status: SHIPPED | OUTPATIENT
Start: 2025-02-24

## 2025-02-24 NOTE — TELEPHONE ENCOUNTER
Patient requesting refill of oxycodone.     DOS: 2/19/25  Surgeon: Dr. Oquendo  Procedure: Lumbar fusion  Weeks Post op: 5 days  Last refilled: 2/22 #28 (#40 prescribed but only #28 filled due to 7 day supply limit. Called pharmacy to confirm)    Rx was 1 pill q6h. Then was changed to 1-2 pills q4h. Patient requesting 1-2 q3h. Routed to provider team for review.    Pain assessment completed via [employer]. Please see encounter for further details. Refill request will be forwarded to care team.

## 2025-02-25 ENCOUNTER — PATIENT OUTREACH (OUTPATIENT)
Dept: CARE COORDINATION | Facility: CLINIC | Age: 30
End: 2025-02-25
Payer: COMMERCIAL

## 2025-02-25 NOTE — PROGRESS NOTES
[facility]: Transitions of Care Outreach  Chief Complaint   Patient presents with    Clinic Care Coordination - Post Hospital       Most Recent Admission Date: 2/20/2025   Most Recent Admission Diagnosis: Acute post-operative pain - G89.18     Most Recent Discharge Date: 2/22/2025   Most Recent Discharge Diagnosis: Acute post-operative pain - G89.18  S/P lumbar fusion - Z98.1     Transitions of Care Assessment    Discharge Assessment  How are your symptoms? (Red Flag symptoms escalate to triage hotline per guidelines): Improved  Do you know how to contact your clinic care team if you have future questions or changes to your health status? : Yes  Does the patient have their discharge instructions? : Yes  Does the patient have questions regarding their discharge instructions? : No  Were you started on any new medications or were there changes to any of your previous medications? : Yes  Does the patient have all of their medications?: Yes  Do you have questions regarding any of your medications? : No  Do you have all of your needed medical supplies or equipment (DME)?  (i.e. oxygen tank, CPAP, cane, etc.): Yes                Follow up Plan   Follow-up and recommended labs and tests  As planned with Neurosurgery.  Discharge Follow-Up  Discharge follow up appointment scheduled in alignment with recommended follow up timeframe or Transitions of Risk Category? (Low = within 30 days; Moderate= within 14 days; High= within 7 days): Yes  Discharge Follow Up Appointment Date: 02/28/25  Discharge Follow Up Appointment Scheduled with?: Specialty Care Provider    Future Appointments   Date Time Provider Department Center   2/28/2025  9:30 AM Milligan, Deirdre E, MD EAFP [facility]   3/5/2025  2:00 PM Nurse,  Spine/Brain SBRS [facility]   4/2/2025  2:00 PM Cara Palafox PA-C RHSBRS [facility]   5/12/2025 11:30 AM Shanda Mora APRN CNP [facility]   5/20/2025  2:00 PM Juancho Oquendo MD SBRS [facility]        Outpatient Plan as outlined on AVS reviewed with patient.    For any urgent concerns, please contact our 24 hour nurse triage line: [phone] ([phone])       FERNANDEZ Longo (she/her/hers)  Social Work Clinic Care Coordinator   [facility]  [email or web address]  [phone]

## 2025-02-28 ENCOUNTER — MYC MEDICAL ADVICE (OUTPATIENT)
Dept: PEDIATRICS | Facility: CLINIC | Age: 30
End: 2025-02-28

## 2025-02-28 DIAGNOSIS — F64.9 GENDER DYSPHORIA: Primary | ICD-10-CM

## 2025-03-03 ENCOUNTER — DOCUMENTATION ONLY (OUTPATIENT)
Dept: NEUROSURGERY | Facility: CLINIC | Age: 30
End: 2025-03-03
Payer: COMMERCIAL

## 2025-03-03 NOTE — PROGRESS NOTES
Faxed  VANITA form for mother  March 3, 2025 to fax number [phone] and HIM fax number [phone].     Right Fax confirmed at 9:04 AM    Monika Pena

## 2025-03-05 ENCOUNTER — ALLIED HEALTH/NURSE VISIT (OUTPATIENT)
Dept: NEUROSURGERY | Facility: CLINIC | Age: 30
End: 2025-03-05
Attending: NEUROLOGICAL SURGERY
Payer: COMMERCIAL

## 2025-03-05 VITALS
HEART RATE: 88 BPM | OXYGEN SATURATION: 97 % | TEMPERATURE: 98.1 F | DIASTOLIC BLOOD PRESSURE: 86 MMHG | SYSTOLIC BLOOD PRESSURE: 136 MMHG

## 2025-03-05 DIAGNOSIS — Z98.1 S/P LUMBAR FUSION: Primary | ICD-10-CM

## 2025-03-05 DIAGNOSIS — Z98.1 S/P LUMBAR FUSION: ICD-10-CM

## 2025-03-05 PROCEDURE — 3079F DIAST BP 80-89 MM HG: CPT

## 2025-03-05 PROCEDURE — 3075F SYST BP GE 130 - 139MM HG: CPT

## 2025-03-05 PROCEDURE — 99207 PR NO CHARGE NURSE ONLY: CPT

## 2025-03-05 PROCEDURE — 1125F AMNT PAIN NOTED PAIN PRSNT: CPT

## 2025-03-05 ASSESSMENT — PAIN SCALES - GENERAL: PAINLEVEL_OUTOF10: MODERATE PAIN (4)

## 2025-03-05 NOTE — CONFIDENTIAL NOTE
"Post-op Nurse Visit:    Patient seen today per the order of  Juancho Oquendo MD.   DOS: 2/19/2025  Procedure: Lumbar 5 to Sacral 1 stealth posterior instrumentation, bilateral decompression and transforaminal interbody fusion     Pain/Neuro Assessment  3-4/10  pain rating. patient notes low back pain, right hip/buttock  posterolateral RLE pain, had pre op but slowly feels better post op. Pain described as stabbing shooting pain/burning nerve pain/\"heat flash in right leg\".  Pain in right ankle foot and into two toes closest to big toe continues.   Numbness/tingling: yes in BLE had pre op and continues post op and and in bilateral feet (no left foot or toe pain)    Strength: Subjective weakness pre op in BLE > in right leg and continues post op. RLE weaker than left on exam.     Pain Relief Measures:  Oxycodone: 5-10 mg q 4-6 hrs   Tylenol: prn   Tizanidine: 8 mg TID   Ice: icing often      Incision   Incision inspected. Edges well-approximated. No redness, swelling, drainage, or warmth noted. Some exofin glue still remains. Incision healing well, CDI.     Activity  Following restrictions   Falls:  none using walker to a  Patient is walking frequently without difficulty.   Denies redness, swelling, or warmth to bilateral calves.   Wearing brace?  n/a     GI/  Difficulty swallowing? No  Patient's appetite is normal  Bowel/bladder problems? Continued bladder incontinence and urgency patient notes this is not new and not any worse.   Taking stool softeners? No     Refills/x-rays/return to work  Refills given at this appointment? Yes, oxycodone sent to care team for sign off in refill enc   Sent for x-rays after this appointment? No  Ordered future x-rays? Yes  Scheduling team notified? Yes, SM sent  Return to work discussed at this appointment? N/A patient is not working at this time     All of patient's questions addressed today. Patient was instructed to call with any additional questions/concerns.       [facility] " [facility]  [facility]  [address]  [place] [address]  Telephone:  [phone]  Fax:  [phone]

## 2025-03-05 NOTE — CONFIDENTIAL NOTE
Patient needing refill of Oxycodone.     DOS: 2/19/25  Procedure: Lumbar 5 to Sacral 1 stealth posterior instrumentation, bilateral decompression and transforaminal interbody fusion   Surgeon: Dr. Oquendo  Weeks Post Op: 2 wks    Current symptom(s): please see 2 week RN visit note 3/5/25 for pain assessment   Current pain management: please refer to 3/5 RN note     Last fill: 2/24/25 #40  Next visit: 4/2/25    Medication pended for your approval, if appropriate. Pharmacy verified.     Any patient questions or concerns: no    Informed patient request will be forwarded to care team.

## 2025-03-06 RX ORDER — OXYCODONE HYDROCHLORIDE 5 MG/1
5-10 TABLET ORAL EVERY 4 HOURS PRN
Qty: 40 TABLET | Refills: 0 | Status: SHIPPED | OUTPATIENT
Start: 2025-03-06

## 2025-03-13 DIAGNOSIS — M54.16 LUMBAR RADICULOPATHY: ICD-10-CM

## 2025-03-13 DIAGNOSIS — Z98.890 S/P LUMBAR MICRODISCECTOMY: ICD-10-CM

## 2025-03-13 NOTE — TELEPHONE ENCOUNTER
Received fax from pharmacy requesting refill(s) for     tiZANidine (ZANAFLEX) 4 MG tablet    Date last filled 2/13/2025    Last Appt Date:2/10/2025    Next Appt scheduled: 5/12/2025    Pharmacy:   [id number] - [place], [place] - [address] [place] [address] [address] AT [facility] [address],    Will route for processing    Tracie Galo [facility]

## 2025-03-13 NOTE — TELEPHONE ENCOUNTER
Requested Renewals       Name from pharmacy: TIZANIDINE 4MG TABLETS         Will file in chart as: tiZANidine (ZANAFLEX) 4 MG tablet    Sig: TAKE 1 TO 2 TABLETS(4 TO 8 MG) BY MOUTH THREE TIMES DAILY AS NEEDED FOR MUSCLE SPASMS    Disp: 180 tablet    Refills: 1 (Pharmacy requested: Not specified)    Start: 3/13/2025    Class: E-Prescribe    Non-formulary For: Lumbar radiculopathy, S/P lumbar microdiscectomy    Last ordered: 1 month ago (1/14/2025) by Daquan Patel MD    Last refill: 2/12/2025    Rx #: [id number]       [id number] - [place], [place] - [address] [place] [address] [address] AT [place] [address]

## 2025-04-02 ENCOUNTER — ANCILLARY PROCEDURE (OUTPATIENT)
Dept: GENERAL RADIOLOGY | Facility: CLINIC | Age: 30
End: 2025-04-02
Attending: PHYSICIAN ASSISTANT
Payer: COMMERCIAL

## 2025-04-02 ENCOUNTER — OFFICE VISIT (OUTPATIENT)
Dept: NEUROSURGERY | Facility: CLINIC | Age: 30
End: 2025-04-02
Attending: PHYSICIAN ASSISTANT
Payer: COMMERCIAL

## 2025-04-02 VITALS — HEART RATE: 70 BPM | OXYGEN SATURATION: 97 % | SYSTOLIC BLOOD PRESSURE: 132 MMHG | DIASTOLIC BLOOD PRESSURE: 89 MMHG

## 2025-04-02 DIAGNOSIS — Z98.1 S/P LUMBAR FUSION: Primary | ICD-10-CM

## 2025-04-02 DIAGNOSIS — Z98.1 S/P LUMBAR FUSION: ICD-10-CM

## 2025-04-02 PROCEDURE — 72100 X-RAY EXAM L-S SPINE 2/3 VWS: CPT | Mod: TC | Performed by: INTERNAL MEDICINE

## 2025-04-02 PROCEDURE — 99213 OFFICE O/P EST LOW 20 MIN: CPT | Performed by: PHYSICIAN ASSISTANT

## 2025-04-02 ASSESSMENT — PAIN SCALES - GENERAL: PAINLEVEL_OUTOF10: MODERATE PAIN (5)

## 2025-04-02 NOTE — PROGRESS NOTES
NEUROSURGERY CLINIC PROGRESS NOTE    DATE OF VISIT: 4/2/2025    HPI:     Dipak Fuentes is a pleasant 29 year old adult who presents to the clinic today for a 6-week post-operative follow-up visit. On 02/19/2025 the patient underwent a Lumbar 5 to Sacral 1 stealth posterior instrumentation, bilateral decompression and transforaminal interbody fusion with local autograft, posterior arthrodesis using local autograft and allograft cancellous chips with Dr. Oquendo for lumbar radiculopathy, herniated nucleus pulposus of lumbosacral region, S/P lumbar microdiscectomy.     Today, the patient reports she is recovering well. Continues to have back discomfort aggravated with movement, sitting long periods, laying down and alleviated with medications. Intermittent pain down RLE although has improved since surgery. Weakness in RLE is stable. Denies NEW radicular symptoms, weakness, numbness, bowel/bladder changes. Ambulates with a cane and has for many years. Incision has healed well, no concerns. Has been tolerating light activity and walks on a daily basis.       Current Outpatient Medications   Medication Sig Dispense Refill    acetaminophen (TYLENOL) 500 MG tablet Take 500-1,000 mg by mouth every 6 hours as needed      albuterol (PROAIR HFA/PROVENTIL HFA/VENTOLIN HFA) 108 (90 Base) MCG/ACT inhaler Inhale 2 puffs into the lungs every 6 hours as needed for shortness of breath, wheezing or cough 18 g 2    medical cannabis (Patient's own supply) See Admin Instructions. (The purpose of this order is to document that the patient reports taking medical cannabis.  This is not a prescription, and is not used to certify that the patient has a qualifying medical condition.)      oxyCODONE (ROXICODONE) 5 MG tablet Take 1-2 tablets (5-10 mg) by mouth every 4 hours as needed for moderate pain. 40 tablet 0    pregabalin (LYRICA) 150 MG capsule Take 1 capsule (150 mg) by mouth 2 times daily. 180 capsule 0    senna-docusate  "(SENOKOT-S/PERICOLACE) 8.6-50 MG tablet Take 1 tablet by mouth 2 times daily as needed for constipation. 28 tablet 1    tiZANidine (ZANAFLEX) 4 MG tablet TAKE 1 TO 2 TABLETS(4 TO 8 MG) BY MOUTH THREE TIMES DAILY AS NEEDED FOR MUSCLE SPASMS 180 tablet 1     No current facility-administered medications for this visit.       Allergies   Allergen Reactions    Lamotrigine Other (See Comments)     Had known side effect of\"skin eating rash\"  Jaycob lauren syndrome    Latex      PN: Converted from LW Latex Sensitivity Flag    Cefaclor Rash     Tolerated cefazolin pre-op 01/2024       Past Medical History:   Diagnosis Date    Bipolar I disorder, single manic episode (H) 03/25/2013    Problem list name updated by automated process. Provider to review    Complication of anesthesia     panic after having anesthesia    History of substance use disorder     as a teenager, \"took extra adderall\"    Lumbar disc herniation with radiculopathy     Mild intermittent asthma without complication     Other chronic pain     low back and buttocks leg pain       Review Of Systems     ROS: 10 point ROS neg other than the symptoms noted above in the HPI.    OBJECTIVE:    There were no vitals taken for this visit.    Imaging:    Details    Reading Physician Reading Date Result Priority   Marni Gillespie MD  [phone] 4/2/2025      Narrative & Impression  EXAM: XR LUMBAR SPINE 2/3 VIEWS  LOCATION: [facility] [place]  DATE: 4/2/2025     INDICATION: Lumbar fusion  COMPARISON: 2/20/2025.                                                                      IMPRESSION: Alignment is within normal limits. Vertebral body heights are maintained. L5-S1 posterior fusion with interbody disc graft. Intact hardware. Disc spaces are maintained. Normal facets. Spinal cord stimulator battery pack in the posterior soft   tissues with leads entering the cord at L1-L2 and continuing superiorly outside the field-of-view. "       Radiographic Findings: Full radiological report in chart. I personally reviewed the images with the patient today.    Exam:    Patient appears comfortable and in no apparent distress. Moving all extremities.  Gait is non-antalgic.  CN II-XII grossly intact, alert and appropriate with conversation and following  commands  LLE 5/5 all muscle groups   RLE 4/5 hip flexion, KE KF, 3/5 DF, 5/5 PF- baseline per patient   Decreased sensation right L5 distribution - baseline per patient   Lumbar incision edges well approximated. Absent for edema, erythema, or drainage.    ASSESSMENT:    1. S/P lumbar fusion        PLAN:  -physical therapy referral placed    -activity: gradually increase activities as tolerated. You may lift 2-5lbs/week as tolerated. No lifting >50lbs. Increase cardiac activity and duration as tolerated. Listen to your body     -incision is well healed. You may submerge at this time. No restrictions.     -You are out of high risk timeframe for blood clot. Continue to walk daily as you have been doing.     -follow up: 6 weeks with Dr. Oquendo with XR prior     Contact our office questions, concerns, issues.    Cara Escoto PA-C  [facility]  [address] [place] [address]  Tel [phone]  Fax [phone]

## 2025-04-02 NOTE — PATIENT INSTRUCTIONS
-physical therapy referral placed    -activity: gradually increase activities as tolerated. You may lift 2-5lbs/week as tolerated. No lifting >50lbs. Increase cardiac activity and duration as tolerated. Listen to your body     -incision is well healed. You may submerge at this time. No restrictions.     -You are out of high risk timeframe for blood clot. Continue to walk daily as you have been doing.     -follow up: 6 weeks with Dr. Oquendo with XR prior     Contact our office questions, concerns, issues.    Cara Escoto PA-C  [facility]  [address] [place] [address]  Tel [phone]  Fax [phone]

## 2025-04-02 NOTE — LETTER
4/2/2025      Dipak Fuentes  [address] [facility] [address]      Dear Colleague,    Thank you for referring your patient, Dipak Fuentes, to the [facility] [place]. Please see a copy of my visit note below.    NEUROSURGERY CLINIC PROGRESS NOTE    DATE OF VISIT: 4/2/2025    HPI:     Dipak Fuentes is a pleasant 29 year old adult who presents to the clinic today for a 6-week post-operative follow-up visit. On 02/19/2025 the patient underwent a Lumbar 5 to Sacral 1 stealth posterior instrumentation, bilateral decompression and transforaminal interbody fusion with local autograft, posterior arthrodesis using local autograft and allograft cancellous chips with Dr. Oquendo for lumbar radiculopathy, herniated nucleus pulposus of lumbosacral region, S/P lumbar microdiscectomy.     Today, the patient reports she is recovering well. Continues to have back discomfort aggravated with movement, sitting long periods, laying down and alleviated with medications. Intermittent pain down RLE although has improved since surgery. Weakness in RLE is stable. Denies NEW radicular symptoms, weakness, numbness, bowel/bladder changes. Ambulates with a cane and has for many years. Incision has healed well, no concerns. Has been tolerating light activity and walks on a daily basis.       Current Outpatient Medications   Medication Sig Dispense Refill     acetaminophen (TYLENOL) 500 MG tablet Take 500-1,000 mg by mouth every 6 hours as needed       albuterol (PROAIR HFA/PROVENTIL HFA/VENTOLIN HFA) 108 (90 Base) MCG/ACT inhaler Inhale 2 puffs into the lungs every 6 hours as needed for shortness of breath, wheezing or cough 18 g 2     medical cannabis (Patient's own supply) See Admin Instructions. (The purpose of this order is to document that the patient reports taking medical cannabis.  This is not a prescription, and is not used to certify that the patient has a qualifying medical condition.)        "oxyCODONE (ROXICODONE) 5 MG tablet Take 1-2 tablets (5-10 mg) by mouth every 4 hours as needed for moderate pain. 40 tablet 0     pregabalin (LYRICA) 150 MG capsule Take 1 capsule (150 mg) by mouth 2 times daily. 180 capsule 0     senna-docusate (SENOKOT-S/PERICOLACE) 8.6-50 MG tablet Take 1 tablet by mouth 2 times daily as needed for constipation. 28 tablet 1     tiZANidine (ZANAFLEX) 4 MG tablet TAKE 1 TO 2 TABLETS(4 TO 8 MG) BY MOUTH THREE TIMES DAILY AS NEEDED FOR MUSCLE SPASMS 180 tablet 1     No current facility-administered medications for this visit.       Allergies   Allergen Reactions     Lamotrigine Other (See Comments)     Had known side effect of\"skin eating rash\"  Jaycob lauren syndrome     Latex      PN: Converted from LW Latex Sensitivity Flag     Cefaclor Rash     Tolerated cefazolin pre-op 01/2024       Past Medical History:   Diagnosis Date     Bipolar I disorder, single manic episode (H) 03/25/2013    Problem list name updated by automated process. Provider to review     Complication of anesthesia     panic after having anesthesia     History of substance use disorder     as a teenager, \"took extra adderall\"     Lumbar disc herniation with radiculopathy      Mild intermittent asthma without complication      Other chronic pain     low back and buttocks leg pain       Review Of Systems     ROS: 10 point ROS neg other than the symptoms noted above in the HPI.    OBJECTIVE:    There were no vitals taken for this visit.    Imaging:    Details    Reading Physician Reading Date Result Priority   Marni Gillespie MD  [phone] 4/2/2025      Narrative & Impression  EXAM: XR LUMBAR SPINE 2/3 VIEWS  LOCATION: [facility] [place]  DATE: 4/2/2025     INDICATION: Lumbar fusion  COMPARISON: 2/20/2025.                                                                      IMPRESSION: Alignment is within normal limits. Vertebral body heights are maintained. L5-S1 posterior fusion " with interbody disc graft. Intact hardware. Disc spaces are maintained. Normal facets. Spinal cord stimulator battery pack in the posterior soft   tissues with leads entering the cord at L1-L2 and continuing superiorly outside the field-of-view.       Radiographic Findings: Full radiological report in chart. I personally reviewed the images with the patient today.    Exam:    Patient appears comfortable and in no apparent distress. Moving all extremities.  Gait is non-antalgic.  CN II-XII grossly intact, alert and appropriate with conversation and following  commands  LLE 5/5 all muscle groups   RLE 4/5 hip flexion, KE KF, 3/5 DF, 5/5 PF- baseline per patient   Decreased sensation right L5 distribution - baseline per patient   Lumbar incision edges well approximated. Absent for edema, erythema, or drainage.    ASSESSMENT:    1. S/P lumbar fusion        PLAN:  -physical therapy referral placed    -activity: gradually increase activities as tolerated. You may lift 2-5lbs/week as tolerated. No lifting >50lbs. Increase cardiac activity and duration as tolerated. Listen to your body     -incision is well healed. You may submerge at this time. No restrictions.     -You are out of high risk timeframe for blood clot. Continue to walk daily as you have been doing.     -follow up: 6 weeks with Dr. Oquendo with XR prior     Contact our office questions, concerns, issues.    Cara Escoto PA-C  [facility]  [address] [place] [address]  Tel [phone]  Fax [phone]        Again, thank you for allowing me to participate in the care of your patient.        Sincerely,        Cara Palafox PA-C    Electronically signed

## 2025-04-02 NOTE — NURSING NOTE
"Dipak Fuentes is a 29 year old adult who presents for:  Chief Complaint   Patient presents with    Surgical Followup     6 week follow-up          Vitals:    Vitals:    04/02/25 1346   BP: 132/89   Pulse: 70   SpO2: 97%       BMI:  Estimated body mass index is 34.3 kg/m  as calculated from the following:    Height as of 2/4/25: 6' 1\" (1.854 m).    Weight as of 2/21/25: 260 lb (117.9 kg).    Pain Score:  Moderate Pain (5)        Amendo Phorn      "

## 2025-04-03 ENCOUNTER — THERAPY VISIT (OUTPATIENT)
Dept: PHYSICAL THERAPY | Facility: CLINIC | Age: 30
End: 2025-04-03
Attending: PHYSICIAN ASSISTANT
Payer: COMMERCIAL

## 2025-04-03 DIAGNOSIS — Z98.1 S/P LUMBAR FUSION: ICD-10-CM

## 2025-04-03 NOTE — PROGRESS NOTES
PHYSICAL THERAPY EVALUATION  Type of Visit: Evaluation    {Disappearing TIP  Adult Abuse Screen not completed in this Encounter. Please complete screening!:552902}      {TIP  The Fall Risk Screen has not been completed. Complete the screen!:519330}    Subjective   Pt comes in s/p L5-S1 fusion at this point movement restrictions have been lifted. With slow progression of 2-5 lb /week increases in lifting as tolerated. Pt notes a partial increase in bladder control complications.   {Disappearing TIP  Health History pop-up / flowsheet :755786}   Presenting condition or subjective complaint: post op recovery  Date of onset: 02/19/25 {Disappearing TIP  Date of onset/injury :320202}   Relevant medical history: Asthma; Mental Illness   Dates & types of surgery: microdiscectomy x3 2021 2023 spinla fusion 2025 scs implant 2024   Past Surgical History:   Procedure Laterality Date    DISCECTOMY LUMBAR MINIMALLY INVASIVE ONE LEVEL Right 10/7/2020    Procedure: Right L5-S1 minimally invasive microdiskectomy;  Surgeon: Juancho Oquendo MD;  Location:  OR    DISCECTOMY LUMBAR MINIMALLY INVASIVE ONE LEVEL Right 9/21/2022    Procedure: Right L5-S1 minimally invasive redo microdiskectomy;  Surgeon: Juancho Oquendo MD;  Location:  OR    INSERT STIMULATOR AND LEADS INTERNAL DORSAL COLUMN N/A 1/30/2024    Procedure: Spinal cord stimulator placement, phase I and II combined, placement of percutaneous type electrodes in the thoracolumbar spine and placement of generator/battery over the right buttock  **Latex Allergy**;  Surgeon: Giovanni Alfonso MD;  Location: [facility] OR    OPTICAL TRACKING SYSTEM FUSION POSTERIOR SPINE LUMBAR Bilateral 2/19/2025    Procedure: Lumbar 5 to Sacral 1 stealth posterior instrumentation, bilateral decompression and transforaminal interbody fusion with local autograft, posterior arthrodesis using local autograft and allograft cancellous chips;  Surgeon: Juancho Oquendo MD;  Location:   OR    WISDOM ST GUIDEWIRE       Prior diagnostic imaging/testing results: MRI; CT scan; X-ray     Prior therapy history for the same diagnosis, illness or injury: Yes 2023physical therapy    Living Environment  Social support: With a significant other or spouse   Type of home: House   Stairs to enter the home: Yes 1 Is there a railing: No     Ramp: No   Stairs inside the home: Yes 26 Is there a railing: Yes     Help at home: Self Cares (home health aide/personal care attendant, family, etc); Home management tasks (cooking, cleaning); Medication and/or finances; Home and Yard maintenance tasks; Assist for driving and community activities  Equipment owned: Straight Cane; Walker with wheels; Crutches; Grab bars     Employment: No    Hobbies/Interests: [employer] games disc golf sewing IT    Patient goals for therapy: walk aroundmy house with less pain and not have to use my cane as much at home       Objective   LUMBAR SPINE EVALUATION  PAIN: Pain Level at Rest: 5/10  Pain Level with Use: 7/10  Pain Location: lumbar spine  Pain Quality: constant aching in back and constant burning in R LE down to toes (always middle toe, occasionally notes symptoms in all of toes)  Pain Frequency: constant  Pain is Worst: daytime or nighttime  Pain is Exacerbated By: prolonged immobility  Pain is Relieved By: alternating between standing and sitting regularly  POSTURE: {BRENNA PT Posture (Optional):218611}  GAIT:    ROM: {brenna pt rom hip lumbar (Optional):639669}  MYOTOMES: {BRENNA PT Myotomes (Optional):975553}  DTR S: {BRENNA PT DTRs (Optional):095480}  CORD SIGNS: {BRENNA PT Cord Signs (Optional):993089}  DERMATOMES: {BRENNA PT Dermatomes (Optional):428626}  NEURAL TENSION: {BRENNA PT Neural Tension (Optional):390780}  FLEXIBILITY: {BRENNA PT Flexibility (Optional):291945}  FUNCTIONAL TESTS: {BRENNA PT Functional Tests (Optional):587503}  PALPATION: {BRENNA PT Palpation Lumbar (Optional):358111}  SPINAL SEGMENTAL CONCLUSIONS: {BRENNA PT LUMB SPINAL SEG  CONCLUSIONS:134960}    Assessment & Plan   CLINICAL IMPRESSIONS  Medical Diagnosis: s/p lumbar fusion  {Disappearing TIP  Medical Dx :038764}  Treatment Diagnosis:   {Disappearing TIP  Treatment Dx :291144}  Impression/Assessment: {adam pt assessment:769598}    Clinical Decision Making (Complexity):  Clinical Presentation: {Presentation:728786}  Clinical Presentation Rationale: based on medical and personal factors listed in PT evaluation  Clinical Decision Making (Complexity): {Complexity:962603}    PLAN OF CARE  Treatment Interventions:  {:866435}    Long Term Goals {Disappearing TIP  Goals :610715}         {Disapparing TIP  Frequency/Duration :099716}  Frequency of Treatment: 1x/week  Duration of Treatment: 6 weeks    Recommended Referrals to Other Professionals: {adam referrals suggested:720915}  Education Assessment: {Disapparing TIP  Patient Education :635516}  Learner/Method: Patient;Demonstration;Pictures/Video;No Barriers to Learning    Risks and benefits of evaluation/treatment have been explained.   Patient/Family/caregiver agrees with Plan of Care.     Evaluation Time:   {Disappearing TIP  Eval Minutes :329856}     {EVAL ONLY:476531}     Signing Clinician: MARK GRISSOM

## 2025-04-07 RX ORDER — SPIRONOLACTONE 100 MG/1
100 TABLET, FILM COATED ORAL DAILY
Qty: 90 TABLET | Refills: 1 | Status: SHIPPED | OUTPATIENT
Start: 2025-04-07

## 2025-04-07 RX ORDER — ESTRADIOL 2 MG/1
2 TABLET ORAL DAILY
Qty: 90 TABLET | Refills: 1 | Status: SHIPPED | OUTPATIENT
Start: 2025-04-07

## 2025-04-10 ENCOUNTER — THERAPY VISIT (OUTPATIENT)
Dept: PHYSICAL THERAPY | Facility: CLINIC | Age: 30
End: 2025-04-10
Attending: PHYSICIAN ASSISTANT
Payer: COMMERCIAL

## 2025-04-10 DIAGNOSIS — Z98.1 S/P LUMBAR FUSION: Primary | ICD-10-CM

## 2025-04-14 ENCOUNTER — PATIENT OUTREACH (OUTPATIENT)
Dept: CARE COORDINATION | Facility: CLINIC | Age: 30
End: 2025-04-14
Payer: COMMERCIAL

## 2025-04-15 ENCOUNTER — MYC REFILL (OUTPATIENT)
Dept: PALLIATIVE MEDICINE | Facility: CLINIC | Age: 30
End: 2025-04-15
Payer: COMMERCIAL

## 2025-04-15 DIAGNOSIS — M54.16 LUMBAR RADICULOPATHY: ICD-10-CM

## 2025-04-15 RX ORDER — PREGABALIN 150 MG/1
150 CAPSULE ORAL 2 TIMES DAILY
Qty: 180 CAPSULE | Refills: 2 | Status: SHIPPED | OUTPATIENT
Start: 2025-04-15

## 2025-04-15 NOTE — TELEPHONE ENCOUNTER
Received fax from pharmacy requesting refill(s) for     pregabalin (LYRICA) 150 MG capsule    Date last filled 2/2/2025    Last Appt Date:2/10/2025    Next Appt scheduled: 5/12/2025    Pharmacy:   [id number] - [place], [place] - [address] [place] [address] [address] AT [facility] [address],    Will route for processing    Tracie Galo [facility]

## 2025-04-17 ENCOUNTER — THERAPY VISIT (OUTPATIENT)
Dept: PHYSICAL THERAPY | Facility: CLINIC | Age: 30
End: 2025-04-17
Attending: PHYSICIAN ASSISTANT
Payer: COMMERCIAL

## 2025-04-17 DIAGNOSIS — Z98.1 S/P LUMBAR FUSION: Primary | ICD-10-CM

## 2025-04-25 ENCOUNTER — THERAPY VISIT (OUTPATIENT)
Dept: PHYSICAL THERAPY | Facility: CLINIC | Age: 30
End: 2025-04-25
Payer: COMMERCIAL

## 2025-04-25 DIAGNOSIS — Z98.1 S/P LUMBAR FUSION: Primary | ICD-10-CM

## 2025-04-25 PROCEDURE — 97110 THERAPEUTIC EXERCISES: CPT | Mod: GP

## 2025-04-28 ENCOUNTER — PATIENT OUTREACH (OUTPATIENT)
Dept: CARE COORDINATION | Facility: CLINIC | Age: 30
End: 2025-04-28
Payer: COMMERCIAL

## 2025-05-01 ENCOUNTER — THERAPY VISIT (OUTPATIENT)
Dept: PHYSICAL THERAPY | Facility: CLINIC | Age: 30
End: 2025-05-01
Payer: COMMERCIAL

## 2025-05-01 DIAGNOSIS — Z98.1 S/P LUMBAR FUSION: Primary | ICD-10-CM

## 2025-05-01 PROCEDURE — 97110 THERAPEUTIC EXERCISES: CPT | Mod: GP

## 2025-05-08 ENCOUNTER — THERAPY VISIT (OUTPATIENT)
Dept: PHYSICAL THERAPY | Facility: CLINIC | Age: 30
End: 2025-05-08
Payer: COMMERCIAL

## 2025-05-08 DIAGNOSIS — Z98.1 S/P LUMBAR FUSION: Primary | ICD-10-CM

## 2025-05-11 NOTE — PROGRESS NOTES
Date:  5/12/2025      [facility]     Dipak Fuentes is a 29 year old male with PMH significant for lumbar DDD with radiculopathy, s/p microdiscectomy x2, bipolar depression, substance use disorder in prolonged remission, asthma, h/o substance abuse in remission, depression, gender dysphoria who is seen today for follow-up on chronic pain treatment.  Original Consult: 2/2/2023.      Updates since last appointment on 2/10/2025.  Patient presents alone using a cane.    2/19/2025 Dr. Juancho Oquendo L5-S1 fusion.  Dr. Oquendo did his other 2 lumbar surgeries.  Next appt 5/20/2025.     Medtronic SCS implanted by Dr. Alfonso, Neurosurgeon - will have it reprogrammed soon.     Oxycodone last dose was 1.5 months ago.       History:  Lumbar microdiscectomy surgery 9/2022 with persistent pain. Treated with PT (pelvic, pool and land). Symptom management with cannabis, pregabalin and tizanidine; underwent successful SCS trial 12/12/23-12/19/23, implant on 1/30/24.        Interventions/Injections:   L5-S1 ILESI with Dr. Schwarz on 9/18/24- 2 days of significant relief, then pain returned  8/25/22: right S1-S2 transforaminal epidural corticosteroid injection with Dr. Shay - worsened pain  3/10/20: Lumbar L5-S1 interlaminar epidural steroid injection with Dr. Esteves- had 2 weeks of nothing, then 2 weeks of relief and then back to painful       Progress Notes Reviewed:  5/8/2025 PT s/p lumbar fusion  4/18/2025 Dr. Deirdre Milligan, Internal Medicine  4/2/2025 Cara Palafox PA-C - 6 wk post-operative follow up visit    Any hospitalizations/ER/UC visits since last appointment:  no  Any falls/accidents since last appointment:  no      Primary Pain :  Low back with radiation into right side numbness from the knee down       Characteristics:  Any changes in pain characteristics since last appointment?  no    Aggravating factors include: Sitting on hard chairs, lying down makes back pain worse, sitting and standing  makes right leg pain worse  Relieving factors include: icing helps some, tends to provide more immediate relief, delta 9 THC helps reduce pain, SCS     2025 current pain on 0/10 VAS:  3-4 Worst pain:  8   Best pain:   1-2      2/10/2025 current pain on 0/10 VAS:  6    Worst pain:  8    Best pain:  3       Current pain rating Moderate Pain (4)                                                            PE.67        Current Pain Related Medications:  Any medications changes since last appointment: yes    Tizanidine 4 - 8mg TID - usually 8mg  Pregabalin 150 mg BID   Medical Cannabis P#  last certified 2024 with ZENA Mendenhall CNP  APAP  2 tabs TID prn  Depakote ER 250mg q hs  Spironolactone 100mg daily    Ibuprofen will stop next Wednesday.     Anticoagulation:  none  Implanted Devices:  Medtronic SCS per Dr. Alfonso       [place]  was last reviewed on 2025 and was appropriate.  3/7/2025 Oxycodone 5mg 40 tabs for 4 days  2025 Oxycodone 5mg 40 tabs for 3 day  2025 Oxycodone 5mg 28 tabs for 7 days  2025 Pregabalin 150mg 180 tabs for 90 day      Therapies discuss on initial consult:   Physical therapy, Pain Psychology, TENs unit, Grounding Mat, Frequency Specific Micro Current, Anti-inflammatory Lifestyle      Employment:  He works in IT.     Exercise:  Enjoys hiking,     Hobbies:  Programming on computers, gardening.     Mental Health:    Endorses bipolar/anxiety/depression.  He follows with a therapist once a week.                 Plan on 2/10/2025:  No refills needed today.      Surgery with Dr. Oquendo 2025.      Follow-up in 3 months.      Updates since last appointment on 10/29/2025 with ZENA Mendenhall, CNP.  He presents alone using a single point cane.    SCS implanted by Dr. Alfonso, Neurosurgeon - no issues    2025 lumbar surgery with Dr. Juancho Oquendo L5-S1 fusion.  Dr. Oquendo did his other 2 lumbar surgeries.    History:  Lumbar microdiscectomy surgery 2022 with  persistent pain. Treated with PT (pelvic, pool and land). Symptom management with cannabis, pregabalin and tizanidine; underwent successful SCS trial 12/12/23-12/19/23, implant on 1/30/24.       Interventions/Injections:   L5-S1 ILESI with Dr. Schwarz on 9/18/24- 2 days of significant relief, then pain returned  8/25/22: right S1-S2 transforaminal epidural corticosteroid injection with Dr. Shay - worsened pain  3/10/20: Lumbar L5-S1 interlaminar epidural steroid injection with Dr. Esteves- had 2 weeks of nothing, then 2 weeks of relief and then back to painful      Progress Notes Reviewed:  1/27/2025 Dr. Jennifer Johnson - gender dysphoria  12/10/2024 ED to Hospital Admission  11/18/2024 Maria Guadalupe Isabel, Pain Psychology - has a different therapist now.  11/12/2024 Dr. Juancho Oquendo, Neurosurgeon - h/o 2 prior right L5-S1 microdiscectomies in 2020 and 2022. After those microdiscectomies he had a spinal cord stimulator placed at the beginning of 2024 and was doing reasonably well, but had worsening back and right lower extremity symptoms in an S1 distribution beginning over the summer.  Plan: The patient has had 2 prior surgeries in this area, given that this is his third operation I have recommended an L5-S1 fusion.   10/29/2024 ZENA Mendenhall, CNP    Any hospitalizations/ER/UC visits since last appointment:  no  Any falls/accidents since last appointment:  no      Primary Pain :  Low back with radiation into right side numbness from the knee down    Characteristics:  Any changes in pain characteristics since last appointment?  no    Aggravating factors include: Sitting on hard chairs, lying down makes back pain worse, sitting and standing makes right leg pain worse  Relieving factors include: icing helps some, tends to provide more immediate relief, delta 9 THC helps reduce pain, SCS        2/10/2025 current pain on 0/10 VAS:  6    Worst pain:   8    Best pain:  3       Current pain rating Moderate  Pain (4)     PE.67      Current Pain Related Medications:  Any medications changes since last appointment: no    Tizanidine 4 - 8mg  TID - usually 8mg  Pregabalin 150 mg BID -   Medical Cannabis P#  last certified 2024 with ZENA Mendenhall CNP  APAP  2 tabs TID  Ibuprofen will stop next Wednesday.    Anticoagulation:  none  Implanted Devices:  Medtronic SCS per Dr. Alfonso        Therapies discuss on initial consult:   Physical therapy, Pain Psychology, TENs unit, Grounding Mat, Frequency Specific Micro Current, Anti-inflammatory Lifestyle    Employment:  He works in [employer].    Exercise:  Enjoys hiking,    Hobbies:  Programming on computers, gardening.    Mental Health:    Endorses bipolar/anxiety/depression.  He follows with a therapist once a week.      Recommendations from last visit 10/29/2025 with ZENA Mendenhall, CNP:  - Lumbar MRI ordered. Valium 5 mg X 2 tablets prescribed.   - Continue plan to meet with Homa Isabel again as an additional layer of support.  - Wait to consider reprogramming again until after MRI   ____________________________________________________________    Interval History     - Pain is better with standing. His right leg feels weak with walking. After walking for a period of time, he has nausea, urge to urinate, weakness.  - Primarily right leg pain. Back pain is from occasional irritation around the battery implant site only.   - Left his job due to pressure to return to office, this has reduced his stress level somwhat.   - Has now had worsening pain for a duration of 3 months.  - Tried AQ, not helpful (in the lower back and right leg) Felt worse.  - He has met with [employer] representative x 3 in August for reprogramming, last on 24.   -Prior to this recent pain episode, he was able to walk without a cane and tolerate weight bearing on his right leg.   - Lyrica at 150 BID, tizanidine prn and THC are helpful. .    - Not sleeping well due to increased pain with  "lying flat. He notes spasms and severe pain in his right leg when lying flat.      Current pain medications:  Tizanidine 4 mg TID prn  Pregabalin 150 mg BID    Pain description:  Low back with radiation into right side numbness from the knee down  Current pain rating Data Unavailable  Aggravating factors include: Sitting on hard chairs, lying down makes back pain worse, sitting and standing makes right leg pain worse  Relieving factors include: icing helps some, tends to provide more immediate relief, delta 9 THC helps reduce pain, SCS  Pain Intensity and Interference in the past week 7.67    PAIN MANAGEMENT TREATMENT HISTORY  1. MEDICATIONS:  Opiates: Oxycodone after surgery- Helped  NSAIDS: Ibuprofen.  Medication was not helpful , only takes it when his wife and mom \"bug me to take it because I am in pain\" Diclofenac -not helpful  Muscle Relaxants: Cyclobenzaprine - Medication was helpful  Methocarbamol -Medication was helpful; Tizanidine -Medication was helpful  Helps with spasms, tizanidine most helpful  Anti-depressants: Remeron most recently - helpful. Mood stabilizers have been helpful in the past; tried on many anti-psychotics: had hallucinations, lithium: made him hard to be around, lamotrigine - skin rash. Now uses THC to manage polo with success  Anxiolytics: tried hydroxyzine in the past  Neuropathics: Gabapentin -was not helpful, trialed once at 300 mg TID, then again at 600 mg TID. Lyrica -helpful  Topicals: CBD spray - not helpful, lidocaine patches - not helpful  Adjuvant pain medications: THC helps his neuropathic pain sleep  2. PHYSICAL THERAPY:   Pool PT at [facility] with Sara Ro, PT was helpful, continues HEP  Pelvic floor therapy helpful for urinary symptoms after back surgery   Traditional PT after surgery not helpful.  3. PAIN PSYCHOLOGY:   Therapy in the past for biopolar management, not in several years  4. SURGERY:   1/30/24 spinal cord stimulator implant and surgery with " Dr. Alfonso  2022 Right L5-S1 minimally invasive microdiscectomy with Dr. Oquendo  10/7/2020  Right L5-S1 minimally invasive microdiscectomy with Dr. Oquendo  5. INJECTIONS:   L5-S1 ILESI with Dr. Schwarz on 24- 2 days of significant relief, then pain returned  22: right S1-S2 transforaminal epidural corticosteroid injection with Dr. Shay - worsened pain  3/10/20: Lumbar L5-S1 interlaminar epidural steroid injection with Dr. Esteves- had 2 weeks of nothing, then 2 weeks of relief and then back to painful  6. COMPLEMENTARY THERAPY:  Chiropractic  not helpful  Acupuncture/acupressure: tried, not helpful and made pain worse  TENS unit  not helpful made pain worse  cold/cold packs: helpful  distraction/mindfulness: somewhat helpful   meditation/guided imagery: somewhat helpful    Imagin2024 Thoracic MRI  IMPRESSION:  1.  Multilevel thoracic spondylosis with multiple small disc herniations as detailed above. The largest is an inferiorly dissecting disc extrusion at T4-T5 with disc material indenting the ventral cord and contributing to mild spinal canal stenosis.  2.  No greater than mild spinal canal stenosis in the thoracic spine.  3.  No substantial neural foraminal stenosis.  4.  No cord signal abnormality.    10/7/2022 Lumbar MRI at [facility]          Lumbar MRI on 10/23/24  Reviewed with patient today. Report notes no change since prior MRI, but was compared to his pre-operative MRI in 2022, not his postoperative MRI in 10/2022.  Social History   Social History     Tobacco Use    Smoking status: Former     Current packs/day: 0.50     Average packs/day: 0.5 packs/day for 5.0 years (2.5 ttl pk-yrs)     Types: Cigarettes, Other     Passive exposure: Past    Smokeless tobacco: Current    Tobacco comments:     vaping   Vaping Use    Vaping status: Every Day    Substances: Nicotine, THC, CBD    Devices: Pre-filled or refillable cartridge, Refillable tank   Substance Use Topics    Alcohol use: Not Currently      Comment: over 6 months    Drug use: Yes     Types: Marijuana     Comment: bipolar-concentrates 1-2 x day      Social History     Social History Narrative    .     Works in IT    Likes to bake for fun, mostly bread. Likes to play video games with his daughter.    8 y.o. daughter       Medications and Allergies reviewed.  Lamotrigine, Latex, and Cefaclor  Medications   has a current medication list which includes the following prescription(s): acetaminophen, albuterol, estradiol, medical cannabis, oxycodone, pregabalin, senna-docusate, spironolactone, and tizanidine.    Objective   /82 (BP Location: Right arm)   Pulse 86   SpO2 97%     Constitutional: Well developed, well nourished, appears stated age. No acute distress.  Gait is steady.  HEENT: Head atraumatic, normocephalic. Eyes without conjunctival injection or jaundice.   Skin: No obvious lesions, or petechiae of exposed skin. Mulitple tattoos present.  Extremities: Peripheral pulses intact. No clubbing, cyanosis, or edema. Moves all extremities.  Psychiatric/mental status: Alert, without lethargy or stupor. Speech fluent. Appropriate affect. Mood normal. Able to follow commands without difficulty.   Musculoskeletal exam:        Diagnosis:  (Z98.890) S/P lumbar microdiscectomy  (primary encounter diagnosis)  Comment:   Plan:     (M54.16) Lumbar radiculopathy  Comment:   Plan:     (Z96.89) Spinal cord stimulator status  Comment:   Plan:     (M51.27) Herniated nucleus pulposus of lumbosacral region  Comment:   Plan:     (G89.29) Chronic intractable pain  Comment:   Plan:         Plan on 5/12/2025:  Continue:  Tizanidine 4 - 8mg TID   Pregabalin 150 mg BID   Medical Cannabis P#  last certified 11/2024 with ZENA Mendenhall CNP  APAP  2 tabs TID prn  Depakote ER 250mg q hs  Spironolactone 100mg daily     Anticoagulation:  none  Implanted Devices:  Medtronic SCS per Dr. Alfonso       Follow-up in clinic in 6 months.      ZENA Watson,  [facility]            BILLING TIME DOCUMENTATION:   The total TIME spent on this patient on the date of the encounter/appointment was 24 minutes.

## 2025-05-12 ENCOUNTER — OFFICE VISIT (OUTPATIENT)
Dept: PALLIATIVE MEDICINE | Facility: CLINIC | Age: 30
End: 2025-05-12
Attending: NURSE PRACTITIONER
Payer: COMMERCIAL

## 2025-05-12 VITALS — OXYGEN SATURATION: 97 % | HEART RATE: 86 BPM | SYSTOLIC BLOOD PRESSURE: 138 MMHG | DIASTOLIC BLOOD PRESSURE: 82 MMHG

## 2025-05-12 DIAGNOSIS — G89.29 CHRONIC INTRACTABLE PAIN: ICD-10-CM

## 2025-05-12 DIAGNOSIS — Z98.890 S/P LUMBAR MICRODISCECTOMY: ICD-10-CM

## 2025-05-12 DIAGNOSIS — M54.16 LUMBAR RADICULOPATHY: Primary | ICD-10-CM

## 2025-05-12 DIAGNOSIS — Z96.89 SPINAL CORD STIMULATOR STATUS: ICD-10-CM

## 2025-05-12 PROCEDURE — 99024 POSTOP FOLLOW-UP VISIT: CPT | Performed by: NURSE PRACTITIONER

## 2025-05-12 PROCEDURE — 3075F SYST BP GE 130 - 139MM HG: CPT | Performed by: NURSE PRACTITIONER

## 2025-05-12 PROCEDURE — 3079F DIAST BP 80-89 MM HG: CPT | Performed by: NURSE PRACTITIONER

## 2025-05-12 PROCEDURE — 1125F AMNT PAIN NOTED PAIN PRSNT: CPT | Performed by: NURSE PRACTITIONER

## 2025-05-12 RX ORDER — DIVALPROEX SODIUM 250 MG/1
250 TABLET, FILM COATED, EXTENDED RELEASE ORAL AT BEDTIME
COMMUNITY
Start: 2025-04-17

## 2025-05-12 ASSESSMENT — PAIN SCALES - GENERAL: PAINLEVEL_OUTOF10: MODERATE PAIN (4)

## 2025-05-12 NOTE — PATIENT INSTRUCTIONS
Plan on 5/12/2025:  Continue:  Tizanidine 4 - 8mg TID   Pregabalin 150 mg BID   Medical Cannabis P#  last certified 11/2024 with ZENA Mendenhall CNP  APAP  2 tabs TID prn  Depakote ER 250mg q hs  Spironolactone 100mg daily     Anticoagulation:  none  Implanted Devices:  Medtronic SCS per Dr. Alfonso       Follow-up in clinic in 6 months.      ZENA Watson, CNP  [facility], [facility] Number:  [phone]  Call with any questions about your care and for scheduling assistance.   Calls are returned Monday through Friday between 8 AM and 4:30 PM. We usually get back to you within 2 business days depending on the issue/request.    If we are prescribing your medications:  For opioid medication refills, call the clinic or send a [employer] message 7 days in advance.  Please include:  Name of requested medication  Name of the pharmacy.  For non-opioid medications, call your pharmacy directly to request a refill. Please allow 3-4 days to be processed.   Per [place] State Law:  All controlled substance prescriptions must be filled within 30 days of being written.    For those controlled substances allowing refills, pickup must occur within 30 days of last fill.      We believe regular attendance is key to your success in our program!    Any time you are unable to keep your appointment we ask that you call us at least 24 hours in advance to cancel.This will allow us to offer the appointment time to another patient.   Multiple missed appointments may lead to dismissal from the clinic.

## 2025-05-14 ENCOUNTER — THERAPY VISIT (OUTPATIENT)
Dept: PHYSICAL THERAPY | Facility: CLINIC | Age: 30
End: 2025-05-14
Payer: COMMERCIAL

## 2025-05-14 DIAGNOSIS — Z98.890 S/P LUMBAR MICRODISCECTOMY: ICD-10-CM

## 2025-05-14 DIAGNOSIS — M54.16 LUMBAR RADICULOPATHY: ICD-10-CM

## 2025-05-14 DIAGNOSIS — Z98.1 S/P LUMBAR FUSION: Primary | ICD-10-CM

## 2025-05-14 PROCEDURE — 97110 THERAPEUTIC EXERCISES: CPT | Mod: GP

## 2025-05-19 ENCOUNTER — TELEPHONE (OUTPATIENT)
Dept: NEUROSURGERY | Facility: CLINIC | Age: 30
End: 2025-05-19
Payer: COMMERCIAL

## 2025-05-19 NOTE — TELEPHONE ENCOUNTER
Attempted to reach patient to remind them about appointment scheduled with Juancho Oqunedo MD on 5/20/25 in our [place] clinic.    A voicemail was left with a call back number if the patient has questions or would like to reschedule.

## 2025-05-20 ENCOUNTER — OFFICE VISIT (OUTPATIENT)
Dept: NEUROSURGERY | Facility: CLINIC | Age: 30
End: 2025-05-20
Attending: NEUROLOGICAL SURGERY
Payer: COMMERCIAL

## 2025-05-20 ENCOUNTER — ANCILLARY PROCEDURE (OUTPATIENT)
Dept: GENERAL RADIOLOGY | Facility: CLINIC | Age: 30
End: 2025-05-20
Attending: PHYSICIAN ASSISTANT
Payer: COMMERCIAL

## 2025-05-20 VITALS — HEART RATE: 98 BPM | DIASTOLIC BLOOD PRESSURE: 88 MMHG | OXYGEN SATURATION: 100 % | SYSTOLIC BLOOD PRESSURE: 134 MMHG

## 2025-05-20 DIAGNOSIS — Z98.1 S/P LUMBAR FUSION: Primary | ICD-10-CM

## 2025-05-20 DIAGNOSIS — Z98.1 S/P LUMBAR FUSION: ICD-10-CM

## 2025-05-20 PROCEDURE — 1125F AMNT PAIN NOTED PAIN PRSNT: CPT | Performed by: NEUROLOGICAL SURGERY

## 2025-05-20 PROCEDURE — 99024 POSTOP FOLLOW-UP VISIT: CPT | Performed by: NEUROLOGICAL SURGERY

## 2025-05-20 PROCEDURE — 72100 X-RAY EXAM L-S SPINE 2/3 VWS: CPT | Mod: TC | Performed by: RADIOLOGY

## 2025-05-20 PROCEDURE — 3075F SYST BP GE 130 - 139MM HG: CPT | Performed by: NEUROLOGICAL SURGERY

## 2025-05-20 PROCEDURE — 3079F DIAST BP 80-89 MM HG: CPT | Performed by: NEUROLOGICAL SURGERY

## 2025-05-20 ASSESSMENT — PAIN SCALES - GENERAL: PAINLEVEL_OUTOF10: MODERATE PAIN (4)

## 2025-05-20 NOTE — LETTER
"5/20/2025      Dipak Fuentes  [address] [id number]      Dear Colleague,    Thank you for referring your patient, Dipak Fuentes, to the [facility] [place]. Please see a copy of my visit note below.    It was a pleasure to see Dipak Fuentes today in Neurosurgery Clinic. She is a 29 year old adult who underwent:    February 19, 2025        Operative Note     Pre-operative diagnosis: Lumbar radiculopathy [M54.16]  Herniated nucleus pulposus of lumbosacral region [M51.27]  S/P lumbar microdiscectomy [Z98.890]   Post-operative diagnosis same   Procedure: Procedure(s):  Lumbar 5 to Sacral 1 stealth posterior instrumentation, bilateral decompression and transforaminal interbody fusion with local autograft, posterior arthrodesis using local autograft and allograft cancellous chips    Surgeon(s): Surgeons and Role:     * Juancho Oquendo MD - Primary     * Richy Schumacher PA-C   Estimated blood loss:             100ml   Specimens: * No specimens in log *   Findings: L5-S1 fusion performed as noted above.  Good decompression bilaterally.     She seems to be doing well.  Her  radicular pain is back to the chronic levels that she had prior to the herniation and fusion surgery.  She does get back pain particularly with sitting for extended periods of time.    Vitals:    05/20/25 1343   BP: 134/88   Pulse: 98   SpO2: 100%     Estimated body mass index is 34.3 kg/m  as calculated from the following:    Height as of 2/4/25: 1.854 m (6' 1\").    Weight as of 2/21/25: 117.9 kg (260 lb).  Moderate Pain (4)    Well-healed lumbar incisions  No tenderness to palpation bilateral lower extremity strength is 5 out of 5      Imaging: X-rays performed today show stable alignment of the hardware and position of the spine.  Imaging was reviewed with the patient shown to the patient clinic today.    Assessment: Status post lumbar fusion    Plan: " Overall I think she is doing well.  I would like to see her back in 9 months with x-rays of the lumbar spine.       Again, thank you for allowing me to participate in the care of your patient.        Sincerely,        Juancho Oquendo MD    Electronically signed

## 2025-05-20 NOTE — PROGRESS NOTES
"It was a pleasure to see Dipak Fuentes today in Neurosurgery Clinic. She is a 29 year old adult who underwent:    February 19, 2025     [facility]   Operative Note     Pre-operative diagnosis: Lumbar radiculopathy [M54.16]  Herniated nucleus pulposus of lumbosacral region [M51.27]  S/P lumbar microdiscectomy [Z98.890]   Post-operative diagnosis same   Procedure: Procedure(s):  Lumbar 5 to Sacral 1 stealth posterior instrumentation, bilateral decompression and transforaminal interbody fusion with local autograft, posterior arthrodesis using local autograft and allograft cancellous chips    Surgeon(s): Surgeons and Role:     * Juancho Oquendo MD - Primary     * Richy Schumacher PA-C   Estimated blood loss:             100ml   Specimens: * No specimens in log *   Findings: L5-S1 fusion performed as noted above.  Good decompression bilaterally.     She seems to be doing well.  Her  radicular pain is back to the chronic levels that she had prior to the herniation and fusion surgery.  She does get back pain particularly with sitting for extended periods of time.    Vitals:    05/20/25 1343   BP: 134/88   Pulse: 98   SpO2: 100%     Estimated body mass index is 34.3 kg/m  as calculated from the following:    Height as of 2/4/25: 1.854 m (6' 1\").    Weight as of 2/21/25: 117.9 kg (260 lb).  Moderate Pain (4)    Well-healed lumbar incisions  No tenderness to palpation bilateral lower extremity strength is 5 out of 5      Imaging: X-rays performed today show stable alignment of the hardware and position of the spine.  Imaging was reviewed with the patient shown to the patient clinic today.    Assessment: Status post lumbar fusion    Plan: Overall I think she is doing well.  I would like to see her back in 9 months with x-rays of the lumbar spine.     "

## 2025-05-20 NOTE — NURSING NOTE
"Dipak Fuentes is a 29 year old adult who presents for:  Chief Complaint   Patient presents with    Surgical Followup        Vitals:    Vitals:    05/20/25 1343   BP: 134/88   Pulse: 98   SpO2: 100%       BMI:  Estimated body mass index is 34.3 kg/m  as calculated from the following:    Height as of 2/4/25: 6' 1\" (1.854 m).    Weight as of 2/21/25: 260 lb (117.9 kg).    Pain Score:  Moderate Pain (4)        Amendo Phorn      "

## 2025-05-22 ENCOUNTER — THERAPY VISIT (OUTPATIENT)
Dept: PHYSICAL THERAPY | Facility: CLINIC | Age: 30
End: 2025-05-22
Payer: COMMERCIAL

## 2025-05-22 DIAGNOSIS — Z98.1 S/P LUMBAR FUSION: Primary | ICD-10-CM

## 2025-06-02 ENCOUNTER — TELEPHONE (OUTPATIENT)
Dept: PALLIATIVE MEDICINE | Facility: OTHER | Age: 30
End: 2025-06-02
Payer: COMMERCIAL

## 2025-06-02 NOTE — TELEPHONE ENCOUNTER
[facility] Call Center    Phone Message    May a detailed message be left on voicemail: yes     Reason for Call: Other: Patient called to schedule a SCS re-programming. Pt stated he needs a rep appt with a nurse visit. Please review and call to schedule.      Action Taken: Message routed to:  Other: Pain     Travel Screening: Not Applicable     Date of Service:

## 2025-06-04 NOTE — TELEPHONE ENCOUNTER
Patient sees Shanda Mora at [place], is this something set up on a nurse schedule at [place] anytime or only when Shanda is there?

## 2025-06-05 NOTE — TELEPHONE ENCOUNTER
Called pt. Scheduled 06/12/25 for reprogramming. Email sent to rep to coordinate.     Gabriela BARRAGAN, RN Care Coordinator  [facility]  Pain [facility]

## 2025-06-06 NOTE — TELEPHONE ENCOUNTER
Confirmed Rep for appt.     Gabriela BARRAGAN, RN Care Coordinator  [facility]  Pain [facility]

## 2025-06-11 ENCOUNTER — TELEPHONE (OUTPATIENT)
Dept: PEDIATRICS | Facility: CLINIC | Age: 30
End: 2025-06-11
Payer: COMMERCIAL

## 2025-06-11 NOTE — TELEPHONE ENCOUNTER
Attempted to reach patient to: Reschedule an appointment    When patient returns call, please take this action: Assist with rescheduling    Reason for the reschedule: sent pt mcm to call [phone] to reschedule Dr Milligan appt -provider out        If unable to reschedule: Transfer to TC line (or update telephone encounter in after-hours)

## 2025-06-12 ENCOUNTER — THERAPY VISIT (OUTPATIENT)
Dept: PHYSICAL THERAPY | Facility: CLINIC | Age: 30
End: 2025-06-12
Payer: COMMERCIAL

## 2025-06-12 ENCOUNTER — ALLIED HEALTH/NURSE VISIT (OUTPATIENT)
Dept: PALLIATIVE MEDICINE | Facility: CLINIC | Age: 30
End: 2025-06-12
Payer: COMMERCIAL

## 2025-06-12 DIAGNOSIS — Z96.89 SPINAL CORD STIMULATOR STATUS: Primary | ICD-10-CM

## 2025-06-12 DIAGNOSIS — Z98.1 S/P LUMBAR FUSION: Primary | ICD-10-CM

## 2025-06-14 ENCOUNTER — HEALTH MAINTENANCE LETTER (OUTPATIENT)
Age: 30
End: 2025-06-14

## 2025-06-30 ENCOUNTER — LAB (OUTPATIENT)
Dept: LAB | Facility: CLINIC | Age: 30
End: 2025-06-30
Attending: STUDENT IN AN ORGANIZED HEALTH CARE EDUCATION/TRAINING PROGRAM
Payer: COMMERCIAL

## 2025-06-30 DIAGNOSIS — F64.9 GENDER DYSPHORIA: ICD-10-CM

## 2025-06-30 LAB
ANION GAP SERPL CALCULATED.3IONS-SCNC: 14 MMOL/L (ref 7–15)
BUN SERPL-MCNC: 12.6 MG/DL (ref 6–20)
CALCIUM SERPL-MCNC: 9.5 MG/DL (ref 8.8–10.4)
CHLORIDE SERPL-SCNC: 101 MMOL/L (ref 98–107)
CREAT SERPL-MCNC: 0.81 MG/DL (ref 0.51–1.17)
EGFRCR SERPLBLD CKD-EPI 2021: >90 ML/MIN/1.73M2
ESTRADIOL SERPL-MCNC: 84 PG/ML
GLUCOSE SERPL-MCNC: 83 MG/DL (ref 70–99)
HCO3 SERPL-SCNC: 21 MMOL/L (ref 22–29)
POTASSIUM SERPL-SCNC: 4.7 MMOL/L (ref 3.4–5.3)
SODIUM SERPL-SCNC: 136 MMOL/L (ref 135–145)

## 2025-06-30 PROCEDURE — 80048 BASIC METABOLIC PNL TOTAL CA: CPT

## 2025-06-30 PROCEDURE — 82670 ASSAY OF TOTAL ESTRADIOL: CPT

## 2025-06-30 PROCEDURE — 84403 ASSAY OF TOTAL TESTOSTERONE: CPT

## 2025-06-30 PROCEDURE — 36415 COLL VENOUS BLD VENIPUNCTURE: CPT

## 2025-07-02 LAB — TESTOST SERPL-MCNC: 217 NG/DL (ref 8–950)

## 2025-07-03 ENCOUNTER — MYC MEDICAL ADVICE (OUTPATIENT)
Dept: PEDIATRICS | Facility: CLINIC | Age: 30
End: 2025-07-03

## 2025-07-03 DIAGNOSIS — F64.9 GENDER DYSPHORIA: ICD-10-CM

## 2025-07-07 RX ORDER — SPIRONOLACTONE 100 MG/1
100 TABLET, FILM COATED ORAL 2 TIMES DAILY
Qty: 180 TABLET | Refills: 2 | Status: SHIPPED | OUTPATIENT
Start: 2025-07-07

## 2025-07-07 NOTE — TELEPHONE ENCOUNTER
Per last Virtual visit notes w/ Dr. Milligan 7/3/25: Gender dysphoria  Increase spironolactone for T suppression. Switch estradiol to IM injection per patient preference. Labs in 3 months, follow up scheduled.  - spironolactone (ALDACTONE) 100 MG tablet; Take 1 tablet (100 mg) by mouth daily.    Was patient to increase dose of spironolactone?     Gennaro DE LA TORRE RN 7/7/2025 at 8:20 AM

## 2025-07-10 ENCOUNTER — THERAPY VISIT (OUTPATIENT)
Dept: PHYSICAL THERAPY | Facility: CLINIC | Age: 30
End: 2025-07-10
Payer: COMMERCIAL

## 2025-07-10 DIAGNOSIS — Z98.1 S/P LUMBAR FUSION: Primary | ICD-10-CM

## 2025-07-10 NOTE — PROGRESS NOTES
DISCHARGE  Reason for Discharge: Patient has met all goals.  No further expectation of progress.    Equipment Issued: theraband     07/10/25 0500   Appointment Info   Signing clinician's name / credentials Yoel Andrea, PT, DPT   Total/Authorized Visits E&T   Visits Used 10   Medical Diagnosis s/p lumbar fusion   PT Tx Diagnosis low back pain s/p lumbar fusion   Other pertinent information 2-5lb progression of lifting per week; max 50lb per neurosurgery note   Progress Note/Certification   Onset of illness/injury or Date of Surgery 02/19/25   Therapy Frequency 1x/week   Predicted Duration 12 weeks   PT Goal 1   Goal Identifier disc golf   Goal Description Pt will return to completion of 9 holes for disc golf without limitations from low back paint   Rationale to maximize safety and independence with performance of ADLs and functional tasks   Goal Progress goal met   Target Date 06/27/25   Date Met 07/10/25   Subjective Report   Subjective Report Feels stronger overall, hardware get sore overall.   Objective Measures   Objective Measures Objective Measure 1;Objective Measure 2;Objective Measure 3   Objective Measure 1   Objective Measure Lumbar AROM   Details Flexion mod loss, extension min loss, SG min loss walter   Objective Measure 2   Objective Measure MMT   Treatment Interventions (PT)   Interventions Therapeutic Procedure/Exercise;Neuromuscular Re-education   Therapeutic Procedure/Exercise   Therapeutic Procedures: strength, endurance, ROM, flexibility minutes (92547) 16   Therapeutic Procedures Ther Proc 2;Ther Proc 3;Ther Proc 4;Ther Proc 5;Ther Proc 6   Ther Proc 1 Bike   Ther Proc 1 - Details x 5'   Ther Proc 2 educadtion   Ther Proc 2 - Details discussed cont HEP now that d/c   Ther Proc 5 Supine Abdominal #4   Ther Proc 5 - Details 1x6/side - verbal cues to keep core engaged - good challenge, improved core activation   Ther Proc 6 Leg press   Ther Proc 6 - Details NT   PTRx Ther Proc 1 Supine Lumbar Hip  Roll   PTRx Ther Proc 1 - Details HEP   PTRx Ther Proc 2 Posterior Pelvic Tilt   PTRx Ther Proc 2 - Details HEP   PTRx Ther Proc 3 Supine Abdominal Exercise #3 (Marching)   PTRx Ther Proc 3 - Details perf advanced variation tolerates well overall   PTRx Ther Proc 4 Seated Trunk Rotation   PTRx Ther Proc 4 - Details perf to fatigue each way tolerates well overall    PTRx Ther Proc 5 Knee Bends   PTRx Ther Proc 5 - Details HEP   PTRx Ther Proc 6 Sit to Stand   PTRx Ther Proc 6 - Details progressed to 15 pounds x 20   PTRx Ther Proc 7 Standing Hip Flexion   PTRx Ther Proc 7 - Details HEP   PTRx Ther Proc 8 Hip Abduction Straight Leg Raise   PTRx Ther Proc 8 - Details x 30B   PTRx Ther Proc 9 All 4s Leg Lift   PTRx Ther Proc 9 - Details perf each way    Skilled Intervention Progression of strengthening - initiated light leg press and added weight to STS   Patient Response/Progress Pt tolerated well overall   Neuromuscular Re-education   Neuromuscular re-ed of mvmt, balance, coord, kinesthetic sense, posture, proprioception minutes (30377) 8   PTRx Neuro Re-ed 1 Star Exercise   PTRx Neuro Re-ed 1 - Details performed each way improving control    PTRx Neuro Re-ed 2 Rowing with Shoulders Up and Back   PTRx Neuro Re-ed 2 - Details progressed to black band x 15 with 5 sec holds good challenge overall    PTRx Neuro Re-ed 3 Shoulder Theraband Low Row/[place]   PTRx Neuro Re-ed 3 - Details progressed to black band x 20 good challenge overall    Skilled Intervention cue for form   Patient Response/Progress tolerates well overall   Education   Learner/Method Patient;Demonstration;Pictures/Video;No Barriers to Learning   Plan   Home program PTrx   Plan for next session d.c to HEP   Total Session Time   Timed Code Treatment Minutes 24   Total Treatment Time (sum of timed and untimed services) 24       Discharge Plan: Patient to continue home program.    Referring Provider:  Cara Escoto

## 2025-07-20 DIAGNOSIS — M54.16 LUMBAR RADICULOPATHY: ICD-10-CM

## 2025-07-20 DIAGNOSIS — Z98.890 S/P LUMBAR MICRODISCECTOMY: ICD-10-CM

## 2025-07-21 ENCOUNTER — TELEPHONE (OUTPATIENT)
Dept: PALLIATIVE MEDICINE | Facility: OTHER | Age: 30
End: 2025-07-21
Payer: COMMERCIAL

## 2025-07-21 NOTE — TELEPHONE ENCOUNTER
Received fax from pharmacy requesting refill(s) for     tiZANidine (ZANAFLEX) 4 MG tablet    Date last filled:  7/14/2025    Last Appt Date:  5/12/2025    Next Appt scheduled:  None on File    Pharmacy:   [id number] - [address] [facility] [address],    Will route for processing    Tracie Galo [facility]

## 2025-07-21 NOTE — TELEPHONE ENCOUNTER
[facility] Call Center    Phone Message    May a detailed message be left on voicemail: yes     Reason for Call: Other: Patient called to get an appointment scheduled to get his spinal cord stimulator checked. Please review and call patient back to schedule      Action Taken: Message routed to:  Other: [facility]     Travel Screening: Not Applicable     Date of Service:

## 2025-07-22 NOTE — TELEPHONE ENCOUNTER
Called pt. Scheduled for nurse only for reprogramming. Email set for coordination w/ haja BARRAGAN, RN Care Coordinator  [facility]  Pain [facility]

## 2025-07-24 NOTE — TELEPHONE ENCOUNTER
Time adjusted and confirmed with [employer] and pt.     Gabriela ANDREWN, RN Care Coordinator  [facility]  Pain [facility]

## 2025-07-29 ENCOUNTER — ALLIED HEALTH/NURSE VISIT (OUTPATIENT)
Dept: PALLIATIVE MEDICINE | Facility: CLINIC | Age: 30
End: 2025-07-29
Payer: COMMERCIAL

## 2025-07-29 DIAGNOSIS — G89.4 CHRONIC PAIN SYNDROME: Primary | ICD-10-CM

## 2025-07-29 PROCEDURE — 99207 PR NO CHARGE NURSE ONLY: CPT

## 2025-08-25 ENCOUNTER — MYC MEDICAL ADVICE (OUTPATIENT)
Dept: PEDIATRICS | Facility: CLINIC | Age: 30
End: 2025-08-25
Payer: COMMERCIAL

## 2025-09-04 ENCOUNTER — LAB (OUTPATIENT)
Dept: LAB | Facility: CLINIC | Age: 30
End: 2025-09-04
Payer: COMMERCIAL

## 2025-09-04 DIAGNOSIS — Z79.899 ENCOUNTER FOR LONG-TERM (CURRENT) USE OF HIGH-RISK MEDICATION: ICD-10-CM

## 2025-09-04 DIAGNOSIS — F31.31 BIPOLAR DISORDER, CURRENT EPISODE DEPRESSED, MILD (H): Primary | ICD-10-CM

## 2025-09-04 LAB
BASOPHILS # BLD AUTO: 0.04 10E3/UL (ref 0–0.2)
BASOPHILS NFR BLD AUTO: 0.4 %
EOSINOPHIL # BLD AUTO: 0.21 10E3/UL (ref 0–0.7)
EOSINOPHIL NFR BLD AUTO: 2.2 %
ERYTHROCYTE [DISTWIDTH] IN BLOOD BY AUTOMATED COUNT: 12.5 % (ref 10–15)
HCT VFR BLD AUTO: 39.8 % (ref 35–53)
HGB BLD-MCNC: 13.9 G/DL (ref 11.7–17.7)
IMM GRANULOCYTES # BLD: 0.06 10E3/UL
IMM GRANULOCYTES NFR BLD: 0.6 %
LYMPHOCYTES # BLD AUTO: 1.84 10E3/UL (ref 0.8–5.3)
LYMPHOCYTES NFR BLD AUTO: 19.5 %
MCH RBC QN AUTO: 30.7 PG (ref 26.5–33)
MCHC RBC AUTO-ENTMCNC: 34.9 G/DL (ref 31.5–36.5)
MCV RBC AUTO: 87.9 FL (ref 78–100)
MONOCYTES # BLD AUTO: 1.16 10E3/UL (ref 0–1.3)
MONOCYTES NFR BLD AUTO: 12.3 %
NEUTROPHILS # BLD AUTO: 6.11 10E3/UL (ref 1.6–8.3)
NEUTROPHILS NFR BLD AUTO: 65 %
PLATELET # BLD AUTO: 317 10E3/UL (ref 150–450)
RBC # BLD AUTO: 4.53 10E6/UL (ref 3.8–5.9)
WBC # BLD AUTO: 9.42 10E3/UL (ref 4–11)

## (undated) DEVICE — SU VICRYL 2-0 CT-2 CR 8X18" J726D

## (undated) DEVICE — SU ETHILON 3-0 FS-1 18" 669H

## (undated) DEVICE — GLOVE BIOGEL PI MICRO SZ 8.0 48580

## (undated) DEVICE — STRAP UNIVERSAL POSITIONING 2-PIECE 4X47X76" 91-287

## (undated) DEVICE — PACK SMALL SPINE RIDGES

## (undated) DEVICE — GLOVE BIOGEL PI MICRO SZ 7.5 48575

## (undated) DEVICE — DRAPE COVER C-ARM SEAMLESS SNAP-KAP 03-KP26 LATEX FREE

## (undated) DEVICE — ESU ELEC BLADE 2.75" COATED/INSULATED E1455

## (undated) DEVICE — DRAPE C-ARM W/STRAPS 42X72" 07-CA104

## (undated) DEVICE — GLOVE BIOGEL PI MICRO INDICATOR UNDERGLOVE SZ 6.5 48965

## (undated) DEVICE — PREP SKIN SCRUB TRAY 4461A

## (undated) DEVICE — LINEN ORTHO ACL PACK 5447

## (undated) DEVICE — DRAPE STERI TOWEL LG 1010

## (undated) DEVICE — SYR 30ML LL W/O NDL 302832

## (undated) DEVICE — SYR 05ML GLASS PORTEX PULSATOR LF 4904

## (undated) DEVICE — GLOVE PROTEXIS BLUE W/NEU-THERA 8.0  2D73EB80

## (undated) DEVICE — PREP POVIDONE-IODINE 7.5% SCRUB 4OZ BOTTLE MDS093945

## (undated) DEVICE — PREP CHLORAPREP 26ML TINTED HI-LITE ORANGE 930815

## (undated) DEVICE — DRAPE MICROSCOPE OPMI ZEISS 48X118" 306071-0000-000

## (undated) DEVICE — DRAPE MAYO STAND 23X54 8337

## (undated) DEVICE — Device

## (undated) DEVICE — MARKER SPHERES PASSIVE MEDT PACK 5 8801075

## (undated) DEVICE — MIDAS REX DISSECTING TOOL 5.5MM T14MH25

## (undated) DEVICE — SOL NACL 0.9% IRRIG 1000ML BOTTLE 2F7124

## (undated) DEVICE — GLOVE BIOGEL PI MICRO SZ 6.5 48565

## (undated) DEVICE — SU MONOCRYL 4-0 PS-2 27" UND Y426H

## (undated) DEVICE — DRAPE MICROSCOPE LEICA 54X150" AR8033650

## (undated) DEVICE — GLOVE PROTEXIS W/NEU-THERA 8.0  2D73TE80

## (undated) DEVICE — SOL WATER IRRIG 1000ML BOTTLE 2F7114

## (undated) DEVICE — DRAPE O ARM TUBE 9732722

## (undated) DEVICE — GLOVE PROTEXIS MICRO 7.5  2D73PM75

## (undated) DEVICE — GLOVE BIOGEL PI MICRO INDICATOR UNDERGLOVE SZ 7.5 48975

## (undated) DEVICE — BLADE CLIPPER SGL USE 9680

## (undated) DEVICE — CUSHION INSERT LG PRONE VIEW JACKSON TABLE

## (undated) DEVICE — SU VICRYL 3-0 SH 8X18" UND J864D

## (undated) DEVICE — SPONGE SURGIFOAM 100 1974

## (undated) DEVICE — PREP CHLORAPREP CLEAR 3ML 930400

## (undated) DEVICE — TOOL DISSECT MIDAS MR8 14CM TELESC MATCH 2.5 MR8-T14MH25

## (undated) DEVICE — WIRELESS EXTERNAL NEUROSTIMULATOR

## (undated) DEVICE — LINEN TOWEL PACK X5 5464

## (undated) DEVICE — SUCTION FRAZIER 12FR W/HANDLE K73

## (undated) DEVICE — ESU PENCIL W/HOLSTER E2350H

## (undated) DEVICE — GOWN XXL 9575

## (undated) DEVICE — ESU GROUND PAD ADULT W/CORD E7507

## (undated) DEVICE — POSITIONER PT PRONESAFE HEAD REST W/DERMAPROX INSERT 40599

## (undated) DEVICE — PACK NEURO MINOR UMMC SNE32MNMU4

## (undated) DEVICE — PREP POVIDONE-IODINE 10% SOLUTION 4OZ BOTTLE MDS093944

## (undated) DEVICE — SUCTION MANIFOLD NEPTUNE 2 SYS 4 PORT 0702-020-000

## (undated) DEVICE — ESU ELEC BLADE 6" COATED/INSULATED E1455-6

## (undated) DEVICE — ADH SKIN CLOSURE PREMIERPRO EXOFIN 1.0ML 3470

## (undated) DEVICE — SPONGE COTTONOID 1/2X1/2" 80-1400

## (undated) DEVICE — PAD FOAM WILSON FRAME KIT

## (undated) DEVICE — RX SURGIFLO HEMOSTATIC MATRIX 8ML 2991

## (undated) DEVICE — GLOVE PROTEXIS BLUE W/NEU-THERA 6.5  2D73EB65

## (undated) DEVICE — SU ETHIBOND 2-0 CT-2 CR 8X18" CX26D

## (undated) DEVICE — SU DERMABOND ADVANCED .7ML DNX12

## (undated) DEVICE — DRAPE C-ARMOR 5 SIDED 5523

## (undated) DEVICE — LINEN TOWEL PACK X6 WHITE 5487

## (undated) DEVICE — DRAPE X-RAY TUBE 00-901169-01-OEC

## (undated) DEVICE — BLADE BONE MILL STRK 5.0MM MED 5400-701-000

## (undated) DEVICE — LINEN TOWEL PACK X30 5481

## (undated) DEVICE — SUTURE VICRYL+ 0 CT-1 18" DYED VIO VCP740D

## (undated) DEVICE — GLOVE BIOGEL PI MICRO INDICATOR UNDERGLOVE SZ 8.0 48980

## (undated) DEVICE — GLOVE PROTEXIS BLUE W/NEU-THERA 8.5  2D73EB85

## (undated) DEVICE — SU VICRYL 0 UR-6 27" J603H

## (undated) DEVICE — KIT BONE MILL PREP & CARTRIDGE 5420-PRP-000

## (undated) DEVICE — CATH TRAY FOLEY SURESTEP 16FR W/URINE MTR STATLK LF A303416A

## (undated) DEVICE — DRAPE IOBAN INCISE 23X17" 6650EZ

## (undated) DEVICE — SPONGE RAY-TEC 4X8" 7318

## (undated) DEVICE — GLOVE BIOGEL PI MICRO INDICATOR UNDERGLOVE SZ 8.5 48985

## (undated) DEVICE — GLOVE PROTEXIS W/NEU-THERA 6.5  2D73TE65

## (undated) RX ORDER — HYDROMORPHONE HCL IN WATER/PF 6 MG/30 ML
PATIENT CONTROLLED ANALGESIA SYRINGE INTRAVENOUS
Status: DISPENSED
Start: 2025-02-19

## (undated) RX ORDER — BUPIVACAINE HYDROCHLORIDE AND EPINEPHRINE 2.5; 5 MG/ML; UG/ML
INJECTION, SOLUTION EPIDURAL; INFILTRATION; INTRACAUDAL; PERINEURAL
Status: DISPENSED
Start: 2022-09-21

## (undated) RX ORDER — BUPIVACAINE HYDROCHLORIDE AND EPINEPHRINE 2.5; 5 MG/ML; UG/ML
INJECTION, SOLUTION EPIDURAL; INFILTRATION; INTRACAUDAL; PERINEURAL
Status: DISPENSED
Start: 2025-02-19

## (undated) RX ORDER — FENTANYL CITRATE 50 UG/ML
INJECTION, SOLUTION INTRAMUSCULAR; INTRAVENOUS
Status: DISPENSED
Start: 2025-02-19

## (undated) RX ORDER — OXYCODONE HYDROCHLORIDE 5 MG/1
TABLET ORAL
Status: DISPENSED
Start: 2025-02-19

## (undated) RX ORDER — BUPIVACAINE HYDROCHLORIDE AND EPINEPHRINE 2.5; 5 MG/ML; UG/ML
INJECTION, SOLUTION EPIDURAL; INFILTRATION; INTRACAUDAL; PERINEURAL
Status: DISPENSED
Start: 2020-10-07

## (undated) RX ORDER — EPHEDRINE SULFATE 50 MG/ML
INJECTION, SOLUTION INTRAMUSCULAR; INTRAVENOUS; SUBCUTANEOUS
Status: DISPENSED
Start: 2024-01-30

## (undated) RX ORDER — LIDOCAINE HYDROCHLORIDE AND EPINEPHRINE 10; 10 MG/ML; UG/ML
INJECTION, SOLUTION INFILTRATION; PERINEURAL
Status: DISPENSED
Start: 2024-01-30

## (undated) RX ORDER — FENTANYL CITRATE 50 UG/ML
INJECTION, SOLUTION INTRAMUSCULAR; INTRAVENOUS
Status: DISPENSED
Start: 2020-10-07

## (undated) RX ORDER — CEFAZOLIN SODIUM/WATER 2 G/20 ML
SYRINGE (ML) INTRAVENOUS
Status: DISPENSED
Start: 2024-01-30

## (undated) RX ORDER — OXYCODONE HYDROCHLORIDE 5 MG/1
TABLET ORAL
Status: DISPENSED
Start: 2024-01-30

## (undated) RX ORDER — FENTANYL CITRATE 50 UG/ML
INJECTION, SOLUTION INTRAMUSCULAR; INTRAVENOUS
Status: DISPENSED
Start: 2022-09-21

## (undated) RX ORDER — KETOROLAC TROMETHAMINE 30 MG/ML
INJECTION, SOLUTION INTRAMUSCULAR; INTRAVENOUS
Status: DISPENSED
Start: 2020-10-07

## (undated) RX ORDER — HYDROMORPHONE HYDROCHLORIDE 1 MG/ML
INJECTION, SOLUTION INTRAMUSCULAR; INTRAVENOUS; SUBCUTANEOUS
Status: DISPENSED
Start: 2020-10-07

## (undated) RX ORDER — PROPOFOL 10 MG/ML
INJECTION, EMULSION INTRAVENOUS
Status: DISPENSED
Start: 2022-09-21

## (undated) RX ORDER — OXYCODONE HYDROCHLORIDE 5 MG/1
TABLET ORAL
Status: DISPENSED
Start: 2022-09-21

## (undated) RX ORDER — ACETAMINOPHEN 325 MG/1
TABLET ORAL
Status: DISPENSED
Start: 2020-10-07

## (undated) RX ORDER — CLINDAMYCIN PHOSPHATE 900 MG/50ML
INJECTION, SOLUTION INTRAVENOUS
Status: DISPENSED
Start: 2022-09-21

## (undated) RX ORDER — GLYCOPYRROLATE 0.2 MG/ML
INJECTION INTRAMUSCULAR; INTRAVENOUS
Status: DISPENSED
Start: 2020-10-07

## (undated) RX ORDER — CEFAZOLIN SODIUM/WATER 2 G/20 ML
SYRINGE (ML) INTRAVENOUS
Status: DISPENSED
Start: 2025-02-19

## (undated) RX ORDER — ACETAMINOPHEN 325 MG/1
TABLET ORAL
Status: DISPENSED
Start: 2022-09-21

## (undated) RX ORDER — METHOCARBAMOL 500 MG/1
TABLET, FILM COATED ORAL
Status: DISPENSED
Start: 2025-02-19

## (undated) RX ORDER — FENTANYL CITRATE-0.9 % NACL/PF 10 MCG/ML
PLASTIC BAG, INJECTION (ML) INTRAVENOUS
Status: DISPENSED
Start: 2025-02-19

## (undated) RX ORDER — BUPIVACAINE HYDROCHLORIDE 2.5 MG/ML
INJECTION, SOLUTION EPIDURAL; INFILTRATION; INTRACAUDAL
Status: DISPENSED
Start: 2024-01-30

## (undated) RX ORDER — ACETAMINOPHEN 325 MG/1
TABLET ORAL
Status: DISPENSED
Start: 2025-02-19

## (undated) RX ORDER — NEOSTIGMINE METHYLSULFATE 1 MG/ML
VIAL (ML) INJECTION
Status: DISPENSED
Start: 2020-10-07

## (undated) RX ORDER — OXYCODONE HYDROCHLORIDE 5 MG/1
TABLET ORAL
Status: DISPENSED
Start: 2020-10-07

## (undated) RX ORDER — PROPOFOL 10 MG/ML
INJECTION, EMULSION INTRAVENOUS
Status: DISPENSED
Start: 2024-01-30

## (undated) RX ORDER — ONDANSETRON 2 MG/ML
INJECTION INTRAMUSCULAR; INTRAVENOUS
Status: DISPENSED
Start: 2020-10-07

## (undated) RX ORDER — CLINDAMYCIN PHOSPHATE 900 MG/50ML
INJECTION, SOLUTION INTRAVENOUS
Status: DISPENSED
Start: 2020-10-07

## (undated) RX ORDER — FENTANYL CITRATE 50 UG/ML
INJECTION, SOLUTION INTRAMUSCULAR; INTRAVENOUS
Status: DISPENSED
Start: 2024-01-30

## (undated) RX ORDER — LIDOCAINE HYDROCHLORIDE 10 MG/ML
INJECTION, SOLUTION EPIDURAL; INFILTRATION; INTRACAUDAL; PERINEURAL
Status: DISPENSED
Start: 2020-10-07

## (undated) RX ORDER — PROPOFOL 10 MG/ML
INJECTION, EMULSION INTRAVENOUS
Status: DISPENSED
Start: 2020-10-07

## (undated) RX ORDER — DEXMEDETOMIDINE HYDROCHLORIDE 4 UG/ML
INJECTION, SOLUTION INTRAVENOUS
Status: DISPENSED
Start: 2025-02-19

## (undated) RX ORDER — DEXAMETHASONE SODIUM PHOSPHATE 4 MG/ML
INJECTION, SOLUTION INTRA-ARTICULAR; INTRALESIONAL; INTRAMUSCULAR; INTRAVENOUS; SOFT TISSUE
Status: DISPENSED
Start: 2020-10-07